# Patient Record
Sex: MALE | Race: WHITE | NOT HISPANIC OR LATINO | Employment: OTHER | URBAN - METROPOLITAN AREA
[De-identification: names, ages, dates, MRNs, and addresses within clinical notes are randomized per-mention and may not be internally consistent; named-entity substitution may affect disease eponyms.]

---

## 2017-02-22 ENCOUNTER — TRANSCRIBE ORDERS (OUTPATIENT)
Dept: LAB | Facility: HOSPITAL | Age: 67
End: 2017-02-22

## 2017-02-22 ENCOUNTER — LAB (OUTPATIENT)
Dept: LAB | Facility: HOSPITAL | Age: 67
End: 2017-02-22

## 2017-02-22 DIAGNOSIS — Z96.651 PRESENCE OF RIGHT ARTIFICIAL KNEE JOINT: Primary | ICD-10-CM

## 2017-02-22 DIAGNOSIS — Z96.651 PRESENCE OF RIGHT ARTIFICIAL KNEE JOINT: ICD-10-CM

## 2017-02-22 LAB
BASOPHILS # BLD AUTO: 0.07 10*3/MM3 (ref 0–0.2)
BASOPHILS NFR BLD AUTO: 1 % (ref 0–1)
CRP SERPL-MCNC: 3.4 MG/DL (ref 0–10)
DEPRECATED RDW RBC AUTO: 50.7 FL (ref 37–54)
EOSINOPHIL # BLD AUTO: 0.42 10*3/MM3 (ref 0.1–0.3)
EOSINOPHIL NFR BLD AUTO: 5.9 % (ref 0–3)
ERYTHROCYTE [DISTWIDTH] IN BLOOD BY AUTOMATED COUNT: 14.9 % (ref 11.3–14.5)
ERYTHROCYTE [SEDIMENTATION RATE] IN BLOOD: 8 MM/HR (ref 0–20)
HCT VFR BLD AUTO: 39.9 % (ref 38.9–50.9)
HGB BLD-MCNC: 13 G/DL (ref 13.1–17.5)
IMM GRANULOCYTES # BLD: 0.01 10*3/MM3 (ref 0–0.03)
IMM GRANULOCYTES NFR BLD: 0.1 % (ref 0–0.6)
LYMPHOCYTES # BLD AUTO: 1.57 10*3/MM3 (ref 0.6–4.8)
LYMPHOCYTES NFR BLD AUTO: 22.1 % (ref 24–44)
MCH RBC QN AUTO: 30 PG (ref 27–31)
MCHC RBC AUTO-ENTMCNC: 32.6 G/DL (ref 32–36)
MCV RBC AUTO: 92.1 FL (ref 80–99)
MONOCYTES # BLD AUTO: 0.85 10*3/MM3 (ref 0–1)
MONOCYTES NFR BLD AUTO: 11.9 % (ref 0–12)
NEUTROPHILS # BLD AUTO: 4.2 10*3/MM3 (ref 1.5–8.3)
NEUTROPHILS NFR BLD AUTO: 59 % (ref 41–71)
PLATELET # BLD AUTO: 251 10*3/MM3 (ref 150–450)
PMV BLD AUTO: 10.7 FL (ref 6–12)
RBC # BLD AUTO: 4.33 10*6/MM3 (ref 4.2–5.76)
WBC NRBC COR # BLD: 7.12 10*3/MM3 (ref 3.5–10.8)

## 2017-02-22 PROCEDURE — 36415 COLL VENOUS BLD VENIPUNCTURE: CPT

## 2017-02-22 PROCEDURE — 86140 C-REACTIVE PROTEIN: CPT | Performed by: ORTHOPAEDIC SURGERY

## 2017-02-22 PROCEDURE — 85652 RBC SED RATE AUTOMATED: CPT | Performed by: ORTHOPAEDIC SURGERY

## 2017-02-22 PROCEDURE — 85025 COMPLETE CBC W/AUTO DIFF WBC: CPT | Performed by: ORTHOPAEDIC SURGERY

## 2017-03-01 ENCOUNTER — TRANSCRIBE ORDERS (OUTPATIENT)
Dept: ADMINISTRATIVE | Facility: HOSPITAL | Age: 67
End: 2017-03-01

## 2017-03-01 DIAGNOSIS — M25.561 RIGHT KNEE PAIN, UNSPECIFIED CHRONICITY: Primary | ICD-10-CM

## 2017-03-15 ENCOUNTER — HOSPITAL ENCOUNTER (OUTPATIENT)
Dept: NUCLEAR MEDICINE | Facility: HOSPITAL | Age: 67
Discharge: HOME | End: 2017-03-15
Attending: ORTHOPAEDIC SURGERY

## 2017-03-15 ENCOUNTER — APPOINTMENT (OUTPATIENT)
Dept: NUCLEAR MEDICINE | Facility: HOSPITAL | Age: 67
End: 2017-03-15
Attending: ORTHOPAEDIC SURGERY

## 2017-03-15 DIAGNOSIS — M25.561 RIGHT KNEE PAIN, UNSPECIFIED CHRONICITY: ICD-10-CM

## 2017-03-15 PROCEDURE — 0 TECHNETIUM MEDRONATE KIT: Performed by: ORTHOPAEDIC SURGERY

## 2017-03-15 PROCEDURE — 78306 BONE IMAGING WHOLE BODY: CPT

## 2017-03-15 PROCEDURE — A9503 TC99M MEDRONATE: HCPCS | Performed by: ORTHOPAEDIC SURGERY

## 2017-03-15 RX ORDER — TC 99M MEDRONATE 20 MG/10ML
23.8 INJECTION, POWDER, LYOPHILIZED, FOR SOLUTION INTRAVENOUS
Status: COMPLETED | OUTPATIENT
Start: 2017-03-15 | End: 2017-03-15

## 2017-03-15 RX ADMIN — Medication 23.8 MILLICURIE: at 09:36

## 2017-09-20 ENCOUNTER — OFFICE VISIT (OUTPATIENT)
Dept: ORTHOPEDIC SURGERY | Facility: CLINIC | Age: 67
End: 2017-09-20

## 2017-09-20 VITALS
SYSTOLIC BLOOD PRESSURE: 164 MMHG | HEIGHT: 72 IN | WEIGHT: 231 LBS | DIASTOLIC BLOOD PRESSURE: 92 MMHG | HEART RATE: 69 BPM | BODY MASS INDEX: 31.29 KG/M2

## 2017-09-20 DIAGNOSIS — Z09 POSTOPERATIVE EXAMINATION: Primary | ICD-10-CM

## 2017-09-20 DIAGNOSIS — Z96.651 STATUS POST TOTAL RIGHT KNEE REPLACEMENT: ICD-10-CM

## 2017-09-20 PROCEDURE — 99213 OFFICE O/P EST LOW 20 MIN: CPT | Performed by: ORTHOPAEDIC SURGERY

## 2017-09-20 RX ORDER — NAPROXEN 500 MG/1
500 TABLET ORAL EVERY MORNING
COMMUNITY
Start: 2017-08-12 | End: 2019-05-18 | Stop reason: HOSPADM

## 2017-09-20 NOTE — PROGRESS NOTES
[facility] [place] [facility] Note    Subjective     Chief Complaint   Patient presents with   • Right Knee - Follow-up     6 month          HPI    Larry D Klinefelter is a 67 y.o. male. Patient follows up today for his right total knee arthroplasty.  Surgery was February 2016.  Pain is only mild on occasion.  He has no swelling, popping or grinding.  Symptoms are improved compared to his preoperative symptoms.  He is pleased with the results.  He is here today for routine follow-up.  There was some concern about his femoral component on his previous x-rays and most specifically with possible signs of loosening.      There is no problem list on file for this patient.    Past Medical History:   Diagnosis Date   • Alzheimer's disease    • Anemia    • Chest pain    • Emphysema of lung    • Hepatitis    • Medial meniscus tear    • Obesity    • Obstructive sleep apnea on CPAP    • Primary osteoarthritis of left knee     and right knee      Past Surgical History:   Procedure Laterality Date   • CARPAL TUNNEL RELEASE     • FRACTURE SURGERY     • KNEE ARTHROSCOPY Right    • REPLACEMENT TOTAL KNEE Right    • TONSILLECTOMY        Family History   Problem Relation Age of Onset   • Rheum arthritis Mother    • Stroke Father    • Heart attack Father    • Hypertension Father    • Clotting disorder Father    • Rheum arthritis Father    • Hypertension Other      Social History     Social History   • Marital status:      Spouse name: N/A   • Number of children: N/A   • Years of education: N/A     Occupational History   • Not on file.     Social History Main Topics   • Smoking status: Never Smoker   • Smokeless tobacco: Never Used   • Alcohol use No   • Drug use: No   • Sexual activity: Defer     Other Topics Concern   • Not on file     Social History Narrative      Current Outpatient Prescriptions on File Prior to Visit   Medication Sig Dispense Refill   • aspirin 81 MG tablet Take  by mouth Take As Directed.     •  Calcium Carb-Cholecalciferol (CALCIUM PLUS VITAMIN D3 PO) Take  by mouth Take As Directed.     • CHONDROITIN SULFATE PO Take  by mouth Take As Directed. Chondroitin Sulphate 600MG     • Coenzyme Q10 (CO Q-10) 200 MG capsule Take 400 mg by mouth Take As Directed.     • diclofenac (VOLTAREN) 1 % gel gel Place  on the skin Take As Directed.     • ezetimibe (ZETIA) 10 MG tablet Take  by mouth Take As Directed.     • Glucosamine Sulfate 750 MG capsule Take  by mouth Take As Directed.     • lidocaine-Maalox-diphenhydramine (MIRACLE MOUTHWASH) 900 mL suspension Take  by mouth Take As Directed.     • Multiple Vitamins-Minerals (CENTRUM SILVER PO) Take  by mouth Take As Directed.     • Multiple Vitamins-Minerals (PRESERVISION AREDS PO) Take  by mouth Daily.     • Multiple Vitamins-Minerals (VITEYES COMPLETE PO) Take  by mouth Daily.     • Polyethylene Glycol 3350 (MIRALAX PO) Take  by mouth Take As Directed.     • Trolamine Salicylate (ASPERCREME) 10 % lotion Apply  topically Take As Directed.     • docusate sodium (COLACE) 250 MG capsule Take  by mouth Take As Directed.     • naproxen (NAPROSYN) 375 MG tablet Take  by mouth As Needed.     • Tolnaftate (ANTIFUNGAL EX) Apply  topically Take As Directed.       No current facility-administered medications on file prior to visit.       Allergies   Allergen Reactions   • Advil [Ibuprofen]         Review of Systems   Constitutional: Negative for activity change, appetite change, chills, diaphoresis, fatigue, fever and unexpected weight change.   HENT: Negative for congestion, dental problem, drooling, ear discharge, ear pain, facial swelling, hearing loss, mouth sores, nosebleeds, postnasal drip, rhinorrhea, sinus pressure, sneezing, sore throat, tinnitus, trouble swallowing and voice change.    Eyes: Negative for photophobia, pain, discharge, redness, itching and visual disturbance.   Respiratory: Negative for apnea, cough, choking, chest tightness, shortness of breath, wheezing  "and stridor.    Cardiovascular: Negative for chest pain, palpitations and leg swelling.   Gastrointestinal: Negative for abdominal distention, abdominal pain, anal bleeding, blood in stool, constipation, diarrhea, nausea, rectal pain and vomiting.   Endocrine: Negative for cold intolerance, heat intolerance, polydipsia, polyphagia and polyuria.   Genitourinary: Negative for decreased urine volume, difficulty urinating, dysuria, enuresis, flank pain, frequency, genital sores, hematuria and urgency.   Musculoskeletal: Positive for arthralgias. Negative for back pain, gait problem, joint swelling, myalgias, neck pain and neck stiffness.   Skin: Negative for color change, pallor, rash and wound.   Allergic/Immunologic: Negative for environmental allergies, food allergies and immunocompromised state.   Neurological: Negative for dizziness, tremors, seizures, syncope, facial asymmetry, speech difficulty, weakness, light-headedness, numbness and headaches.   Hematological: Negative for adenopathy. Does not bruise/bleed easily.   Psychiatric/Behavioral: Negative for agitation, behavioral problems, confusion, decreased concentration, dysphoric mood, hallucinations, self-injury, sleep disturbance and suicidal ideas. The patient is not nervous/anxious and is not hyperactive.         Objective      Physical Exam  /92  Pulse 69  Ht 72\" (182.9 cm)  Wt 231 lb (105 kg)  BMI 31.33 kg/m2    Body mass index is 31.33 kg/(m^2).    General:   Mental Status:  Alert   Appearance: Cooperative, in no acute distress   Build and Nutrition: Overweight    Orientation: Alert and oriented to person, place and time   Posture: Normal   Gait: Normal    Integument:   Right knee: Wound is well-healed with no signs of infection    Lower Extremities:   Right Knee:    Tenderness:  No medial or lateral joint line tenderness    Effusion:  1+    Swelling: None    Crepitus:  None    Range of motion:  Extension: 0°       Flexion: 120°  Instability: "  No varus laxity, no valgus laxity, negative anterior drawer  Deformities:  None  '    Imaging/Studies  Imaging Results (last 24 hours)     Procedure Component Value Units Date/Time    XR Knee 3+ View With [place] Right [[id number]] Resulted:  09/20/17 1156     Updated:  09/20/17 1157    Narrative:       Right Knee Radiographs  Indication: status-post right total knee arthroplasty  Views: AP, lateral, and sunrise views of the right knee    Comparison: no change compared to prior study    Findings:    The components are well aligned, and the findings on the femur are   stable, with the exception of some osteolysis noted posteriorly by the   condyles.            Assessment and Plan     Alec was seen today for follow-up.    Diagnoses and all orders for this visit:    Postoperative examination    Status post total right knee replacement  -     XR Knee 3+ View With [place] Right      I reviewed my findings with patient today.  We will continue to keep an eye on the femoral component, and I will see him back in a year with repeat x-rays.  I will see him back sooner for any problems.  I see no signs of infection clinically, and the patient has had improved symptoms compared to his preoperative symptoms.  He is pleased with his knee.    Return in about 1 year (around 9/20/2018) for Recheck with X-Rays.      Medical Decision Making    Data/Risk: radiology tests and independent visualization of imaging, lab tests, or EMG/NCV      Willian Jc MD  09/20/17  12:07 PM

## 2017-11-03 ENCOUNTER — TELEPHONE (OUTPATIENT)
Dept: ORTHOPEDIC SURGERY | Facility: CLINIC | Age: 67
End: 2017-11-03

## 2017-11-03 NOTE — TELEPHONE ENCOUNTER
Patient would like to know if he needs to take an antibiotic prior to his toe nail removal procedure on 11/14/17. He had right knee total replacement surgery by Dr. Jc about 1 1/2 years ago.

## 2017-11-03 NOTE — TELEPHONE ENCOUNTER
I called patient and told him he does not need an antibiotic for that procedure.  He understood.  Nikki

## 2018-09-19 ENCOUNTER — OFFICE VISIT (OUTPATIENT)
Dept: ORTHOPEDIC SURGERY | Facility: CLINIC | Age: 68
End: 2018-09-19

## 2018-09-19 VITALS — OXYGEN SATURATION: 98 % | BODY MASS INDEX: 32.43 KG/M2 | HEIGHT: 72 IN | HEART RATE: 70 BPM | WEIGHT: 239.42 LBS

## 2018-09-19 DIAGNOSIS — Z96.651 HISTORY OF TOTAL RIGHT KNEE REPLACEMENT: Primary | ICD-10-CM

## 2018-09-19 DIAGNOSIS — Z09 POSTOPERATIVE EXAMINATION: ICD-10-CM

## 2018-09-19 PROCEDURE — 99213 OFFICE O/P EST LOW 20 MIN: CPT | Performed by: ORTHOPAEDIC SURGERY

## 2018-09-19 RX ORDER — NITROGLYCERIN 0.4 MG/1
0.4 TABLET SUBLINGUAL AS NEEDED
COMMUNITY

## 2018-09-19 RX ORDER — ATORVASTATIN CALCIUM 20 MG/1
20 TABLET, FILM COATED ORAL DAILY
COMMUNITY
Start: 2018-06-26 | End: 2020-07-08 | Stop reason: SDUPTHER

## 2018-09-19 RX ORDER — LEVOTHYROXINE SODIUM 25 MCG
25 TABLET ORAL DAILY
COMMUNITY
Start: 2018-07-20

## 2018-09-19 RX ORDER — LISINOPRIL 5 MG/1
5 TABLET ORAL NIGHTLY
COMMUNITY
Start: 2018-07-25 | End: 2020-11-01

## 2018-09-19 RX ORDER — METOPROLOL SUCCINATE 25 MG
25 TABLET, EXTENDED RELEASE 24 HR ORAL DAILY
Status: ON HOLD | COMMUNITY
Start: 2018-06-26 | End: 2020-11-02

## 2018-09-19 NOTE — PROGRESS NOTES
[facility] – [place] [facility] Note    Subjective     Chief Complaint   Patient presents with   • Right Knee - Follow-up     1 year f/u  Status post total right knee replacement 2/16        HPI    Larry D Klinefelter is a 68 y.o. male.  He follows up today for his right knee.  He is not having any pain.  Fully ambulatory without external aids.  Perforating follow-up.  We were monitoring the radiographic features of his knee.      There is no problem list on file for this patient.    Past Medical History:   Diagnosis Date   • Alzheimer's disease    • Anemia    • Chest pain    • Emphysema of lung (CMS/HCC)    • Hepatitis    • Medial meniscus tear    • Obesity    • Obstructive sleep apnea on CPAP    • Primary osteoarthritis of left knee     and right knee      Past Surgical History:   Procedure Laterality Date   • CARPAL TUNNEL RELEASE     • FRACTURE SURGERY     • KNEE ARTHROSCOPY Right    • REPLACEMENT TOTAL KNEE Right    • TONSILLECTOMY        Family History   Problem Relation Age of Onset   • Rheum arthritis Mother    • Stroke Father    • Heart attack Father    • Hypertension Father    • Clotting disorder Father    • Rheum arthritis Father    • Hypertension Other      Social History     Social History   • Marital status:      Spouse name: N/A   • Number of children: N/A   • Years of education: N/A     Occupational History   • Not on file.     Social History Main Topics   • Smoking status: Never Smoker   • Smokeless tobacco: Never Used   • Alcohol use No   • Drug use: No   • Sexual activity: Defer     Other Topics Concern   • Not on file     Social History Narrative   • No narrative on file      Current Outpatient Prescriptions on File Prior to Visit   Medication Sig Dispense Refill   • aspirin 81 MG tablet Take  by mouth Take As Directed.     • Calcium Carb-Cholecalciferol (CALCIUM PLUS VITAMIN D3 PO) Take  by mouth Take As Directed.     • CHONDROITIN SULFATE PO Take  by mouth Take As Directed.  Chondroitin Sulphate 600MG     • Coenzyme Q10 (CO Q-10) 200 MG capsule Take 400 mg by mouth Take As Directed.     • diclofenac (VOLTAREN) 1 % gel gel Place  on the skin Take As Directed.     • docusate sodium (COLACE) 250 MG capsule Take  by mouth Take As Directed.     • ezetimibe (ZETIA) 10 MG tablet Take  by mouth Take As Directed.     • Glucosamine Sulfate 750 MG capsule Take  by mouth Take As Directed.     • Multiple Vitamins-Minerals (CENTRUM SILVER PO) Take  by mouth Take As Directed.     • Multiple Vitamins-Minerals (PRESERVISION AREDS PO) Take  by mouth Daily.     • naproxen (NAPROSYN) 500 MG tablet      • Polyethylene Glycol 3350 (MIRALAX PO) Take  by mouth Take As Directed.     • [DISCONTINUED] lidocaine-Maalox-diphenhydramine (MIRACLE MOUTHWASH) 900 mL suspension Take  by mouth Take As Directed.     • [DISCONTINUED] Multiple Vitamins-Minerals (VITEYES COMPLETE PO) Take  by mouth Daily.     • [DISCONTINUED] naproxen (NAPROSYN) 375 MG tablet Take  by mouth As Needed.     • [DISCONTINUED] Tolnaftate (ANTIFUNGAL EX) Apply  topically Take As Directed.     • [DISCONTINUED] Trolamine Salicylate (ASPERCREME) 10 % lotion Apply  topically Take As Directed.       No current facility-administered medications on file prior to visit.       Allergies   Allergen Reactions   • Advil [Ibuprofen]         Review of Systems   Constitutional: Negative for activity change, appetite change, chills, diaphoresis, fatigue, fever and unexpected weight change.   HENT: Negative for congestion, dental problem, drooling, ear discharge, ear pain, facial swelling, hearing loss, mouth sores, nosebleeds, postnasal drip, rhinorrhea, sinus pressure, sneezing, sore throat, tinnitus, trouble swallowing and voice change.    Eyes: Negative for photophobia, pain, discharge, redness, itching and visual disturbance.   Respiratory: Positive for apnea. Negative for cough, choking, chest tightness, shortness of breath, wheezing and stridor.   "  Cardiovascular: Negative for chest pain, palpitations and leg swelling.   Gastrointestinal: Negative for abdominal distention, abdominal pain, anal bleeding, blood in stool, constipation, diarrhea, nausea, rectal pain and vomiting.   Endocrine: Negative for cold intolerance, heat intolerance, polydipsia, polyphagia and polyuria.   Genitourinary: Negative for decreased urine volume, difficulty urinating, dysuria, enuresis, flank pain, frequency, genital sores, hematuria and urgency.   Musculoskeletal: Positive for arthralgias. Negative for back pain, gait problem, joint swelling, myalgias, neck pain and neck stiffness.   Skin: Negative for color change, pallor, rash and wound.   Allergic/Immunologic: Negative for environmental allergies, food allergies and immunocompromised state.   Neurological: Negative for dizziness, tremors, seizures, syncope, facial asymmetry, speech difficulty, weakness, light-headedness, numbness and headaches.   Hematological: Negative for adenopathy. Does not bruise/bleed easily.   Psychiatric/Behavioral: Negative for agitation, behavioral problems, confusion, decreased concentration, dysphoric mood, hallucinations, self-injury, sleep disturbance and suicidal ideas. The patient is not nervous/anxious and is not hyperactive.         Objective      Physical Exam  Pulse 70   Ht 182.9 cm (72.01\")   Wt 109 kg (239 lb 6.7 oz)   SpO2 98%   BMI 32.46 kg/m²     Body mass index is 32.46 kg/m².    General:   Mental Status:  Alert   Appearance: Cooperative, in no acute distress   Build and Nutrition: Overweight male   Orientation: Alert and oriented to person, place and time   Gait: Hunched over posture, with accommodating gait    Integument:   Right knee: Wound is well-healed with no signs of infection    Lower Extremities:   Right Knee:    Tenderness:  None    Effusion:  1+    Swelling: None    Crepitus:  None    Range of motion:  Extension: 0°       Flexion: 125°  Instability:  No varus laxity, " no valgus laxity, negative anterior drawer  Deformities:  None    Imaging/Studies      Imaging Results (last 24 hours)     Procedure Component Value Units Date/Time    XR Knee 3+ View With [place] Right [[id number]] Resulted:  09/19/18 1149     Updated:  09/19/18 1150    Narrative:       Right Knee Radiographs  Indication: status-post right total knee arthroplasty  Views: AP, lateral, and sunrise views of the right knee    Comparison: no change compared to prior study, 9/20/2017    Findings:   The radiographic loosened lines are stable compared to previous films, on   both the femoral and tibial aspects, with no acute bony features.    Implants are well aligned.          Assessment and Plan     Alec was seen today for follow-up.    Diagnoses and all orders for this visit:    History of total right knee replacement  -     XR Knee 3+ View With [place] Right    Postoperative examination        I reviewed my findings with patient today.  Implants are stable radiographically, and is not having any knee pain.  I will see him back in a year with repeat x-rays, but sooner for any problems.    Return in about 1 year (around 9/19/2019) for Recheck with X-Rays.      Medical Decision Making  Data/Risk: radiology tests and independent visualization of imaging, lab tests, or EMG/NCV      Willian Jc MD  09/19/18  11:58 AM

## 2019-02-06 ENCOUNTER — OFFICE VISIT (OUTPATIENT)
Dept: ORTHOPEDIC SURGERY | Facility: CLINIC | Age: 69
End: 2019-02-06

## 2019-02-06 VITALS — OXYGEN SATURATION: 97 % | HEIGHT: 72 IN | HEART RATE: 64 BPM | WEIGHT: 235.23 LBS | BODY MASS INDEX: 31.86 KG/M2

## 2019-02-06 DIAGNOSIS — M17.12 PRIMARY OSTEOARTHRITIS OF LEFT KNEE: Primary | ICD-10-CM

## 2019-02-06 PROCEDURE — 99213 OFFICE O/P EST LOW 20 MIN: CPT | Performed by: ORTHOPAEDIC SURGERY

## 2019-02-06 NOTE — PROGRESS NOTES
[facility] Note    Subjective     Chief Complaint   Patient presents with   • Left Knee - Pain        HPI    Larry D Klinefelter is a 68 y.o. male.  He presents today for evaluation of left knee pain.  He has had pain for several years, worsening over time.  Worse with climbing stairs and with work activities.  The pain is 5 out of 10, dull, no improvement with bracing.  Of note, he did well with a left total knee replacement.  No history of personal blood clots.  History of injections in the past.      There is no problem list on file for this patient.    Past Medical History:   Diagnosis Date   • Alzheimer's disease    • Anemia    • Chest pain    • Emphysema of lung (CMS/HCC)    • Hepatitis    • Medial meniscus tear    • Obesity    • Obstructive sleep apnea on CPAP    • Primary osteoarthritis of left knee     and right knee      Past Surgical History:   Procedure Laterality Date   • CARPAL TUNNEL RELEASE     • FRACTURE SURGERY     • KNEE ARTHROSCOPY Right    • REPLACEMENT TOTAL KNEE Right    • TONSILLECTOMY        Family History   Problem Relation Age of Onset   • Rheum arthritis Mother    • Stroke Father    • Heart attack Father    • Hypertension Father    • Clotting disorder Father    • Rheum arthritis Father    • Hypertension Other      Social History     Socioeconomic History   • Marital status:      Spouse name: Not on file   • Number of children: Not on file   • Years of education: Not on file   • Highest education level: Not on file   Social Needs   • Financial resource strain: Not on file   • Food insecurity - worry: Not on file   • Food insecurity - inability: Not on file   • Transportation needs - medical: Not on file   • Transportation needs - non-medical: Not on file   Occupational History   • Not on file   Tobacco Use   • Smoking status: Never Smoker   • Smokeless tobacco: Never Used   Substance and Sexual Activity   • Alcohol use: No   • Drug use: No   • Sexual  activity: Defer   Other Topics Concern   • Not on file   Social History Narrative   • Not on file      Current Outpatient Medications on File Prior to Visit   Medication Sig Dispense Refill   • aspirin 81 MG tablet Take  by mouth Take As Directed.     • atorvastatin (LIPITOR) 10 MG tablet      • Calcium Carb-Cholecalciferol (CALCIUM PLUS VITAMIN D3 PO) Take  by mouth Take As Directed.     • CHONDROITIN SULFATE PO Take  by mouth Take As Directed. Chondroitin Sulphate 600MG     • Coenzyme Q10 (CO Q-10) 200 MG capsule Take 400 mg by mouth Take As Directed.     • diclofenac (VOLTAREN) 1 % gel gel Place  on the skin Take As Directed.     • docusate sodium (COLACE) 250 MG capsule Take  by mouth Take As Directed.     • ezetimibe (ZETIA) 10 MG tablet Take  by mouth Take As Directed.     • Glucosamine Sulfate 750 MG capsule Take  by mouth Take As Directed.     • lisinopril (PRINIVIL,ZESTRIL) 5 MG tablet      • metoprolol tartrate (LOPRESSOR) 25 MG tablet Take 25 mg by mouth 2 (Two) Times a Day.     • Multiple Vitamins-Minerals (CENTRUM SILVER PO) Take  by mouth Take As Directed.     • Multiple Vitamins-Minerals (PRESERVISION AREDS PO) Take  by mouth Daily.     • naproxen (NAPROSYN) 500 MG tablet      • nitroglycerin (NITROSTAT) 0.3 MG SL tablet Place 0.3 mg under the tongue As Needed for Chest Pain. Take no more than 3 doses in 15 minutes.     • Polyethylene Glycol 3350 (MIRALAX PO) Take  by mouth Take As Directed.     • SYNTHROID 25 MCG tablet      • TOPROL XL 25 MG 24 hr tablet        No current facility-administered medications on file prior to visit.       Allergies   Allergen Reactions   • Advil [Ibuprofen]         Review of Systems   Constitutional: Negative for activity change, appetite change, chills, diaphoresis, fatigue, fever and unexpected weight change.   HENT: Negative for congestion, dental problem, drooling, ear discharge, ear pain, facial swelling, hearing loss, mouth sores, nosebleeds, postnasal drip,  "rhinorrhea, sinus pressure, sneezing, sore throat, tinnitus, trouble swallowing and voice change.    Eyes: Negative for photophobia, pain, discharge, redness, itching and visual disturbance.   Respiratory: Negative for apnea, cough, choking, chest tightness, shortness of breath, wheezing and stridor.    Cardiovascular: Negative for chest pain, palpitations and leg swelling.   Gastrointestinal: Negative for abdominal distention, abdominal pain, anal bleeding, blood in stool, constipation, diarrhea, nausea, rectal pain and vomiting.   Endocrine: Negative for cold intolerance, heat intolerance, polydipsia, polyphagia and polyuria.   Genitourinary: Negative for decreased urine volume, difficulty urinating, dysuria, enuresis, flank pain, frequency, genital sores, hematuria and urgency.   Musculoskeletal: Negative for arthralgias, back pain, gait problem, joint swelling, myalgias, neck pain and neck stiffness.        Left knee pain      Skin: Negative for color change, pallor, rash and wound.   Allergic/Immunologic: Negative for environmental allergies, food allergies and immunocompromised state.   Neurological: Negative for dizziness, tremors, seizures, syncope, facial asymmetry, speech difficulty, weakness, light-headedness, numbness and headaches.   Hematological: Negative for adenopathy. Does not bruise/bleed easily.   Psychiatric/Behavioral: Negative for agitation, behavioral problems, confusion, decreased concentration, dysphoric mood, hallucinations, self-injury, sleep disturbance and suicidal ideas. The patient is not nervous/anxious and is not hyperactive.         Objective      Physical Exam  Pulse 64   Ht 182.9 cm (72.01\")   Wt 107 kg (235 lb 3.7 oz)   SpO2 97%   BMI 31.90 kg/m²     Body mass index is 31.9 kg/m².    General:   Mental Status:  Alert   Appearance: Cooperative, in no acute distress   Build and Nutrition: Well-nourished and well developed male   Orientation: Alert and oriented to person, place " and time   Posture: Leaning forward   Gait: Limping on the left    Integument:   Left knee: No skin lesions, no rash, no ecchymosis    Lower Extremities:   Left Knee:    Tenderness:  Medial joint line tenderness    Effusion:  None    Swelling:  None    Crepitus:  Positive    Atrophy:  None    Range of motion:  Extension: 5°       Flexion: 120°  Instability:  No varus laxity, no valgus laxity, negative anterior drawer  Deformities:  Varus        Imaging/Studies  Imaging Results (last 24 hours)     Procedure Component Value Units Date/Time    XR Knee 4+ View Left [[id number]] Resulted:  02/06/19 1013     Updated:  02/06/19 1013    Narrative:       Left Knee Radiographs  Indication: left knee pain  Views: Standing AP's and skiers of both knees, with lateral and sunrise   views of the left knee    Comparison: no prior studies available    Findings:   Advanced arthritic changes, with bone-on-bone contact in medial   compartment, with medial osteophytes, severe varus alignment, lateral   osteophytes, and advanced patellofemoral degeneration.            Assessment and Plan     Alec was seen today for pain.    Diagnoses and all orders for this visit:    Primary osteoarthritis of left knee  -     XR Knee 4+ View Left        I reviewed my findings with patient today.  He has advanced left knee arthritis, and is a candidate for knee replacement surgery.  He wishes to discuss this with his wife.  He will contact me if he would like to proceed in the future.  He may also decide to do an injection instead, and we'll contact me if you would like to do that.  I did discuss that he could not have surgery for 3 months after an injection.  Please see my counseling note for details.      Surgical Counseling     I have informed the patient of the diagnosis and the prognosis.  Exhaustive conservative treatment modalities have not resulted in long term pain relief.  The symptoms have progressed to the point of daily pain and inability to  perform activities of daily living without significant pain. The patient has reached the point of desiring to proceed with total knee arthroplasty after discussing the risks, benefits and alternatives to the procedure.  The surgical procedure itself was discussed in detail. Risks of the procedure were discussed, which included but are not limited to, bleeding, infection, damage to blood vessels and nerves, incomplete pain relief, loosening of the prosthesis, deep infection, need for further surgery, loss of limb, deep venous thrombosis, pulmonary embolus, death, heart attack, stroke, kidney failure, liver failure, and anesthetic complications.  In addition, the potential for deep infection developing in the future was discussed, which could require further surgery.  The knee would have to be re-opened, debrided, and potentially remove the prosthesis, which may or may not be replaced in the future.  Also, the possibility for loosening of the prosthesis has been mentioned.  If the prosthesis loosened, a revision arthroplasty could be performed, with results that are not as predictable compared to the original procedure.  The typical rehabilitative course has also been discussed, and full recovery may take up to a year to see the maximum benefit.  The importance of patient cooperation in the rehabilitative efforts has also been discussed.  No guarantees whatsoever were given.  The patient understands the potential risks versus the benefits and desires to proceed with total knee arthroplasty at a mutually convenient time.      Return for When he opts for surgical intervention or injection as an alternative.      Medical Decision Making  Management Options : major surgery with risk factors  Data/Risk: radiology tests and independent visualization of imaging, lab tests, or EMG/NCV      Willian Jc MD  02/06/19  10:26 AM

## 2019-02-11 ENCOUNTER — PREP FOR SURGERY (OUTPATIENT)
Dept: OTHER | Facility: HOSPITAL | Age: 69
End: 2019-02-11

## 2019-02-11 DIAGNOSIS — M17.12 PRIMARY OSTEOARTHRITIS OF LEFT KNEE: Primary | ICD-10-CM

## 2019-02-11 RX ORDER — ACETAMINOPHEN 500 MG
1000 TABLET ORAL ONCE
Status: CANCELLED | OUTPATIENT
Start: 2019-02-11 | End: 2019-02-11

## 2019-02-11 RX ORDER — CHLORHEXIDINE GLUCONATE 4 G/100ML
SOLUTION TOPICAL DAILY
Qty: 236 ML | Refills: 0 | Status: ON HOLD | OUTPATIENT
Start: 2019-02-11 | End: 2019-05-16

## 2019-02-11 RX ORDER — CEFAZOLIN SODIUM 2 G/100ML
2 INJECTION, SOLUTION INTRAVENOUS ONCE
Status: CANCELLED | OUTPATIENT
Start: 2019-02-11 | End: 2019-02-11

## 2019-02-11 RX ORDER — PREGABALIN 150 MG/1
150 CAPSULE ORAL ONCE
Status: CANCELLED | OUTPATIENT
Start: 2019-02-11 | End: 2019-02-11

## 2019-03-05 ENCOUNTER — APPOINTMENT (OUTPATIENT)
Dept: PREADMISSION TESTING | Facility: HOSPITAL | Age: 69
End: 2019-03-05

## 2019-03-05 VITALS — HEIGHT: 70 IN | WEIGHT: 245.15 LBS | BODY MASS INDEX: 35.1 KG/M2

## 2019-03-05 DIAGNOSIS — M17.12 PRIMARY OSTEOARTHRITIS OF LEFT KNEE: ICD-10-CM

## 2019-03-05 LAB
ANION GAP SERPL CALCULATED.3IONS-SCNC: 7 MMOL/L (ref 3–11)
APTT PPP: 28.4 SECONDS (ref 24–37)
BASOPHILS # BLD AUTO: 0.05 10*3/MM3 (ref 0–0.2)
BASOPHILS NFR BLD AUTO: 0.7 % (ref 0–1)
BILIRUB UR QL STRIP: NEGATIVE
BUN BLD-MCNC: 19 MG/DL (ref 9–23)
BUN/CREAT SERPL: 21.8 (ref 7–25)
CALCIUM SPEC-SCNC: 9.2 MG/DL (ref 8.7–10.4)
CHLORIDE SERPL-SCNC: 107 MMOL/L (ref 99–109)
CLARITY UR: CLEAR
CO2 SERPL-SCNC: 26 MMOL/L (ref 20–31)
COLOR UR: YELLOW
CREAT BLD-MCNC: 0.87 MG/DL (ref 0.6–1.3)
CRP SERPL-MCNC: 0.2 MG/DL (ref 0–1)
DEPRECATED RDW RBC AUTO: 51.4 FL (ref 37–54)
EOSINOPHIL # BLD AUTO: 0.35 10*3/MM3 (ref 0–0.3)
EOSINOPHIL NFR BLD AUTO: 4.8 % (ref 0–3)
ERYTHROCYTE [DISTWIDTH] IN BLOOD BY AUTOMATED COUNT: 14.5 % (ref 11.3–14.5)
ERYTHROCYTE [SEDIMENTATION RATE] IN BLOOD: 5 MM/HR (ref 0–20)
GFR SERPL CREATININE-BSD FRML MDRD: 87 ML/MIN/1.73
GLUCOSE BLD-MCNC: 110 MG/DL (ref 70–100)
GLUCOSE UR STRIP-MCNC: NEGATIVE MG/DL
HBA1C MFR BLD: 5.4 % (ref 4.8–5.6)
HCT VFR BLD AUTO: 41.9 % (ref 38.9–50.9)
HGB BLD-MCNC: 13.4 G/DL (ref 13.1–17.5)
HGB UR QL STRIP.AUTO: NEGATIVE
IMM GRANULOCYTES # BLD AUTO: 0.01 10*3/MM3 (ref 0–0.05)
IMM GRANULOCYTES NFR BLD AUTO: 0.1 % (ref 0–0.6)
INR PPP: 1.12 (ref 0.85–1.16)
KETONES UR QL STRIP: NEGATIVE
LEUKOCYTE ESTERASE UR QL STRIP.AUTO: NEGATIVE
LYMPHOCYTES # BLD AUTO: 1.38 10*3/MM3 (ref 0.6–4.8)
LYMPHOCYTES NFR BLD AUTO: 18.8 % (ref 24–44)
MCH RBC QN AUTO: 30.9 PG (ref 27–31)
MCHC RBC AUTO-ENTMCNC: 32 G/DL (ref 32–36)
MCV RBC AUTO: 96.8 FL (ref 80–99)
MONOCYTES # BLD AUTO: 0.73 10*3/MM3 (ref 0–1)
MONOCYTES NFR BLD AUTO: 9.9 % (ref 0–12)
NEUTROPHILS # BLD AUTO: 4.83 10*3/MM3 (ref 1.5–8.3)
NEUTROPHILS NFR BLD AUTO: 65.8 % (ref 41–71)
NITRITE UR QL STRIP: NEGATIVE
PH UR STRIP.AUTO: 6.5 [PH] (ref 5–8)
PLATELET # BLD AUTO: 194 10*3/MM3 (ref 150–450)
PMV BLD AUTO: 10.3 FL (ref 6–12)
POTASSIUM BLD-SCNC: 4.2 MMOL/L (ref 3.5–5.5)
PROT UR QL STRIP: NEGATIVE
PROTHROMBIN TIME: 13.9 SECONDS (ref 11.2–14.3)
RBC # BLD AUTO: 4.33 10*6/MM3 (ref 4.2–5.76)
SODIUM BLD-SCNC: 140 MMOL/L (ref 132–146)
SP GR UR STRIP: 1.01 (ref 1–1.03)
UROBILINOGEN UR QL STRIP: NORMAL
WBC NRBC COR # BLD: 7.34 10*3/MM3 (ref 3.5–10.8)

## 2019-03-05 PROCEDURE — 93010 ELECTROCARDIOGRAM REPORT: CPT | Performed by: INTERNAL MEDICINE

## 2019-03-05 PROCEDURE — 36415 COLL VENOUS BLD VENIPUNCTURE: CPT

## 2019-03-05 PROCEDURE — 80048 BASIC METABOLIC PNL TOTAL CA: CPT | Performed by: ORTHOPAEDIC SURGERY

## 2019-03-05 PROCEDURE — 83036 HEMOGLOBIN GLYCOSYLATED A1C: CPT | Performed by: ANESTHESIOLOGY

## 2019-03-05 PROCEDURE — 86140 C-REACTIVE PROTEIN: CPT | Performed by: ORTHOPAEDIC SURGERY

## 2019-03-05 PROCEDURE — 85025 COMPLETE CBC W/AUTO DIFF WBC: CPT | Performed by: ORTHOPAEDIC SURGERY

## 2019-03-05 PROCEDURE — 85730 THROMBOPLASTIN TIME PARTIAL: CPT | Performed by: ORTHOPAEDIC SURGERY

## 2019-03-05 PROCEDURE — 93005 ELECTROCARDIOGRAM TRACING: CPT

## 2019-03-05 PROCEDURE — 81003 URINALYSIS AUTO W/O SCOPE: CPT | Performed by: ORTHOPAEDIC SURGERY

## 2019-03-05 PROCEDURE — 85652 RBC SED RATE AUTOMATED: CPT | Performed by: ORTHOPAEDIC SURGERY

## 2019-03-05 PROCEDURE — 85610 PROTHROMBIN TIME: CPT | Performed by: ORTHOPAEDIC SURGERY

## 2019-03-05 RX ORDER — FERROUS SULFATE TAB EC 324 MG (65 MG FE EQUIVALENT) 324 (65 FE) MG
324 TABLET DELAYED RESPONSE ORAL NIGHTLY
COMMUNITY

## 2019-03-05 ASSESSMENT — KOOS JR
KOOS JR SCORE: 57.14
KOOS JR SCORE: 12

## 2019-03-05 NOTE — DISCHARGE INSTRUCTIONS
The following information and instructions were given:    Do not eat, drink, smoke or chew gum after midnight the night before surgery. This also includes no mints.  Take all routine, prescribed medications including heart and blood pressure medicines with a sip of water unless otherwise instructed by your physician.   Do NOT take diabetic medication unless instructed by your physician.    If you were instructed to drink 20 ounces (or until full) of Gatorade or G2 (if diabetic) the morning of surgery, the Gatorade or G2 must be completed 1 hour before arrival time on the day of surgery .  No RED Gatorade or G2.      DO NOT shave for two days before your procedure.  Do not wear makeup.      DO NOT wear fingernail polish (gel/regular) and/or acrylic/artificial nails on the day of surgery.   If you have had a recent manicure and would rather not remove polish or artificial nails, then the minimum requirement is that the polish/artificial nails must be removed from the middle finger on each hand.      If you are having surgery/procedure on an upper extremity, then fingernail polish/artificial fingernails must be removed for surgery.  NO EXCEPTIONS.      If you are having surgery/procedure on a lower extremity, then toenail polish on both extremities must be removed for surgery.  NO EXCEPTIONS.    Remove all jewelry (advise to go to jeweler if unable to remove).  Jewelry, especially rings, can no longer be taped for surgery.    Leave anything you consider valuable at home.    Leave your suitcase in the car until after your surgery.    Bring the following with you the day of your procedure (when applicable)       -picture ID and insurance cards       -Co-pay/deductible required by insurance       -Medications in the original bottles (not a list) including all over-the-counter medications if not brought to PAT       -Copy of advance directive, living will or power of  documents if not brought to PAT       -CPAP or  BIPAP mask and tubing (do not bring machine)       -Skin prep instruction(s) sheet       -PAT Pass    Education booklet, brochure, handout or binder related to procedure given to patient.    When applicable, an ERAS booklet was given to patient.    Pain Control After Surgery handout given to patient.    Respirex use (handout given to patient) and pneumonia prevention education provided.    Signs and Symptoms of infection discussed.    DVT Prevention education given.  Stressing the importance of ambulation.    Please apply Chlorhexadine wipes to surgical area (if instructed) the night before procedure and the AM of procedure and document date/time of applications on skin prep instruction sheet.

## 2019-03-05 NOTE — PAT
Cardiac clearance requested from Dr. Kraft's office.     Verified with patient that they received a prescription for Bactroban and Chlorhexidine shower from the office.  Reinforced with them to fill the prescription if they haven't already.  Verbal and written instructions given regarding proper use of the Bactroban and Chlorhexidine to patient and/or famlily during PAT visit. Patient/family also instructed to complete checklist and return it to Pre-op on the day of surgery.  Patient and/or family verbalized understanding.    Patient to apply Chlorhexadine wipes  to surgical area (as instructed) the night before procedure and the AM of procedure. Wipes provided.    Patient instructed to drink 20 ounces (or until full) of Gatorade or 20 ounces of G2 (if diabetic) and complete 1 hour before arrival time for procedure (NO RED Gatorade or G2)    Patient verbalized understanding.    Per Anesthesia Request, patient instructed not to take their ACE/ARB medications on the AM of surgery.    Patient attended joint replacement class today during PAT visit.

## 2019-05-01 ENCOUNTER — HOSPITAL ENCOUNTER (OUTPATIENT)
Dept: GENERAL RADIOLOGY | Facility: HOSPITAL | Age: 69
Discharge: HOME | End: 2019-05-01
Admitting: ORTHOPAEDIC SURGERY

## 2019-05-01 ENCOUNTER — APPOINTMENT (OUTPATIENT)
Dept: PREADMISSION TESTING | Facility: HOSPITAL | Age: 69
End: 2019-05-01

## 2019-05-01 VITALS — BODY MASS INDEX: 35.66 KG/M2 | HEIGHT: 70 IN | WEIGHT: 249.12 LBS

## 2019-05-01 LAB
ANION GAP SERPL CALCULATED.3IONS-SCNC: 11 MMOL/L
APTT PPP: 29.6 SECONDS (ref 24–37)
BACTERIA UR QL AUTO: NORMAL /HPF
BASOPHILS # BLD AUTO: 0.05 10*3/MM3 (ref 0–0.2)
BASOPHILS NFR BLD AUTO: 0.7 % (ref 0–1.5)
BILIRUB UR QL STRIP: NEGATIVE
BUN BLD-MCNC: 21 MG/DL (ref 8–23)
BUN/CREAT SERPL: 24.4 (ref 7–25)
CALCIUM SPEC-SCNC: 9.2 MG/DL (ref 8.6–10.5)
CHLORIDE SERPL-SCNC: 103 MMOL/L (ref 98–107)
CLARITY UR: CLEAR
CO2 SERPL-SCNC: 28 MMOL/L (ref 22–29)
COLOR UR: YELLOW
CREAT BLD-MCNC: 0.86 MG/DL (ref 0.76–1.27)
CRP SERPL-MCNC: 1.36 MG/DL (ref 0–0.5)
DEPRECATED RDW RBC AUTO: 47.7 FL (ref 37–54)
EOSINOPHIL # BLD AUTO: 0.43 10*3/MM3 (ref 0–0.4)
EOSINOPHIL NFR BLD AUTO: 5.6 % (ref 0.3–6.2)
ERYTHROCYTE [DISTWIDTH] IN BLOOD BY AUTOMATED COUNT: 13.4 % (ref 12.3–15.4)
ERYTHROCYTE [SEDIMENTATION RATE] IN BLOOD: 10 MM/HR (ref 0–20)
GFR SERPL CREATININE-BSD FRML MDRD: 88 ML/MIN/1.73
GLUCOSE BLD-MCNC: 114 MG/DL (ref 65–99)
GLUCOSE UR STRIP-MCNC: NEGATIVE MG/DL
HBA1C MFR BLD: 5 % (ref 4.8–5.6)
HCT VFR BLD AUTO: 39.6 % (ref 37.5–51)
HGB BLD-MCNC: 12.9 G/DL (ref 13–17.7)
HGB UR QL STRIP.AUTO: NEGATIVE
HYALINE CASTS UR QL AUTO: NORMAL /LPF
IMM GRANULOCYTES # BLD AUTO: 0.02 10*3/MM3 (ref 0–0.05)
IMM GRANULOCYTES NFR BLD AUTO: 0.3 % (ref 0–0.5)
INR PPP: 1.1 (ref 0.85–1.16)
KETONES UR QL STRIP: NEGATIVE
LEUKOCYTE ESTERASE UR QL STRIP.AUTO: NEGATIVE
LYMPHOCYTES # BLD AUTO: 1.49 10*3/MM3 (ref 0.7–3.1)
LYMPHOCYTES NFR BLD AUTO: 19.5 % (ref 19.6–45.3)
MCH RBC QN AUTO: 32 PG (ref 26.6–33)
MCHC RBC AUTO-ENTMCNC: 32.6 G/DL (ref 31.5–35.7)
MCV RBC AUTO: 98.3 FL (ref 79–97)
MONOCYTES # BLD AUTO: 0.72 10*3/MM3 (ref 0.1–0.9)
MONOCYTES NFR BLD AUTO: 9.4 % (ref 5–12)
NEUTROPHILS # BLD AUTO: 4.97 10*3/MM3 (ref 1.7–7)
NEUTROPHILS NFR BLD AUTO: 64.8 % (ref 42.7–76)
NITRITE UR QL STRIP: NEGATIVE
PH UR STRIP.AUTO: 6.5 [PH] (ref 5–8)
PLATELET # BLD AUTO: 234 10*3/MM3 (ref 140–450)
PMV BLD AUTO: 10.2 FL (ref 6–12)
POTASSIUM BLD-SCNC: 4.6 MMOL/L (ref 3.5–5.2)
PROT UR QL STRIP: NEGATIVE
PROTHROMBIN TIME: 13.7 SECONDS (ref 11.2–14.3)
RBC # BLD AUTO: 4.03 10*6/MM3 (ref 4.14–5.8)
RBC # UR: NORMAL /HPF
REF LAB TEST METHOD: NORMAL
SODIUM BLD-SCNC: 142 MMOL/L (ref 136–145)
SP GR UR STRIP: 1.03 (ref 1–1.03)
SQUAMOUS #/AREA URNS HPF: NORMAL /HPF
UROBILINOGEN UR QL STRIP: NORMAL
WBC NRBC COR # BLD: 7.66 10*3/MM3 (ref 3.4–10.8)
WBC UR QL AUTO: NORMAL /HPF

## 2019-05-01 PROCEDURE — 85730 THROMBOPLASTIN TIME PARTIAL: CPT | Performed by: ORTHOPAEDIC SURGERY

## 2019-05-01 PROCEDURE — 81001 URINALYSIS AUTO W/SCOPE: CPT | Performed by: ORTHOPAEDIC SURGERY

## 2019-05-01 PROCEDURE — 86140 C-REACTIVE PROTEIN: CPT | Performed by: ORTHOPAEDIC SURGERY

## 2019-05-01 PROCEDURE — 85025 COMPLETE CBC W/AUTO DIFF WBC: CPT | Performed by: ORTHOPAEDIC SURGERY

## 2019-05-01 PROCEDURE — 36415 COLL VENOUS BLD VENIPUNCTURE: CPT

## 2019-05-01 PROCEDURE — 83036 HEMOGLOBIN GLYCOSYLATED A1C: CPT | Performed by: ORTHOPAEDIC SURGERY

## 2019-05-01 PROCEDURE — 80048 BASIC METABOLIC PNL TOTAL CA: CPT | Performed by: ORTHOPAEDIC SURGERY

## 2019-05-01 PROCEDURE — 85652 RBC SED RATE AUTOMATED: CPT | Performed by: ORTHOPAEDIC SURGERY

## 2019-05-01 PROCEDURE — 71046 X-RAY EXAM CHEST 2 VIEWS: CPT

## 2019-05-01 PROCEDURE — 85610 PROTHROMBIN TIME: CPT | Performed by: ORTHOPAEDIC SURGERY

## 2019-05-01 ASSESSMENT — KOOS JR
KOOS JR SCORE: 61.583
KOOS JR SCORE: 10

## 2019-05-01 NOTE — PAT
Verified with patient that they received a prescription for Bactroban and Chlorhexidine shower from the office.  Reinforced with them to fill the prescription if they haven't already.  Verbal and written instructions given regarding proper use of the Bactroban and Chlorhexidine to patient and/or famlily during PAT visit. Patient/family also instructed to complete checklist and return it to Pre-op on the day of surgery.  Patient and/or family verbalized understanding.    Patient to apply Chlorhexadine wipes  to surgical area (as instructed) the night before procedure and the AM of procedure. Wipes provided.    Passed memory screen 5/5    Pt and wife plan to attend joint class before surgery date, instructed that class is on tuedays or thursdays at 1pm    Per deangelo in dr parkinson office, pt is cleared for surgery from a cardiac standpoint and ok to stop aspirin as directed by dr londono and to resume with his recs after surgery

## 2019-05-09 ENCOUNTER — TELEPHONE (OUTPATIENT)
Dept: ORTHOPEDIC SURGERY | Facility: CLINIC | Age: 69
End: 2019-05-09

## 2019-05-09 NOTE — TELEPHONE ENCOUNTER
----- Message from Willian Jc MD sent at 5/9/2019  9:37 AM EDT -----  Yes, that is fine.    ----- Message -----  From: Sabrina Self  Sent: 5/9/2019   9:33 AM  To: Willian Jc MD    IS IT OK FOR PATIENT TO GET LUCENTIS INJECTION IN HIS EYES THE DAY BEFORE SX. THIS IS FOR MACULAR DEGENERATION.    TXS

## 2019-05-13 ENCOUNTER — DOCUMENTATION (OUTPATIENT)
Dept: SOCIAL WORK | Facility: HOSPITAL | Age: 69
End: 2019-05-13

## 2019-05-13 NOTE — PROGRESS NOTES
"9/13- I spoke with Mr Klinefelter by phone to discuss d/c plan for upcoming surgery.   His plan is to go to [facility]. He went there after his right TKR in 2016. We reviewed Medicare guidelines that classify TKR's as overnight outpatient/observation. He verbalized understanding. With his permission I made a pre op referral to [facility]. I spoke with Aleksandr who accepted referral. Explained that [facility] may not have a bed available, his second choice would be [facility], but he is open to other SNS's if needed. Also explained that his insurance may not approve if he is too \"high level\". He has attended pre op joint class. He already has rolling walker and installed high toilet, no other questions verbalized.  S.Kellerman RN, case mgt  "

## 2019-05-15 ENCOUNTER — ANESTHESIA EVENT (OUTPATIENT)
Dept: PERIOP | Facility: HOSPITAL | Age: 69
End: 2019-05-15

## 2019-05-15 RX ORDER — FAMOTIDINE 10 MG/ML
20 INJECTION, SOLUTION INTRAVENOUS ONCE
Status: CANCELLED | OUTPATIENT
Start: 2019-05-15 | End: 2019-05-15

## 2019-05-16 ENCOUNTER — APPOINTMENT (OUTPATIENT)
Dept: GENERAL RADIOLOGY | Facility: HOSPITAL | Age: 69
End: 2019-05-16

## 2019-05-16 ENCOUNTER — ANESTHESIA (OUTPATIENT)
Dept: PERIOP | Facility: HOSPITAL | Age: 69
End: 2019-05-16

## 2019-05-16 ENCOUNTER — HOSPITAL ENCOUNTER (OUTPATIENT)
Facility: HOSPITAL | Age: 69
Discharge: HOME | End: 2019-05-18
Attending: ORTHOPAEDIC SURGERY | Admitting: ORTHOPAEDIC SURGERY

## 2019-05-16 DIAGNOSIS — Z78.9 IMPAIRED MOBILITY AND ADLS: ICD-10-CM

## 2019-05-16 DIAGNOSIS — Z74.09 IMPAIRED FUNCTIONAL MOBILITY, BALANCE, GAIT, AND ENDURANCE: Primary | ICD-10-CM

## 2019-05-16 DIAGNOSIS — Z74.09 IMPAIRED MOBILITY AND ADLS: ICD-10-CM

## 2019-05-16 DIAGNOSIS — M17.12 PRIMARY OSTEOARTHRITIS OF LEFT KNEE: ICD-10-CM

## 2019-05-16 PROBLEM — I10 HTN (HYPERTENSION): Status: ACTIVE | Noted: 2019-05-16

## 2019-05-16 PROBLEM — Z99.89 OSA ON CPAP: Status: ACTIVE | Noted: 2019-05-16

## 2019-05-16 PROBLEM — E03.9 HYPOTHYROID: Status: ACTIVE | Noted: 2019-05-16

## 2019-05-16 PROBLEM — E78.5 HLD (HYPERLIPIDEMIA): Status: ACTIVE | Noted: 2019-05-16

## 2019-05-16 PROBLEM — G47.33 OSA ON CPAP: Status: ACTIVE | Noted: 2019-05-16

## 2019-05-16 PROBLEM — I25.10 CAD (CORONARY ARTERY DISEASE): Status: ACTIVE | Noted: 2019-05-16

## 2019-05-16 PROBLEM — Z96.652 STATUS POST TOTAL LEFT KNEE REPLACEMENT: Status: ACTIVE | Noted: 2019-05-16

## 2019-05-16 LAB — POTASSIUM BLD-SCNC: 4.2 MMOL/L (ref 3.5–5.2)

## 2019-05-16 PROCEDURE — A9270 NON-COVERED ITEM OR SERVICE: HCPCS | Performed by: ORTHOPAEDIC SURGERY

## 2019-05-16 PROCEDURE — 97161 PT EVAL LOW COMPLEX 20 MIN: CPT

## 2019-05-16 PROCEDURE — 25010000002 FENTANYL CITRATE (PF) 100 MCG/2ML SOLUTION: Performed by: NURSE ANESTHETIST, CERTIFIED REGISTERED

## 2019-05-16 PROCEDURE — 63710000001 FAMOTIDINE 20 MG TABLET: Performed by: ANESTHESIOLOGY

## 2019-05-16 PROCEDURE — 63710000001 MUPIROCIN 2 % OINTMENT: Performed by: ORTHOPAEDIC SURGERY

## 2019-05-16 PROCEDURE — A9270 NON-COVERED ITEM OR SERVICE: HCPCS | Performed by: NURSE PRACTITIONER

## 2019-05-16 PROCEDURE — 63710000001 ACETAMINOPHEN 500 MG TABLET: Performed by: ORTHOPAEDIC SURGERY

## 2019-05-16 PROCEDURE — 27447 TOTAL KNEE ARTHROPLASTY: CPT | Performed by: ORTHOPAEDIC SURGERY

## 2019-05-16 PROCEDURE — 63710000001 PREGABALIN 75 MG CAPSULE: Performed by: ORTHOPAEDIC SURGERY

## 2019-05-16 PROCEDURE — 25010000002 ROPIVACINE HCL-NACL: Performed by: NURSE ANESTHETIST, CERTIFIED REGISTERED

## 2019-05-16 PROCEDURE — 25010000002 DEXAMETHASONE PER 1 MG: Performed by: NURSE ANESTHETIST, CERTIFIED REGISTERED

## 2019-05-16 PROCEDURE — 94799 UNLISTED PULMONARY SVC/PX: CPT

## 2019-05-16 PROCEDURE — 84132 ASSAY OF SERUM POTASSIUM: CPT | Performed by: ANESTHESIOLOGY

## 2019-05-16 PROCEDURE — 25010000003 CEFAZOLIN IN DEXTROSE 2-4 GM/100ML-% SOLUTION: Performed by: ORTHOPAEDIC SURGERY

## 2019-05-16 PROCEDURE — C1776 JOINT DEVICE (IMPLANTABLE): HCPCS | Performed by: ORTHOPAEDIC SURGERY

## 2019-05-16 PROCEDURE — C1713 ANCHOR/SCREW BN/BN,TIS/BN: HCPCS | Performed by: ORTHOPAEDIC SURGERY

## 2019-05-16 PROCEDURE — 63710000001 BISACODYL 5 MG TABLET DELAYED-RELEASE: Performed by: ORTHOPAEDIC SURGERY

## 2019-05-16 PROCEDURE — 25010000002 ONDANSETRON PER 1 MG: Performed by: NURSE ANESTHETIST, CERTIFIED REGISTERED

## 2019-05-16 PROCEDURE — 63710000001 ATORVASTATIN 20 MG TABLET: Performed by: NURSE PRACTITIONER

## 2019-05-16 PROCEDURE — A9270 NON-COVERED ITEM OR SERVICE: HCPCS | Performed by: ANESTHESIOLOGY

## 2019-05-16 PROCEDURE — 73560 X-RAY EXAM OF KNEE 1 OR 2: CPT

## 2019-05-16 PROCEDURE — 97116 GAIT TRAINING THERAPY: CPT

## 2019-05-16 PROCEDURE — 25010000002 PROPOFOL 1000 MG/ML EMULSION: Performed by: NURSE ANESTHETIST, CERTIFIED REGISTERED

## 2019-05-16 PROCEDURE — 63710000001 LISINOPRIL 5 MG TABLET: Performed by: NURSE PRACTITIONER

## 2019-05-16 PROCEDURE — 63710000001 HYDROCODONE-ACETAMINOPHEN 5-325 MG TABLET: Performed by: ORTHOPAEDIC SURGERY

## 2019-05-16 PROCEDURE — 25010000002 ROPIVACAINE PER 1 MG: Performed by: ORTHOPAEDIC SURGERY

## 2019-05-16 PROCEDURE — 25010000002 ONDANSETRON PER 1 MG: Performed by: ORTHOPAEDIC SURGERY

## 2019-05-16 DEVICE — ATTUNE KNEE SYSTEM TIBIAL BASE ROTATING PLATFORM SIZE 7 CEMENTED
Type: IMPLANTABLE DEVICE | Site: KNEE | Status: FUNCTIONAL
Brand: ATTUNE

## 2019-05-16 DEVICE — TOTL KN ATTUNE DEPUY 9527038: Type: IMPLANTABLE DEVICE | Site: KNEE | Status: FUNCTIONAL

## 2019-05-16 DEVICE — ATTUNE KNEE SYSTEM FEMORAL POSTERIOR STABILIZED SIZE 8 LEFT CEMENTED
Type: IMPLANTABLE DEVICE | Site: KNEE | Status: FUNCTIONAL
Brand: ATTUNE

## 2019-05-16 DEVICE — CMT BONE SIMPLEX/P FULL DOSE 10/PK: Type: IMPLANTABLE DEVICE | Site: KNEE | Status: FUNCTIONAL

## 2019-05-16 DEVICE — ATTUNE PATELLA MEDIALIZED ANATOMIC 41MM CEMENTED AOX
Type: IMPLANTABLE DEVICE | Site: PATELLA | Status: FUNCTIONAL
Brand: ATTUNE

## 2019-05-16 DEVICE — ATTUNE KNEE SYSTEM TIBIAL INSERT ROTATING PLATFORM POSTERIOR STABILIZED 8 5MM AOX
Type: IMPLANTABLE DEVICE | Site: KNEE | Status: FUNCTIONAL
Brand: ATTUNE

## 2019-05-16 RX ORDER — ACETAMINOPHEN 500 MG
1000 TABLET ORAL ONCE
Status: COMPLETED | OUTPATIENT
Start: 2019-05-16 | End: 2019-05-16

## 2019-05-16 RX ORDER — ACETAMINOPHEN 160 MG/5ML
650 SOLUTION ORAL EVERY 4 HOURS PRN
Status: DISCONTINUED | OUTPATIENT
Start: 2019-05-16 | End: 2019-05-18 | Stop reason: HOSPADM

## 2019-05-16 RX ORDER — ONDANSETRON 4 MG/1
4 TABLET, FILM COATED ORAL EVERY 6 HOURS PRN
Status: DISCONTINUED | OUTPATIENT
Start: 2019-05-16 | End: 2019-05-18 | Stop reason: HOSPADM

## 2019-05-16 RX ORDER — NALOXONE HCL 0.4 MG/ML
0.1 VIAL (ML) INJECTION
Status: DISCONTINUED | OUTPATIENT
Start: 2019-05-16 | End: 2019-05-18 | Stop reason: HOSPADM

## 2019-05-16 RX ORDER — SODIUM CHLORIDE 0.9 % (FLUSH) 0.9 %
3-10 SYRINGE (ML) INJECTION AS NEEDED
Status: DISCONTINUED | OUTPATIENT
Start: 2019-05-16 | End: 2019-05-16 | Stop reason: HOSPADM

## 2019-05-16 RX ORDER — METOPROLOL SUCCINATE 25 MG/1
25 TABLET, EXTENDED RELEASE ORAL DAILY
Status: DISCONTINUED | OUTPATIENT
Start: 2019-05-17 | End: 2019-05-18 | Stop reason: HOSPADM

## 2019-05-16 RX ORDER — CEFAZOLIN SODIUM 2 G/100ML
2 INJECTION, SOLUTION INTRAVENOUS EVERY 8 HOURS
Status: COMPLETED | OUTPATIENT
Start: 2019-05-16 | End: 2019-05-17

## 2019-05-16 RX ORDER — SODIUM CHLORIDE 0.9 % (FLUSH) 0.9 %
3 SYRINGE (ML) INJECTION EVERY 12 HOURS SCHEDULED
Status: DISCONTINUED | OUTPATIENT
Start: 2019-05-16 | End: 2019-05-16 | Stop reason: HOSPADM

## 2019-05-16 RX ORDER — ACETAMINOPHEN 325 MG/1
650 TABLET ORAL EVERY 4 HOURS PRN
Status: DISCONTINUED | OUTPATIENT
Start: 2019-05-16 | End: 2019-05-18 | Stop reason: HOSPADM

## 2019-05-16 RX ORDER — ACETAMINOPHEN 325 MG/1
650 TABLET ORAL ONCE AS NEEDED
Status: DISCONTINUED | OUTPATIENT
Start: 2019-05-16 | End: 2019-05-18 | Stop reason: HOSPADM

## 2019-05-16 RX ORDER — ACETAMINOPHEN 650 MG/1
650 SUPPOSITORY RECTAL ONCE AS NEEDED
Status: DISCONTINUED | OUTPATIENT
Start: 2019-05-16 | End: 2019-05-18 | Stop reason: HOSPADM

## 2019-05-16 RX ORDER — PREGABALIN 150 MG/1
150 CAPSULE ORAL ONCE
Status: COMPLETED | OUTPATIENT
Start: 2019-05-16 | End: 2019-05-16

## 2019-05-16 RX ORDER — ONDANSETRON 2 MG/ML
4 INJECTION INTRAMUSCULAR; INTRAVENOUS ONCE AS NEEDED
Status: DISCONTINUED | OUTPATIENT
Start: 2019-05-16 | End: 2019-05-18 | Stop reason: HOSPADM

## 2019-05-16 RX ORDER — ONDANSETRON 2 MG/ML
4 INJECTION INTRAMUSCULAR; INTRAVENOUS EVERY 6 HOURS PRN
Status: DISCONTINUED | OUTPATIENT
Start: 2019-05-16 | End: 2019-05-18 | Stop reason: HOSPADM

## 2019-05-16 RX ORDER — OXYCODONE AND ACETAMINOPHEN 7.5; 325 MG/1; MG/1
1 TABLET ORAL ONCE AS NEEDED
Status: DISCONTINUED | OUTPATIENT
Start: 2019-05-16 | End: 2019-05-18 | Stop reason: HOSPADM

## 2019-05-16 RX ORDER — MORPHINE SULFATE 4 MG/ML
4 INJECTION, SOLUTION INTRAMUSCULAR; INTRAVENOUS
Status: DISCONTINUED | OUTPATIENT
Start: 2019-05-16 | End: 2019-05-18 | Stop reason: HOSPADM

## 2019-05-16 RX ORDER — LIDOCAINE HYDROCHLORIDE 10 MG/ML
0.5 INJECTION, SOLUTION EPIDURAL; INFILTRATION; INTRACAUDAL; PERINEURAL ONCE AS NEEDED
Status: COMPLETED | OUTPATIENT
Start: 2019-05-16 | End: 2019-05-16

## 2019-05-16 RX ORDER — LABETALOL HYDROCHLORIDE 5 MG/ML
10 INJECTION, SOLUTION INTRAVENOUS EVERY 4 HOURS PRN
Status: DISCONTINUED | OUTPATIENT
Start: 2019-05-16 | End: 2019-05-18 | Stop reason: HOSPADM

## 2019-05-16 RX ORDER — SODIUM CHLORIDE, SODIUM LACTATE, POTASSIUM CHLORIDE, CALCIUM CHLORIDE 600; 310; 30; 20 MG/100ML; MG/100ML; MG/100ML; MG/100ML
9 INJECTION, SOLUTION INTRAVENOUS CONTINUOUS
Status: DISCONTINUED | OUTPATIENT
Start: 2019-05-16 | End: 2019-05-16

## 2019-05-16 RX ORDER — MAGNESIUM HYDROXIDE 1200 MG/15ML
LIQUID ORAL AS NEEDED
Status: DISCONTINUED | OUTPATIENT
Start: 2019-05-16 | End: 2019-05-16 | Stop reason: HOSPADM

## 2019-05-16 RX ORDER — OXYCODONE AND ACETAMINOPHEN 10; 325 MG/1; MG/1
1 TABLET ORAL EVERY 4 HOURS PRN
Status: DISCONTINUED | OUTPATIENT
Start: 2019-05-16 | End: 2019-05-18 | Stop reason: HOSPADM

## 2019-05-16 RX ORDER — PROMETHAZINE HYDROCHLORIDE 25 MG/1
25 SUPPOSITORY RECTAL ONCE AS NEEDED
Status: DISCONTINUED | OUTPATIENT
Start: 2019-05-16 | End: 2019-05-18 | Stop reason: HOSPADM

## 2019-05-16 RX ORDER — ONDANSETRON 2 MG/ML
INJECTION INTRAMUSCULAR; INTRAVENOUS AS NEEDED
Status: DISCONTINUED | OUTPATIENT
Start: 2019-05-16 | End: 2019-05-16 | Stop reason: SURG

## 2019-05-16 RX ORDER — BISACODYL 10 MG
10 SUPPOSITORY, RECTAL RECTAL DAILY PRN
Status: DISCONTINUED | OUTPATIENT
Start: 2019-05-16 | End: 2019-05-18 | Stop reason: HOSPADM

## 2019-05-16 RX ORDER — FENTANYL CITRATE 50 UG/ML
INJECTION, SOLUTION INTRAMUSCULAR; INTRAVENOUS AS NEEDED
Status: DISCONTINUED | OUTPATIENT
Start: 2019-05-16 | End: 2019-05-16 | Stop reason: SURG

## 2019-05-16 RX ORDER — ATORVASTATIN CALCIUM 20 MG/1
20 TABLET, FILM COATED ORAL DAILY
Status: DISCONTINUED | OUTPATIENT
Start: 2019-05-16 | End: 2019-05-18 | Stop reason: HOSPADM

## 2019-05-16 RX ORDER — TRANEXAMIC ACID 100 MG/ML
INJECTION, SOLUTION INTRAVENOUS AS NEEDED
Status: DISCONTINUED | OUTPATIENT
Start: 2019-05-16 | End: 2019-05-16 | Stop reason: SURG

## 2019-05-16 RX ORDER — FAMOTIDINE 20 MG/1
20 TABLET, FILM COATED ORAL ONCE
Status: COMPLETED | OUTPATIENT
Start: 2019-05-16 | End: 2019-05-16

## 2019-05-16 RX ORDER — HYDROCODONE BITARTRATE AND ACETAMINOPHEN 5; 325 MG/1; MG/1
1 TABLET ORAL EVERY 4 HOURS PRN
Status: DISCONTINUED | OUTPATIENT
Start: 2019-05-16 | End: 2019-05-18 | Stop reason: HOSPADM

## 2019-05-16 RX ORDER — NALOXONE HCL 0.4 MG/ML
0.4 VIAL (ML) INJECTION
Status: DISCONTINUED | OUTPATIENT
Start: 2019-05-16 | End: 2019-05-18 | Stop reason: HOSPADM

## 2019-05-16 RX ORDER — PROMETHAZINE HYDROCHLORIDE 25 MG/ML
6.25 INJECTION, SOLUTION INTRAMUSCULAR; INTRAVENOUS ONCE AS NEEDED
Status: DISCONTINUED | OUTPATIENT
Start: 2019-05-16 | End: 2019-05-18 | Stop reason: HOSPADM

## 2019-05-16 RX ORDER — PROMETHAZINE HYDROCHLORIDE 25 MG/1
25 TABLET ORAL ONCE AS NEEDED
Status: DISCONTINUED | OUTPATIENT
Start: 2019-05-16 | End: 2019-05-18 | Stop reason: HOSPADM

## 2019-05-16 RX ORDER — ASPIRIN 325 MG
325 TABLET, DELAYED RELEASE (ENTERIC COATED) ORAL DAILY
Status: DISCONTINUED | OUTPATIENT
Start: 2019-05-17 | End: 2019-05-18 | Stop reason: HOSPADM

## 2019-05-16 RX ORDER — ROPIVACAINE HYDROCHLORIDE 5 MG/ML
INJECTION, SOLUTION EPIDURAL; INFILTRATION; PERINEURAL AS NEEDED
Status: DISCONTINUED | OUTPATIENT
Start: 2019-05-16 | End: 2019-05-16 | Stop reason: HOSPADM

## 2019-05-16 RX ORDER — MEPERIDINE HYDROCHLORIDE 50 MG/ML
12.5 INJECTION INTRAMUSCULAR; INTRAVENOUS; SUBCUTANEOUS
Status: ACTIVE | OUTPATIENT
Start: 2019-05-16 | End: 2019-05-17

## 2019-05-16 RX ORDER — BUPIVACAINE HYDROCHLORIDE 2.5 MG/ML
INJECTION, SOLUTION EPIDURAL; INFILTRATION; INTRACAUDAL
Status: DISCONTINUED | OUTPATIENT
Start: 2019-05-16 | End: 2019-05-16 | Stop reason: SURG

## 2019-05-16 RX ORDER — BUPIVACAINE HYDROCHLORIDE 5 MG/ML
INJECTION, SOLUTION PERINEURAL
Status: COMPLETED | OUTPATIENT
Start: 2019-05-16 | End: 2019-05-16

## 2019-05-16 RX ORDER — IPRATROPIUM BROMIDE AND ALBUTEROL SULFATE 2.5; .5 MG/3ML; MG/3ML
3 SOLUTION RESPIRATORY (INHALATION) ONCE AS NEEDED
Status: DISCONTINUED | OUTPATIENT
Start: 2019-05-16 | End: 2019-05-18 | Stop reason: HOSPADM

## 2019-05-16 RX ORDER — SODIUM CHLORIDE 0.9 % (FLUSH) 0.9 %
1-10 SYRINGE (ML) INJECTION AS NEEDED
Status: DISCONTINUED | OUTPATIENT
Start: 2019-05-16 | End: 2019-05-18 | Stop reason: HOSPADM

## 2019-05-16 RX ORDER — DEXAMETHASONE SODIUM PHOSPHATE 4 MG/ML
INJECTION, SOLUTION INTRA-ARTICULAR; INTRALESIONAL; INTRAMUSCULAR; INTRAVENOUS; SOFT TISSUE AS NEEDED
Status: DISCONTINUED | OUTPATIENT
Start: 2019-05-16 | End: 2019-05-16 | Stop reason: SURG

## 2019-05-16 RX ORDER — HYDROMORPHONE HYDROCHLORIDE 1 MG/ML
0.5 INJECTION, SOLUTION INTRAMUSCULAR; INTRAVENOUS; SUBCUTANEOUS
Status: DISCONTINUED | OUTPATIENT
Start: 2019-05-16 | End: 2019-05-18 | Stop reason: HOSPADM

## 2019-05-16 RX ORDER — SODIUM CHLORIDE 0.9 % (FLUSH) 0.9 %
3 SYRINGE (ML) INJECTION EVERY 12 HOURS SCHEDULED
Status: DISCONTINUED | OUTPATIENT
Start: 2019-05-16 | End: 2019-05-18 | Stop reason: HOSPADM

## 2019-05-16 RX ORDER — POLYETHYLENE GLYCOL 3350 17 G/17G
17 POWDER, FOR SOLUTION ORAL DAILY
Status: DISCONTINUED | OUTPATIENT
Start: 2019-05-16 | End: 2019-05-16

## 2019-05-16 RX ORDER — CEFAZOLIN SODIUM 2 G/100ML
2 INJECTION, SOLUTION INTRAVENOUS ONCE
Status: COMPLETED | OUTPATIENT
Start: 2019-05-16 | End: 2019-05-16

## 2019-05-16 RX ORDER — FENTANYL CITRATE 50 UG/ML
50 INJECTION, SOLUTION INTRAMUSCULAR; INTRAVENOUS
Status: DISCONTINUED | OUTPATIENT
Start: 2019-05-16 | End: 2019-05-18 | Stop reason: HOSPADM

## 2019-05-16 RX ORDER — DOCUSATE SODIUM 100 MG/1
100 CAPSULE, LIQUID FILLED ORAL 2 TIMES DAILY PRN
Status: DISCONTINUED | OUTPATIENT
Start: 2019-05-16 | End: 2019-05-18 | Stop reason: HOSPADM

## 2019-05-16 RX ORDER — MELOXICAM 7.5 MG/1
15 TABLET ORAL DAILY
Status: DISCONTINUED | OUTPATIENT
Start: 2019-05-17 | End: 2019-05-18 | Stop reason: HOSPADM

## 2019-05-16 RX ORDER — LISINOPRIL 5 MG/1
5 TABLET ORAL NIGHTLY
Status: DISCONTINUED | OUTPATIENT
Start: 2019-05-16 | End: 2019-05-18 | Stop reason: HOSPADM

## 2019-05-16 RX ORDER — SODIUM CHLORIDE 9 MG/ML
120 INJECTION, SOLUTION INTRAVENOUS CONTINUOUS
Status: DISCONTINUED | OUTPATIENT
Start: 2019-05-16 | End: 2019-05-18 | Stop reason: HOSPADM

## 2019-05-16 RX ORDER — BISACODYL 5 MG/1
10 TABLET, DELAYED RELEASE ORAL DAILY PRN
Status: DISCONTINUED | OUTPATIENT
Start: 2019-05-16 | End: 2019-05-18 | Stop reason: HOSPADM

## 2019-05-16 RX ORDER — LEVOTHYROXINE SODIUM 0.03 MG/1
25 TABLET ORAL
Status: DISCONTINUED | OUTPATIENT
Start: 2019-05-17 | End: 2019-05-18 | Stop reason: HOSPADM

## 2019-05-16 RX ADMIN — SODIUM CHLORIDE, POTASSIUM CHLORIDE, SODIUM LACTATE AND CALCIUM CHLORIDE 9 ML/HR: 600; 310; 30; 20 INJECTION, SOLUTION INTRAVENOUS at 08:02

## 2019-05-16 RX ADMIN — ROPIVACAINE HYDROCHLORIDE 10 ML/HR: 5 INJECTION, SOLUTION EPIDURAL; INFILTRATION; PERINEURAL at 12:24

## 2019-05-16 RX ADMIN — HYDROCODONE BITARTRATE AND ACETAMINOPHEN 1 TABLET: 5; 325 TABLET ORAL at 17:51

## 2019-05-16 RX ADMIN — PROPOFOL 50 MCG/KG/MIN: 10 INJECTION, EMULSION INTRAVENOUS at 10:37

## 2019-05-16 RX ADMIN — TRANEXAMIC ACID 1000 MG: 100 INJECTION, SOLUTION INTRAVENOUS at 12:01

## 2019-05-16 RX ADMIN — MUPIROCIN 1 APPLICATION: 20 OINTMENT TOPICAL at 08:02

## 2019-05-16 RX ADMIN — BISACODYL 10 MG: 5 TABLET, COATED ORAL at 20:25

## 2019-05-16 RX ADMIN — ACETAMINOPHEN 1000 MG: 500 TABLET ORAL at 08:01

## 2019-05-16 RX ADMIN — CEFAZOLIN SODIUM 2 G: 2 INJECTION, SOLUTION INTRAVENOUS at 10:19

## 2019-05-16 RX ADMIN — LIDOCAINE HYDROCHLORIDE 0.5 ML: 10 INJECTION, SOLUTION EPIDURAL; INFILTRATION; INTRACAUDAL; PERINEURAL at 08:02

## 2019-05-16 RX ADMIN — HYDROCODONE BITARTRATE AND ACETAMINOPHEN 1 TABLET: 5; 325 TABLET ORAL at 21:56

## 2019-05-16 RX ADMIN — ATORVASTATIN CALCIUM 20 MG: 20 TABLET, FILM COATED ORAL at 20:19

## 2019-05-16 RX ADMIN — FENTANYL CITRATE 50 MCG: 50 INJECTION, SOLUTION INTRAMUSCULAR; INTRAVENOUS at 10:26

## 2019-05-16 RX ADMIN — ONDANSETRON 4 MG: 2 INJECTION INTRAMUSCULAR; INTRAVENOUS at 15:19

## 2019-05-16 RX ADMIN — SODIUM CHLORIDE, PRESERVATIVE FREE 3 ML: 5 INJECTION INTRAVENOUS at 20:18

## 2019-05-16 RX ADMIN — PREGABALIN 150 MG: 75 CAPSULE ORAL at 08:10

## 2019-05-16 RX ADMIN — SODIUM CHLORIDE, POTASSIUM CHLORIDE, SODIUM LACTATE AND CALCIUM CHLORIDE: 600; 310; 30; 20 INJECTION, SOLUTION INTRAVENOUS at 11:55

## 2019-05-16 RX ADMIN — FAMOTIDINE 20 MG: 20 TABLET ORAL at 08:01

## 2019-05-16 RX ADMIN — BUPIVACAINE HYDROCHLORIDE 2 ML: 5 INJECTION, SOLUTION PERINEURAL at 10:36

## 2019-05-16 RX ADMIN — BUPIVACAINE HYDROCHLORIDE 20 ML: 2.5 INJECTION, SOLUTION EPIDURAL; INFILTRATION; INTRACAUDAL; PERINEURAL at 12:55

## 2019-05-16 RX ADMIN — SODIUM CHLORIDE 120 ML/HR: 9 INJECTION, SOLUTION INTRAVENOUS at 16:23

## 2019-05-16 RX ADMIN — TRANEXAMIC ACID 1000 MG: 100 INJECTION, SOLUTION INTRAVENOUS at 10:36

## 2019-05-16 RX ADMIN — ONDANSETRON 4 MG: 2 INJECTION INTRAMUSCULAR; INTRAVENOUS at 12:11

## 2019-05-16 RX ADMIN — CEFAZOLIN SODIUM 2 G: 2 INJECTION, SOLUTION INTRAVENOUS at 17:11

## 2019-05-16 RX ADMIN — DEXAMETHASONE SODIUM PHOSPHATE 8 MG: 4 INJECTION, SOLUTION INTRAMUSCULAR; INTRAVENOUS at 10:58

## 2019-05-16 RX ADMIN — FENTANYL CITRATE 50 MCG: 50 INJECTION, SOLUTION INTRAMUSCULAR; INTRAVENOUS at 10:19

## 2019-05-16 RX ADMIN — LISINOPRIL 5 MG: 5 TABLET ORAL at 20:19

## 2019-05-16 NOTE — ANESTHESIA POSTPROCEDURE EVALUATION
Patient: Larry Dean Klinefelter    Procedure Summary     Date:  05/16/19 Room / Location:   [facility]    Anesthesia Start:  1019 Anesthesia Stop:  1234    Procedure:  TOTAL KNEE ARTHROPLASTY LEFT (Left Knee) Diagnosis:       Primary osteoarthritis of left knee      (Primary osteoarthritis of left knee [M17.12])    Surgeon:  Willian Jc MD Provider:  Armen Le MD    Anesthesia Type:  MAC, spinal ASA Status:  3          Anesthesia Type: MAC, spinal  Last vitals  BP   146/71 (05/16/19 1250)   Temp   97.9 °F (36.6 °C) (05/16/19 1250)   Pulse   60 (05/16/19 1250)   Resp   16 (05/16/19 1250)     SpO2   96 % (05/16/19 1250)     Post Anesthesia Care and Evaluation    Patient location during evaluation: PACU  Patient participation: complete - patient participated  Level of consciousness: awake and alert  Pain score: 0  Pain management: adequate  Airway patency: patent  Anesthetic complications: No anesthetic complications  PONV Status: none  Cardiovascular status: hemodynamically stable and acceptable  Respiratory status: nonlabored ventilation, acceptable and nasal cannula  Hydration status: acceptable

## 2019-05-16 NOTE — H&P
Pre-Op H&P  Larry Dean Klinefelter  [id number]  1950    Chief complaint: Left knee pain    HPI:    2/6/19 office visit:  Larry D Klinefelter is a 68 y.o. male.  He presents today for evaluation of left knee pain.  He has had pain for several years, worsening over time.  Worse with climbing stairs and with work activities.  The pain is 5 out of 10, dull, no improvement with bracing.  Of note, he did well with a left total knee replacement.  No history of personal blood clots.  History of injections in the past.    5/16/19:  Here today for left total knee arthroplasty    Review of Systems:  General ROS: negative for chills, fever or skin lesions;  No changes since last office visit  Cardiovascular ROS: no chest pain or +baseline dyspnea on exertion.  +Cardiac clearance.  No change since seen in Feb 2019.  [facility] 2018 d/t SOA with nonobstructive CAD and in an area not amendable to PCI per pt report  Respiratory ROS: no cough, shortness of breath, or wheezing    Allergies: No Known Allergies    Home Meds:    No current facility-administered medications on file prior to encounter.      Current Outpatient Medications on File Prior to Encounter   Medication Sig Dispense Refill   • aspirin 81 MG tablet Take 81 mg by mouth Daily.     • atorvastatin (LIPITOR) 20 MG tablet Take 20 mg by mouth Daily.     • Calcium Carb-Cholecalciferol (CALCIUM PLUS VITAMIN D3 PO) Take 1 tablet by mouth 2 (Two) Times a Day.     • chlorhexidine (HIBICLENS) 4 % external liquid Shower with solution as directed for 5 days prior to surgery 236 mL 0   • Coenzyme Q10 (CO Q-10) 100 MG capsule Take 100 mg by mouth 2 (Two) Times a Day.     • docusate sodium (COLACE) 250 MG capsule Take 250 mg by mouth Every Night.     • ezetimibe (ZETIA) 10 MG tablet Take 10 mg by mouth Daily.     • lisinopril (PRINIVIL,ZESTRIL) 5 MG tablet Take 5 mg by mouth Every Night.     • mupirocin (BACTROBAN) 2 % ointment Apply to the inside of each nostril as directed by  "provider 2 (Two) Times a Day for 5 days prior to surgery. 22 g 0   • SYNTHROID 25 MCG tablet Take 25 mcg by mouth Daily.     • TOPROL XL 25 MG 24 hr tablet Take 25 mg by mouth Daily.     • diclofenac (VOLTAREN) 1 % gel gel Place 4 g on the skin as directed by provider 4 (Four) Times a Day As Needed (feet).     • Glucosamine Sulfate 750 MG capsule Take 1 tablet by mouth 2 (Two) Times a Day.     • naproxen (NAPROSYN) 500 MG tablet Take 500 mg by mouth Every Morning.     • nitroglycerin (NITROSTAT) 0.4 MG SL tablet Place 0.4 mg under the tongue As Needed for Chest Pain. Take no more than 3 doses in 15 minutes.          PMH:   Past Medical History:   Diagnosis Date   • Anemia    • Bowel obstruction (CMS/HCC)    • Chest pain    • Constipation    • Coronary artery disease    • Disease of thyroid gland    • Hepatitis    • Hyperlipidemia    • Hypertension    • Macular degeneration    • Medial meniscus tear    • Obesity    • Obstructive sleep apnea on CPAP     setting - 13.   • Primary osteoarthritis of left knee     and right knee   • Wears glasses      PSH:    Past Surgical History:   Procedure Laterality Date   • CARDIAC CATHETERIZATION  01/2019   • COLONOSCOPY  2013   • FRACTURE SURGERY      left heel    • INGUINAL HERNIA REPAIR      x2    • KNEE ARTHROSCOPY Right    • REPLACEMENT TOTAL KNEE Right    • TONSILLECTOMY       Social History:   Tobacco:   Social History     Tobacco Use   Smoking Status Never Smoker   Smokeless Tobacco Never Used      Alcohol:     Social History     Substance and Sexual Activity   Alcohol Use No       Vitals:           /85 (BP Location: Right arm, Patient Position: Lying)   Pulse 64   Resp 16   Ht 177.8 cm (70\")   Wt 113 kg (249 lb 1.9 oz)   SpO2 97%   BMI 35.74 kg/m²     Physical Exam:  General Appearance:    Alert, cooperative, no distress, appears stated age   Head:    Normocephalic, without obvious abnormality, atraumatic   Lungs:     Clear to auscultation bilaterally, " respirations unlabored    Heart:   Regular rate and rhythm, S1 and S2 normal, no murmur, rub    or gallop    Abdomen:    Soft, non-tender.  +bowel sounds   Breast Exam:    deferred   Genitalia:    deferred   Extremities:   Extremities normal, atraumatic, no cyanosis or edema   Skin:   Skin color, texture, turgor normal, no rashes or lesions   Neurologic:   Grossly intact   Results Review  LABS:  Lab Results   Component Value Date    WBC 7.66 05/01/2019    HGB 12.9 (L) 05/01/2019    HCT 39.6 05/01/2019    MCV 98.3 (H) 05/01/2019     05/01/2019    NEUTROABS 4.97 05/01/2019    GLUCOSE 114 (H) 05/01/2019    BUN 21 05/01/2019    CREATININE 0.86 05/01/2019    EGFRIFNONA 88 05/01/2019     05/01/2019    K 4.2 05/16/2019     05/01/2019    CO2 28.0 05/01/2019    CALCIUM 9.2 05/01/2019       RADIOLOGY:  Imaging Results (last 72 hours)     ** No results found for the last 72 hours. **          I reviewed the patient's new clinical results.    Cancer Staging (if applicable)  Cancer Patient: __ yes _x_no __unknown; If yes, clinical stage T:__ N:__M:__, stage group or __N/A    Impression/Plan:      He has advanced left knee arthritis, and is a candidate for knee replacement surgery.   Please see my counseling note for details.      Surgical Counseling      I have informed the patient of the diagnosis and the prognosis.  Exhaustive conservative treatment modalities have not resulted in long term pain relief.  The symptoms have progressed to the point of daily pain and inability to perform activities of daily living without significant pain. The patient has reached the point of desiring to proceed with total knee arthroplasty after discussing the risks, benefits and alternatives to the procedure.  The surgical procedure itself was discussed in detail. Risks of the procedure were discussed, which included but are not limited to, bleeding, infection, damage to blood vessels and nerves, incomplete pain relief,  loosening of the prosthesis, deep infection, need for further surgery, loss of limb, deep venous thrombosis, pulmonary embolus, death, heart attack, stroke, kidney failure, liver failure, and anesthetic complications.  In addition, the potential for deep infection developing in the future was discussed, which could require further surgery.  The knee would have to be re-opened, debrided, and potentially remove the prosthesis, which may or may not be replaced in the future.  Also, the possibility for loosening of the prosthesis has been mentioned.  If the prosthesis loosened, a revision arthroplasty could be performed, with results that are not as predictable compared to the original procedure.  The typical rehabilitative course has also been discussed, and full recovery may take up to a year to see the maximum benefit.  The importance of patient cooperation in the rehabilitative efforts has also been discussed.  No guarantees whatsoever were given.  The patient understands the potential risks versus the benefits and desires to proceed with total knee arthroplasty    Sun Kwon, JASSI   5/16/2019   8:50 AM     Agree with above.  Plan for left total knee arthroplasty.

## 2019-05-16 NOTE — ANESTHESIA PROCEDURE NOTES
Peripheral Block      Patient location during procedure: post-op  Start time: 5/16/2019 12:35 PM  Stop time: 5/16/2019 12:55 PM  Reason for block: at surgeon's request and post-op pain management  Performed by  CRNA: Villa Kramer CRNA  Assisted by: Rowan Li CRNA  Preanesthetic Checklist  Completed: patient identified, site marked, surgical consent, pre-op evaluation, timeout performed, IV checked, risks and benefits discussed and monitors and equipment checked  Prep:  Sterile barriers:cap, gloves, mask and sterile barriers  Prep: ChloraPrep  Patient monitoring: blood pressure monitoring, continuous pulse oximetry and EKG  Procedure    Guidance:ultrasound guided  Images:still images obtained    Laterality:left  Block Type:adductor canal block  Injection Technique:catheter  Needle Type:Tuohy and echogenic  Needle Gauge:18 G    Catheter Size:20 G (20g)  Cath Depth at skin: 13 cm  Medications Used: bupivacaine PF (MARCAINE) 0.25 % injection, 20 mL  Med admintered at 5/16/2019 12:55 PM  Medications  Preservative Free Saline:10ml    Post Assessment  Injection Assessment: negative aspiration for heme, incremental injection and no paresthesia on injection  Patient Tolerance:comfortable throughout block  Complications:no  Additional Notes  Procedure:             The pt was placed in the Supine position.  The Insertion site was  prepped and Draped in sterile fashion.  The pt was anesthetized with  IV Sedation( see meds).  Skin and cutaneous tissue was infiltrated and anesthetized with 1% Lidocaine 3 mls via a 25g needle.  A BBraun 4 inch 18g echogenic needle was then  inserted approximately midline, mid-thigh and advanced In-plane with Ultrasound guidance.  Normal Saline PSF was utilized for hydrodissection of tissue.  The Vastus medialis and Sartorius muscle where visualized and the needle tip was placed in the adductor canal,  lateral to the femoral artery.  LA injection spread was visualized, injection was  incremental 1-5ml, injection pressure was normal or little, no intraneural injection, no vascular injection.  LA dose was injected thru the needle(see dose above).  A BBraun 20g wire stylet catheter was placed via the needle with ultrasound visualization and confirmation with NS fluid bolus. The labeled Catheter was then secured to skin at insertion site with skin afix and steristrips to curled catheter and CHG transparent dressing.  Thank you.

## 2019-05-16 NOTE — H&P
Patient Name: Larry Dean Klinefelter  MRN: [id number]  : 1950  DOS: 2019    Attending: Willian Jc MD    Primary Care Provider: Kin Nunez MD      Chief complaint:  Left knee pain    Subjective   Patient is a 68 y.o. male presented for left total knee arthroplasty by Dr. Jc.    He underwent surgery under spinal anethesia. He tolerated surgery well and is admitted for further medical management. His knee has been painful for many years. Conservative treatments failed to provide long term pain relief. He denies use of assistive device for ambulation or recent falls.     When seen postop he is doing well. He denies pain, but is still under effects of spinal block. He denies nausea, shortness of breath or chest pain. No hx of DVT or PE.    He has history of HTN, CAD and HLD. He is followed by Dr. Newman, cardiology, and received preop cardiac clearance.     ( Above is noted/ agree. Seen in his room afterwards. Doing well. No f/c/n/vom/sob. Good pain control. Ambulated with PT 15 ft with CGAx2 limited by pain and fatigue. )wy    Allergies:  No Known Allergies    Meds:  Medications Prior to Admission   Medication Sig Dispense Refill Last Dose   • aspirin 81 MG tablet Take 81 mg by mouth Daily.   2019 at Unknown time   • atorvastatin (LIPITOR) 20 MG tablet Take 20 mg by mouth Daily.   5/15/2019 at 1800   • Calcium Carb-Cholecalciferol (CALCIUM PLUS VITAMIN D3 PO) Take 1 tablet by mouth 2 (Two) Times a Day.   5/15/2019 at 1800   • Coenzyme Q10 (CO Q-10) 100 MG capsule Take 100 mg by mouth 2 (Two) Times a Day.   5/15/2019 at 1800   • docusate sodium (COLACE) 250 MG capsule Take 250 mg by mouth Every Night.   5/15/2019 at 1800   • ezetimibe (ZETIA) 10 MG tablet Take 10 mg by mouth Daily.   5/15/2019 at 1800   • ferrous sulfate 324 (65 Fe) MG tablet delayed-release EC tablet Take 324 mg by mouth Every Night.   5/15/2019 at 1800   • lisinopril (PRINIVIL,ZESTRIL) 5 MG tablet Take 5 mg by mouth  Every Night.   5/15/2019 at 1800   • Polyethylene Glycol 3350 (MIRALAX PO) Take 1 dose by mouth Daily.   5/15/2019 at 0700   • SYNTHROID 25 MCG tablet Take 25 mcg by mouth Daily.   5/15/2019 at 0700   • TOPROL XL 25 MG 24 hr tablet Take 25 mg by mouth Daily.   5/16/2019 at 0430   • diclofenac (VOLTAREN) 1 % gel gel Place 4 g on the skin as directed by provider 4 (Four) Times a Day As Needed (feet).   5/9/2019   • Glucosamine Sulfate 750 MG capsule Take 1 tablet by mouth 2 (Two) Times a Day.   5/12/2019   • Multiple Vitamins-Minerals (VITEYES AREDS FORMULA PO) Take 1 capsule by mouth 2 (Two) Times a Day.      • naproxen (NAPROSYN) 500 MG tablet Take 500 mg by mouth Every Morning.   5/12/2019   • nitroglycerin (NITROSTAT) 0.4 MG SL tablet Place 0.4 mg under the tongue As Needed for Chest Pain. Take no more than 3 doses in 15 minutes.    Taking       History:   Past Medical History:   Diagnosis Date   • Anemia    • Bowel obstruction (CMS/HCC)    • Chest pain    • Constipation    • Coronary artery disease    • Disease of thyroid gland    • Hepatitis    • Hyperlipidemia    • Hypertension    • Macular degeneration    • Medial meniscus tear    • Obesity    • Obstructive sleep apnea on CPAP     setting - 13.   • Primary osteoarthritis of left knee     and right knee   • Wears glasses      Past Surgical History:   Procedure Laterality Date   • CARDIAC CATHETERIZATION  01/2019   • COLONOSCOPY  2013   • FRACTURE SURGERY      left heel    • INGUINAL HERNIA REPAIR      x2    • KNEE ARTHROSCOPY Right    • REPLACEMENT TOTAL KNEE Right    • TONSILLECTOMY       Family History   Problem Relation Age of Onset   • Rheum arthritis Mother    • Stroke Father    • Heart attack Father    • Hypertension Father    • Clotting disorder Father    • Rheum arthritis Father    • Hypertension Other      Social History     Tobacco Use   • Smoking status: Never Smoker   • Smokeless tobacco: Never Used   Substance Use Topics   • Alcohol use: No   •  "Drug use: No   He is  and has 3 children. He is a teacher.     Review of Systems  All systems were reviewed and negative except for:  Respiratory: positive for  shortness of air  Gastrointestinal: positive for  constipation    Vital Signs  Visit Vitals  /80 (BP Location: Right arm, Patient Position: Lying)   Pulse 58   Temp 97.4 °F (36.3 °C) (Oral)   Resp 16   Ht 177.8 cm (70\")   Wt 113 kg (249 lb 1.9 oz)   SpO2 97%   BMI 35.74 kg/m²       Physical Exam:    General Appearance:    Alert, cooperative, in no acute distress   Head:    Normocephalic, without obvious abnormality, atraumatic   Eyes:            Lids and lashes normal, conjunctivae and sclerae normal, no   icterus, no pallor, corneas clear   Ears:    Ears appear intact with no abnormalities noted   Neck:   No adenopathy, supple, trachea midline, no thyromegaly   Lungs:     Clear to auscultation,respirations regular, even and                   unlabored    Heart:    Regular rhythm and normal rate, normal S1 and S2, no            murmur, no gallop   Abdomen:     Normal bowel sounds, no masses, no organomegaly, soft        non-tender, non-distended, no guarding, no rebound                 tenderness   Genitalia:    Deferred   Extremities:   Left knee ACE wrap CDI. Nerve block present   Pulses:   Pulses palpable and equal bilaterally   Skin:   No bleeding, bruising or rash   Neurologic:   Cranial nerves 2 - 12 grossly intact, limited sensation and movement BLE due to lingering effects of spinal block( SA effects subsiding)wy      I reviewed the patient's new clinical results.     Results for KLINEFELTER, LARRY DEAN (MRN [id number]) as of 5/16/2019 17:40   Ref. Range 5/1/2019 11:05   Glucose Latest Ref Range: 65 - 99 mg/dL 114 (H)   Sodium Latest Ref Range: 136 - 145 mmol/L 142   Potassium Latest Ref Range: 3.5 - 5.2 mmol/L 4.6   CO2 Latest Ref Range: 22.0 - 29.0 mmol/L 28.0   Chloride Latest Ref Range: 98 - 107 mmol/L 103   Anion Gap Latest Units: " mmol/L 11.0   Creatinine Latest Ref Range: 0.76 - 1.27 mg/dL 0.86   BUN Latest Ref Range: 8 - 23 mg/dL 21   BUN/Creatinine Ratio Latest Ref Range: 7.0 - 25.0  24.4   Calcium Latest Ref Range: 8.6 - 10.5 mg/dL 9.2   eGFR Non  Am Latest Ref Range: >60 mL/min/1.73 88   Hemoglobin A1C Latest Ref Range: 4.80 - 5.60 % 5.00   C-Reactive Protein Latest Ref Range: 0.00 - 0.50 mg/dL 1.36 (H)   Protime Latest Ref Range: 11.2 - 14.3 Seconds 13.7   INR Latest Ref Range: 0.85 - 1.16  1.10   PTT Latest Ref Range: 24.0 - 37.0 seconds 29.6   WBC Latest Ref Range: 3.40 - 10.80 10*3/mm3 7.66   RBC Latest Ref Range: 4.14 - 5.80 10*6/mm3 4.03 (L)   Hemoglobin Latest Ref Range: 13.0 - 17.7 g/dL 12.9 (L)   Hematocrit Latest Ref Range: 37.5 - 51.0 % 39.6   RDW Latest Ref Range: 12.3 - 15.4 % 13.4   MCV Latest Ref Range: 79.0 - 97.0 fL 98.3 (H)   MCH Latest Ref Range: 26.6 - 33.0 pg 32.0   MCHC Latest Ref Range: 31.5 - 35.7 g/dL 32.6   MPV Latest Ref Range: 6.0 - 12.0 fL 10.2   Platelets Latest Ref Range: 140 - 450 10*3/mm3 234     Assessment and Plan:     Status post total left knee replacement    Primary osteoarthritis of left knee    TRACEY on CPAP    HTN (hypertension)    HLD (hyperlipidemia)    Hypothyroid    CAD (coronary artery disease)      Plan  1. PT/OT- early ambulation post op  2. Pain control-prns, AC nerve block  3. IS-encourage  4. DVT proph- mechs/ASA  5. Bowel regimen  6. Resume home medications as appropriate  7. Monitor post-op labs  8. CT planning for rehab, requesting [facility]    HTN, HLD, CAD  - Continue home statin, lisinopril and toprol   - Monitor BP   - Holding parameters for BP meds  - Labetalol PRN for SBP>170    Hypothyroid  - Continue home synthroid    TRACEY  - CPAP at night      JASSI Kline  05/16/19  5:41 PM   I have personally performed the evaluation on this patient. My history is consistent  with HPI obtained. My exam findings are listed above. I have personally reviewed and discussed the above  formulated treatment plan with pt and [email or web address]

## 2019-05-16 NOTE — PLAN OF CARE
Problem: Patient Care Overview  Goal: Plan of Care Review  Outcome: Ongoing (interventions implemented as appropriate)   05/16/19 7088   Coping/Psychosocial   Plan of Care Reviewed With patient   Plan of Care Review   Progress improving   OTHER   Outcome Summary Pt had LTK done today by Dr. Jc. Ace wrap in place, CDI. Ropivicaine infusing, ice packs on knee. Pt still numb/tingling in BETI feet- hasnt worked with PT/OT yet but stood at edge of bed with walker while we changed his sheets due to small incontinece episode (spinal anesthesia wearing off). Pt VSS, A&Ox4. Pt wants to go to rehab at d/c. Hx of chronic consitpation, last Bm today.

## 2019-05-16 NOTE — ANESTHESIA PROCEDURE NOTES
Spinal Block    Pre-sedation assessment completed: 5/16/2019 10:19 AM    Patient reassessed immediately prior to procedure    Patient location during procedure: OR  Start Time: 5/16/2019 10:25 AM  Stop Time: 5/16/2019 10:36 AM  Indication:at surgeon's request  Performed By  CRNA: Villa Kramer CRNA  Preanesthetic Checklist  Completed: patient identified, site marked, surgical consent, pre-op evaluation, timeout performed, IV checked, risks and benefits discussed and monitors and equipment checked  Spinal Block Prep:  Patient Position:sitting  Sterile Tech:cap, gloves, sterile barriers and mask  Prep:alcohol swabs and Chloraprep  Patient Monitoring:blood pressure monitoring, continuous pulse oximetry and EKG  Spinal Block Procedure  Approach:midline  Guidance:landmark technique and palpation technique  Needle Gauge:22 G  Placement of Spinal needle event:cerebrospinal fluid aspirated  Paresthesia: no  Fluid Appearance:clear  Medications: bupivacaine (MARCAINE) 0.5 % injection, 2 mL  Med Administered at 5/16/2019 10:36 AM   Post Assessment  Patient Tolerance:patient tolerated the procedure well with no apparent complications  Complications no  Additional Notes  Procedure:  Pt assisted to sitting position, with legs in position of comfort over side of bed.  Pt. instructed in optimal spine presentation, the spine was prepped/ Draped and the skin at insertion site was anesthetized with 1% Lidocaine 2 ml.  The spinal needle was then advanced until CSF flow was obtained and LA was injected:

## 2019-05-16 NOTE — ANESTHESIA PREPROCEDURE EVALUATION
Anesthesia Evaluation     Patient summary reviewed and Nursing notes reviewed   no history of anesthetic complications:  NPO Solid Status: > 8 hours  NPO Liquid Status: > 8 hours           Airway   Mallampati: II  TM distance: >3 FB  Neck ROM: full  No difficulty expected  Dental - normal exam     Pulmonary - normal exam    breath sounds clear to auscultation  (+) sleep apnea on CPAP,   Cardiovascular - normal exam    ECG reviewed  Rhythm: regular  Rate: normal    (+) hypertension, CAD (non-obstructive),       Neuro/Psych- negative ROS  GI/Hepatic/Renal/Endo    (+) obesity,       Musculoskeletal     Abdominal    Substance History      OB/GYN          Other   (+) arthritis                     Anesthesia Plan    ASA 3     MAC and spinal   (Left adductor canal block/catheter with arrow pump for post-operative analgesia per request of Dr. Jc)  intravenous induction   Anesthetic plan, all risks, benefits, and alternatives have been provided, discussed and informed consent has been obtained with: patient.    Plan discussed with CRNA.

## 2019-05-16 NOTE — THERAPY EVALUATION
Acute Care - Physical Therapy Initial Evaluation   [place]     Patient Name: Larry Dean Klinefelter  : 1950  MRN: [id number]  Today's Date: 2019   Onset of Illness/Injury or Date of Surgery: 19  Date of Referral to PT: 19  Referring Physician: MD Hosea      Admit Date: 2019    Visit Dx:     ICD-10-CM ICD-9-CM   1. Impaired functional mobility, balance, gait, and endurance Z74.09 V49.89   2. Primary osteoarthritis of left knee M17.12 715.16     Patient Active Problem List   Diagnosis   • Primary osteoarthritis of left knee   • TRACEY on CPAP   • HTN (hypertension)   • HLD (hyperlipidemia)   • Hypothyroid   • CAD (coronary artery disease)   • Status post total left knee replacement     Past Medical History:   Diagnosis Date   • Anemia    • Bowel obstruction (CMS/HCC)    • Chest pain    • Constipation    • Coronary artery disease    • Disease of thyroid gland    • Hepatitis    • Hyperlipidemia    • Hypertension    • Macular degeneration    • Medial meniscus tear    • Obesity    • Obstructive sleep apnea on CPAP     setting - 13.   • Primary osteoarthritis of left knee     and right knee   • Wears glasses      Past Surgical History:   Procedure Laterality Date   • CARDIAC CATHETERIZATION  2019   • COLONOSCOPY  2013   • FRACTURE SURGERY      left heel    • INGUINAL HERNIA REPAIR      x2    • KNEE ARTHROSCOPY Right    • REPLACEMENT TOTAL KNEE Right    • TONSILLECTOMY          PT ASSESSMENT (last 12 hours)      Physical Therapy Evaluation     Row Name 19 1712          PT Evaluation Time/Intention    Subjective Information  complains of;pain  -MJ     Document Type  evaluation  -MJ     Mode of Treatment  physical therapy  -MJ     Patient Effort  adequate  -MJ     Symptoms Noted During/After Treatment  increased pain  -MJ     Comment  RN notified  -     Row Name 19 2587          General Information    Patient Profile Reviewed?  yes  -MJ     Onset of Illness/Injury or Date of  Surgery  05/16/19  -     Referring Physician  MD Hosea  -     Patient Observations  alert;cooperative;agree to therapy  -     General Observations of Patient  Pt supine in bed, L adductor nerve catheter, IV, ace bandage and ice to L knee. RN and tech present  -MJ     Prior Level of Function  min assist:;all household mobility;community mobility;gait;transfer;bed mobility;cooking;cleaning;driving;shopping Pain increased with activity, used rollator for mobility  -MJ     Equipment Currently Used at Home  walker, rolling;rollator;cane, straight;shower chair;raised toilet;grab bar  -MJ     Pertinent History of Current Functional Problem  Pt presents for surgical management of L knee pain and dysfunction that failed to improve with conservative management  -MJ     Existing Precautions/Restrictions  fall;other (see comments) L adductor nerve catheter  -     Risks Reviewed  patient:;LOB;increased discomfort  -MJ     Benefits Reviewed  patient:;improve function;increase independence;increase strength;increase balance;decrease pain  -     Barriers to Rehab  previous functional deficit  -     Row Name 05/16/19 1712          Relationship/Environment    Lives With  spouse  -     Family Caregiver if Needed  spouse  -MJ     Row Name 05/16/19 1712          Resource/Environmental Concerns    Current Living Arrangements  home/apartment/condo  -     Row Name 05/16/19 1712          Home Main Entrance    Number of Stairs, Main Entrance  one  -     Row Name 05/16/19 1712          Cognitive Assessment/Intervention- PT/OT    Orientation Status (Cognition)  oriented x 4  -MJ     Follows Commands (Cognition)  follows one step commands;over 90% accuracy;verbal cues/prompting required  -     Safety Deficit (Cognitive)  mild deficit  -     Row Name 05/16/19 1712          Safety Issues, Functional Mobility    Impairments Affecting Function (Mobility)  pain;range of motion (ROM);strength  -     Row Name 05/16/19 1712           Mobility Assessment/Treatment    Extremity Weight-bearing Status  left lower extremity  -     Left Lower Extremity (Weight-bearing Status)  weight-bearing as tolerated (WBAT)  -     Row Name 05/16/19 1712          Bed Mobility Assessment/Treatment    Bed Mobility Assessment/Treatment  supine-sit  -     Supine-Sit [place] (Bed Mobility)  supervision;verbal cues  -     Bed Mobility, Safety Issues  decreased use of legs for bridging/pushing  -     Assistive Device (Bed Mobility)  bed rails;head of bed elevated  -     Comment (Bed Mobility)  Verbal cues to move LEs off EOB and to use UEs to push trunk to sitting  -     Row Name 05/16/19 1712          Transfer Assessment/Treatment    Transfer Assessment/Treatment  sit-stand transfer;stand-sit transfer  -     Comment (Transfers)  Verbal cues to push up from bed with UEs to stand and to reach back for chair to lower into sitting. Cues to step L LE out prior to t/f.   -     Sit-Stand [place] (Transfers)  contact guard;2 person assist;verbal cues  -     Stand-Sit [place] (Transfers)  contact guard;2 person assist;verbal cues  -     Row Name 05/16/19 1712          Sit-Stand Transfer    Assistive Device (Sit-Stand Transfers)  walker, front-wheeled  -MJ     Row Name 05/16/19 1712          Stand-Sit Transfer    Assistive Device (Stand-Sit Transfers)  walker, front-wheeled  -MJ     [facility] Name 05/16/19 1712          Gait/Stairs Assessment/Training    43698 - Gait Training Minutes   7  -MJ     [place] Level (Gait)  contact guard;2 person assist;verbal cues  -     Assistive Device (Gait)  walker, front-wheeled  -     Distance in Feet (Gait)  15  -MJ     Pattern (Gait)  step-to;step-through  -MJ     Deviations/Abnormal Patterns (Gait)  left sided deviations;antalgic;bilateral deviations;mala decreased;stride length decreased  -     Bilateral Gait Deviations  forward flexed posture;heel strike decreased;weight shift ability  decreased  -MJ     Comment (Gait/Stairs)  Pt initially demo step to gait pattern at slow pace, moments of progression to step through. Cues for upright posture, to stay in middle of RW and for equal step lengths. Pt with 1 instance of knee buckling initially, then improved. Gait limited by pain and chair brought up behind him  -MJ     Row Name 05/16/19 1712          General ROM    LT Lower Ext  Lt Knee Extension/Flexion  -     Row Name 05/16/19 1712          Left Lower Ext    Lt Knee Extension/Flexion AROM  L knee AROM impaired 25%  -     Row Name 05/16/19 1712          MMT (Manual Muscle Testing)    General MMT Comments  L LE 4-/5; R LE 4+/5  -     Row Name 05/16/19 1712          Motor Assessment/Intervention    Additional Documentation  Therapeutic Exercise (Group);Therapeutic Exercise Interventions (Group)  -     Row Name 05/16/19 1712          Therapeutic Exercise    87584 - PT Therapeutic Exercise Minutes  1  -MJ     Row Name 05/16/19 1712          Therapeutic Exercise    Lower Extremity (Therapeutic Exercise)  gluteal sets;quad sets, left  -MJ     Lower Extremity Range of Motion (Therapeutic Exercise)  ankle dorsiflexion/plantar flexion, left  -MJ     Exercise Type (Therapeutic Exercise)  AROM (active range of motion);isometric contraction, static  -MJ     Position (Therapeutic Exercise)  seated  -MJ     Sets/Reps (Therapeutic Exercise)  10x each  -MJ     Comment (Therapeutic Exercise)  Cues for technique  -     Row Name 05/16/19 1712          Pain Assessment    Additional Documentation  Pain Scale: Numbers Pre/Post-Treatment (Group)  -     Row Name 05/16/19 1712          Pain Scale: Numbers Pre/Post-Treatment    Pain Scale: Numbers, Pretreatment  1/10  -MJ     Pain Scale: Numbers, Post-Treatment  5/10  -MJ     Pain Location - Side  Left  -MJ     Pain Location - Orientation  generalized  -MJ     Pain Location  knee  -MJ     Pain Intervention(s)  Repositioned;Ambulation/increased activity;Cold pack   -     Row Name             Wound 05/16/19 1139 Left knee incision    Wound - Properties Group Date first assessed: 05/16/19  -DS Time first assessed: 1139  -DS Side: Left  -DS Location: knee  -DS Type: incision  -DS    Row Name 05/16/19 1712          Plan of Care Review    Plan of Care Reviewed With  patient  -     Row Name 05/16/19 1712          Physical Therapy Clinical Impression    Date of Referral to PT  05/16/19  -     PT Diagnosis (PT Clinical Impression)  s/p elective L TKA  -     Patient/Family Goals Statement (PT Clinical Impression)  to go to rehab  -     Criteria for Skilled Interventions Met (PT Clinical Impression)  yes;treatment indicated  -     Care Plan Review (PT)  evaluation/treatment results reviewed;care plan/treatment goals reviewed;risks/benefits reviewed;patient/other agree to care plan  -     Row Name 05/16/19 1712          Physical Therapy Goals    Bed Mobility Goal Selection (PT)  bed mobility, PT goal 1  -MJ     Transfer Goal Selection (PT)  transfer, PT goal 1  -MJ     Gait Training Goal Selection (PT)  gait training, PT goal 1  -MJ     ROM Goal Selection (PT)  ROM, PT goal 1  -MJ     Additional Documentation  ROM Goal Selection (PT) (Row)  -     Row Name 05/16/19 1712          Bed Mobility Goal 1 (PT)    Activity/Assistive Device (Bed Mobility Goal 1, PT)  sit to supine/supine to sit  -MJ     [place] Level/Cues Needed (Bed Mobility Goal 1, PT)  conditional independence  -MJ     Time Frame (Bed Mobility Goal 1, PT)  3 days;long term goal (LTG)  -MJ     Progress/Outcomes (Bed Mobility Goal 1, PT)  goal ongoing  -     Row Name 05/16/19 1712          Transfer Goal 1 (PT)    Activity/Assistive Device (Transfer Goal 1, PT)  sit-to-stand/stand-to-sit;walker, rolling  -     [place] Level/Cues Needed (Transfer Goal 1, PT)  conditional independence  -MJ     Time Frame (Transfer Goal 1, PT)  3 days;long term goal (LTG)  -MJ     Progress/Outcome (Transfer Goal 1, PT)   goal ongoing  -     Row Name 05/16/19 1712          Gait Training Goal 1 (PT)    Activity/Assistive Device (Gait Training Goal 1, PT)  gait (walking locomotion);walker, rolling  -     [place] Level (Gait Training Goal 1, PT)  conditional independence  -     Distance (Gait Goal 1, PT)  150 feet  -     Time Frame (Gait Training Goal 1, PT)  3 days;long term goal (LTG)  -     Progress/Outcome (Gait Training Goal 1, PT)  goal ongoing  -     Row Name 05/16/19 1712          ROM Goal 1 (PT)    ROM Goal 1 (PT)  L knee 0-90 degrees  -     Time Frame (ROM Goal 1, PT)  3 days;long term goal (LTG)  -     Progress/Outcome (ROM Goal 1, PT)  goal ongoing  -     Row Name 05/16/19 1712          Positioning and Restraints    Pre-Treatment Position  in bed  -     Post Treatment Position  chair  -     In Chair  notified nsg;reclined;call light within reach;encouraged to call for assist;exit alarm on;on mechanical lift sling  -     Row Name 05/16/19 1712          Living Environment    Home Accessibility  stairs to enter home walk-in shower  -       User Key  (r) = Recorded By, (t) = Taken By, (c) = Cosigned By    Initials Name Provider Type     Kee Natarajan, PT Physical Therapist    Jimena Crenshaw, RN Registered Nurse        Physical Therapy Education     Title: PT OT SLP Therapies (In Progress)     Topic: Physical Therapy (In Progress)     Point: Mobility training (Done)     Learning Progress Summary           Patient Acceptance, E,D, VU by  at 5/16/2019  5:12 PM    Comment:  Edu re: effects of spinal, correct transfer technique, gait mechanics, progression of POC                   Point: Body mechanics (Done)     Learning Progress Summary           Patient Acceptance, E,D, VU by  at 5/16/2019  5:12 PM    Comment:  Edu re: effects of spinal, correct transfer technique, gait mechanics, progression of POC                   Point: Precautions (Done)     Learning Progress Summary           Patient  Acceptance, E,D, VU by DARA at 5/16/2019  5:12 PM    Comment:  Edu re: effects of spinal, correct transfer technique, gait mechanics, progression of POC                               User Key     Initials Effective Dates Name Provider Type Discipline    DARA 04/03/18 -  Kee Natarajan, PT Physical Therapist PT              PT Recommendation and Plan  Anticipated Discharge Disposition (PT): inpatient rehabilitation facility  Planned Therapy Interventions (PT Eval): balance training, bed mobility training, gait training, home exercise program, patient/family education, ROM (range of motion), stair training, strengthening, transfer training  Therapy Frequency (PT Clinical Impression): 2 times/day  Outcome Summary/Treatment Plan (PT)  Anticipated Equipment Needs at Discharge (PT): (none)  Anticipated Discharge Disposition (PT): inpatient rehabilitation facility  Plan of Care Reviewed With: patient  Outcome Summary: Pt ambulated 15 feet with CGAx2 and RW, limited by pain and fatigue. Will obtain ROM measure POD #1. PT will follow to increase strength and progress functional mobility. Plan is rehab at discharge, PADD score=7  Outcome Measures     Row Name 05/16/19 1712             How much help from another person do you currently need...    Turning from your back to your side while in flat bed without using bedrails?  3  -MJ      Moving from lying on back to sitting on the side of a flat bed without bedrails?  3  -MJ      Moving to and from a bed to a chair (including a wheelchair)?  3  -MJ      Standing up from a chair using your arms (e.g., wheelchair, bedside chair)?  3  -MJ      Climbing 3-5 steps with a railing?  1  -MJ      To walk in hospital room?  3  -MJ      AM-PAC 6 Clicks Score  16  -         Functional Assessment    Outcome Measure Options  AM-PAC 6 Clicks Basic Mobility (PT)  -        User Key  (r) = Recorded By, (t) = Taken By, (c) = Cosigned By    Initials Name Provider Type    Kee Jorgensen, KELIN Physical  Therapist         Time Calculation:   PT Charges     Row Name 05/16/19 1712             Time Calculation    Start Time  1712  -MJ      PT Received On  05/16/19  -MJ      PT Goal Re-Cert Due Date  05/26/19  -MJ         Time Calculation- PT    Total Timed Code Minutes- PT  8 minute(s)  -MJ         Timed Charges    17174 - PT Therapeutic Exercise Minutes  1  -MJ      69615 - Gait Training Minutes   7  -MJ        User Key  (r) = Recorded By, (t) = Taken By, (c) = Cosigned By    Initials Name Provider Type    Kee Jorgensen, PT Physical Therapist        Therapy Charges for Today     Code Description Service Date Service Provider Modifiers Qty    79400042846 HC PT EVAL LOW COMPLEXITY 2 5/16/2019 Kee Natarajan, PT GP 1    27908404395 HC GAIT TRAINING EA 15 MIN 5/16/2019 Kee Natarajan, PT GP 1    53152629942 HC PT THER SUPP EA 15 MIN 5/16/2019 Kee Natarajan, PT GP 2          PT G-Codes  Outcome Measure Options: AM-PAC 6 Clicks Basic Mobility (PT)  AM-PAC 6 Clicks Score: 16      Kee Natarajan PT  5/16/2019

## 2019-05-16 NOTE — PLAN OF CARE
Problem: Patient Care Overview  Goal: Plan of Care Review  Outcome: Ongoing (interventions implemented as appropriate)   05/16/19 1712   Coping/Psychosocial   Plan of Care Reviewed With patient   OTHER   Outcome Summary Pt ambulated 15 feet with CGAx2 and RW, limited by pain and fatigue. Will obtain ROM measure POD #1. PT will follow to increase strength and progress functional mobility. Plan is rehab at discharge, PADD score=7       Problem: Knee Arthroplasty (Total, Partial) (Adult)  Goal: Signs and Symptoms of Listed Potential Problems Will be Absent, Minimized or Managed (Knee Arthroplasty)  Outcome: Ongoing (interventions implemented as appropriate)   05/16/19 1712   Goal/Outcome Evaluation   Problems Assessed (Knee Arthroplasty) functional deficit;pain;range of motion decreased   Problems Present (Knee Arthroplasty) functional deficit;pain;range of motion decreased

## 2019-05-16 NOTE — OP NOTE
DATE OF PROCEDURE: 05/16/19    PREOPERATIVE DIAGNOSIS: left knee arthritis      POSTOPERATIVE DIAGNOSIS:  left knee arthritis    PROCEDURES PERFORMED:   left total knee arthroplasty with DePuy Attune components (#8 posterior stabilized femur, #7 rotating platform tibia, 5 mm rotating platform polyethylene, with 41 three peg anatomic patella).     SURGEON: Willian Jc MD    ASSISTANT: Viviane Denney PA-C     SPECIMENS: None      ANESTHESIA:  Spinal    STAFF:  Circulator: Yennifer Waldron RN; Qi Ramos RN; Jimena Metz RN  Scrub Person: Favio Carpio  Vendor Representative: Jamie Pulido  Nursing Assistant: Briana Waldron  Assistant: Viviane Denney PA-C    TOURNIQUET TIME: 19 minutes    ESTIMATED BLOOD LOSS: 200ml     COMPLICATIONS: None    PREOPERATIVE ANTIBIOTICS: Ancef 2 g    INDICATIONS: The patient is a 68 y.o. male with debilitating left knee pain secondary to osteoarthritis that failed to improve in spite of conservative treatment .  Options have been discussed at length with the patient and the patient has had an extended course of conservative treatment without long-term benefit. The patient has reached the point where the patient desires total knee arthroplasty surgery and understands the risks, benefits, and alternatives. Consent was obtained. Please see my office notes for details with regard to preoperative counseling and operative rationale.     DESCRIPTION OF PROCEDURE: The patient was positively identified in the preoperative holding area and brought to the operating suite and placed in a supine position. After adequate spinal anesthetic had been achieved, the left lower extremity was prepped and draped in the usual sterile fashion.  After application of a tourniquet to the left upper thigh, which was used during the procedure for a total 17 minutes during the cementation process only. Landmarks of the knee were identified and timeout procedure was performed to confirm the  operative site, as well as other parameters. Following the sterile prep and drape, a skin incision was made just off the medial aspect of midline for a medial parapatellar approach. Following a sharp skin incision, dissection was carried down to the level of the extensor mechanism and a medial parapatellar arthrotomy was made and the patella was tucked into the lateral gutter. Description of arthritis: Bone-on-bone contact medial compartment, with tricompartmental degeneration, varus alignment.  The knee was adequately exposed and a distal femoral cut was made with an intramedullary guide. This was subsequently sized for a #8 implant and anterior, posterior chamfer cuts made. The menisci removed both medially and laterally.  The ACL was transected and the tibia was subluxed anteriorly. Proximal tibia cut was made with the external alignment guide. The cut was noted to be perpendicular to the tibial axis, with symmetric flexion and extension gaps. Therefore, final femoral preparations were made with the box cutting guide followed by final tibial preparations to accommodate a #7 tibia. With a trial 5 insert in place, full flexion and extension was noted with no instability. The patella was then prepared for a 41 three peg anatomic patella which had excellent tracking. All trial components were removed and final components were cemented in place with namely a #7 rotating platform tibia, #8 posterior stabilized femur and a 41 three peg anatomic patella with a trial 6 insert for cement compression. All the excess cement was removed from the bone implant interface and allowed to harden. Tourniquet was deflated. Hemostasis was obtained with electrocautery. There was no brisk bleeding noted in the popliteal fossa in particular. Therefore, the knee was copiously irrigated as it was between major steps, and the final 5 insert was placed as this was deemed appropriate for the patient's anatomy with full flexion and extension  and no instability and attention was then directed towards closure. The medial parapatellar arthrotomy was closed with #1 Vicryl in an interrupted figure-of-eight fashion in 4 strategic locations followed by oversewing this from proximal to distal with a #1 StrataFix symmetric, which nicely sealed the joint, followed by closure of the deep fascial layer with #1 Vicryl in a buried interrupted fashion, followed by closure of the subcutaneous layer with 2-0 Vicryl and the skin with 3-0 Stratafix in a running subcuticular fashion. Prineo wound closure dressing was applied followed by a sterile dressing with 4 x 4's, abdominal pad, soft roll and Ace wrap. The patient tolerated the procedure well and was brought to the recovery room in good condition.     PLAN:  1.  The patient will begin early range of motion and weight-bearing per the post total knee arthroplasty protocol.   2.  I anticipate brief hospitalization for initial rehabilitation and pain control followed by continued rehabilitation in either home health or supervised setting.   3.  Postoperative medical management with Dr. [place].  4.  Aspirin will be utilized for DVT prophylaxis unless there is a contraindication.       Willian Jc MD  05/16/19  12:11 PM

## 2019-05-17 PROBLEM — G89.18 ACUTE POSTOPERATIVE PAIN: Status: ACTIVE | Noted: 2019-05-17

## 2019-05-17 PROBLEM — N99.89 POSTOPERATIVE URINARY RETENTION: Status: ACTIVE | Noted: 2019-05-17

## 2019-05-17 PROBLEM — D62 ACUTE BLOOD LOSS ANEMIA: Status: ACTIVE | Noted: 2019-05-17

## 2019-05-17 PROBLEM — D72.829 LEUKOCYTOSIS: Status: ACTIVE | Noted: 2019-05-17

## 2019-05-17 PROBLEM — R33.8 POSTOPERATIVE URINARY RETENTION: Status: ACTIVE | Noted: 2019-05-17

## 2019-05-17 LAB
ANION GAP SERPL CALCULATED.3IONS-SCNC: 15 MMOL/L
BUN BLD-MCNC: 17 MG/DL (ref 8–23)
BUN/CREAT SERPL: 18.9 (ref 7–25)
CALCIUM SPEC-SCNC: 8.2 MG/DL (ref 8.6–10.5)
CHLORIDE SERPL-SCNC: 102 MMOL/L (ref 98–107)
CO2 SERPL-SCNC: 22 MMOL/L (ref 22–29)
CREAT BLD-MCNC: 0.9 MG/DL (ref 0.76–1.27)
DEPRECATED RDW RBC AUTO: 47.4 FL (ref 37–54)
ERYTHROCYTE [DISTWIDTH] IN BLOOD BY AUTOMATED COUNT: 13.3 % (ref 12.3–15.4)
GFR SERPL CREATININE-BSD FRML MDRD: 83 ML/MIN/1.73
GLUCOSE BLD-MCNC: 126 MG/DL (ref 65–99)
HCT VFR BLD AUTO: 35.8 % (ref 37.5–51)
HGB BLD-MCNC: 11.9 G/DL (ref 13–17.7)
MCH RBC QN AUTO: 32.6 PG (ref 26.6–33)
MCHC RBC AUTO-ENTMCNC: 33.2 G/DL (ref 31.5–35.7)
MCV RBC AUTO: 98.1 FL (ref 79–97)
PLATELET # BLD AUTO: 226 10*3/MM3 (ref 140–450)
PMV BLD AUTO: 10.1 FL (ref 6–12)
POTASSIUM BLD-SCNC: 4.1 MMOL/L (ref 3.5–5.2)
RBC # BLD AUTO: 3.65 10*6/MM3 (ref 4.14–5.8)
SODIUM BLD-SCNC: 139 MMOL/L (ref 136–145)
WBC NRBC COR # BLD: 13.71 10*3/MM3 (ref 3.4–10.8)

## 2019-05-17 PROCEDURE — 63710000001 LEVOTHYROXINE 25 MCG TABLET: Performed by: NURSE PRACTITIONER

## 2019-05-17 PROCEDURE — 97535 SELF CARE MNGMENT TRAINING: CPT

## 2019-05-17 PROCEDURE — 63710000001 LISINOPRIL 5 MG TABLET: Performed by: NURSE PRACTITIONER

## 2019-05-17 PROCEDURE — A9270 NON-COVERED ITEM OR SERVICE: HCPCS | Performed by: ORTHOPAEDIC SURGERY

## 2019-05-17 PROCEDURE — 97110 THERAPEUTIC EXERCISES: CPT

## 2019-05-17 PROCEDURE — 63710000001 ASPIRIN 325 MG TABLET DELAYED-RELEASE: Performed by: ORTHOPAEDIC SURGERY

## 2019-05-17 PROCEDURE — 63710000001 POLYETHYLENE GLYCOL PACK: Performed by: INTERNAL MEDICINE

## 2019-05-17 PROCEDURE — 97116 GAIT TRAINING THERAPY: CPT

## 2019-05-17 PROCEDURE — 63710000001 TAMSULOSIN 0.4 MG CAPSULE: Performed by: NURSE PRACTITIONER

## 2019-05-17 PROCEDURE — A9270 NON-COVERED ITEM OR SERVICE: HCPCS | Performed by: NURSE PRACTITIONER

## 2019-05-17 PROCEDURE — 97165 OT EVAL LOW COMPLEX 30 MIN: CPT

## 2019-05-17 PROCEDURE — 63710000001 DOCUSATE SODIUM 100 MG CAPSULE: Performed by: ORTHOPAEDIC SURGERY

## 2019-05-17 PROCEDURE — 63710000001 METOPROLOL SUCCINATE XL 25 MG TABLET SUSTAINED-RELEASE 24 HOUR: Performed by: NURSE PRACTITIONER

## 2019-05-17 PROCEDURE — 97530 THERAPEUTIC ACTIVITIES: CPT

## 2019-05-17 PROCEDURE — 63710000001 HYDROCODONE-ACETAMINOPHEN 5-325 MG TABLET: Performed by: ORTHOPAEDIC SURGERY

## 2019-05-17 PROCEDURE — 25010000003 CEFAZOLIN IN DEXTROSE 2-4 GM/100ML-% SOLUTION: Performed by: ORTHOPAEDIC SURGERY

## 2019-05-17 PROCEDURE — 85027 COMPLETE CBC AUTOMATED: CPT | Performed by: ORTHOPAEDIC SURGERY

## 2019-05-17 PROCEDURE — 63710000001 ATORVASTATIN 20 MG TABLET: Performed by: NURSE PRACTITIONER

## 2019-05-17 PROCEDURE — 99024 POSTOP FOLLOW-UP VISIT: CPT | Performed by: ORTHOPAEDIC SURGERY

## 2019-05-17 PROCEDURE — A9270 NON-COVERED ITEM OR SERVICE: HCPCS | Performed by: NURSE ANESTHETIST, CERTIFIED REGISTERED

## 2019-05-17 PROCEDURE — 94799 UNLISTED PULMONARY SVC/PX: CPT

## 2019-05-17 PROCEDURE — 63710000001 OXYCODONE-ACETAMINOPHEN 10-325 MG TABLET: Performed by: NURSE ANESTHETIST, CERTIFIED REGISTERED

## 2019-05-17 PROCEDURE — A9270 NON-COVERED ITEM OR SERVICE: HCPCS | Performed by: INTERNAL MEDICINE

## 2019-05-17 PROCEDURE — 80048 BASIC METABOLIC PNL TOTAL CA: CPT | Performed by: NURSE PRACTITIONER

## 2019-05-17 PROCEDURE — 63710000001 MELOXICAM 7.5 MG TABLET: Performed by: ORTHOPAEDIC SURGERY

## 2019-05-17 RX ORDER — TAMSULOSIN HYDROCHLORIDE 0.4 MG/1
0.4 CAPSULE ORAL DAILY
Status: DISCONTINUED | OUTPATIENT
Start: 2019-05-17 | End: 2019-05-18 | Stop reason: HOSPADM

## 2019-05-17 RX ADMIN — ASPIRIN 325 MG: 325 TABLET, COATED ORAL at 08:11

## 2019-05-17 RX ADMIN — TAMSULOSIN HYDROCHLORIDE 0.4 MG: 0.4 CAPSULE ORAL at 10:05

## 2019-05-17 RX ADMIN — OXYCODONE HYDROCHLORIDE AND ACETAMINOPHEN 1 TABLET: 10; 325 TABLET ORAL at 04:12

## 2019-05-17 RX ADMIN — DOCUSATE SODIUM 100 MG: 100 CAPSULE, LIQUID FILLED ORAL at 08:10

## 2019-05-17 RX ADMIN — HYDROCODONE BITARTRATE AND ACETAMINOPHEN 1 TABLET: 5; 325 TABLET ORAL at 19:04

## 2019-05-17 RX ADMIN — CEFAZOLIN SODIUM 2 G: 2 INJECTION, SOLUTION INTRAVENOUS at 02:25

## 2019-05-17 RX ADMIN — LISINOPRIL 5 MG: 5 TABLET ORAL at 22:58

## 2019-05-17 RX ADMIN — HYDROCODONE BITARTRATE AND ACETAMINOPHEN 1 TABLET: 5; 325 TABLET ORAL at 08:11

## 2019-05-17 RX ADMIN — MELOXICAM 15 MG: 7.5 TABLET ORAL at 08:10

## 2019-05-17 RX ADMIN — POLYETHYLENE GLYCOL 3350 17 G: 17 POWDER, FOR SOLUTION ORAL at 08:10

## 2019-05-17 RX ADMIN — METOPROLOL SUCCINATE 25 MG: 25 TABLET, EXTENDED RELEASE ORAL at 08:11

## 2019-05-17 RX ADMIN — LEVOTHYROXINE SODIUM 25 MCG: 25 TABLET ORAL at 04:12

## 2019-05-17 RX ADMIN — ATORVASTATIN CALCIUM 20 MG: 20 TABLET, FILM COATED ORAL at 08:10

## 2019-05-17 NOTE — PROGRESS NOTES
Discharge Planning Assessment  [facility]     Patient Name: Larry Dean Klinefelter  MRN: [id number]  Today's Date: 5/17/2019    Admit Date: 5/16/2019    Discharge Needs Assessment     Row Name 05/17/19 1432       Living Environment    Lives With  spouse    Current Living Arrangements  home/apartment/condo    Primary Care Provided by  self    Provides Primary Care For  no one    Family Caregiver if Needed  spouse    Able to Return to Prior Arrangements  yes       Transition Planning    Patient/Family Anticipates Transition to  home;inpatient rehabilitation facility    Transportation Anticipated  family or friend will provide       Discharge Needs Assessment    Equipment Currently Used at Home  bipap/cpap;rollator;walker, standard;commode;shower chair        Discharge Plan     Row Name 05/17/19 1433       Plan    Plan  [facility]    Patient/Family in Agreement with Plan  yes    Plan Comments  Spoke with patient and wife at bedside. They live in a one story in [facility]. PTA patient was independent with ADL's and mobility. Per his request a referral has been made to [facility]/ [facility]. Ins precert was initiated and approved. They are able to accept him tomorrow. Final note to follow w/ number to call report. CM following.     Final Discharge Disposition Code  03 - skilled nursing facility (SNF)        Destination      No service coordination in this encounter.      Durable Medical Equipment      No service coordination in this encounter.      Dialysis/Infusion      No service coordination in this encounter.      Home Medical Care      No service coordination in this encounter.      Therapy      No service coordination in this encounter.      Community Resources      No service coordination in this encounter.        Expected Discharge Date and Time     Expected Discharge Date Expected Discharge Time    May 20, 2019         Demographic Summary     Row Name 05/17/19 1423       General Information    Arrived From  home    Reason for  Consult  discharge planning        Functional Status     Row Name 05/17/19 1432       Functional Status    Usual Activity Tolerance  good    Current Activity Tolerance  moderate        Psychosocial    No documentation.       Abuse/Neglect    No documentation.       Legal    No documentation.       Substance Abuse    No documentation.       Patient Forms    No documentation.           Brooklyn Soto RN

## 2019-05-17 NOTE — PLAN OF CARE
Problem: Patient Care Overview  Goal: Plan of Care Review  Outcome: Ongoing (interventions implemented as appropriate)   05/17/19 5494   Coping/Psychosocial   Plan of Care Reviewed With patient;spouse   OTHER   Outcome Summary Pt increased ambulation distance to 180 feet with CGA and RW, required multiple standing rest breaks throughout due to fatigue and pain during ambulation. Pt continued to require CGA for transfers; Jose Martin when returning to supine position in bed. Will continue to progress with mobility as able.

## 2019-05-17 NOTE — PLAN OF CARE
Problem: Patient Care Overview  Goal: Plan of Care Review  Outcome: Ongoing (interventions implemented as appropriate)   05/17/19 1034   Coping/Psychosocial   Plan of Care Reviewed With patient   OTHER   Outcome Summary Pt increased ambulation distance to 150 feet with CGA and RW, cues for sequencing throughout; gait distance limited by fatigue. L knee AROM measured 12-78 degrees. Initiated HEP, reviewed knee precautions. Pt awaiting rehab placement. PADD score: 9

## 2019-05-17 NOTE — PROGRESS NOTES
Case Management Discharge Note    Final Note: Per Aleksandr mccarthy/ [facility], they are able to accept patient to the Skilled Rehab Unit tomorrow if medically ready. Nurse to call report to [phone], dc summary to be faxed to [phone]. Family to transport. CM following.     Destination - Selection Complete      Service Provider Request Status Selected Services Address Phone Number Fax Number    [facility] Selected Skilled Nursing [address] [place] [id number] [phone]           Final Discharge Disposition Code: 03 - skilled nursing facility (SNF)

## 2019-05-17 NOTE — PROGRESS NOTES
"IM progress note      Larry Dean Klinefelter  [id number]  1950     LOS: 0 days     Attending: Willian Jc MD    Primary Care Provider: Kin Nunez MD      Chief Complaint/Reason for visit:  Left knee pain    Subjective   Doing well. Good pain control. Denies f/c/n/v/sob/cp.  ( noted/ agree. Fair progress.)wy    Objective     Vital Signs  Visit Vitals  /55   Pulse 78   Temp 97.8 °F (36.6 °C) (Temporal)   Resp 20   Ht 177.8 cm (70\")   Wt 113 kg (249 lb 1.9 oz)   SpO2 94%   BMI 35.74 kg/m²     Temp (24hrs), Av.9 °F (36.6 °C), Min:97.4 °F (36.3 °C), Max:98.7 °F (37.1 °C)      Nutrition: PO    Respiratory: RA    Physical Therapy: Pt increased ambulation distance to 150 feet with CGA and RW, cues for sequencing throughout; gait distance limited by fatigue. L knee AROM measured 12-78 degrees. Initiated HEP, reviewed knee precautions. Pt awaiting rehab placement. PADD score: 9      Physical Exam:     General Appearance:    Alert, cooperative, in no acute distress   Head:    Normocephalic, without obvious abnormality, atraumatic    Lungs:     Normal effort, symmetric chest rise, no crepitus, clear to      auscultation bilaterally             Heart:    Regular rhythm and normal rate, normal S1 and S2   Abdomen:     Normal bowel sounds, no masses, no organomegaly, soft        non-tender, non-distended, no guarding, no rebound                tenderness   Extremities:   No clubbing, cyanosis or edema.  No deformities. Left knee ACE wrap CDI. Nerve block present   Pulses:   Pulses palpable and equal bilaterally   Skin:   No bleeding, bruising or rash   Neurologic:   Moves all extremities with no obvious focal motor deficit.  Cranial nerves 2 - 12 grossly intact. Flexion and dorsiflexion intact bilateral feet.       Results Review:     I reviewed the patient's new clinical results.   Results from last 7 days   Lab Units 19  0351   WBC 10*3/mm3 13.71*   HEMOGLOBIN g/dL 11.9*   HEMATOCRIT % 35.8* "   PLATELETS 10*3/mm3 226     Results from last 7 days   Lab Units 05/17/19  0351 05/16/19  0805   SODIUM mmol/L 139  --    POTASSIUM mmol/L 4.1 4.2   CHLORIDE mmol/L 102  --    CO2 mmol/L 22.0  --    BUN mg/dL 17  --    CREATININE mg/dL 0.90  --    CALCIUM mg/dL 8.2*  --    GLUCOSE mg/dL 126*  --      I reviewed the patient's new imaging including images and reports.    All medications reviewed.     aspirin 325 mg Oral Daily   atorvastatin 20 mg Oral Daily   levothyroxine 25 mcg Oral Q AM   lisinopril 5 mg Oral Nightly   meloxicam 15 mg Oral Daily   metoprolol succinate XL 25 mg Oral Daily   polyethylene glycol 17 g Oral Daily   sodium chloride 3 mL Intravenous Q12H   tamsulosin 0.4 mg Oral Daily       Assessment/Plan        Status post total left knee replacement    Primary osteoarthritis of left knee    TRACEY on CPAP    HTN (hypertension)    HLD (hyperlipidemia)    Hypothyroid    CAD (coronary artery disease)    Postoperative urinary retention    Leukocytosis, likely reactive    Acute blood loss anemia, mild, asymptomatic    Acute postoperative pain      Plan  1. PT/OT- WBAT LLE  2. Pain control-prns, AC nerve block   3. IS-encouraged  4. DVT proph- mechs/ASA  5. Bowel regimen  6. Monitor post-op labs  7. DC planning for [facility]. CM following    HTN, HLD, CAD  - Continue home statin, lisinopril and toprol   - Monitor BP   - Holding parameters for BP meds  - Labetalol PRN for SBP>170     Hypothyroid  - Continue home synthroid     TRACEY  - CPAP at night      JASSI Kline  05/17/19  2:07 PM     I have personally performed the evaluation on this patient. My history is consistent  with HPI obtained. My exam findings are listed above. I have personally reviewed and discussed the above formulated treatment plan with pt and AH. [email or web address].

## 2019-05-17 NOTE — THERAPY EVALUATION
Acute Care - Occupational Therapy Initial Evaluation   [place]     Patient Name: Larry Dean Klinefelter  : 1950  MRN: [id number]  Today's Date: 2019  Onset of Illness/Injury or Date of Surgery: 19  Date of Referral to OT: 19  Referring Physician: MD Hosea     Admit Date: 2019       ICD-10-CM ICD-9-CM   1. Impaired functional mobility, balance, gait, and endurance Z74.09 V49.89   2. Primary osteoarthritis of left knee M17.12 715.16   3. Impaired mobility and ADLs Z74.09 799.89     Patient Active Problem List   Diagnosis   • Primary osteoarthritis of left knee   • TRACEY on CPAP   • HTN (hypertension)   • HLD (hyperlipidemia)   • Hypothyroid   • CAD (coronary artery disease)   • Status post total left knee replacement     Past Medical History:   Diagnosis Date   • Anemia    • Bowel obstruction (CMS/HCC)    • Chest pain    • Constipation    • Coronary artery disease    • Disease of thyroid gland    • Hepatitis    • Hyperlipidemia    • Hypertension    • Macular degeneration    • Medial meniscus tear    • Obesity    • Obstructive sleep apnea on CPAP     setting - 13.   • Primary osteoarthritis of left knee     and right knee   • Wears glasses      Past Surgical History:   Procedure Laterality Date   • CARDIAC CATHETERIZATION  2019   • COLONOSCOPY     • FRACTURE SURGERY      left heel    • INGUINAL HERNIA REPAIR      x2    • KNEE ARTHROSCOPY Right    • REPLACEMENT TOTAL KNEE Right    • TONSILLECTOMY     • TOTAL KNEE ARTHROPLASTY Left 2019    Procedure: TOTAL KNEE ARTHROPLASTY LEFT;  Surgeon: Willian Jc MD;  Location: [facility];  Service: Orthopedics          OT ASSESSMENT FLOWSHEET (last 12 hours)      Occupational Therapy Evaluation     Row Name 19 0912                   OT Evaluation Time/Intention    Subjective Information  complains of;weakness;pain  -HK        Document Type  evaluation  -HK        Mode of Treatment  individual therapy;occupational therapy  -HK         Patient Effort  good  -HK        Symptoms Noted During/After Treatment  increased pain;fatigue  -HK        Comment  increased pain with movement   -HK           General Information    Patient Profile Reviewed?  yes  -HK        Onset of Illness/Injury or Date of Surgery  05/16/19  -HK        Referring Physician  MD Hosea   -HK        Patient Observations  alert;cooperative;agree to therapy  -HK        Patient/Family Observations  No visitors at bedside.   -HK        General Observations of Patient  Pt received upright in chair with IV heplocked and L adductor canal nerve catheter intact.   -HK        Prior Level of Function  min assist:;all household mobility;community mobility;gait;transfer;ADL's;bed mobility;cooking;cleaning;driving;shopping  -HK        Equipment Currently Used at Home  walker, standard;rollator;cane, straight;shower chair;raised toilet;grab bar  -HK        Pertinent History of Current Functional Problem  Pt is a 68 YOM who presents to [facility] for surgical management of intractable L knee pain and dysfunction that failed to improve with conservative management. Pt is POD #1 s/p L TKA.   -HK        Existing Precautions/Restrictions  fall;other (see comments) knee precautions; L adductor canal nerve catheter  -HK        Equipment Issued to Patient  leg ;sock aid  -HK        Risks Reviewed  patient:;nausea/vomiting;LOB;dizziness;increased discomfort;change in vital signs;increased drainage;lines disloged  -HK        Benefits Reviewed  patient:;improve function;increase independence;increase strength;increase balance;decrease pain;improve skin integrity;increase knowledge;decrease risk of DVT  -HK        Barriers to Rehab  previous functional deficit  -HK           Relationship/Environment    Primary Source of Support/Comfort  spouse  -HK        Lives With  spouse  -HK           Resource/Environmental Concerns    Current Living Arrangements  home/apartment/condo  -HK           Home Main Entrance     Number of Stairs, Main Entrance  one  -HK           Cognitive Assessment/Interventions    Additional Documentation  Cognitive Assessment/Intervention (Group)  -HK           Cognitive Assessment/Intervention- PT/OT    Affect/Mental Status (Cognitive)  WFL  -HK        Orientation Status (Cognition)  oriented x 4  -HK        Follows Commands (Cognition)  follows one step commands;over 90% accuracy  -HK        Cognitive Function (Cognitive)  safety deficit  -HK        Safety Deficit (Cognitive)  mild deficit;safety precautions follow-through/compliance;safety precautions awareness  -HK        Personal Safety Interventions  fall prevention program maintained;gait belt;nonskid shoes/slippers when out of bed  -HK           Safety Issues, Functional Mobility    Safety Issues Affecting Function (Mobility)  safety precautions follow-through/compliance;safety precaution awareness  -HK        Impairments Affecting Function (Mobility)  balance;pain;strength;range of motion (ROM)  -HK           Mobility Assessment/Treatment    Extremity Weight-bearing Status  left lower extremity  -HK        Left Lower Extremity (Weight-bearing Status)  weight-bearing as tolerated (WBAT)  -HK           Bed Mobility Assessment/Treatment    Comment (Bed Mobility)  Pt received and left UIC. OT issued leg  and educated pt on use for completion of bed mobility and car transfers.   -HK           Functional Mobility    Functional Mobility- Ind. Level  contact guard assist;verbal cues required  -HK        Functional Mobility- Device  rolling walker  -HK        Functional Mobility-Distance (Feet)  30  -HK        Functional Mobility- Safety Issues  step length decreased;weight-shifting ability decreased  -HK        Functional Mobility- Comment  Pt ambulated from chair to bathroom and back to chair.   -HK           Transfer Assessment/Treatment    Transfer Assessment/Treatment  sit-stand transfer;stand-sit transfer;toilet transfer  -         Comment (Transfers)  Verbal cues for safe hand placement, sequencing and to slide L LE out prior to surgery.   -HK           Sit-Stand Transfer    Sit-Stand [place] (Transfers)  contact guard;verbal cues  -HK        Assistive Device (Sit-Stand Transfers)  walker, front-wheeled  -HK           Stand-Sit Transfer    Stand-Sit [place] (Transfers)  contact guard;verbal cues  -HK        Assistive Device (Stand-Sit Transfers)  walker, front-wheeled  -HK           Toilet Transfer    Type (Toilet Transfer)  sit-stand;stand-sit  -HK        [place] Level (Toilet Transfer)  contact guard;verbal cues  -HK        Assistive Device (Toilet Transfer)  walker, front-wheeled  -HK           ADL Assessment/Intervention    BADL Assessment/Intervention  bathing;lower body dressing;upper body dressing;toileting  -HK           Bathing Assessment/Intervention    Comment (Bathing)  Pt educated on use of LH sponge for completion of LBB.   -HK           Upper Body Dressing Assessment/Training    Upper Body Dressing [place] Level  don;pull-over garment;set up  -HK        Upper Body Dressing Position  unsupported sitting  -HK           Lower Body Dressing Assessment/Training    Lower Body Dressing [place] Level  doff;don;socks;minimum assist (75% patient effort);verbal cues  -HK        Assistive Devices (Lower Body Dressing)  sock-aid  -HK        Lower Body Dressing Position  unsupported sitting  -HK        Comment (Lower Body Dressing)  Pt able to don underwear and pants independently with out use of AE. Pt requried use of sock aid to don socks. OT issued sock aid and educated pt on use. Pt educated on use of reacher for item retrieval and use of shoe horn if needed.  -HK           Toileting Assessment/Training    [place] Level (Toileting)  toileting skills;minimum assist (75% patient effort);verbal cues  -HK        Toileting Position  unsupported sitting  -HK        Comment (Toileting)  Jose Martin for clothing management  and cues to attend to nerve catheter during clothing management.   -HK           BADL Safety/Performance    Impairments, BADL Safety/Performance  balance;pain;strength;range of motion  -HK           General ROM    RT Upper Ext  Rt Shoulder Flexion  -HK        LT Upper Ext  Lt Shoulder Flexion  -HK           Right Upper Ext    Rt Shoulder Flexion AROM  WFL for eval   -HK           Left Upper Ext    Lt Shoulder Flexion AROM  WFL for eval   -HK           MMT (Manual Muscle Testing)    Rt Upper Ext  Rt Shoulder WFL  -HK        Lt Upper Ext  Lt Shoulder WFL  -HK        General MMT Comments  WFL for eval   -HK           Motor Assessment/Interventions    Additional Documentation  Balance (Group)  -HK           Balance    Balance  static sitting balance;static standing balance;dynamic sitting balance;dynamic standing balance  -HK           Static Sitting Balance    Level of [place] (Unsupported Sitting, Static Balance)  independent  -HK        Sitting Position (Unsupported Sitting, Static Balance)  sitting in chair  -HK        Time Able to Maintain Position (Unsupported Sitting, Static Balance)  2 to 3 minutes  -HK           Dynamic Sitting Balance    Level of [place], Reaches Outside Midline (Sitting, Dynamic Balance)  independent  -HK        Sitting Position, Reaches Outside Midline (Sitting, Dynamic Balance)  sitting in chair  -HK        Comment, Reaches Outside Midline (Sitting, Dynamic Balance)  completing LBD   -HK           Static Standing Balance    Level of [place] (Supported Standing, Static Balance)  contact guard assist  -HK        Time Able to Maintain Position (Supported Standing, Static Balance)  1 to 2 minutes  -HK        Assistive Device Utilized (Supported Standing, Static Balance)  walker, rolling  -HK           Dynamic Standing Balance    Level of [place], Reaches Outside Midline (Standing, Dynamic Balance)  contact guard assist  -HK        Time Able to Maintain Position, Reaches  Outside Midline (Standing, Dynamic Balance)  30 to 45 seconds  -HK        Assistive Device Utilized (Supported Standing, Dynamic Balance)  walker, rolling  -HK        Comment, Reaches Outside Midline (Standing, Dynamic Balance)  completing clothing management after toileting.   -HK           Sensory Assessment/Intervention    Sensory General Assessment  no sensation deficits identified  -HK           Positioning and Restraints    Pre-Treatment Position  sitting in chair/recliner  -HK        Post Treatment Position  chair  -HK        In Chair  notified nsg;reclined;call light within reach;encouraged to call for assist;exit alarm on  -HK           Pain Assessment    Additional Documentation  Pain Scale: Numbers Pre/Post-Treatment (Group)  -HK           Pain Scale: Numbers Pre/Post-Treatment    Pain Scale: Numbers, Pretreatment  0/10 - no pain  -HK        Pain Scale: Numbers, Post-Treatment  0/10 - no pain  -HK        Pre/Post Treatment Pain Comment  Pt reports pain is worse when up but 0 when sitting.   -HK           Wound 05/16/19 1139 Left knee incision    Wound - Properties Group Date first assessed: 05/16/19  -DS Time first assessed: 1139  -DS Side: Left  -DS Location: knee  -DS Type: incision  -DS       Coping    Observed Emotional State  accepting;calm;cooperative  -HK           Plan of Care Review    Plan of Care Reviewed With  patient  -HK           Clinical Impression (OT)    Date of Referral to OT  05/16/19  -HK        OT Diagnosis  Decreased independence with ADLS and Mobility   -HK        Patient/Family Goals Statement (OT Eval)  Pt would like to improve and return home.   -HK        Criteria for Skilled Therapeutic Interventions Met (OT Eval)  yes;treatment indicated  -HK        Rehab Potential (OT Eval)  good, to achieve stated therapy goals  -HK        Therapy Frequency (OT Eval)  daily  -HK        Care Plan Review (OT)  evaluation/treatment results reviewed;care plan/treatment goals  reviewed;risks/benefits reviewed;patient/other agree to care plan  -HK        Anticipated Equipment Needs at Discharge (OT)  front wheeled walker  -HK        Anticipated Discharge Disposition (OT)  inpatient rehabilitation facility  -HK           Vital Signs    Pre Systolic BP Rehab  -- RN cleared for tx   -HK        Pre Patient Position  Sitting  -HK        Intra Patient Position  Standing  -HK        Post Patient Position  Sitting  -HK           OT Goals    Bed Mobility Goal Selection (OT)  bed mobility, OT goal 1  -HK        Transfer Goal Selection (OT)  transfer, OT goal 1  -HK        Dressing Goal Selection (OT)  dressing, OT goal 1  -HK        Toileting Goal Selection (OT)  toileting, OT goal 1  -HK           Bed Mobility Goal 1 (OT)    Activity/Assistive Device (Bed Mobility Goal 1, OT)  sit to supine/supine to sit;scooting  -HK        [place] Level/Cues Needed (Bed Mobility Goal 1, OT)  minimum assist (75% or more patient effort);verbal cues required  -HK        Time Frame (Bed Mobility Goal 1, OT)  5 days  -HK        Progress/Outcomes (Bed Mobility Goal 1, OT)  goal ongoing  -HK           Transfer Goal 1 (OT)    Activity/Assistive Device (Transfer Goal 1, OT)  sit-to-stand/stand-to-sit;toilet  -HK        [place] Level/Cues Needed (Transfer Goal 1, OT)  conditional independence  -HK        Time Frame (Transfer Goal 1, OT)  5 days  -HK        Progress/Outcome (Transfer Goal 1, OT)  goal ongoing  -HK           Dressing Goal 1 (OT)    Activity/Assistive Device (Dressing Goal 1, OT)  lower body dressing;sock-aid  -HK        [place]/Cues Needed (Dressing Goal 1, OT)  standby assist  -HK        Time Frame (Dressing Goal 1, OT)  5 days  -HK        Progress/Outcome (Dressing Goal 1, OT)  goal ongoing  -HK           Toileting Goal 1 (OT)    Activity/Device (Toileting Goal 1, OT)  toileting skills, all  -HK        [place] Level/Cues Needed (Toileting Goal 1, OT)  conditional independence  -HK         Time Frame (Toileting Goal 1, OT)  5 days  -        Progress/Outcome (Toileting Goal 1, OT)  goal ongoing  -           Living Environment    Home Accessibility  stairs to enter home  -          User Key  (r) = Recorded By, (t) = Taken By, (c) = Cosigned By    Initials Name Effective Dates     Holly Cobb, OT 03/07/18 -     Jimena Crenshaw, RN -         Occupational Therapy Education     Title: PT OT SLP Therapies (In Progress)     Topic: Occupational Therapy (In Progress)     Point: ADL training (Done)     Description: Instruct learner(s) on proper safety adaptation and remediation techniques during self care or transfers.   Instruct in proper use of assistive devices.    Learning Progress Summary           Patient Acceptance, E, VU by  at 5/17/2019  9:12 AM    Comment:  Pt educated on ADL retraining with use of sock aid for LBD, safety precautions, and appropriate body mechanics to maintain knee precautions.                   Point: Precautions (Done)     Description: Instruct learner(s) on prescribed precautions during self-care and functional transfers.    Learning Progress Summary           Patient Acceptance, E, VU by  at 5/17/2019  9:12 AM    Comment:  Pt educated on ADL retraining with use of sock aid for LBD, safety precautions, and appropriate body mechanics to maintain knee precautions.                   Point: Body mechanics (Done)     Description: Instruct learner(s) on proper positioning and spine alignment during self-care, functional mobility activities and/or exercises.    Learning Progress Summary           Patient Acceptance, E, VU by  at 5/17/2019  9:12 AM    Comment:  Pt educated on ADL retraining with use of sock aid for LBD, safety precautions, and appropriate body mechanics to maintain knee precautions.                               User Key     Initials Effective Dates Name Provider Type Discipline     03/07/18 -  Holly Cobb, OT Occupational Therapist OT                   OT Recommendation and Plan  Outcome Summary/Treatment Plan (OT)  Anticipated Equipment Needs at Discharge (OT): front wheeled walker  Anticipated Discharge Disposition (OT): inpatient rehabilitation facility  Therapy Frequency (OT Eval): daily  Plan of Care Review  Plan of Care Reviewed With: patient  Plan of Care Reviewed With: patient  Outcome Summary: OT eval complete. Pt compeltes sit to stand with CGA and ambulates with CGA and RW. Pt donned pants and underwear independently and required Jose Martin and use of sock aid to don socks. Pt completed toileting with Jose Martin for clothing management. Recommend d/c to IPR prior to returning home due to patients wife being unable to assist him. Pt will need RW.     Outcome Measures     Row Name 05/16/19 1712             How much help from another person do you currently need...    Turning from your back to your side while in flat bed without using bedrails?  3  -MJ      Moving from lying on back to sitting on the side of a flat bed without bedrails?  3  -MJ      Moving to and from a bed to a chair (including a wheelchair)?  3  -MJ      Standing up from a chair using your arms (e.g., wheelchair, bedside chair)?  3  -MJ      Climbing 3-5 steps with a railing?  1  -MJ      To walk in hospital room?  3  -MJ      AM-PAC 6 Clicks Score  16  -MJ         Functional Assessment    Outcome Measure Options  AM-PAC 6 Clicks Basic Mobility (PT)  -        User Key  (r) = Recorded By, (t) = Taken By, (c) = Cosigned By    Initials Name Provider Type     Kee Natarajan, PT Physical Therapist          Time Calculation:   Time Calculation- OT     Row Name 05/17/19 0912             Time Calculation- OT    OT Start Time  0912  -      OT Received On  05/17/19  -      OT Goal Re-Cert Due Date  05/27/19  -         Timed Charges    84915 - OT Self Care/Mgmt Minutes  24  -HK        User Key  (r) = Recorded By, (t) = Taken By, (c) = Cosigned By    Initials Name Provider Type     Holly Cobb  [place], OT Occupational Therapist        Therapy Charges for Today     Code Description Service Date Service Provider Modifiers Qty    44820292753 HC OT SELF CARE/MGMT/TRAIN EA 15 MIN 5/17/2019 Holly Cobb, OT GO 2    90943144350 HC OT EVAL LOW COMPLEXITY 2 5/17/2019 Holly Cobb, OT GO 1               Holly Cobb OT  5/17/2019

## 2019-05-17 NOTE — PLAN OF CARE
Problem: Patient Care Overview  Goal: Plan of Care Review  Outcome: Ongoing (interventions implemented as appropriate)   05/17/19 7500   Coping/Psychosocial   Plan of Care Reviewed With patient   Plan of Care Review   Progress no change   OTHER   Outcome Summary Pt LTK compressogrip in place, CDI. Ropivicaine infusing at 10ml/hr, ice packs on knee. Pt VSS, A&Ox4. Pt to go to [facility] rehab tomorrow. Hx of chronic consitpation, see MAR for meds given. Voids frequently, refusing I/o cath- Flomax started today. Able to ambulate up with 1 assist.

## 2019-05-17 NOTE — THERAPY TREATMENT NOTE
Acute Care - Physical Therapy Treatment Note  [facility]     Patient Name: Larry Dean Klinefelter  : 1950  MRN: [id number]  Today's Date: 2019  Onset of Illness/Injury or Date of Surgery: 19  Date of Referral to PT: 19  Referring Physician: MD Hosea     Admit Date: 2019    Visit Dx:    ICD-10-CM ICD-9-CM   1. Impaired functional mobility, balance, gait, and endurance Z74.09 V49.89   2. Primary osteoarthritis of left knee M17.12 715.16   3. Impaired mobility and ADLs Z74.09 799.89     Patient Active Problem List   Diagnosis   • Primary osteoarthritis of left knee   • TRACEY on CPAP   • HTN (hypertension)   • HLD (hyperlipidemia)   • Hypothyroid   • CAD (coronary artery disease)   • Status post total left knee replacement       Therapy Treatment    Rehabilitation Treatment Summary     Row Name 19 1034             Treatment Time/Intention    Discipline  physical therapist  -LM      Document Type  therapy note (daily note)  -LM      Subjective Information  no complaints  -LM      Mode of Treatment  physical therapy;individual therapy  -LM      Patient/Family Observations  Pt received sitting UIC, L adductor canal nerve catheter intact.  -LM      Care Plan Review  care plan/treatment goals reviewed;patient/other agree to care plan  -LM      Therapy Frequency (PT Clinical Impression)  2 times/day  -LM      Patient Effort  good  -LM      Existing Precautions/Restrictions  fall;other (see comments) L adductor canal nerve catheter  -LM      Recorded by [LM] Yolanda Trinidad, PT 19 1319      Row Name 19 1034             Vital Signs    Pre Patient Position  Sitting  -LM      Intra Patient Position  Standing  -LM      Post Patient Position  Sitting  -LM      Recorded by [LM] Yolanda Trinidad, PT 19 1319      Row Name 19 1034             Cognitive Assessment/Intervention- PT/OT    Affect/Mental Status (Cognitive)  WFL  -LM      Orientation Status (Cognition)  oriented x  4  -LM      Follows Commands (Cognition)  WFL  -LM      Safety Deficit (Cognitive)  mild deficit;safety precautions awareness;safety precautions follow-through/compliance  -LM      Recorded by [LM] Yolanda Trinidad, PT 05/17/19 1319      Row Name 05/17/19 1034             Safety Issues, Functional Mobility    Safety Issues Affecting Function (Mobility)  safety precaution awareness;safety precautions follow-through/compliance  -LM      Impairments Affecting Function (Mobility)  balance;pain;range of motion (ROM);strength  -LM      Recorded by [LM] Yolanda Trinidad, PT 05/17/19 1319      Row Name 05/17/19 1034             Mobility Assessment/Intervention    Extremity Weight-bearing Status  left lower extremity  -LM      Left Lower Extremity (Weight-bearing Status)  weight-bearing as tolerated (WBAT)  -LM      Recorded by [LM] Yolanda Trinidad, PT 05/17/19 1319      Row Name 05/17/19 1034             Bed Mobility Assessment/Treatment    Comment (Bed Mobility)  Not tested - pt received and left UIC  -LM      Recorded by [LM] Yolanda Trinidad, PT 05/17/19 1319      Row Name 05/17/19 1034             Transfer Assessment/Treatment    Transfer Assessment/Treatment  sit-stand transfer;stand-sit transfer  -LM      Comment (Transfers)  Verbal cues for correct hand placement and to step out left LE prior to t/f for comfort.  -LM      Recorded by [LM] Yolanda Trinidad, PT 05/17/19 1319      Row Name 05/17/19 1034             Sit-Stand Transfer    Sit-Stand [place] (Transfers)  contact guard;verbal cues  -LM      Assistive Device (Sit-Stand Transfers)  walker, front-wheeled  -LM      Recorded by [LM] Yolanda Trinidad, PT 05/17/19 1319      Row Name 05/17/19 1034             Stand-Sit Transfer    Stand-Sit [place] (Transfers)  contact guard;verbal cues  -LM      Assistive Device (Stand-Sit Transfers)  walker, front-wheeled  -LM      Recorded by [LM] Yolanda Trinidad, PT 05/17/19 1319      Row Name 05/17/19 1034              Gait/Stairs Assessment/Training    23242 - Gait Training Minutes   10  -LM      [place] Level (Gait)  contact guard;verbal cues  -LM      Assistive Device (Gait)  walker, front-wheeled  -LM      Distance in Feet (Gait)  150  -LM      Pattern (Gait)  step-to;step-through  -LM      Deviations/Abnormal Patterns (Gait)  left sided deviations;antalgic;bilateral deviations;mala decreased;stride length decreased  -LM      Bilateral Gait Deviations  forward flexed posture;heel strike decreased  -LM      Left Sided Gait Deviations  weight shift ability decreased  -LM      Comment (Gait/Stairs)  Pt initially demonstrated step-to pattern but progressed to step-through pattern at slow pace with cues and practice. Verbal cues to increase left LE weight bearing, upright posture, remain within middle of RW, forward gaze, and remain within middle of RW at all times. Cues to not twist on left LE during turns. Gait limited by fatigue.  -LM      Recorded by [LM] Yolanda Trinidad, PT 05/17/19 1319      Row Name 05/17/19 1034             Left Lower Ext    Lt Knee Extension/Flexion AROM  L knee AROM measured 12-78 degrees; pt lacking 12 degrees from full extension, AAROM flexion measured in sitting  -LM      Recorded by [LM] Yolanda Trinidad, PT 05/17/19 1319      Row Name 05/17/19 1034             Motor Skills Assessment/Interventions    Additional Documentation  Therapeutic Exercise (Group);Therapeutic Exercise Interventions (Group)  -LM      Recorded by [LM] Yolanda Trinidad, PT 05/17/19 1319      Row Name 05/17/19 1034             Therapeutic Exercise    76132 - PT Therapeutic Exercise Minutes  10  -LM      97021 - PT Therapeutic Activity Minutes  3  -LM      Recorded by [LM] Yolanda Trinidad, PT 05/17/19 1319      Row Name 05/17/19 1034             Therapeutic Exercise    Lower Extremity (Therapeutic Exercise)  gluteal sets;heel slides, left;LAQ (long arc quad), left;quad sets, left;SAQ (short arc quad), left;SLR  (straight leg raise), left  -LM      Lower Extremity Range of Motion (Therapeutic Exercise)  ankle dorsiflexion/plantar flexion, bilateral  -LM      Exercise Type (Therapeutic Exercise)  AROM (active range of motion)  -LM      Position (Therapeutic Exercise)  seated  -LM      Sets/Reps (Therapeutic Exercise)  x15 reps each  -LM      Comment (Therapeutic Exercise)  Cues for technique, able to perform without physical assist.  -LM      Recorded by [LM] Yolanda Trinidad, PT 05/17/19 1319      Row Name 05/17/19 1034             Positioning and Restraints    Pre-Treatment Position  sitting in chair/recliner  -LM      Post Treatment Position  chair  -LM      In Chair  notified nsg;reclined;sitting;call light within reach;encouraged to call for assist;exit alarm on;legs elevated  -LM      Recorded by [LM] Yolanda Trinidad, PT 05/17/19 1319      Row Name 05/17/19 1034             Pain Assessment    Additional Documentation  Pain Scale: Numbers Pre/Post-Treatment (Group)  -LM      Recorded by [LM] Yolanda rTinidad, PT 05/17/19 1319      Row Name 05/17/19 1034             Pain Scale: Numbers Pre/Post-Treatment    Pain Scale: Numbers, Pretreatment  0/10 - no pain  -LM      Pain Scale: Numbers, Post-Treatment  0/10 - no pain  -LM      Recorded by [LM] Yolanda Trinidad, PT 05/17/19 1319      Row Name                Wound 05/16/19 1139 Left knee incision    Wound - Properties Group Date first assessed: 05/16/19 [DS] Time first assessed: 1139 [DS] Side: Left [DS] Location: knee [DS] Type: incision [DS] Recorded by:  [DS] Jimena Metz RN 05/16/19 1139    Row Name 05/17/19 1034             Plan of Care Review    Plan of Care Reviewed With  patient  -LM      Recorded by [LM] Yolanda Trinidad, PT 05/17/19 1319      Row Name 05/17/19 103             Outcome Summary/Treatment Plan (PT)    Daily Summary of Progress (PT)  progress toward functional goals is good  -LM      Recorded by [LM] Yolanda Trinidad, PT 05/17/19 0620         User Key  (r) = Recorded By, (t) = Taken By, (c) = Cosigned By    Initials Name Effective Dates Discipline    LM Yolanda Trinidad, PT 04/03/18 -  PT    Jimena Crenshaw, RN - Nurse          Wound 05/16/19 1139 Left knee incision (Active)   Dressing Appearance dry;intact;no drainage 5/17/2019  8:10 AM   Closure APPLE 5/17/2019  8:10 AM   Base dressing in place, unable to visualize 5/17/2019  8:10 AM   Drainage Amount none 5/17/2019  8:10 AM   Care, Wound dressing removed 5/17/2019 11:00 AM   Dressing Care, Wound elastic bandage 5/17/2019 11:00 AM       Rehab Goal Summary     Row Name 05/17/19 0912             Occupational Therapy Goals    Bed Mobility Goal Selection (OT)  bed mobility, OT goal 1  -HK      Transfer Goal Selection (OT)  transfer, OT goal 1  -HK      Dressing Goal Selection (OT)  dressing, OT goal 1  -HK      Toileting Goal Selection (OT)  toileting, OT goal 1  -HK         Bed Mobility Goal 1 (OT)    Activity/Assistive Device (Bed Mobility Goal 1, OT)  sit to supine/supine to sit;scooting  -HK      [place] Level/Cues Needed (Bed Mobility Goal 1, OT)  minimum assist (75% or more patient effort);verbal cues required  -HK      Time Frame (Bed Mobility Goal 1, OT)  5 days  -HK      Progress/Outcomes (Bed Mobility Goal 1, OT)  goal ongoing  -HK         Transfer Goal 1 (OT)    Activity/Assistive Device (Transfer Goal 1, OT)  sit-to-stand/stand-to-sit;toilet  -HK      [place] Level/Cues Needed (Transfer Goal 1, OT)  conditional independence  -HK      Time Frame (Transfer Goal 1, OT)  5 days  -HK      Progress/Outcome (Transfer Goal 1, OT)  goal ongoing  -HK         Dressing Goal 1 (OT)    Activity/Assistive Device (Dressing Goal 1, OT)  lower body dressing;sock-aid  -HK      [place]/Cues Needed (Dressing Goal 1, OT)  standby assist  -HK      Time Frame (Dressing Goal 1, OT)  5 days  -HK      Progress/Outcome (Dressing Goal 1, OT)  goal ongoing  -HK         Toileting Goal 1 (OT)     Activity/Device (Toileting Goal 1, OT)  toileting skills, all  -HK      [place] Level/Cues Needed (Toileting Goal 1, OT)  conditional independence  -HK      Time Frame (Toileting Goal 1, OT)  5 days  -HK      Progress/Outcome (Toileting Goal 1, OT)  goal ongoing  -HK        User Key  (r) = Recorded By, (t) = Taken By, (c) = Cosigned By    Initials Name Provider Type Discipline    HK Holly Cobb, OT Occupational Therapist OT          Physical Therapy Education     Title: PT OT SLP Therapies (In Progress)     Topic: Physical Therapy (Done)     Point: Mobility training (Done)     Learning Progress Summary           Patient Acceptance, E,D, VU,NR by SPENCER at 5/17/2019 10:34 AM    Comment:  Reviewed knee precautions, benefits of activity, HEP, correct gait mechanics.    Acceptance, E,D, VU by DARA at 5/16/2019  5:12 PM    Comment:  Edu re: effects of spinal, correct transfer technique, gait mechanics, progression of POC                   Point: Home exercise program (Done)     Learning Progress Summary           Patient Acceptance, E,D, VU,NR by SPENCER at 5/17/2019 10:34 AM    Comment:  Reviewed knee precautions, benefits of activity, HEP, correct gait mechanics.                   Point: Body mechanics (Done)     Learning Progress Summary           Patient Acceptance, E,D, VU,NR by SPENCER at 5/17/2019 10:34 AM    Comment:  Reviewed knee precautions, benefits of activity, HEP, correct gait mechanics.    Acceptance, E,D, VU by DARA at 5/16/2019  5:12 PM    Comment:  Edu re: effects of spinal, correct transfer technique, gait mechanics, progression of POC                   Point: Precautions (Done)     Learning Progress Summary           Patient Acceptance, E,D, VU,NR by SPENCER at 5/17/2019 10:34 AM    Comment:  Reviewed knee precautions, benefits of activity, HEP, correct gait mechanics.    Acceptance, E,D, VU by DARA at 5/16/2019  5:12 PM    Comment:  Edu re: effects of spinal, correct transfer technique, gait mechanics, progression of  POC                               User Key     Initials Effective Dates Name Provider Type Discipline     04/03/18 -  Kee Natarajan, PT Physical Therapist PT     04/03/18 -  Yolanda Trinidad PT Physical Therapist PT                PT Recommendation and Plan  Therapy Frequency (PT Clinical Impression): 2 times/day  Outcome Summary/Treatment Plan (PT)  Daily Summary of Progress (PT): progress toward functional goals is good  Plan of Care Reviewed With: patient  Outcome Summary: Pt increased ambulation distance to 150 feet with CGA and RW, cues for sequencing throughout; gait distance limited by fatigue. L knee AROM measured 12-78 degrees. Initiated HEP, reviewed knee precautions. Pt awaiting rehab placement. PADD score: 9  Outcome Measures     Row Name 05/17/19 1034 05/16/19 1712          How much help from another person do you currently need...    Turning from your back to your side while in flat bed without using bedrails?  3  -LM  3  -MJ     Moving from lying on back to sitting on the side of a flat bed without bedrails?  3  -LM  3  -MJ     Moving to and from a bed to a chair (including a wheelchair)?  3  -LM  3  -MJ     Standing up from a chair using your arms (e.g., wheelchair, bedside chair)?  3  -LM  3  -MJ     Climbing 3-5 steps with a railing?  1  -LM  1  -MJ     To walk in hospital room?  3  -LM  3  -MJ     AM-PAC 6 Clicks Score  16  -LM  16  -MJ        Functional Assessment    Outcome Measure Options  AM-PAC 6 Clicks Basic Mobility (PT)  -  AM-PAC 6 Clicks Basic Mobility (PT)  -       User Key  (r) = Recorded By, (t) = Taken By, (c) = Cosigned By    Initials Name Provider Type    Kee Jorgensen, PT Physical Therapist     Yolanda Trinidad, KELIN Physical Therapist         Time Calculation:   PT Charges     Row Name 05/17/19 1034             Time Calculation    Start Time  1321  -LM      PT Received On  05/17/19  -      PT Goal Re-Cert Due Date  05/26/19  -         Time Calculation- PT    Total  Timed Code Minutes- PT  23 minute(s)  -LM         Timed Charges    92910 - PT Therapeutic Exercise Minutes  10  -LM      94043 - Gait Training Minutes   10  -LM      71357 - PT Therapeutic Activity Minutes  3  -LM        User Key  (r) = Recorded By, (t) = Taken By, (c) = Cosigned By    Initials Name Provider Type     Yolanda Trinidad, PT Physical Therapist        Therapy Charges for Today     Code Description Service Date Service Provider Modifiers Qty    30460325437 HC PT THER PROC EA 15 MIN 5/17/2019 Yolanda Trinidad, PT GP 1    21880856885 HC GAIT TRAINING EA 15 MIN 5/17/2019 Yolanda Trinidad, PT GP 1          PT G-Codes  Outcome Measure Options: AM-PAC 6 Clicks Basic Mobility (PT)  AM-PAC 6 Clicks Score: 16    Yolanda Trinidad PT  5/17/2019

## 2019-05-17 NOTE — THERAPY TREATMENT NOTE
Acute Care - Physical Therapy Treatment Note  [facility]     Patient Name: Larry Dean Klinefelter  : 1950  MRN: [id number]  Today's Date: 2019  Onset of Illness/Injury or Date of Surgery: 19  Date of Referral to PT: 19  Referring Physician: MD Hosea     Admit Date: 2019    Visit Dx:    ICD-10-CM ICD-9-CM   1. Impaired functional mobility, balance, gait, and endurance Z74.09 V49.89   2. Primary osteoarthritis of left knee M17.12 715.16   3. Impaired mobility and ADLs Z74.09 799.89     Patient Active Problem List   Diagnosis   • Primary osteoarthritis of left knee   • TRACEY on CPAP   • HTN (hypertension)   • HLD (hyperlipidemia)   • Hypothyroid   • CAD (coronary artery disease)   • Status post total left knee replacement   • Postoperative urinary retention   • Leukocytosis, likely reactive   • Acute blood loss anemia, mild, asymptomatic   • Acute postoperative pain       Therapy Treatment    Rehabilitation Treatment Summary     Row Name 19 1419 19 1034          Treatment Time/Intention    Discipline  physical therapist  -LM  physical therapist  -LM     Document Type  therapy note (daily note)  -LM  therapy note (daily note)  -LM     Subjective Information  no complaints  -LM  no complaints  -LM     Mode of Treatment  physical therapy  -LM  physical therapy;individual therapy  -LM     Patient/Family Observations  Pt received semi-tomlin in bed, left adductor canal nerve catheter in place, spouse present.  -LM  Pt received sitting UIC, L adductor canal nerve catheter intact.  -LM     Care Plan Review  care plan/treatment goals reviewed;patient/other agree to care plan  -LM  care plan/treatment goals reviewed;patient/other agree to care plan  -LM     Therapy Frequency (PT Clinical Impression)  2 times/day  -LM  2 times/day  -LM     Patient Effort  good  -LM  good  -LM     Existing Precautions/Restrictions  fall;other (see comments) L adductor canal nerve catheter  -LM  fall;other  (see comments) L adductor canal nerve catheter  -LM     Recorded by [LM] Yolanda Trinidad, PT 05/17/19 1528 [LM] Yolanda Trinidad, PT 05/17/19 1319     Row Name 05/17/19 1419 05/17/19 1034          Vital Signs    Pre Patient Position  Supine  -LM  Sitting  -LM     Intra Patient Position  Standing  -LM  Standing  -LM     Post Patient Position  Supine  -LM  Sitting  -LM     Recorded by [LM] Yolanda Trinidad, PT 05/17/19 1528 [LM] Yolanda Trinidad, PT 05/17/19 1319     Row Name 05/17/19 1419 05/17/19 1034          Cognitive Assessment/Intervention- PT/OT    Affect/Mental Status (Cognitive)  WNL  -LM  WFL  -LM     Orientation Status (Cognition)  oriented x 4  -LM  oriented x 4  -LM     Follows Commands (Cognition)  WFL  -LM  WFL  -LM     Safety Deficit (Cognitive)  mild deficit;safety precautions awareness;safety precautions follow-through/compliance  -LM  mild deficit;safety precautions awareness;safety precautions follow-through/compliance  -LM     Recorded by [LM] Yolanda Trinidad, PT 05/17/19 1528 [LM] Yolanda Trinidad, PT 05/17/19 1319     Row Name 05/17/19 1419 05/17/19 1034          Safety Issues, Functional Mobility    Safety Issues Affecting Function (Mobility)  safety precaution awareness;safety precautions follow-through/compliance  -LM  safety precaution awareness;safety precautions follow-through/compliance  -LM     Impairments Affecting Function (Mobility)  balance;pain;strength;endurance/activity tolerance  -LM  balance;pain;range of motion (ROM);strength  -LM     Recorded by [LM] Yolanda Trinidad, PT 05/17/19 1528 [LM] Yolanda Trinidad, PT 05/17/19 1319     Row Name 05/17/19 1419 05/17/19 1034          Mobility Assessment/Intervention    Extremity Weight-bearing Status  left lower extremity  -LM  left lower extremity  -LM     Left Lower Extremity (Weight-bearing Status)  weight-bearing as tolerated (WBAT)  -LM  weight-bearing as tolerated (WBAT)  -LM     Recorded by [LM] Yolanda Trinidad, PT 05/17/19  1528 [LM] Yolanda Trinidad, PT 05/17/19 1319     Row Name 05/17/19 1419 05/17/19 1034          Bed Mobility Assessment/Treatment    Bed Mobility Assessment/Treatment  supine-sit;sit-supine  -LM  --     Supine-Sit [place] (Bed Mobility)  supervision;verbal cues  -LM  --     Sit-Supine [place] (Bed Mobility)  minimum assist (75% patient effort);verbal cues  -LM  --     Bed Mobility, Safety Issues  decreased use of legs for bridging/pushing  -LM  --     Assistive Device (Bed Mobility)  bed rails;leg   -LM  --     Comment (Bed Mobility)  Verbal cues for sequencing, assist at left LE to bring onto EOB when returning to supine.  -LM  Not tested - pt received and left UIC  -LM     Recorded by [LM] Yolanda Trinidad, PT 05/17/19 1528 [LM] Yolanda Trinidad, PT 05/17/19 1319     Row Name 05/17/19 1419 05/17/19 1034          Transfer Assessment/Treatment    Transfer Assessment/Treatment  sit-stand transfer;stand-sit transfer  -LM  sit-stand transfer;stand-sit transfer  -LM     Comment (Transfers)  Verbal cues for correct hand placement.  -LM  Verbal cues for correct hand placement and to step out left LE prior to t/f for comfort.  -LM     Recorded by [LM] Yolanda Trinidad, PT 05/17/19 1528 [LM] Yolanda Trinidad, PT 05/17/19 1319     Row Name 05/17/19 1419 05/17/19 1034          Sit-Stand Transfer    Sit-Stand [place] (Transfers)  contact guard;verbal cues  -LM  contact guard;verbal cues  -LM     Assistive Device (Sit-Stand Transfers)  walker, front-wheeled  -LM  walker, front-wheeled  -LM     Recorded by [LM] Yolanda Trinidad, PT 05/17/19 1528 [LM] Yolanda Trinidad, PT 05/17/19 1319     Row Name 05/17/19 1419 05/17/19 1034          Stand-Sit Transfer    Stand-Sit [place] (Transfers)  contact guard;verbal cues  -LM  contact guard;verbal cues  -LM     Assistive Device (Stand-Sit Transfers)  walker, front-wheeled  -LM  walker, front-wheeled  -LM     Recorded by [LM] Yolanda Trinidad, PT 05/17/19 1528 [LM]  Yolanda Trinidad, PT 05/17/19 1319     Row Name 05/17/19 1419 05/17/19 1034          Gait/Stairs Assessment/Training    03624 - Gait Training Minutes   9  -LM  10  -LM     [place] Level (Gait)  contact guard;verbal cues  -LM  contact guard;verbal cues  -LM     Assistive Device (Gait)  walker, front-wheeled  -LM  walker, front-wheeled  -LM     Distance in Feet (Gait)  180  -LM  150  -LM     Pattern (Gait)  step-through;step-to  -LM  step-to;step-through  -LM     Deviations/Abnormal Patterns (Gait)  left sided deviations;antalgic;bilateral deviations;mala decreased;stride length decreased  -LM  left sided deviations;antalgic;bilateral deviations;mala decreased;stride length decreased  -LM     Bilateral Gait Deviations  forward flexed posture;heel strike decreased  -LM  forward flexed posture;heel strike decreased  -LM     Left Sided Gait Deviations  weight shift ability decreased  -LM  weight shift ability decreased  -LM     Comment (Gait/Stairs)  Pt initially demonstrated step-to pattern but progressed to step-through pattern with cues and practice. Cues to increase left LE weight bearing, decrease bilateral UE weight bearing, upright posture, forward gaze and increase step length. Pt took multiple standing rest breaks due to fatigue and pain.   -LM  Pt initially demonstrated step-to pattern but progressed to step-through pattern at slow pace with cues and practice. Verbal cues to increase left LE weight bearing, upright posture, remain within middle of RW, forward gaze, and remain within middle of RW at all times. Cues to not twist on left LE during turns. Gait limited by fatigue.  -LM     Recorded by [LM] Yolanda Trinidad, PT 05/17/19 1528 [LM] Yolanda Trinidad, PT 05/17/19 1319     Row Name 05/17/19 1034             Left Lower Ext    Lt Knee Extension/Flexion AROM  L knee AROM measured 12-78 degrees; pt lacking 12 degrees from full extension, AAROM flexion measured in sitting  -LM      Recorded by [LM]  Yolanda Trinidad, PT 05/17/19 1319      Row Name 05/17/19 1034             Motor Skills Assessment/Interventions    Additional Documentation  Therapeutic Exercise (Group);Therapeutic Exercise Interventions (Group)  -LM      Recorded by [LM] Yolanda Trinidad, PT 05/17/19 1319      Row Name 05/17/19 1419 05/17/19 1034          Therapeutic Exercise    57069 - PT Therapeutic Exercise Minutes  10  -LM  10  -LM     41573 - PT Therapeutic Activity Minutes  10  -LM  3  -LM     Recorded by [LM] Yolanda Trinidad, PT 05/17/19 1528 [LM] Yolanda Trinidad, PT 05/17/19 1319     Row Name 05/17/19 1419 05/17/19 1034          Therapeutic Exercise    Lower Extremity (Therapeutic Exercise)  gluteal sets;heel slides, left;LAQ (long arc quad), left;quad sets, left;SAQ (short arc quad), left;SLR (straight leg raise), left  -LM  gluteal sets;heel slides, left;LAQ (long arc quad), left;quad sets, left;SAQ (short arc quad), left;SLR (straight leg raise), left  -LM     Lower Extremity Range of Motion (Therapeutic Exercise)  ankle dorsiflexion/plantar flexion, bilateral  -LM  ankle dorsiflexion/plantar flexion, bilateral  -LM     Exercise Type (Therapeutic Exercise)  AROM (active range of motion)  -LM  AROM (active range of motion)  -LM     Position (Therapeutic Exercise)  supine  -LM  seated  -LM     Sets/Reps (Therapeutic Exercise)  x15 reps each  -LM  x15 reps each  -LM     Comment (Therapeutic Exercise)  Cues for technique,  no physical assist required  -LM  Cues for technique, able to perform without physical assist.  -LM     Recorded by [LM] Yolanda Trinidad, PT 05/17/19 1528 [LM] Yolanda Trinidad, PT 05/17/19 1319     Row Name 05/17/19 1419 05/17/19 1034          Positioning and Restraints    Pre-Treatment Position  in bed  -LM  sitting in chair/recliner  -LM     Post Treatment Position  bed  -LM  chair  -LM     In Bed  notified nsg;supine;call light within reach;encouraged to call for assist;with family/caregiver  -LM  --     In Chair   --  notified nsg;reclined;sitting;call light within reach;encouraged to call for assist;exit alarm on;legs elevated  -LM     Recorded by [LM] Yolanda Trinidad, PT 05/17/19 1528 [LM] Yolanda Trinidad, PT 05/17/19 1319     Row Name 05/17/19 1034             Pain Assessment    Additional Documentation  Pain Scale: Numbers Pre/Post-Treatment (Group)  -LM      Recorded by [LM] Yolanda Trinidad, PT 05/17/19 1319      Row Name 05/17/19 1419 05/17/19 1034          Pain Scale: Numbers Pre/Post-Treatment    Pain Scale: Numbers, Pretreatment  0/10 - no pain  -LM  0/10 - no pain  -LM     Pain Scale: Numbers, Post-Treatment  0/10 - no pain  -LM  0/10 - no pain  -LM     Recorded by [LM] Yolanda Trinidad, PT 05/17/19 1528 [LM] Yolanda Trinidad, PT 05/17/19 1319     Row Name                Wound 05/16/19 1139 Left knee incision    Wound - Properties Group Date first assessed: 05/16/19 [DS] Time first assessed: 1139 [DS] Side: Left [DS] Location: knee [DS] Type: incision [DS] Recorded by:  [DS] Jimena Metz RN 05/16/19 1139    Row Name 05/17/19 1419 05/17/19 1034          Plan of Care Review    Plan of Care Reviewed With  patient;spouse  -LM  patient  -LM     Recorded by [LM] Yolanda Trinidad, PT 05/17/19 1528 [LM] Yolanda Trinidad, PT 05/17/19 1319     Row Name 05/17/19 1419 05/17/19 1034          Outcome Summary/Treatment Plan (PT)    Daily Summary of Progress (PT)  progress toward functional goals is good  -LM  progress toward functional goals is good  -LM     Recorded by [LM] Yolanda Trinidad, PT 05/17/19 1528 [LM] Yolanda Trinidad, PT 05/17/19 1319       User Key  (r) = Recorded By, (t) = Taken By, (c) = Cosigned By    Initials Name Effective Dates Discipline    LM Yolanda Trinidad, PT 04/03/18 -  PT    DS Metz, Jimena, RN - Nurse          Wound 05/16/19 1139 Left knee incision (Active)   Dressing Appearance dry;intact;no drainage 5/17/2019  8:10 AM   Closure APPLE 5/17/2019  8:10 AM   Base dressing in place, unable to  visualize 5/17/2019  8:10 AM   Drainage Amount none 5/17/2019  8:10 AM   Care, Wound dressing removed 5/17/2019 11:00 AM   Dressing Care, Wound elastic bandage 5/17/2019 11:00 AM       Rehab Goal Summary     Row Name 05/17/19 0912             Occupational Therapy Goals    Bed Mobility Goal Selection (OT)  bed mobility, OT goal 1  -HK      Transfer Goal Selection (OT)  transfer, OT goal 1  -HK      Dressing Goal Selection (OT)  dressing, OT goal 1  -HK      Toileting Goal Selection (OT)  toileting, OT goal 1  -HK         Bed Mobility Goal 1 (OT)    Activity/Assistive Device (Bed Mobility Goal 1, OT)  sit to supine/supine to sit;scooting  -HK      [place] Level/Cues Needed (Bed Mobility Goal 1, OT)  minimum assist (75% or more patient effort);verbal cues required  -HK      Time Frame (Bed Mobility Goal 1, OT)  5 days  -HK      Progress/Outcomes (Bed Mobility Goal 1, OT)  goal ongoing  -HK         Transfer Goal 1 (OT)    Activity/Assistive Device (Transfer Goal 1, OT)  sit-to-stand/stand-to-sit;toilet  -HK      [place] Level/Cues Needed (Transfer Goal 1, OT)  conditional independence  -HK      Time Frame (Transfer Goal 1, OT)  5 days  -HK      Progress/Outcome (Transfer Goal 1, OT)  goal ongoing  -HK         Dressing Goal 1 (OT)    Activity/Assistive Device (Dressing Goal 1, OT)  lower body dressing;sock-aid  -HK      [place]/Cues Needed (Dressing Goal 1, OT)  standby assist  -HK      Time Frame (Dressing Goal 1, OT)  5 days  -HK      Progress/Outcome (Dressing Goal 1, OT)  goal ongoing  -HK         Toileting Goal 1 (OT)    Activity/Device (Toileting Goal 1, OT)  toileting skills, all  -HK      [place] Level/Cues Needed (Toileting Goal 1, OT)  conditional independence  -HK      Time Frame (Toileting Goal 1, OT)  5 days  -HK      Progress/Outcome (Toileting Goal 1, OT)  goal ongoing  -HK        User Key  (r) = Recorded By, (t) = Taken By, (c) = Cosigned By    Initials Name Provider Type Discipline     Holly Pool, OT Occupational Therapist OT          Physical Therapy Education     Title: PT OT SLP Therapies (In Progress)     Topic: Physical Therapy (Done)     Point: Mobility training (Done)     Learning Progress Summary           Patient Acceptance, E,D, VU,NR by LM at 5/17/2019  2:19 PM    Comment:  Reviewed benefits of activity, HEP, knee precautions, correct gait mechanics, safety with mobility and transfers.    Acceptance, E,D, VU,NR by LM at 5/17/2019 10:34 AM    Comment:  Reviewed knee precautions, benefits of activity, HEP, correct gait mechanics.    Acceptance, E,D, VU by MJ at 5/16/2019  5:12 PM    Comment:  Edu re: effects of spinal, correct transfer technique, gait mechanics, progression of POC   Significant Other Acceptance, E,D, VU,NR by LM at 5/17/2019  2:19 PM    Comment:  Reviewed benefits of activity, HEP, knee precautions, correct gait mechanics, safety with mobility and transfers.                   Point: Home exercise program (Done)     Learning Progress Summary           Patient Acceptance, E,D, VU,NR by LM at 5/17/2019  2:19 PM    Comment:  Reviewed benefits of activity, HEP, knee precautions, correct gait mechanics, safety with mobility and transfers.    Acceptance, E,D, VU,NR by LM at 5/17/2019 10:34 AM    Comment:  Reviewed knee precautions, benefits of activity, HEP, correct gait mechanics.   Significant Other Acceptance, E,D, VU,NR by LM at 5/17/2019  2:19 PM    Comment:  Reviewed benefits of activity, HEP, knee precautions, correct gait mechanics, safety with mobility and transfers.                   Point: Body mechanics (Done)     Learning Progress Summary           Patient Acceptance, E,D, VU,NR by LM at 5/17/2019  2:19 PM    Comment:  Reviewed benefits of activity, HEP, knee precautions, correct gait mechanics, safety with mobility and transfers.    Acceptance, E,D, VU,NR by LM at 5/17/2019 10:34 AM    Comment:  Reviewed knee precautions, benefits of activity, HEP, correct  gait mechanics.    Acceptance, E,D, VU by DARA at 5/16/2019  5:12 PM    Comment:  Edu re: effects of spinal, correct transfer technique, gait mechanics, progression of POC   Significant Other Acceptance, E,D, VU,NR by SPENCER at 5/17/2019  2:19 PM    Comment:  Reviewed benefits of activity, HEP, knee precautions, correct gait mechanics, safety with mobility and transfers.                   Point: Precautions (Done)     Learning Progress Summary           Patient Acceptance, E,D, VU,NR by SPENCER at 5/17/2019  2:19 PM    Comment:  Reviewed benefits of activity, HEP, knee precautions, correct gait mechanics, safety with mobility and transfers.    Acceptance, E,D, VU,NR by SPENCER at 5/17/2019 10:34 AM    Comment:  Reviewed knee precautions, benefits of activity, HEP, correct gait mechanics.    Acceptance, E,D, VU by DARA at 5/16/2019  5:12 PM    Comment:  Edu re: effects of spinal, correct transfer technique, gait mechanics, progression of POC   Significant Other Acceptance, E,D, VU,NR by SPENCER at 5/17/2019  2:19 PM    Comment:  Reviewed benefits of activity, HEP, knee precautions, correct gait mechanics, safety with mobility and transfers.                               User Key     Initials Effective Dates Name Provider Type Discipline     04/03/18 -  Kee Natarajan, PT Physical Therapist PT     04/03/18 -  Yolanda Trinidad PT Physical Therapist PT                PT Recommendation and Plan  Therapy Frequency (PT Clinical Impression): 2 times/day  Outcome Summary/Treatment Plan (PT)  Daily Summary of Progress (PT): progress toward functional goals is good  Plan of Care Reviewed With: patient, spouse  Outcome Summary: Pt increased ambulation distance to 180 feet with CGA and RW, required multiple standing rest breaks throughout due to fatigue and pain during ambulation. Pt continued to require CGA for transfers; Jose Martin when returning to supine position in bed. Will continue to progress with mobility as able.   Outcome Measures     Row Name  05/17/19 1419 05/17/19 1034 05/16/19 1712       How much help from another person do you currently need...    Turning from your back to your side while in flat bed without using bedrails?  3  -LM  3  -LM  3  -MJ    Moving from lying on back to sitting on the side of a flat bed without bedrails?  3  -LM  3  -LM  3  -MJ    Moving to and from a bed to a chair (including a wheelchair)?  3  -LM  3  -LM  3  -MJ    Standing up from a chair using your arms (e.g., wheelchair, bedside chair)?  3  -LM  3  -LM  3  -MJ    Climbing 3-5 steps with a railing?  1  -LM  1  -LM  1  -MJ    To walk in hospital room?  3  -LM  3  -LM  3  -MJ    AM-PAC 6 Clicks Score  16  -LM  16  -LM  16  -MJ       Functional Assessment    Outcome Measure Options  AM-PAC 6 Clicks Basic Mobility (PT)  -LM  AM-PAC 6 Clicks Basic Mobility (PT)  -LM  AM-PAC 6 Clicks Basic Mobility (PT)  -MJ      User Key  (r) = Recorded By, (t) = Taken By, (c) = Cosigned By    Initials Name Provider Type    Kee Jorgensen, PT Physical Therapist    Yolanda Arias, PT Physical Therapist         Time Calculation:   PT Charges     Row Name 05/17/19 1419 05/17/19 1034          Time Calculation    Start Time  1419  -LM  1321  -LM     PT Received On  05/17/19  -LM  05/17/19  -LM     PT Goal Re-Cert Due Date  05/26/19  -LM  05/26/19  -LM        Time Calculation- PT    Total Timed Code Minutes- PT  29 minute(s)  -LM  23 minute(s)  -LM        Timed Charges    21422 - PT Therapeutic Exercise Minutes  10  -LM  10  -LM     36237 - Gait Training Minutes   9  -LM  10  -LM     26758 - PT Therapeutic Activity Minutes  10  -LM  3  -LM       User Key  (r) = Recorded By, (t) = Taken By, (c) = Cosigned By    Initials Name Provider Type    Yolanda Arias, PT Physical Therapist        Therapy Charges for Today     Code Description Service Date Service Provider Modifiers Qty    78909168010 HC PT THER PROC EA 15 MIN 5/17/2019 Yolanda Trinidad, PT GP 1    20260816992 HC GAIT TRAINING EA 15  MIN 5/17/2019 Yolanda Trinidad, PT GP 1    17571233542 HC PT THER PROC EA 15 MIN 5/17/2019 Yolanda Trinidad, PT GP 1    86708694054 HC PT THERAPEUTIC ACT EA 15 MIN 5/17/2019 Yolanda Trinidad, PT GP 1          PT G-Codes  Outcome Measure Options: AM-PAC 6 Clicks Basic Mobility (PT)  AM-PAC 6 Clicks Score: 16    Yolanda Trinidad PT  5/17/2019

## 2019-05-17 NOTE — PLAN OF CARE
Problem: Patient Care Overview  Goal: Plan of Care Review  Outcome: Ongoing (interventions implemented as appropriate)   05/17/19 0912   Coping/Psychosocial   Plan of Care Reviewed With patient   Plan of Care Review   Progress improving   OTHER   Outcome Summary OT eval complete. Pt compeltes sit to stand with CGA and ambulates with CGA and RW. Pt donned pants and underwear independently and required Jose Martin and use of sock aid to don socks. Pt completed toileting with Jose Martin for clothing management. Recommend d/c to [facility] prior to returning home due to patients wife being unable to assist him. Pt will need RW.

## 2019-05-17 NOTE — PROGRESS NOTES
"      [facility] [place] [facility] Progress Note    Subjective      LOS: 0 days   Patient Care Team:  Kin Nunez MD as PCP - General  Kin Nunez MD as PCP - Family Medicine  NewmanRosario toro MD as Consulting Physician (Cardiology)    Chief complaint: left knee pain       Interval History:   Patient sitting in a chair comfortably.  Pain is controlled.    Objective      Vital Signs  Temp (24hrs), Av.9 °F (36.6 °C), Min:97.4 °F (36.3 °C), Max:98.7 °F (37.1 °C)      /55   Pulse 78   Temp 97.8 °F (36.6 °C) (Temporal)   Resp 20   Ht 177.8 cm (70\")   Wt 113 kg (249 lb 1.9 oz)   SpO2 94%   BMI 35.74 kg/m²     Examination:   Examination of the left knee: The wound is clean, dry, and intact.  Ankle dorsiflexion, ankle plantar flexion, and EHL are intact.  Sensation intact in the foot to light touch.  2+ dorsalis pedis pulse.  Straight leg raise is intact.      Labs:  Results from last 7 days   Lab Units 19  0351   WBC 10*3/mm3 13.71*   HEMOGLOBIN g/dL 11.9*   HEMATOCRIT % 35.8*   MCV fL 98.1*   PLATELETS 10*3/mm3 226       Radiology:  Imaging Results (last 24 hours)     Procedure Component Value Units Date/Time    XR Knee 1 or 2 View Left [[id number]] Collected:  19 1331     Updated:  19 1352    Narrative:       EXAMINATION: XR KNEE 1 OR 2 VW LEFT- 2019      INDICATION: KNEE ARTHROPLASTY      COMPARISON: NONE     FINDINGS: Views of the left knee demonstrate no fracture or dislocation.  There is no loosening.           Impression:       Expected postoperative findings.     D:  2019  E:  2019     This report was finalized on 2019 1:49 PM by Dr. César Rm MD.             PT:  Note from yesterday:  Pt ambulated 15 feet with CGAx2 and RW, limited by pain and fatigue. Will obtain ROM measure POD #1. PT will follow to increase strength and progress functional mobility. Plan is rehab at discharge, PADD score=7     Results Review:     I reviewed the patient's new " clinical results.    Assessment and Plan     Assessment:   Status post left total knee arthroplasty-doing well      Status post total left knee replacement    Primary osteoarthritis of left knee    TRACEY on CPAP    HTN (hypertension)    HLD (hyperlipidemia)    Hypothyroid    CAD (coronary artery disease)      Plan for disposition: Patient requesting rehab since facility is a possibility for discharge.  Okay to transfer when bed is available from my perspective.  I will see him back in 3 weeks as planned, but sooner for any problems.      6/10/2019 2:50 PM Willian Jc MD [facility]    Appt Notes: 3 WEEK POSTOP         Willian Jc MD  05/17/19  11:13 AM

## 2019-05-17 NOTE — PROGRESS NOTES
[facility]    Acute pain service Inpatient Progress Note    Patient Name: Larry Dean Klinefelter  :  1950  MRN:  [id number]        Acute Pain  Service Inpatient Progress Note:    Analgesia:Good  Pain Score:0/10  LOC: alert and awake  Resp Status: room air  Cardiac: VS stable  Side Effects:None  Catheter Site:clean, dressing intact and dry  Cath type: peripheral nerve cath with ON Q  Infusion rate: 10ml/hr  Catheter Plan:Catheter to remain Insitu and Continue catheter infusion rate unchanged

## 2019-05-17 NOTE — NURSING NOTE
Acute Pain Service:  Peripheral nerve catheter and disposable infusion device teaching completed with patient and spouse. Demonstration, handout and bracelet provided with [facility] on call central phone number.  Instructed to call with any questions or concerns.  Patient verbalized understanding.  Service will continue to follow until catheter DC'd.  Please contact patient at [phone] or [phone] if needed.

## 2019-05-18 VITALS
TEMPERATURE: 99.3 F | HEIGHT: 70 IN | SYSTOLIC BLOOD PRESSURE: 125 MMHG | RESPIRATION RATE: 16 BRPM | DIASTOLIC BLOOD PRESSURE: 58 MMHG | WEIGHT: 249.12 LBS | HEART RATE: 86 BPM | BODY MASS INDEX: 35.66 KG/M2 | OXYGEN SATURATION: 96 %

## 2019-05-18 LAB
DEPRECATED RDW RBC AUTO: 49.6 FL (ref 37–54)
ERYTHROCYTE [DISTWIDTH] IN BLOOD BY AUTOMATED COUNT: 13.7 % (ref 12.3–15.4)
HCT VFR BLD AUTO: 33.2 % (ref 37.5–51)
HGB BLD-MCNC: 10.9 G/DL (ref 13–17.7)
MCH RBC QN AUTO: 32.5 PG (ref 26.6–33)
MCHC RBC AUTO-ENTMCNC: 32.8 G/DL (ref 31.5–35.7)
MCV RBC AUTO: 99.1 FL (ref 79–97)
PLATELET # BLD AUTO: 204 10*3/MM3 (ref 140–450)
PMV BLD AUTO: 10.1 FL (ref 6–12)
RBC # BLD AUTO: 3.35 10*6/MM3 (ref 4.14–5.8)
WBC NRBC COR # BLD: 10.2 10*3/MM3 (ref 3.4–10.8)

## 2019-05-18 PROCEDURE — 85027 COMPLETE CBC AUTOMATED: CPT | Performed by: ORTHOPAEDIC SURGERY

## 2019-05-18 PROCEDURE — 63710000001 METOPROLOL SUCCINATE XL 25 MG TABLET SUSTAINED-RELEASE 24 HOUR: Performed by: NURSE PRACTITIONER

## 2019-05-18 PROCEDURE — A9270 NON-COVERED ITEM OR SERVICE: HCPCS | Performed by: NURSE PRACTITIONER

## 2019-05-18 PROCEDURE — 94660 CPAP INITIATION&MGMT: CPT

## 2019-05-18 PROCEDURE — A9270 NON-COVERED ITEM OR SERVICE: HCPCS | Performed by: ORTHOPAEDIC SURGERY

## 2019-05-18 PROCEDURE — 63710000001 POLYETHYLENE GLYCOL PACK: Performed by: INTERNAL MEDICINE

## 2019-05-18 PROCEDURE — 63710000001 MELOXICAM 7.5 MG TABLET: Performed by: ORTHOPAEDIC SURGERY

## 2019-05-18 PROCEDURE — 63710000001 TAMSULOSIN 0.4 MG CAPSULE: Performed by: NURSE PRACTITIONER

## 2019-05-18 PROCEDURE — 97116 GAIT TRAINING THERAPY: CPT

## 2019-05-18 PROCEDURE — 63710000001 ASPIRIN 325 MG TABLET DELAYED-RELEASE: Performed by: ORTHOPAEDIC SURGERY

## 2019-05-18 PROCEDURE — 63710000001 BISACODYL 5 MG TABLET DELAYED-RELEASE: Performed by: ORTHOPAEDIC SURGERY

## 2019-05-18 PROCEDURE — 63710000001 ATORVASTATIN 20 MG TABLET: Performed by: NURSE PRACTITIONER

## 2019-05-18 PROCEDURE — A9270 NON-COVERED ITEM OR SERVICE: HCPCS | Performed by: INTERNAL MEDICINE

## 2019-05-18 PROCEDURE — 63710000001 LEVOTHYROXINE 25 MCG TABLET: Performed by: NURSE PRACTITIONER

## 2019-05-18 PROCEDURE — 94799 UNLISTED PULMONARY SVC/PX: CPT

## 2019-05-18 RX ORDER — HYDROCODONE BITARTRATE AND ACETAMINOPHEN 5; 325 MG/1; MG/1
1 TABLET ORAL EVERY 4 HOURS PRN
Start: 2019-05-18 | End: 2019-05-26

## 2019-05-18 RX ORDER — TAMSULOSIN HYDROCHLORIDE 0.4 MG/1
0.4 CAPSULE ORAL DAILY
Qty: 7 CAPSULE | Refills: 0
Start: 2019-05-19 | End: 2020-07-08

## 2019-05-18 RX ADMIN — ATORVASTATIN CALCIUM 20 MG: 20 TABLET, FILM COATED ORAL at 08:41

## 2019-05-18 RX ADMIN — POLYETHYLENE GLYCOL 3350 17 G: 17 POWDER, FOR SOLUTION ORAL at 08:41

## 2019-05-18 RX ADMIN — METOPROLOL SUCCINATE 25 MG: 25 TABLET, EXTENDED RELEASE ORAL at 08:41

## 2019-05-18 RX ADMIN — BISACODYL 10 MG: 5 TABLET, COATED ORAL at 04:44

## 2019-05-18 RX ADMIN — TAMSULOSIN HYDROCHLORIDE 0.4 MG: 0.4 CAPSULE ORAL at 08:41

## 2019-05-18 RX ADMIN — LEVOTHYROXINE SODIUM 25 MCG: 25 TABLET ORAL at 04:44

## 2019-05-18 RX ADMIN — ASPIRIN 325 MG: 325 TABLET, COATED ORAL at 08:41

## 2019-05-18 RX ADMIN — MELOXICAM 15 MG: 7.5 TABLET ORAL at 08:41

## 2019-05-18 NOTE — DISCHARGE SUMMARY
Patient Name: Larry Dean Klinefelter  MRN: [id number]  : 1950  DOS: 2019    Attending: Willian Jc MD    Primary Care Provider: Kin Nunez MD    Date of Admission:.2019  7:20 AM    Date of Discharge:  2019    Discharge Diagnosis:     Status post total left knee replacement    Primary osteoarthritis of left knee    TRACEY on CPAP    HTN (hypertension)    HLD (hyperlipidemia)    Hypothyroid    CAD (coronary artery disease)    Postoperative urinary retention, better.    Leukocytosis, likely reactive, resolved.    Acute blood loss anemia, mild, asymptomatic    Acute postoperative pain      Hospital Course  Patient is a 68 y.o. male presented for scheduled surgery by Dr. Jc.  He underwent left total knee arthroplasty under spinal anesthesia, tolerated procedure well, was admitted for further management.  He had an adductor canal nerve block catheter placed.    Patient was provided pain medications as needed for pain control, along with adductor canal nerve block infusion of Ropivacaine.    Adjustments were made to pain medications to optimize postop pain management. Risks and benefits of opiate medications discussed with patient.    He was seen by PT and OT and has progressed well over his stay.    He used an IS for atelectasis prophylaxis and ASA along with mechanicals for DVT prophylaxis.    He was continued on CPAP for his obstructive sleep apnea.    Home medications were resumed as appropriate, and labs were monitored and remained fairly stable.     With the progress he has made, patient is ready for DC to [facility] today.      He will have an Arrow pump ( instructed on it during this admit).    Discussed with patient regarding plan and he shows understanding and agreement.    Patient will have HHPT following discharge.    Procedures Performed  Procedure(s):  TOTAL KNEE ARTHROPLASTY LEFT       Pertinent Test Results:    I reviewed the patient's new clinical results.   Results from last 7  "days   Lab Units 19  0849 19  0351   WBC 10*3/mm3 10.20 13.71*   HEMOGLOBIN g/dL 10.9* 11.9*   HEMATOCRIT % 33.2* 35.8*   PLATELETS 10*3/mm3 204 226     Results from last 7 days   Lab Units 19  0351 19  0805   SODIUM mmol/L 139  --    POTASSIUM mmol/L 4.1 4.2   CHLORIDE mmol/L 102  --    CO2 mmol/L 22.0  --    BUN mg/dL 17  --    CREATININE mg/dL 0.90  --    CALCIUM mg/dL 8.2*  --    GLUCOSE mg/dL 126*  --      I reviewed the patient's new imaging including images and reports.          Physical therapyPt ambulated 180 feet with CGA and RW, required multiple standing rest breaks due to pain and fatigue. L knee AROM measured 12-80 degrees. Pt anticipating d/c to rehab today.     Discharge Assessment:    Vital Signs  Visit Vitals  /58 (BP Location: Right arm, Patient Position: Lying)   Pulse 86   Temp 99.3 °F (37.4 °C) (Oral)   Resp 16   Ht 177.8 cm (70\")   Wt 113 kg (249 lb 1.9 oz)   SpO2 96%   BMI 35.74 kg/m²     Temp (24hrs), Av.3 °F (36.8 °C), Min:97.4 °F (36.3 °C), Max:99.3 °F (37.4 °C)      General Appearance:    Alert, cooperative, in no acute distress   Lungs:     Clear to auscultation,respirations regular, even and                   unlabored    Heart:    Regular rhythm and normal rate, normal S1 and S2    Abdomen:     Normal bowel sounds, no masses, no organomegaly, soft        non-tender, non-distended, no guarding, no rebound                 tenderness   Extremities:   Left knee with prineo dressing, CDI. Mild swelling.   Pulses:   Pulses palpable and equal bilaterally   Skin:   No bleeding, bruising or rash   Neurologic:   Cranial nerves 2 - 12 grossly intact, sensation intact, Flexion and dorsiflexion intact bilateral feet.         Discharge Disposition: [facility].    Discharge Medications     Discharge Medications      New Medications      Instructions Start Date   aspirin 325 MG EC tablet  Replaces:  aspirin 81 MG tablet   325 mg, Oral, Daily, For 30 days then 81 mg /day   " Start Date:  5/19/2019     HYDROcodone-acetaminophen 5-325 MG per tablet  Commonly known as:  NORCO   1 tablet, Oral, Every 4 Hours PRN      Ropivacine HCl-NaCl  Commonly known as:  NAROPIN   10 mL/hr, Peripheral Nerve, Continuous      tamsulosin 0.4 MG capsule 24 hr capsule  Commonly known as:  FLOMAX   0.4 mg, Oral, Daily   Start Date:  5/19/2019        Continue These Medications      Instructions Start Date   atorvastatin 20 MG tablet  Commonly known as:  LIPITOR   20 mg, Oral, Daily      CALCIUM PLUS VITAMIN D3 PO   1 tablet, Oral, 2 Times Daily      Co Q-10 100 MG capsule   100 mg, Oral, 2 Times Daily      docusate sodium 250 MG capsule  Commonly known as:  COLACE   250 mg, Oral, Nightly      ferrous sulfate 324 (65 Fe) MG tablet delayed-release EC tablet   324 mg, Oral, Nightly      lisinopril 5 MG tablet  Commonly known as:  PRINIVIL,ZESTRIL   5 mg, Oral, Nightly      MIRALAX PO   1 dose, Oral, Daily      nitroglycerin 0.4 MG SL tablet  Commonly known as:  NITROSTAT   0.4 mg, Sublingual, As Needed, Take no more than 3 doses in 15 minutes.      SYNTHROID 25 MCG tablet  Generic drug:  levothyroxine   25 mcg, Oral, Daily      TOPROL XL 25 MG 24 hr tablet  Generic drug:  metoprolol succinate XL   25 mg, Oral, Daily      VITEYES AREDS FORMULA PO   1 capsule, Oral, 2 Times Daily      ZETIA 10 MG tablet  Generic drug:  ezetimibe   10 mg, Oral, Daily         Stop These Medications    aspirin 81 MG tablet  Replaced by:  aspirin 325 MG EC tablet     diclofenac 1 % gel gel  Commonly known as:  VOLTAREN     Glucosamine Sulfate 750 MG capsule     naproxen 500 MG tablet  Commonly known as:  NAPROSYN            Discharge Diet: Regular.    Activity at Discharge:  Weight bearing as tolerated     Follow-up Appointments   per his orders.    Dianna Montana MD  05/18/19  12:38 PM

## 2019-05-18 NOTE — PLAN OF CARE
Problem: Patient Care Overview  Goal: Plan of Care Review  Outcome: Ongoing (interventions implemented as appropriate)   05/18/19 1016   Coping/Psychosocial   Plan of Care Reviewed With patient   OTHER   Outcome Summary Pt ambulated 180 feet with CGA and RW, required multiple standing rest breaks due to pain and fatigue. L knee AROM measured 12-80 degrees. Pt anticipating d/c to rehab today.

## 2019-05-18 NOTE — THERAPY TREATMENT NOTE
Acute Care - Physical Therapy Treatment Note  [facility]     Patient Name: Larry Dean Klinefelter  : 1950  MRN: [id number]  Today's Date: 2019  Onset of Illness/Injury or Date of Surgery: 19  Date of Referral to PT: 19  Referring Physician: MD Hosea     Admit Date: 2019    Visit Dx:    ICD-10-CM ICD-9-CM   1. Impaired functional mobility, balance, gait, and endurance Z74.09 V49.89   2. Primary osteoarthritis of left knee M17.12 715.16   3. Impaired mobility and ADLs Z74.09 799.89     Patient Active Problem List   Diagnosis   • Primary osteoarthritis of left knee   • TRACEY on CPAP   • HTN (hypertension)   • HLD (hyperlipidemia)   • Hypothyroid   • CAD (coronary artery disease)   • Status post total left knee replacement   • Postoperative urinary retention   • Leukocytosis, likely reactive   • Acute blood loss anemia, mild, asymptomatic   • Acute postoperative pain       Therapy Treatment    Rehabilitation Treatment Summary     Row Name 19 1016             Treatment Time/Intention    Discipline  physical therapist  -LM      Document Type  therapy note (daily note)  -LM      Subjective Information  no complaints  -LM      Mode of Treatment  physical therapy;individual therapy  -LM      Patient/Family Observations  Pt received sitting UIC, exit alarm intact, L adductor canal nerve catheter.  -LM      Care Plan Review  care plan/treatment goals reviewed;patient/other agree to care plan  -LM      Therapy Frequency (PT Clinical Impression)  2 times/day  -LM      Patient Effort  good  -LM      Existing Precautions/Restrictions  fall;other (see comments) L adductor canal nerve catheter  -LM      Recorded by [LM] Yolanda Trinidad, PT 19 1140      Row Name 19 1016             Cognitive Assessment/Intervention- PT/OT    Affect/Mental Status (Cognitive)  WNL  -LM      Orientation Status (Cognition)  oriented x 4  -LM      Follows Commands (Cognition)  WNL  -LM      Safety Deficit  (Cognitive)  mild deficit;safety precautions awareness;safety precautions follow-through/compliance  -LM      Recorded by [LM] Yolanda Trinidad, PT 05/18/19 1140      Row Name 05/18/19 1016             Safety Issues, Functional Mobility    Safety Issues Affecting Function (Mobility)  safety precaution awareness;safety precautions follow-through/compliance  -LM      Impairments Affecting Function (Mobility)  balance;pain;endurance/activity tolerance  -LM      Recorded by [LM] Yolanda Trinidad, PT 05/18/19 1140      Row Name 05/18/19 1016             Mobility Assessment/Intervention    Extremity Weight-bearing Status  left lower extremity  -LM      Left Lower Extremity (Weight-bearing Status)  weight-bearing as tolerated (WBAT)  -LM      Recorded by [LM] Yolanda Trinidad, PT 05/18/19 1140      Row Name 05/18/19 1016             Bed Mobility Assessment/Treatment    Comment (Bed Mobility)  Not tested - pt received and left UIC  -LM      Recorded by [LM] Yolanda Trinidad, PT 05/18/19 1140      Row Name 05/18/19 1016             Transfer Assessment/Treatment    Transfer Assessment/Treatment  sit-stand transfer;stand-sit transfer;toilet transfer  -LM      Comment (Transfers)  Verbal cues for correct hand placement. Assisted patient into bathroom.  -LM      Recorded by [LM] Yolanda Trinidad, PT 05/18/19 1140      Row Name 05/18/19 1016             Sit-Stand Transfer    Sit-Stand [place] (Transfers)  contact guard;verbal cues  -LM      Assistive Device (Sit-Stand Transfers)  walker, front-wheeled  -LM      Recorded by [LM] Yolanda Trinidad, PT 05/18/19 1140      Row Name 05/18/19 1016             Stand-Sit Transfer    Stand-Sit [place] (Transfers)  contact guard;verbal cues  -LM      Assistive Device (Stand-Sit Transfers)  walker, front-wheeled  -LM      Recorded by [LM] Yolanda Trinidad, PT 05/18/19 1140      Row Name 05/18/19 1016             Toilet Transfer    Type (Toilet Transfer)  stand-sit  -LM       "[place] Level (Toilet Transfer)  contact guard;verbal cues  -LM      Assistive Device (Toilet Transfer)  walker, front-wheeled  -LM      Recorded by [LM] Yolanda Trinidad, PT 05/18/19 1140      Row Name 05/18/19 1016             Gait/Stairs Assessment/Training    70201 - Gait Training Minutes   10  -LM      [place] Level (Gait)  contact guard;verbal cues  -LM      Assistive Device (Gait)  walker, front-wheeled  -LM      Distance in Feet (Gait)  180  -LM      Pattern (Gait)  step-through  -LM      Deviations/Abnormal Patterns (Gait)  left sided deviations;antalgic;bilateral deviations;mala decreased;stride length decreased  -LM      Bilateral Gait Deviations  forward flexed posture;heel strike decreased  -LM      Left Sided Gait Deviations  weight shift ability decreased  -LM      Comment (Gait/Stairs)  Pt initially demonstrated step-to pattern but progresed to step-through pattern with cues and practice. Verbal cues for upright posture with minimal correction, remaining within middle of RW during turns, and increasing step length. Gait limited by fatigue, required multiple standing rest breaks throughout ambulation distance. Pt attempted to \"walk as fast as possible\" during ambulation, cues for safety and to slow pace for balance.  -LM      Recorded by [LM] Yolanda Trinidad, PT 05/18/19 1140      Row Name 05/18/19 1016             Left Lower Ext    Lt Knee Extension/Flexion AROM  L knee AROM measured 12-80 degrees; pt lacking 12 degrees from full extension; AAROM flexion measured in sitting.  -LM      Recorded by [LM] Yolanda Trinidad, PT 05/18/19 1140      Row Name 05/18/19 1016             Therapeutic Exercise    50552 - PT Therapeutic Exercise Minutes  6  -LM      68767 - PT Therapeutic Activity Minutes  3  -LM      Recorded by [LM] Yolanda Trinidad, PT 05/18/19 1140      Row Name 05/18/19 1016             Therapeutic Exercise    Lower Extremity (Therapeutic Exercise)  gluteal sets;heel slides, " left;LAQ (long arc quad), left;quad sets, left;SAQ (short arc quad), left;SLR (straight leg raise), left  -LM      Lower Extremity Range of Motion (Therapeutic Exercise)  ankle dorsiflexion/plantar flexion, bilateral  -LM      Exercise Type (Therapeutic Exercise)  AROM (active range of motion)  -LM      Position (Therapeutic Exercise)  seated  -LM      Sets/Reps (Therapeutic Exercise)  x15 reps each  -LM      Comment (Therapeutic Exercise)  Cues for technique, no physical assist required. Reviewed HEP and frequency.  -LM      Recorded by [LM] Yolanda Trinidad, PT 05/18/19 1140      Row Name 05/18/19 1016             Positioning and Restraints    Pre-Treatment Position  sitting in chair/recliner  -LM      Post Treatment Position  bathroom  -LM      Bathroom  notified nsg;sitting;call light within reach;encouraged to call for assist  -LM      Recorded by [LM] Yolanda Trinidad, PT 05/18/19 1140      Row Name 05/18/19 1016             Pain Scale: Numbers Pre/Post-Treatment    Pain Scale: Numbers, Pretreatment  0/10 - no pain  -LM      Pain Scale: Numbers, Post-Treatment  0/10 - no pain  -LM      Recorded by [LM] Yolanda Trinidad, PT 05/18/19 1140      Row Name                Wound 05/16/19 1139 Left knee incision    Wound - Properties Group Date first assessed: 05/16/19 [DS] Time first assessed: 1139 [DS] Side: Left [DS] Location: knee [DS] Type: incision [DS] Recorded by:  [DS] Jimena Metz RN 05/16/19 1139    Row Name 05/18/19 1016             Plan of Care Review    Plan of Care Reviewed With  patient  -LM      Recorded by [LM] Yolanda Trinidad, PT 05/18/19 1140      Row Name 05/18/19 1016             Outcome Summary/Treatment Plan (PT)    Daily Summary of Progress (PT)  progress toward functional goals is good  -LM      Recorded by [LM] Yolanda Trinidad, PT 05/18/19 1140        User Key  (r) = Recorded By, (t) = Taken By, (c) = Cosigned By    Initials Name Effective Dates Discipline    LM Yolanda Trinidad, PT  04/03/18 -  PT    Jimena Crenshaw, RN - Nurse          Wound 05/16/19 1139 Left knee incision (Active)   Dressing Appearance no drainage;intact;dry 5/18/2019 10:28 AM   Drainage Amount none 5/17/2019 10:58 PM           Physical Therapy Education     Title: PT OT SLP Therapies (In Progress)     Topic: Physical Therapy (Done)     Point: Mobility training (Done)     Learning Progress Summary           Patient Acceptance, E,D, VU,NR by LM at 5/18/2019 10:16 AM    Comment:  Reviewed benefits of activity, knee precautions, correct gait mechanics, HEP, safety with mobility.    Acceptance, E,D, VU,NR by LM at 5/17/2019  2:19 PM    Comment:  Reviewed benefits of activity, HEP, knee precautions, correct gait mechanics, safety with mobility and transfers.    Acceptance, E,D, VU,NR by LM at 5/17/2019 10:34 AM    Comment:  Reviewed knee precautions, benefits of activity, HEP, correct gait mechanics.    Acceptance, E,D, VU by  at 5/16/2019  5:12 PM    Comment:  Edu re: effects of spinal, correct transfer technique, gait mechanics, progression of POC   Significant Other Acceptance, E,D, VU,NR by LM at 5/17/2019  2:19 PM    Comment:  Reviewed benefits of activity, HEP, knee precautions, correct gait mechanics, safety with mobility and transfers.                   Point: Home exercise program (Done)     Learning Progress Summary           Patient Acceptance, E,D, VU,NR by LM at 5/18/2019 10:16 AM    Comment:  Reviewed benefits of activity, knee precautions, correct gait mechanics, HEP, safety with mobility.    Acceptance, E,D, VU,NR by LM at 5/17/2019  2:19 PM    Comment:  Reviewed benefits of activity, HEP, knee precautions, correct gait mechanics, safety with mobility and transfers.    Acceptance, E,D, VU,NR by LM at 5/17/2019 10:34 AM    Comment:  Reviewed knee precautions, benefits of activity, HEP, correct gait mechanics.   Significant Other Acceptance, E,D, VU,NR by LM at 5/17/2019  2:19 PM    Comment:  Reviewed benefits  of activity, HEP, knee precautions, correct gait mechanics, safety with mobility and transfers.                   Point: Body mechanics (Done)     Learning Progress Summary           Patient Acceptance, E,D, VU,NR by LM at 5/18/2019 10:16 AM    Comment:  Reviewed benefits of activity, knee precautions, correct gait mechanics, HEP, safety with mobility.    Acceptance, E,D, VU,NR by LM at 5/17/2019  2:19 PM    Comment:  Reviewed benefits of activity, HEP, knee precautions, correct gait mechanics, safety with mobility and transfers.    Acceptance, E,D, VU,NR by SPENCER at 5/17/2019 10:34 AM    Comment:  Reviewed knee precautions, benefits of activity, HEP, correct gait mechanics.    Acceptance, E,D, VU by DARA at 5/16/2019  5:12 PM    Comment:  Edu re: effects of spinal, correct transfer technique, gait mechanics, progression of POC   Significant Other Acceptance, E,D, VU,NR by SPENCER at 5/17/2019  2:19 PM    Comment:  Reviewed benefits of activity, HEP, knee precautions, correct gait mechanics, safety with mobility and transfers.                   Point: Precautions (Done)     Learning Progress Summary           Patient Acceptance, E,D, VU,NR by SPENCER at 5/18/2019 10:16 AM    Comment:  Reviewed benefits of activity, knee precautions, correct gait mechanics, HEP, safety with mobility.    Acceptance, E,D, VU,NR by SPENCER at 5/17/2019  2:19 PM    Comment:  Reviewed benefits of activity, HEP, knee precautions, correct gait mechanics, safety with mobility and transfers.    Acceptance, E,D, VU,NR by SPENCER at 5/17/2019 10:34 AM    Comment:  Reviewed knee precautions, benefits of activity, HEP, correct gait mechanics.    Acceptance, E,D, VU by DARA at 5/16/2019  5:12 PM    Comment:  Edu re: effects of spinal, correct transfer technique, gait mechanics, progression of POC   Significant Other Acceptance, E,D, VU,NR by SPENCER at 5/17/2019  2:19 PM    Comment:  Reviewed benefits of activity, HEP, knee precautions, correct gait mechanics, safety with  mobility and transfers.                               User Key     Initials Effective Dates Name Provider Type Discipline    MJ 04/03/18 -  Kee Natarajan, PT Physical Therapist PT    LM 04/03/18 -  Yolanda Trinidad PT Physical Therapist PT                PT Recommendation and Plan  Therapy Frequency (PT Clinical Impression): 2 times/day  Outcome Summary/Treatment Plan (PT)  Daily Summary of Progress (PT): progress toward functional goals is good  Plan of Care Reviewed With: patient  Outcome Summary: Pt ambulated 180 feet with CGA and RW, required multiple standing rest breaks due to pain and fatigue. L knee AROM measured 12-80 degrees. Pt anticipating d/c to rehab today.   Outcome Measures     Row Name 05/18/19 1016 05/17/19 1419 05/17/19 1034       How much help from another person do you currently need...    Turning from your back to your side while in flat bed without using bedrails?  3  -LM  3  -LM  3  -LM    Moving from lying on back to sitting on the side of a flat bed without bedrails?  3  -LM  3  -LM  3  -LM    Moving to and from a bed to a chair (including a wheelchair)?  3  -LM  3  -LM  3  -LM    Standing up from a chair using your arms (e.g., wheelchair, bedside chair)?  3  -LM  3  -LM  3  -LM    Climbing 3-5 steps with a railing?  1  -LM  1  -LM  1  -LM    To walk in hospital room?  3  -LM  3  -LM  3  -LM    AM-PAC 6 Clicks Score  16  -LM  16  -LM  16  -LM       Functional Assessment    Outcome Measure Options  AM-PAC 6 Clicks Basic Mobility (PT)  -LM  AM-PAC 6 Clicks Basic Mobility (PT)  -LM  AM-PAC 6 Clicks Basic Mobility (PT)  -LM    Row Name 05/16/19 1712             How much help from another person do you currently need...    Turning from your back to your side while in flat bed without using bedrails?  3  -MJ      Moving from lying on back to sitting on the side of a flat bed without bedrails?  3  -MJ      Moving to and from a bed to a chair (including a wheelchair)?  3  -MJ      Standing up from  a chair using your arms (e.g., wheelchair, bedside chair)?  3  -MJ      Climbing 3-5 steps with a railing?  1  -MJ      To walk in hospital room?  3  -MJ      AM-PAC 6 Clicks Score  16  -MJ         Functional Assessment    Outcome Measure Options  AM-PAC 6 Clicks Basic Mobility (PT)  -MJ        User Key  (r) = Recorded By, (t) = Taken By, (c) = Cosigned By    Initials Name Provider Type    Kee Jorgensen, PT Physical Therapist    Yolanda Arias PT Physical Therapist         Time Calculation:   PT Charges     Row Name 05/18/19 1016             Time Calculation    Start Time  1016  -LM      PT Received On  05/18/19  -LM      PT Goal Re-Cert Due Date  05/26/19  -LM         Time Calculation- PT    Total Timed Code Minutes- PT  19 minute(s)  -LM         Timed Charges    98274 - PT Therapeutic Exercise Minutes  6  -LM      65092 - Gait Training Minutes   10  -LM      79446 - PT Therapeutic Activity Minutes  3  -LM        User Key  (r) = Recorded By, (t) = Taken By, (c) = Cosigned By    Initials Name Provider Type    Yolanda Arias PT Physical Therapist        Therapy Charges for Today     Code Description Service Date Service Provider Modifiers Qty    03033112301 HC PT THER PROC EA 15 MIN 5/17/2019 Yolanda Trinidad, PT GP 1    56685089113 HC GAIT TRAINING EA 15 MIN 5/17/2019 Yolanda Trinidad, PT GP 1    16120298044 HC PT THER PROC EA 15 MIN 5/17/2019 Yolanda Trinidad, PT GP 1    01921401435 HC PT THERAPEUTIC ACT EA 15 MIN 5/17/2019 Yolanda Trinidad, PT GP 1    45499151191 HC GAIT TRAINING EA 15 MIN 5/18/2019 Yolanda Trinidad, PT GP 1          PT G-Codes  Outcome Measure Options: AM-PAC 6 Clicks Basic Mobility (PT)  AM-PAC 6 Clicks Score: 16    Yolanda Trinidad PT  5/18/2019

## 2019-05-18 NOTE — PROGRESS NOTES
Continued Stay Note   Trisha     Patient Name: Larry Dean Klinefelter  MRN: [id number]  Today's Date: 5/18/2019    Admit Date: 5/16/2019    Discharge Plan     Row Name 05/18/19 1312       Plan    Plan  [facility]    Patient/Family in Agreement with Plan  yes    Plan Comments  DC summary faxed. CM following.         Discharge Codes    No documentation.       Expected Discharge Date and Time     Expected Discharge Date Expected Discharge Time    May 18, 2019             Brooklyn Soto RN

## 2019-05-18 NOTE — NURSING NOTE
Report called to [facility], spoke with Carol Chin RN. All paperwork printed and ready for transfer, wife to provide. No IV access, all personal belongings packed by family/ wife. Arrow pump continues to infuse as ordered without complaints voiced, blue wrist band identified that education was provided to patient on care and removed of arrow once emptied.Has not been medicated with prn meds, no s/s of pain/discomfort and no requests made by patient.

## 2019-05-18 NOTE — DISCHARGE PLACEMENT REQUEST
"Klinefelter, Larry Dean (68 y.o. Male)     Date of Birth Social Security Number Address Home Phone MRN    1950  [address] [place] [address] [place] [address] [phone] [id number]    Christian Marital Status          None        Admission Date Admission Type Admitting Provider Attending Provider Department, Room/Bed    19 Elective Willian Jc MD Kirk, Michael E, MD [address], S378/1    Discharge Date Discharge Disposition Discharge Destination         Home or Self Care              Attending Provider:  Willian Jc MD    Allergies:  No Known Allergies    Isolation:  None   Infection:  None   Code Status:  CPR    Ht:  177.8 cm (70\")   Wt:  113 kg (249 lb 1.9 oz)    Admission Cmt:  None   Principal Problem:  Status post total left knee replacement [Z96.652]                 Active Insurance as of 2019     Primary Coverage     Payor Plan Insurance Group Employer/Plan Group    [facility] MEDICARE REPLACEMENT [facility] [id number]     Payor Plan Address Payor Plan Phone Number Payor Plan Fax Number Effective Dates    [address] [address]   2017 - None Entered    [facility] [id number]       Subscriber Name Subscriber Birth Date Member ID       KLINEFELTER,LARRY DEAN 1950 [id number]                 Emergency Contacts      (Rel.) Home Phone Work Phone Mobile Phone    Klinefelter,Orlena (Spouse) [phone] -- [phone]                 Discharge Summary      Dianna Montana MD at 2019 12:31 PM          Patient Name: Larry Dean Klinefelter  MRN: [id number]  : 1950  DOS: 2019    Attending: Willian Jc MD    Primary Care Provider: Kin Nunez MD    Date of Admission:.2019  7:20 AM    Date of Discharge:  2019    Discharge Diagnosis:     Status post total left knee replacement    Primary osteoarthritis of left knee    TRACEY on CPAP    HTN (hypertension)    HLD (hyperlipidemia)    Hypothyroid    CAD (coronary artery " disease)    Postoperative urinary retention, better.    Leukocytosis, likely reactive, resolved.    Acute blood loss anemia, mild, asymptomatic    Acute postoperative pain      Hospital Course  Patient is a 68 y.o. male presented for scheduled surgery by Dr. Jc.  He underwent left total knee arthroplasty under spinal anesthesia, tolerated procedure well, was admitted for further management.  He had an adductor canal nerve block catheter placed.    Patient was provided pain medications as needed for pain control, along with adductor canal nerve block infusion of Ropivacaine.    Adjustments were made to pain medications to optimize postop pain management. Risks and benefits of opiate medications discussed with patient.    He was seen by PT and OT and has progressed well over his stay.    He used an IS for atelectasis prophylaxis and ASA along with mechanicals for DVT prophylaxis.    He was continued on CPAP for his obstructive sleep apnea.    Home medications were resumed as appropriate, and labs were monitored and remained fairly stable.     With the progress he has made, patient is ready for DC to [facility] today.      He will have an Arrow pump ( instructed on it during this admit).    Discussed with patient regarding plan and he shows understanding and agreement.    Patient will have HHPT following discharge.    Procedures Performed  Procedure(s):  TOTAL KNEE ARTHROPLASTY LEFT       Pertinent Test Results:    I reviewed the patient's new clinical results.   Results from last 7 days   Lab Units 05/18/19  0849 05/17/19  0351   WBC 10*3/mm3 10.20 13.71*   HEMOGLOBIN g/dL 10.9* 11.9*   HEMATOCRIT % 33.2* 35.8*   PLATELETS 10*3/mm3 204 226     Results from last 7 days   Lab Units 05/17/19  0351 05/16/19  0805   SODIUM mmol/L 139  --    POTASSIUM mmol/L 4.1 4.2   CHLORIDE mmol/L 102  --    CO2 mmol/L 22.0  --    BUN mg/dL 17  --    CREATININE mg/dL 0.90  --    CALCIUM mg/dL 8.2*  --    GLUCOSE mg/dL 126*  --      I  "reviewed the patient's new imaging including images and reports.          Physical therapyPt ambulated 180 feet with CGA and RW, required multiple standing rest breaks due to pain and fatigue. L knee AROM measured 12-80 degrees. Pt anticipating d/c to rehab today.     Discharge Assessment:    Vital Signs  Visit Vitals  /58 (BP Location: Right arm, Patient Position: Lying)   Pulse 86   Temp 99.3 °F (37.4 °C) (Oral)   Resp 16   Ht 177.8 cm (70\")   Wt 113 kg (249 lb 1.9 oz)   SpO2 96%   BMI 35.74 kg/m²     Temp (24hrs), Av.3 °F (36.8 °C), Min:97.4 °F (36.3 °C), Max:99.3 °F (37.4 °C)      General Appearance:    Alert, cooperative, in no acute distress   Lungs:     Clear to auscultation,respirations regular, even and                   unlabored    Heart:    Regular rhythm and normal rate, normal S1 and S2    Abdomen:     Normal bowel sounds, no masses, no organomegaly, soft        non-tender, non-distended, no guarding, no rebound                 tenderness   Extremities:   Left knee with prineo dressing, CDI. Mild swelling.   Pulses:   Pulses palpable and equal bilaterally   Skin:   No bleeding, bruising or rash   Neurologic:   Cranial nerves 2 - 12 grossly intact, sensation intact, Flexion and dorsiflexion intact bilateral feet.         Discharge Disposition: [facility].    Discharge Medications     Discharge Medications      New Medications      Instructions Start Date   aspirin 325 MG EC tablet  Replaces:  aspirin 81 MG tablet   325 mg, Oral, Daily, For 30 days then 81 mg /day   Start Date:  2019     HYDROcodone-acetaminophen 5-325 MG per tablet  Commonly known as:  NORCO   1 tablet, Oral, Every 4 Hours PRN      Ropivacine HCl-NaCl  Commonly known as:  NAROPIN   10 mL/hr, Peripheral Nerve, Continuous      tamsulosin 0.4 MG capsule 24 hr capsule  Commonly known as:  FLOMAX   0.4 mg, Oral, Daily   Start Date:  2019        Continue These Medications      Instructions Start Date   atorvastatin 20 MG " tablet  Commonly known as:  LIPITOR   20 mg, Oral, Daily      CALCIUM PLUS VITAMIN D3 PO   1 tablet, Oral, 2 Times Daily      Co Q-10 100 MG capsule   100 mg, Oral, 2 Times Daily      docusate sodium 250 MG capsule  Commonly known as:  COLACE   250 mg, Oral, Nightly      ferrous sulfate 324 (65 Fe) MG tablet delayed-release EC tablet   324 mg, Oral, Nightly      lisinopril 5 MG tablet  Commonly known as:  PRINIVIL,ZESTRIL   5 mg, Oral, Nightly      MIRALAX PO   1 dose, Oral, Daily      nitroglycerin 0.4 MG SL tablet  Commonly known as:  NITROSTAT   0.4 mg, Sublingual, As Needed, Take no more than 3 doses in 15 minutes.      SYNTHROID 25 MCG tablet  Generic drug:  levothyroxine   25 mcg, Oral, Daily      TOPROL XL 25 MG 24 hr tablet  Generic drug:  metoprolol succinate XL   25 mg, Oral, Daily      VITEYES AREDS FORMULA PO   1 capsule, Oral, 2 Times Daily      ZETIA 10 MG tablet  Generic drug:  ezetimibe   10 mg, Oral, Daily         Stop These Medications    aspirin 81 MG tablet  Replaced by:  aspirin 325 MG EC tablet     diclofenac 1 % gel gel  Commonly known as:  VOLTAREN     Glucosamine Sulfate 750 MG capsule     naproxen 500 MG tablet  Commonly known as:  NAPROSYN            Discharge Diet: Regular.    Activity at Discharge:  Weight bearing as tolerated     Follow-up Appointments   per his orders.    Dianna Montana MD  05/18/19  12:38 PM            Electronically signed by Dianna Montana MD at 5/18/2019 12:40 PM

## 2019-05-18 NOTE — PROGRESS NOTES
"Alec Tolliverdevyn  [id number]  1950    /58 (BP Location: Right arm, Patient Position: Lying)   Pulse 86   Temp 99.3 °F (37.4 °C) (Oral)   Resp 16   Ht 177.8 cm (70\")   Wt 113 kg (249 lb 1.9 oz)   SpO2 96%   BMI 35.74 kg/m²     Lab Results (last 24 hours)     Procedure Component Value Units Date/Time    CBC (No Diff) [[id number]]  (Abnormal) Collected:  05/18/19 0849    Specimen:  Blood Updated:  05/18/19 0933     WBC 10.20 10*3/mm3      RBC 3.35 10*6/mm3      Hemoglobin 10.9 g/dL      Hematocrit 33.2 %      MCV 99.1 fL      MCH 32.5 pg      MCHC 32.8 g/dL      RDW 13.7 %      RDW-SD 49.6 fl      MPV 10.1 fL      Platelets 204 10*3/mm3           Patient Care Team:  Kin Nunez MD as PCP - General  Kin Nunez MD as PCP - Family Medicine  Rosario Newman MD as Consulting Physician (Cardiology)    SUBJECTIVE  Pain well controlled.  Good start in physical therapy.    PHYSICAL EXAM  Incision clean and dry  Minimal thigh and leg swelling  Neurovascularly intact to knee and toes       Status post total left knee replacement    Primary osteoarthritis of left knee    TRACEY on CPAP    HTN (hypertension)    HLD (hyperlipidemia)    Hypothyroid    CAD (coronary artery disease)    Postoperative urinary retention    Leukocytosis, likely reactive    Acute blood loss anemia, mild, asymptomatic    Acute postoperative pain      PLAN / DISPOSITION:  Hemoglobin stable no transfusion anticipated  Continue regional block  Discharge [facility] planned for this afternoon    Vamsi Bird MD  05/18/19  1:39 PM  "

## 2019-05-19 NOTE — PROGRESS NOTES
MARIELENA Rico    Nerve Cath Post Op Call    Patient Name: Larry Dean Klinefelter  :  1950  MRN:  [id number]  Date of Discharge: 2019    Nerve Cath Post Op Call:    Catheter Plan:Patient called/No answer/Message left to call [facility] pain service for any questions or complaints

## 2019-05-20 NOTE — PROGRESS NOTES
MARIELENA Rico    Nerve Cath Post Op Call    Patient Name: Larry Dean Klinefelter  :  1950  MRN:  [id number]  Date of Discharge: 2019    Nerve Cath Post Op Call:    Catheter Plan:Patient called/No answer/Message left to call [facility] pain service for any questions or complaints and The patient was instructed to call ON CALL Anesthesia provider for any questions or problems

## 2019-05-20 NOTE — THERAPY DISCHARGE NOTE
Acute Care - Occupational Therapy Discharge Summary  [facility]     Patient Name: Larry Dean Klinefelter  : 1950  MRN: [id number]    Today's Date: 2019  Onset of Illness/Injury or Date of Surgery: 19    Date of Referral to OT: 19  Referring Physician: MD Hosea       Admit Date: 2019        OT Recommendation and Plan    Visit Dx:    ICD-10-CM ICD-9-CM   1. Impaired functional mobility, balance, gait, and endurance Z74.09 V49.89   2. Primary osteoarthritis of left knee M17.12 715.16   3. Impaired mobility and ADLs Z74.09 799.89               Rehab Goal Summary     Row Name 19 1357             Bed Mobility Goal 1 (OT)    Progress/Outcomes (Bed Mobility Goal 1, OT)  goal met;discharged from facility  -AGUSTIN         Transfer Goal 1 (OT)    Progress/Outcome (Transfer Goal 1, OT)  goal not met;discharged from facility  -AGUSTIN         Dressing Goal 1 (OT)    Progress/Outcome (Dressing Goal 1, OT)  goal not met;discharged from facility  -AGUSTIN         Toileting Goal 1 (OT)    Progress/Outcome (Toileting Goal 1, OT)  goal not met;discharged from facility  -AGUSTIN        User Key  (r) = Recorded By, (t) = Taken By, (c) = Cosigned By    Initials Name Provider Type Discipline    Caryn Sanders, OT Occupational Therapist OT          Outcome Measures     Row Name 19 1016 19 1419          How much help from another person do you currently need...    Turning from your back to your side while in flat bed without using bedrails?  3  -LM  3  -LM     Moving from lying on back to sitting on the side of a flat bed without bedrails?  3  -LM  3  -LM     Moving to and from a bed to a chair (including a wheelchair)?  3  -LM  3  -LM     Standing up from a chair using your arms (e.g., wheelchair, bedside chair)?  3  -LM  3  -LM     Climbing 3-5 steps with a railing?  1  -LM  1  -LM     To walk in hospital room?  3  -LM  3  -LM     AM-PAC 6 Clicks Score  16  -LM  16  -LM        Functional Assessment     Outcome Measure Options  AM-PAC 6 Clicks Basic Mobility (PT)  -LM  AM-PAC 6 Clicks Basic Mobility (PT)  -LM       User Key  (r) = Recorded By, (t) = Taken By, (c) = Cosigned By    Initials Name Provider Type    Yolanda Arias, PT Physical Therapist          Therapy Suggested Charges     Code   Minutes Charges    88979 (CPT®) Hc Ot Neuromusc Re Education Ea 15 Min      13453 (CPT®) Hc Ot Ther Proc Ea 15 Min      09531 (CPT®) Hc Ot Therapeutic Act Ea 15 Min      56080 (CPT®) Hc Ot Manual Therapy Ea 15 Min      50147 (CPT®) Hc Ot Iontophoresis Ea 15 Min      32775 (CPT®) Hc Ot Elec Stim Ea-Per 15 Min      80829 (CPT®) Hc Ot Ultrasound Ea 15 Min      68393 (CPT®) Hc Ot Self Care/Mgmt/Train Ea 15 Min 24 2    Total  24 2              OT Discharge Summary  Reason for Discharge: Discharge from facility  Outcomes Achieved: Other(Goals not met due to patient karin seen for evaluation prior to discharge.)  Discharge Destination: Inpatient rehabilitation facility      Caryn Charlton OT  5/20/2019

## 2019-05-21 NOTE — PROGRESS NOTES
MARIELENA Rico    Nerve Cath Post Op Call    Patient Name: Larry Dean Klinefelter  :  1950  MRN:  [id number]  Date of Discharge: 2019    Nerve Cath Post Op Call:    Analgesia:Good  Pain Score:4/10  Side Effects:None  Catheter Site:clean  Volume: 0ml/hr, catheter discontinued; infusion complete.  Catheter Plan:Patient/Family member report nerve catheter previously discontinued, tip intact and The patient was instructed to call ON CALL Anesthesia provider for any questions or problems

## 2019-05-28 ENCOUNTER — TELEPHONE (OUTPATIENT)
Dept: ORTHOPEDIC SURGERY | Facility: CLINIC | Age: 69
End: 2019-05-28

## 2019-05-28 DIAGNOSIS — Z96.652 STATUS POST TOTAL LEFT KNEE REPLACEMENT: Primary | ICD-10-CM

## 2019-05-28 NOTE — TELEPHONE ENCOUNTER
Patient is calling to see if he can get an order for physical therapy put in. He would like to go to [facility] physical therapy.

## 2019-06-10 ENCOUNTER — OFFICE VISIT (OUTPATIENT)
Dept: ORTHOPEDIC SURGERY | Facility: CLINIC | Age: 69
End: 2019-06-10

## 2019-06-10 DIAGNOSIS — Z47.89 ORTHOPEDIC AFTERCARE: ICD-10-CM

## 2019-06-10 DIAGNOSIS — Z96.652 STATUS POST TOTAL LEFT KNEE REPLACEMENT: Primary | ICD-10-CM

## 2019-06-10 PROCEDURE — 99024 POSTOP FOLLOW-UP VISIT: CPT | Performed by: ORTHOPAEDIC SURGERY

## 2019-06-10 NOTE — PROGRESS NOTES
[facility] Orthopaedic Surgery Clinic Note    Subjective     Chief Complaint   Patient presents with   • Post-op     3 weeks s/p (L) TKA 05/16/2019        HPI Larry Dean Klinefelter is a 69 y.o. male.  He follows up today for his left total knee arthroplasty.  He is doing well today, and is pleased with results so far.  He is making progress with physical therapy.      Patient Active Problem List   Diagnosis   • Primary osteoarthritis of left knee   • TRACEY on CPAP   • HTN (hypertension)   • HLD (hyperlipidemia)   • Hypothyroid   • CAD (coronary artery disease)   • Status post total left knee replacement   • Postoperative urinary retention   • Leukocytosis, likely reactive   • Acute blood loss anemia, mild, asymptomatic   • Acute postoperative pain     Past Medical History:   Diagnosis Date   • Anemia    • Bowel obstruction (CMS/HCC)    • Chest pain    • Constipation    • Coronary artery disease    • Disease of thyroid gland    • Hepatitis    • Hyperlipidemia    • Hypertension    • Macular degeneration    • Medial meniscus tear    • Obesity    • Obstructive sleep apnea on CPAP     setting - 13.   • Primary osteoarthritis of left knee     and right knee   • Wears glasses       Past Surgical History:   Procedure Laterality Date   • CARDIAC CATHETERIZATION  01/2019   • COLONOSCOPY  2013   • FRACTURE SURGERY      left heel    • INGUINAL HERNIA REPAIR      x2    • KNEE ARTHROSCOPY Right    • REPLACEMENT TOTAL KNEE Right    • TONSILLECTOMY     • TOTAL KNEE ARTHROPLASTY Left 5/16/2019    Procedure: TOTAL KNEE ARTHROPLASTY LEFT;  Surgeon: Willian Jc MD;  [facility];  Service: Orthopedics      Family History   Problem Relation Age of Onset   • Rheum arthritis Mother    • Stroke Father    • Heart attack Father    • Hypertension Father    • Clotting disorder Father    • Rheum arthritis Father    • Hypertension Other      Social History     Socioeconomic History   • Marital status:      Spouse name: Not on  file   • Number of children: Not on file   • Years of education: Not on file   • Highest education level: Not on file   Tobacco Use   • Smoking status: Never Smoker   • Smokeless tobacco: Never Used   Substance and Sexual Activity   • Alcohol use: No   • Drug use: No   • Sexual activity: Defer      Current Outpatient Medications on File Prior to Visit   Medication Sig Dispense Refill   • aspirin  MG EC tablet Take 1 tablet by mouth Daily. For 30 days then 81 mg /day 30 tablet 0   • atorvastatin (LIPITOR) 20 MG tablet Take 20 mg by mouth Daily.     • Calcium Carb-Cholecalciferol (CALCIUM PLUS VITAMIN D3 PO) Take 1 tablet by mouth 2 (Two) Times a Day.     • Coenzyme Q10 (CO Q-10) 100 MG capsule Take 100 mg by mouth 2 (Two) Times a Day.     • docusate sodium (COLACE) 250 MG capsule Take 250 mg by mouth Every Night.     • ezetimibe (ZETIA) 10 MG tablet Take 10 mg by mouth Daily.     • ferrous sulfate 324 (65 Fe) MG tablet delayed-release EC tablet Take 324 mg by mouth Every Night.     • lisinopril (PRINIVIL,ZESTRIL) 5 MG tablet Take 5 mg by mouth Every Night.     • Multiple Vitamins-Minerals (VITEYES AREDS FORMULA PO) Take 1 capsule by mouth 2 (Two) Times a Day.     • nitroglycerin (NITROSTAT) 0.4 MG SL tablet Place 0.4 mg under the tongue As Needed for Chest Pain. Take no more than 3 doses in 15 minutes.      • Polyethylene Glycol 3350 (MIRALAX PO) Take 1 dose by mouth Daily.     • Ropivacine HCl-NaCl (NAROPIN) 20 mg/hr by Peripheral Nerve route Continuous.     • SYNTHROID 25 MCG tablet Take 25 mcg by mouth Daily.     • tamsulosin (FLOMAX) 0.4 MG capsule 24 hr capsule Take 1 capsule by mouth Daily. 7 capsule 0   • TOPROL XL 25 MG 24 hr tablet Take 25 mg by mouth Daily.       No current facility-administered medications on file prior to visit.       No Known Allergies     Review of Systems   Constitutional: Negative for activity change, appetite change, chills, diaphoresis, fatigue, fever and unexpected weight  change.   HENT: Negative for congestion, dental problem, drooling, ear discharge, ear pain, facial swelling, hearing loss, mouth sores, nosebleeds, postnasal drip, rhinorrhea, sinus pressure, sneezing, sore throat, tinnitus, trouble swallowing and voice change.    Eyes: Negative for photophobia, pain, discharge, redness, itching and visual disturbance.   Respiratory: Negative for apnea, cough, choking, chest tightness, shortness of breath, wheezing and stridor.    Cardiovascular: Negative for chest pain, palpitations and leg swelling.   Gastrointestinal: Negative for abdominal distention, abdominal pain, anal bleeding, blood in stool, constipation, diarrhea, nausea, rectal pain and vomiting.   Endocrine: Negative for cold intolerance, heat intolerance, polydipsia, polyphagia and polyuria.   Genitourinary: Negative for decreased urine volume, difficulty urinating, dysuria, enuresis, flank pain, frequency, genital sores, hematuria and urgency.   Musculoskeletal: Positive for arthralgias. Negative for back pain, gait problem, joint swelling, myalgias, neck pain and neck stiffness.   Skin: Negative for color change, pallor, rash and wound.   Allergic/Immunologic: Negative for environmental allergies, food allergies and immunocompromised state.   Neurological: Negative for dizziness, tremors, seizures, syncope, facial asymmetry, speech difficulty, weakness, light-headedness, numbness and headaches.   Hematological: Negative for adenopathy. Does not bruise/bleed easily.   Psychiatric/Behavioral: Negative for agitation, behavioral problems, confusion, decreased concentration, dysphoric mood, hallucinations, self-injury, sleep disturbance and suicidal ideas. The patient is not nervous/anxious and is not hyperactive.         Objective      Physical Exam  There were no vitals taken for this visit.    There is no height or weight on file to calculate BMI.    General:   Mental Status:  Alert   Appearance: Cooperative, in no  acute distress   Build and Nutrition: Overweight male   Orientation: Alert and oriented to person, place and time   Posture: Normal   Gait: Normal    Integument:   Left knee: Wound is well-healed with no signs of infection    Lower Extremities:   Left Knee:    Tenderness:  None    Effusion:  None    Swelling: None    Crepitus:  None    Range of motion:  Extension: 0°       Flexion: 115°  Instability:  No varus laxity, no valgus laxity, negative anterior drawer  Deformities:  None      Imaging/Studies      Imaging Results (last 24 hours)     Procedure Component Value Units Date/Time    XR Knee 3+ View With [place] Left [[id number]] Resulted:  06/10/19 1502     Updated:  06/10/19 1502    Narrative:       Left Knee Radiographs  Indication: status-post left total knee arthroplasty  Views: AP, lateral, and sunrise views of the left knee    Comparison: no change compared to prior study, 5/16/2019    Findings:   The components are well aligned, with no signs of loosening or failure.          Assessment and Plan     Alec was seen today for post-op.    Diagnoses and all orders for this visit:    Status post total left knee replacement  -     XR Knee 3+ View With [place] Left    Orthopedic aftercare        1. Status post total left knee replacement    2. Orthopedic aftercare        I reviewed my findings with patient today.  His left total knee arthroplasty is functioning well, and he is pleased with results.  I will see him back in 6 to 8 weeks, but sooner for any problems.    Return in about 6 weeks (around 7/22/2019).      Medical Decision Making  Management Options : prescription/IM medicine  Data/Risk: radiology tests and independent visualization of imaging, lab tests, or EMG/NCV      Willian Jc MD  06/10/19  3:11 PM

## 2019-08-05 ENCOUNTER — OFFICE VISIT (OUTPATIENT)
Dept: ORTHOPEDIC SURGERY | Facility: CLINIC | Age: 69
End: 2019-08-05

## 2019-08-05 DIAGNOSIS — Z47.89 ORTHOPEDIC AFTERCARE: ICD-10-CM

## 2019-08-05 DIAGNOSIS — Z96.652 STATUS POST TOTAL LEFT KNEE REPLACEMENT: Primary | ICD-10-CM

## 2019-08-05 PROCEDURE — 99024 POSTOP FOLLOW-UP VISIT: CPT | Performed by: ORTHOPAEDIC SURGERY

## 2019-08-05 RX ORDER — ASPIRIN 81 MG/1
81 TABLET ORAL DAILY
COMMUNITY

## 2019-08-05 NOTE — PROGRESS NOTES
[facility] [place] Orthopaedic Surgery Clinic Note    Subjective     Chief Complaint   Patient presents with   • Post-op     11 weeks s/p (L) TKA 05/16/2019        HPI Larry Dean Klinefelter is a 69 y.o. male.  He follows up today for his left total knee arthroplasty.  He is doing well today, with 80 to 90% improvement compared to his preoperative symptoms.  Ambulatory without external aids.      Patient Active Problem List   Diagnosis   • Primary osteoarthritis of left knee   • TRACEY on CPAP   • HTN (hypertension)   • HLD (hyperlipidemia)   • Hypothyroid   • CAD (coronary artery disease)   • Status post total left knee replacement   • Postoperative urinary retention   • Leukocytosis, likely reactive   • Acute blood loss anemia, mild, asymptomatic   • Acute postoperative pain     Past Medical History:   Diagnosis Date   • Anemia    • Bowel obstruction (CMS/HCC)    • Chest pain    • Constipation    • Coronary artery disease    • Disease of thyroid gland    • Hepatitis    • Hyperlipidemia    • Hypertension    • Macular degeneration    • Medial meniscus tear    • Obesity    • Obstructive sleep apnea on CPAP     setting - 13.   • Primary osteoarthritis of left knee     and right knee   • Wears glasses       Past Surgical History:   Procedure Laterality Date   • CARDIAC CATHETERIZATION  01/2019   • COLONOSCOPY  2013   • FRACTURE SURGERY      left heel    • INGUINAL HERNIA REPAIR      x2    • KNEE ARTHROSCOPY Right    • REPLACEMENT TOTAL KNEE Right    • TONSILLECTOMY     • TOTAL KNEE ARTHROPLASTY Left 5/16/2019    Procedure: TOTAL KNEE ARTHROPLASTY LEFT;  Surgeon: Willian Jc MD;  Location: [facility];  Service: Orthopedics      Family History   Problem Relation Age of Onset   • Rheum arthritis Mother    • Stroke Father    • Heart attack Father    • Hypertension Father    • Clotting disorder Father    • Rheum arthritis Father    • Hypertension Other      Social History     Socioeconomic History   • Marital status:       Spouse name: Not on file   • Number of children: Not on file   • Years of education: Not on file   • Highest education level: Not on file   Tobacco Use   • Smoking status: Never Smoker   • Smokeless tobacco: Never Used   Substance and Sexual Activity   • Alcohol use: No   • Drug use: No   • Sexual activity: Defer      Current Outpatient Medications on File Prior to Visit   Medication Sig Dispense Refill   • aspirin 81 MG EC tablet Take 81 mg by mouth Daily.     • atorvastatin (LIPITOR) 20 MG tablet Take 20 mg by mouth Daily.     • Calcium Carb-Cholecalciferol (CALCIUM PLUS VITAMIN D3 PO) Take 1 tablet by mouth 2 (Two) Times a Day.     • Coenzyme Q10 (CO Q-10) 100 MG capsule Take 100 mg by mouth 2 (Two) Times a Day.     • docusate sodium (COLACE) 250 MG capsule Take 250 mg by mouth Every Night.     • ezetimibe (ZETIA) 10 MG tablet Take 10 mg by mouth Daily.     • ferrous sulfate 324 (65 Fe) MG tablet delayed-release EC tablet Take 324 mg by mouth Every Night.     • lisinopril (PRINIVIL,ZESTRIL) 5 MG tablet Take 5 mg by mouth Every Night.     • Multiple Vitamins-Minerals (VITEYES AREDS FORMULA PO) Take 1 capsule by mouth 2 (Two) Times a Day.     • nitroglycerin (NITROSTAT) 0.4 MG SL tablet Place 0.4 mg under the tongue As Needed for Chest Pain. Take no more than 3 doses in 15 minutes.      • Polyethylene Glycol 3350 (MIRALAX PO) Take 1 dose by mouth Daily.     • Ropivacine HCl-NaCl (NAROPIN) 20 mg/hr by Peripheral Nerve route Continuous.     • SYNTHROID 25 MCG tablet Take 25 mcg by mouth Daily.     • tamsulosin (FLOMAX) 0.4 MG capsule 24 hr capsule Take 1 capsule by mouth Daily. 7 capsule 0   • TOPROL XL 25 MG 24 hr tablet Take 25 mg by mouth Daily.     • [DISCONTINUED] aspirin  MG EC tablet Take 1 tablet by mouth Daily. For 30 days then 81 mg /day 30 tablet 0     No current facility-administered medications on file prior to visit.       No Known Allergies     Review of Systems     Objective      Physical  Exam  There were no vitals taken for this visit.    There is no height or weight on file to calculate BMI.    General:   Mental Status:  Alert   Appearance: Cooperative, in no acute distress   Build and Nutrition: Overweight male   Orientation: Alert and oriented to person, place and time   Posture: Normal    Integument:   Left knee: Wound is well-healed with no signs of infection    Lower Extremities:   Left Knee:    Tenderness:  None    Effusion:  1+    Swelling: None    Crepitus:  None    Range of motion:  Extension: 5°       Flexion: 115°  Instability:  No varus laxity, no valgus laxity, negative anterior drawer  Deformities:  None      Assessment and Plan     Alec was seen today for post-op.    Diagnoses and all orders for this visit:    Status post total left knee replacement    Orthopedic aftercare        1. Status post total left knee replacement    2. Orthopedic aftercare        Reviewed my findings with patient today.  His left total knee arthroplasty appears to be functioning well, and I will see him back for 6-month checkup which would be in about 3 months.  I will see him back sooner for any problems.    Return in about 3 months (around 11/5/2019) for Recheck with X-Rays.      Willian Jc MD  08/05/19  11:34 AM

## 2019-11-06 ENCOUNTER — OFFICE VISIT (OUTPATIENT)
Dept: ORTHOPEDIC SURGERY | Facility: CLINIC | Age: 69
End: 2019-11-06

## 2019-11-06 VITALS — HEART RATE: 93 BPM | OXYGEN SATURATION: 98 % | BODY MASS INDEX: 35.51 KG/M2 | HEIGHT: 70 IN | WEIGHT: 248.02 LBS

## 2019-11-06 DIAGNOSIS — Z96.652 STATUS POST TOTAL LEFT KNEE REPLACEMENT: Primary | ICD-10-CM

## 2019-11-06 DIAGNOSIS — Z09 POSTOPERATIVE EXAMINATION: ICD-10-CM

## 2019-11-06 PROCEDURE — 99213 OFFICE O/P EST LOW 20 MIN: CPT | Performed by: ORTHOPAEDIC SURGERY

## 2019-11-06 RX ORDER — NAPROXEN 500 MG/1
TABLET ORAL
COMMUNITY
Start: 2019-08-15 | End: 2020-11-01

## 2019-11-06 NOTE — PROGRESS NOTES
[facility] [place] Orthopaedic Surgery Clinic Note    Subjective     Chief Complaint   Patient presents with   • Follow-up     3 month f/u; 6 months s/p Left total knee arthroplasty 5/16/19        ALLY    Larry Dean Klinefelter is a 69 y.o. male.  He follows up today for his left total knee arthroplasty.  He is doing well today, with 90% improvement compared to his preoperative symptoms.  He is pleased with results.  Ambulating without external aids.      Patient Active Problem List   Diagnosis   • Primary osteoarthritis of left knee   • TRACEY on CPAP   • HTN (hypertension)   • HLD (hyperlipidemia)   • Hypothyroid   • CAD (coronary artery disease)   • Status post total left knee replacement   • Postoperative urinary retention   • Leukocytosis, likely reactive   • Acute blood loss anemia, mild, asymptomatic   • Acute postoperative pain     Past Medical History:   Diagnosis Date   • Anemia    • Bowel obstruction (CMS/HCC)    • Chest pain    • Constipation    • Coronary artery disease    • Disease of thyroid gland    • Hepatitis    • Hyperlipidemia    • Hypertension    • Macular degeneration    • Medial meniscus tear    • Obesity    • Obstructive sleep apnea on CPAP     setting - 13.   • Primary osteoarthritis of left knee     and right knee   • Wears glasses       Past Surgical History:   Procedure Laterality Date   • CARDIAC CATHETERIZATION  01/2019   • COLONOSCOPY  2013   • FRACTURE SURGERY      left heel    • INGUINAL HERNIA REPAIR      x2    • KNEE ARTHROSCOPY Right    • REPLACEMENT TOTAL KNEE Right    • TONSILLECTOMY     • TOTAL KNEE ARTHROPLASTY Left 5/16/2019    Procedure: TOTAL KNEE ARTHROPLASTY LEFT;  Surgeon: Willian Jc MD;  Location: [facility];  Service: Orthopedics      Family History   Problem Relation Age of Onset   • Rheum arthritis Mother    • Stroke Father    • Heart attack Father    • Hypertension Father    • Clotting disorder Father    • Rheum arthritis Father    • Hypertension Other      Social History      Socioeconomic History   • Marital status:      Spouse name: Not on file   • Number of children: Not on file   • Years of education: Not on file   • Highest education level: Not on file   Tobacco Use   • Smoking status: Never Smoker   • Smokeless tobacco: Never Used   Substance and Sexual Activity   • Alcohol use: No   • Drug use: No   • Sexual activity: Defer      Current Outpatient Medications on File Prior to Visit   Medication Sig Dispense Refill   • aspirin 81 MG EC tablet Take 81 mg by mouth Daily.     • atorvastatin (LIPITOR) 20 MG tablet Take 20 mg by mouth Daily.     • Calcium Carb-Cholecalciferol (CALCIUM PLUS VITAMIN D3 PO) Take 1 tablet by mouth 2 (Two) Times a Day.     • Coenzyme Q10 (CO Q-10) 100 MG capsule Take 100 mg by mouth 2 (Two) Times a Day.     • docusate sodium (COLACE) 250 MG capsule Take 250 mg by mouth Every Night.     • ezetimibe (ZETIA) 10 MG tablet Take 10 mg by mouth Daily.     • ferrous sulfate 324 (65 Fe) MG tablet delayed-release EC tablet Take 324 mg by mouth Every Night.     • lisinopril (PRINIVIL,ZESTRIL) 5 MG tablet Take 5 mg by mouth Every Night.     • Multiple Vitamins-Minerals (VITEYES AREDS FORMULA PO) Take 1 capsule by mouth 2 (Two) Times a Day.     • naproxen (NAPROSYN) 500 MG tablet      • nitroglycerin (NITROSTAT) 0.4 MG SL tablet Place 0.4 mg under the tongue As Needed for Chest Pain. Take no more than 3 doses in 15 minutes.      • Polyethylene Glycol 3350 (MIRALAX PO) Take 1 dose by mouth Daily.     • Ropivacine HCl-NaCl (NAROPIN) 20 mg/hr by Peripheral Nerve route Continuous.     • SYNTHROID 25 MCG tablet Take 25 mcg by mouth Daily.     • tamsulosin (FLOMAX) 0.4 MG capsule 24 hr capsule Take 1 capsule by mouth Daily. 7 capsule 0   • TOPROL XL 25 MG 24 hr tablet Take 25 mg by mouth Daily.       No current facility-administered medications on file prior to visit.       No Known Allergies     Review of Systems   Constitutional: Negative for activity change,  "appetite change, chills, diaphoresis, fatigue, fever and unexpected weight change.   HENT: Positive for congestion, postnasal drip and sore throat. Negative for dental problem, drooling, ear discharge, ear pain, facial swelling, hearing loss, mouth sores, nosebleeds, rhinorrhea, sinus pressure, sneezing, tinnitus, trouble swallowing and voice change.    Eyes: Negative for photophobia, pain, discharge, redness, itching and visual disturbance.   Respiratory: Negative for apnea, cough, choking, chest tightness, shortness of breath, wheezing and stridor.    Cardiovascular: Negative for chest pain, palpitations and leg swelling.   Gastrointestinal: Negative for abdominal distention, abdominal pain, anal bleeding, blood in stool, constipation, diarrhea, nausea, rectal pain and vomiting.   Endocrine: Negative for cold intolerance, heat intolerance, polydipsia, polyphagia and polyuria.   Genitourinary: Negative for decreased urine volume, difficulty urinating, dysuria, enuresis, flank pain, frequency, genital sores, hematuria and urgency.   Musculoskeletal: Positive for joint swelling. Negative for arthralgias, back pain, gait problem, myalgias, neck pain and neck stiffness.   Skin: Negative for color change, pallor, rash and wound.   Allergic/Immunologic: Negative for environmental allergies, food allergies and immunocompromised state.   Neurological: Positive for numbness. Negative for dizziness, tremors, seizures, syncope, facial asymmetry, speech difficulty, weakness, light-headedness and headaches.   Hematological: Negative for adenopathy. Does not bruise/bleed easily.   Psychiatric/Behavioral: Negative for agitation, behavioral problems, confusion, decreased concentration, dysphoric mood, hallucinations, self-injury, sleep disturbance and suicidal ideas. The patient is not nervous/anxious and is not hyperactive.         Objective      Physical Exam  Pulse 93   Ht 177.8 cm (70\")   Wt 113 kg (248 lb 0.3 oz)   SpO2 " 98%   BMI 35.59 kg/m²     Body mass index is 35.59 kg/m².    General:   Mental Status:  Alert   Appearance: Cooperative, in no acute distress   Build and Nutrition: Overweight male   Orientation: Alert and oriented to person, place and time   Posture: Normal   Gait: Normal    Integument:   Left knee: Wound is well-healed with no signs of infection    Lower Extremities:   Left Knee:    Tenderness:  None    Effusion:  None    Swelling: None    Crepitus:  None    Range of motion:  Extension: 5°       Flexion: 115°  Instability:  No varus laxity, no valgus laxity, negative anterior drawer  Deformities:  None      Imaging/Studies  Imaging Results (Last 24 Hours)     Procedure Component Value Units Date/Time    XR Knee 3+ View With [place] Left [[id number]] Resulted:  11/06/19 0914     Updated:  11/06/19 0915    Narrative:       Left Knee Radiographs  Indication: status-post left total knee arthroplasty  Views: AP, lateral, and sunrise views of the left knee    Comparison: no change compared to prior study, 6/10/2019    Findings:   The components are well aligned, with no signs of loosening or failure.            Assessment and Plan     Alec was seen today for follow-up.    Diagnoses and all orders for this visit:    Status post total left knee replacement  -     XR Knee 3+ View With [place] Left    Postoperative examination        1. Status post total left knee replacement    2. Postoperative examination        I reviewed my findings with the patient today.  His left total knee arthroplasty appears to be functioning well, and I will see him back in 6 months, which will be a one-year checkup.  I will see him back sooner for any problems.    Return in about 6 months (around 5/6/2020) for Recheck with X-Rays.      Medical Decision Making  Data/Risk: radiology tests and independent visualization of imaging, lab tests, or EMG/NCV      Willian Jc MD  11/06/19  9:30 AM

## 2020-07-08 ENCOUNTER — OFFICE VISIT (OUTPATIENT)
Dept: ORTHOPEDIC SURGERY | Facility: CLINIC | Age: 70
End: 2020-07-08

## 2020-07-08 VITALS — BODY MASS INDEX: 33.76 KG/M2 | WEIGHT: 235.8 LBS | HEIGHT: 70 IN | OXYGEN SATURATION: 98 % | HEART RATE: 69 BPM

## 2020-07-08 DIAGNOSIS — Z09 POSTOPERATIVE EXAMINATION: ICD-10-CM

## 2020-07-08 DIAGNOSIS — Z96.652 STATUS POST TOTAL LEFT KNEE REPLACEMENT: Primary | ICD-10-CM

## 2020-07-08 PROCEDURE — 99213 OFFICE O/P EST LOW 20 MIN: CPT | Performed by: ORTHOPAEDIC SURGERY

## 2020-07-08 RX ORDER — ATORVASTATIN CALCIUM 40 MG/1
40 TABLET, FILM COATED ORAL NIGHTLY
COMMUNITY
Start: 2020-06-02

## 2020-07-08 ASSESSMENT — KOOS JR
KOOS JR SCORE: 2
KOOS JR SCORE: 84.6

## 2020-07-08 NOTE — PROGRESS NOTES
[facility] [place] Orthopaedic Surgery Clinic Note    Subjective     Chief Complaint   Patient presents with   • Follow-up     8 months follow up; 14 months s/p Left total knee arthroplasty 5/16/19        HPI    It has been 8  month(s) since Mr. Klinefelter's last visit. He returns to clinic today for follow-up of left knee arthroplasty. He rates his pain a 1/10 on the pain scale. Previous/current treatments: NSAIDS and physical therapy. Current symptoms: popping. The pain is worse with walking, standing; resting improve the pain. Overall, he is doing better.  90% improvement compared to his preoperative symptoms.  He is pleased with results.  Fully ambulatory without external aids.    I have reviewed the following portions of the patient's history:History of Present Illness     Patient Active Problem List   Diagnosis   • Primary osteoarthritis of left knee   • TRACEY on CPAP   • HTN (hypertension)   • HLD (hyperlipidemia)   • Hypothyroid   • CAD (coronary artery disease)   • Status post total left knee replacement   • Postoperative urinary retention   • Leukocytosis, likely reactive   • Acute blood loss anemia, mild, asymptomatic   • Acute postoperative pain     Past Medical History:   Diagnosis Date   • Anemia    • Bowel obstruction (CMS/HCC)    • Chest pain    • Constipation    • Coronary artery disease    • Disease of thyroid gland    • Hepatitis    • Hyperlipidemia    • Hypertension    • Macular degeneration    • Medial meniscus tear    • Obesity    • Obstructive sleep apnea on CPAP     setting - 13.   • Primary osteoarthritis of left knee     and right knee   • Wears glasses       Past Surgical History:   Procedure Laterality Date   • CARDIAC CATHETERIZATION  01/2019   • COLONOSCOPY  2013   • FRACTURE SURGERY      left heel    • INGUINAL HERNIA REPAIR      x2    • KNEE ARTHROSCOPY Right    • REPLACEMENT TOTAL KNEE Right    • TONSILLECTOMY     • TOTAL KNEE ARTHROPLASTY Left 5/16/2019    Procedure: TOTAL KNEE ARTHROPLASTY  LEFT;  Surgeon: Willian Jc MD;  Location: [facility];  Service: Orthopedics      Family History   Problem Relation Age of Onset   • Rheum arthritis Mother    • Stroke Father    • Heart attack Father    • Hypertension Father    • Clotting disorder Father    • Rheum arthritis Father    • Hypertension Other      Social History     Socioeconomic History   • Marital status:      Spouse name: Not on file   • Number of children: Not on file   • Years of education: Not on file   • Highest education level: Not on file   Tobacco Use   • Smoking status: Never Smoker   • Smokeless tobacco: Never Used   Substance and Sexual Activity   • Alcohol use: No   • Drug use: No   • Sexual activity: Defer      Current Outpatient Medications on File Prior to Visit   Medication Sig Dispense Refill   • aspirin 81 MG EC tablet Take 81 mg by mouth Daily.     • atorvastatin (LIPITOR) 40 MG tablet      • Calcium Carb-Cholecalciferol (CALCIUM PLUS VITAMIN D3 PO) Take 1 tablet by mouth 2 (Two) Times a Day.     • Coenzyme Q10 (CO Q-10) 100 MG capsule Take 100 mg by mouth 2 (Two) Times a Day.     • docusate sodium (COLACE) 250 MG capsule Take 250 mg by mouth Every Night.     • ezetimibe (ZETIA) 10 MG tablet Take 10 mg by mouth Daily.     • ferrous sulfate 324 (65 Fe) MG tablet delayed-release EC tablet Take 324 mg by mouth Every Night.     • lisinopril (PRINIVIL,ZESTRIL) 5 MG tablet Take 5 mg by mouth Every Night.     • Multiple Vitamins-Minerals (VITEYES AREDS FORMULA PO) Take 1 capsule by mouth 2 (Two) Times a Day.     • naproxen (NAPROSYN) 500 MG tablet      • nitroglycerin (NITROSTAT) 0.4 MG SL tablet Place 0.4 mg under the tongue As Needed for Chest Pain. Take no more than 3 doses in 15 minutes.      • Polyethylene Glycol 3350 (MIRALAX PO) Take 1 dose by mouth Daily.     • Ropivacine HCl-NaCl (NAROPIN) 20 mg/hr by Peripheral Nerve route Continuous.     • SYNTHROID 25 MCG tablet Take 25 mcg by mouth Daily.     • TOPROL XL 25 MG 24 hr  "tablet Take 25 mg by mouth Daily.     • [DISCONTINUED] atorvastatin (LIPITOR) 20 MG tablet Take 20 mg by mouth Daily.     • [DISCONTINUED] tamsulosin (FLOMAX) 0.4 MG capsule 24 hr capsule Take 1 capsule by mouth Daily. 7 capsule 0     No current facility-administered medications on file prior to visit.       No Known Allergies     Review of Systems   Constitutional: Negative.    HENT: Negative.    Eyes: Negative.    Respiratory: Negative.    Cardiovascular: Negative.    Gastrointestinal: Negative.    Endocrine: Negative.    Genitourinary: Negative.    Musculoskeletal: Positive for arthralgias.   Skin: Negative.    Allergic/Immunologic: Negative.    Neurological: Negative.    Hematological: Negative.    Psychiatric/Behavioral: Negative.         Objective      Physical Exam  Pulse 69   Ht 177.8 cm (70\")   Wt 107 kg (235 lb 12.8 oz)   SpO2 98%   BMI 33.83 kg/m²     Body mass index is 33.83 kg/m².    General:   Mental Status:  Alert   Appearance: Cooperative, in no acute distress   Build and Nutrition: Overweight male   Orientation: Alert and oriented to person, place and time   Posture: Normal   Gait: Normal    Integument:   Left knee: Wound is well-healed with no signs of infection    Lower Extremities:   Left Knee:    Tenderness:  None    Effusion:  None    Swelling: None    Crepitus:  None    Range of motion:  Extension: 0°       Flexion: 130°  Instability:  No varus laxity, no valgus laxity, negative anterior drawer  Deformities:  None      Imaging/Studies  Imaging Results (Last 24 Hours)     Procedure Component Value Units Date/Time    XR Knee 3+ View With [place] Left [[id number]] Resulted:  07/08/20 1625     Updated:  07/08/20 1626    Narrative:       Left Knee Radiographs  Indication: status-post left total knee arthroplasty  Views: AP, lateral, and sunrise views of the left knee    Comparison: 11/6/2019    Findings:   [place] under the medial aspect of the plate, with no significant changes   compared to " the previous films, with good alignment, and no unusual   features.            Assessment and Plan     Alec was seen today for follow-up.    Diagnoses and all orders for this visit:    Status post total left knee replacement  -     XR Knee 3+ View With [place] Left    Postoperative examination        1. Status post total left knee replacement    2. Postoperative examination        I reviewed my findings with patient today.  His left total knee arthroplasty is functioning well, and he is pleased with results.  I will see him back in 4 years with an x-ray, which will be a 5-year checkup.  We will check both in his knees at that time.  I will see him back sooner for any problems.    Return in about 4 years (around 7/8/2024) for Recheck with X-Rays.    Medical Decision Making  Data/Risk: radiology tests and independent visualization of imaging, lab tests, or EMG/NCV      Willian Jc MD  07/08/20  16:30    Dragon disclaimer:  Much of this encounter note is an electronic transcription/translation of spoken language to printed text. The electronic translation of spoken language may permit erroneous, or at times, nonsensical words or phrases to be inadvertently transcribed; Although I have reviewed the note for such errors, some may still exist.

## 2020-11-01 ENCOUNTER — APPOINTMENT (OUTPATIENT)
Dept: CT IMAGING | Facility: HOSPITAL | Age: 70
End: 2020-11-01

## 2020-11-01 ENCOUNTER — APPOINTMENT (OUTPATIENT)
Dept: GENERAL RADIOLOGY | Facility: HOSPITAL | Age: 70
End: 2020-11-01

## 2020-11-01 ENCOUNTER — HOSPITAL ENCOUNTER (INPATIENT)
Facility: HOSPITAL | Age: 70
LOS: 4 days | Discharge: HOME | End: 2020-11-05
Attending: EMERGENCY MEDICINE | Admitting: FAMILY MEDICINE

## 2020-11-01 DIAGNOSIS — J18.9 PNEUMONIA OF RIGHT LOWER LOBE DUE TO INFECTIOUS ORGANISM: Primary | ICD-10-CM

## 2020-11-01 DIAGNOSIS — J96.01 ACUTE RESPIRATORY FAILURE WITH HYPOXIA (HCC): ICD-10-CM

## 2020-11-01 DIAGNOSIS — R53.1 GENERALIZED WEAKNESS: ICD-10-CM

## 2020-11-01 DIAGNOSIS — R06.02 SHORTNESS OF BREATH: ICD-10-CM

## 2020-11-01 DIAGNOSIS — R50.9 ACUTE FEBRILE ILLNESS: ICD-10-CM

## 2020-11-01 DIAGNOSIS — A41.9 SEPSIS DUE TO PNEUMONIA (HCC): ICD-10-CM

## 2020-11-01 DIAGNOSIS — J18.9 SEPSIS DUE TO PNEUMONIA (HCC): ICD-10-CM

## 2020-11-01 LAB
ABO GROUP BLD: NORMAL
ALBUMIN SERPL-MCNC: 4.1 G/DL (ref 3.5–5.2)
ALBUMIN/GLOB SERPL: 1.2 G/DL
ALP SERPL-CCNC: 54 U/L (ref 39–117)
ALT SERPL W P-5'-P-CCNC: 15 U/L (ref 1–41)
ANION GAP SERPL CALCULATED.3IONS-SCNC: 13 MMOL/L (ref 5–15)
AST SERPL-CCNC: 18 U/L (ref 1–40)
B PARAPERT DNA SPEC QL NAA+PROBE: NOT DETECTED
B PERT DNA SPEC QL NAA+PROBE: NOT DETECTED
BACTERIA UR QL AUTO: ABNORMAL /HPF
BASOPHILS # BLD AUTO: 0.06 10*3/MM3 (ref 0–0.2)
BASOPHILS NFR BLD AUTO: 0.4 % (ref 0–1.5)
BILIRUB SERPL-MCNC: 0.6 MG/DL (ref 0–1.2)
BILIRUB UR QL STRIP: NEGATIVE
BLD GP AB SCN SERPL QL: NEGATIVE
BUN SERPL-MCNC: 15 MG/DL (ref 8–23)
BUN/CREAT SERPL: 15.8 (ref 7–25)
C PNEUM DNA NPH QL NAA+NON-PROBE: NOT DETECTED
CALCIUM SPEC-SCNC: 8.8 MG/DL (ref 8.6–10.5)
CHLORIDE SERPL-SCNC: 104 MMOL/L (ref 98–107)
CLARITY UR: CLEAR
CO2 SERPL-SCNC: 22 MMOL/L (ref 22–29)
COLOR UR: YELLOW
CREAT SERPL-MCNC: 0.95 MG/DL (ref 0.76–1.27)
D-LACTATE SERPL-SCNC: 1.3 MMOL/L (ref 0.5–2)
DEPRECATED RDW RBC AUTO: 46.3 FL (ref 37–54)
EOSINOPHIL # BLD AUTO: 0.08 10*3/MM3 (ref 0–0.4)
EOSINOPHIL NFR BLD AUTO: 0.5 % (ref 0.3–6.2)
ERYTHROCYTE [DISTWIDTH] IN BLOOD BY AUTOMATED COUNT: 12.9 % (ref 12.3–15.4)
FLUAV H1 2009 PAND RNA NPH QL NAA+PROBE: NOT DETECTED
FLUAV H1 HA GENE NPH QL NAA+PROBE: NOT DETECTED
FLUAV H3 RNA NPH QL NAA+PROBE: NOT DETECTED
FLUAV SUBTYP SPEC NAA+PROBE: NOT DETECTED
FLUBV RNA ISLT QL NAA+PROBE: NOT DETECTED
GFR SERPL CREATININE-BSD FRML MDRD: 78 ML/MIN/1.73
GLOBULIN UR ELPH-MCNC: 3.3 GM/DL
GLUCOSE SERPL-MCNC: 146 MG/DL (ref 65–99)
GLUCOSE UR STRIP-MCNC: NEGATIVE MG/DL
HADV DNA SPEC NAA+PROBE: NOT DETECTED
HCOV 229E RNA SPEC QL NAA+PROBE: NOT DETECTED
HCOV HKU1 RNA SPEC QL NAA+PROBE: NOT DETECTED
HCOV NL63 RNA SPEC QL NAA+PROBE: NOT DETECTED
HCOV OC43 RNA SPEC QL NAA+PROBE: NOT DETECTED
HCT VFR BLD AUTO: 37.5 % (ref 37.5–51)
HGB BLD-MCNC: 12.4 G/DL (ref 13–17.7)
HGB UR QL STRIP.AUTO: ABNORMAL
HMPV RNA NPH QL NAA+NON-PROBE: NOT DETECTED
HPIV1 RNA SPEC QL NAA+PROBE: NOT DETECTED
HPIV2 RNA SPEC QL NAA+PROBE: NOT DETECTED
HPIV3 RNA NPH QL NAA+PROBE: NOT DETECTED
HPIV4 P GENE NPH QL NAA+PROBE: NOT DETECTED
HYALINE CASTS UR QL AUTO: ABNORMAL /LPF
IMM GRANULOCYTES # BLD AUTO: 0.08 10*3/MM3 (ref 0–0.05)
IMM GRANULOCYTES NFR BLD AUTO: 0.5 % (ref 0–0.5)
KETONES UR QL STRIP: ABNORMAL
LEUKOCYTE ESTERASE UR QL STRIP.AUTO: NEGATIVE
LIPASE SERPL-CCNC: 13 U/L (ref 13–60)
LYMPHOCYTES # BLD AUTO: 0.58 10*3/MM3 (ref 0.7–3.1)
LYMPHOCYTES NFR BLD AUTO: 3.6 % (ref 19.6–45.3)
M PNEUMO IGG SER IA-ACNC: NOT DETECTED
MCH RBC QN AUTO: 32 PG (ref 26.6–33)
MCHC RBC AUTO-ENTMCNC: 33.1 G/DL (ref 31.5–35.7)
MCV RBC AUTO: 96.6 FL (ref 79–97)
MONOCYTES # BLD AUTO: 1.07 10*3/MM3 (ref 0.1–0.9)
MONOCYTES NFR BLD AUTO: 6.6 % (ref 5–12)
MRSA DNA SPEC QL NAA+PROBE: NEGATIVE
NEUTROPHILS NFR BLD AUTO: 14.32 10*3/MM3 (ref 1.7–7)
NEUTROPHILS NFR BLD AUTO: 88.4 % (ref 42.7–76)
NITRITE UR QL STRIP: NEGATIVE
NRBC BLD AUTO-RTO: 0 /100 WBC (ref 0–0.2)
PH UR STRIP.AUTO: <=5 [PH] (ref 5–8)
PLATELET # BLD AUTO: 201 10*3/MM3 (ref 140–450)
PMV BLD AUTO: 9.8 FL (ref 6–12)
POTASSIUM SERPL-SCNC: 3.8 MMOL/L (ref 3.5–5.2)
PROCALCITONIN SERPL-MCNC: 0.32 NG/ML (ref 0–0.25)
PROT SERPL-MCNC: 7.4 G/DL (ref 6–8.5)
PROT UR QL STRIP: ABNORMAL
RBC # BLD AUTO: 3.88 10*6/MM3 (ref 4.14–5.8)
RBC # UR: ABNORMAL /HPF
REF LAB TEST METHOD: ABNORMAL
RH BLD: POSITIVE
RHINOVIRUS RNA SPEC NAA+PROBE: NOT DETECTED
RSV RNA NPH QL NAA+NON-PROBE: NOT DETECTED
SARS-COV-2 RNA NPH QL NAA+NON-PROBE: NOT DETECTED
SODIUM SERPL-SCNC: 139 MMOL/L (ref 136–145)
SP GR UR STRIP: 1.02 (ref 1–1.03)
SQUAMOUS #/AREA URNS HPF: ABNORMAL /HPF
T&S EXPIRATION DATE: NORMAL
UROBILINOGEN UR QL STRIP: ABNORMAL
WBC # BLD AUTO: 16.19 10*3/MM3 (ref 3.4–10.8)
WBC UR QL AUTO: ABNORMAL /HPF

## 2020-11-01 PROCEDURE — 0202U NFCT DS 22 TRGT SARS-COV-2: CPT | Performed by: PHYSICIAN ASSISTANT

## 2020-11-01 PROCEDURE — 87040 BLOOD CULTURE FOR BACTERIA: CPT | Performed by: PHYSICIAN ASSISTANT

## 2020-11-01 PROCEDURE — 99222 1ST HOSP IP/OBS MODERATE 55: CPT | Performed by: FAMILY MEDICINE

## 2020-11-01 PROCEDURE — 80053 COMPREHEN METABOLIC PANEL: CPT | Performed by: PHYSICIAN ASSISTANT

## 2020-11-01 PROCEDURE — 86850 RBC ANTIBODY SCREEN: CPT | Performed by: NURSE PRACTITIONER

## 2020-11-01 PROCEDURE — 83605 ASSAY OF LACTIC ACID: CPT | Performed by: PHYSICIAN ASSISTANT

## 2020-11-01 PROCEDURE — 36415 COLL VENOUS BLD VENIPUNCTURE: CPT

## 2020-11-01 PROCEDURE — 81001 URINALYSIS AUTO W/SCOPE: CPT | Performed by: PHYSICIAN ASSISTANT

## 2020-11-01 PROCEDURE — 25010000002 VANCOMYCIN 10 G RECONSTITUTED SOLUTION: Performed by: PHYSICIAN ASSISTANT

## 2020-11-01 PROCEDURE — 99285 EMERGENCY DEPT VISIT HI MDM: CPT

## 2020-11-01 PROCEDURE — 84145 PROCALCITONIN (PCT): CPT | Performed by: PHYSICIAN ASSISTANT

## 2020-11-01 PROCEDURE — 86900 BLOOD TYPING SEROLOGIC ABO: CPT | Performed by: NURSE PRACTITIONER

## 2020-11-01 PROCEDURE — 83690 ASSAY OF LIPASE: CPT | Performed by: PHYSICIAN ASSISTANT

## 2020-11-01 PROCEDURE — 71045 X-RAY EXAM CHEST 1 VIEW: CPT

## 2020-11-01 PROCEDURE — 0 IOPAMIDOL PER 1 ML: Performed by: EMERGENCY MEDICINE

## 2020-11-01 PROCEDURE — 85025 COMPLETE CBC W/AUTO DIFF WBC: CPT | Performed by: PHYSICIAN ASSISTANT

## 2020-11-01 PROCEDURE — 87641 MR-STAPH DNA AMP PROBE: CPT | Performed by: FAMILY MEDICINE

## 2020-11-01 PROCEDURE — 25010000002 CEFTRIAXONE PER 250 MG: Performed by: PHYSICIAN ASSISTANT

## 2020-11-01 PROCEDURE — 86901 BLOOD TYPING SEROLOGIC RH(D): CPT | Performed by: NURSE PRACTITIONER

## 2020-11-01 PROCEDURE — 71275 CT ANGIOGRAPHY CHEST: CPT

## 2020-11-01 RX ORDER — ACETAMINOPHEN 500 MG
1000 TABLET ORAL ONCE
Status: COMPLETED | OUTPATIENT
Start: 2020-11-01 | End: 2020-11-01

## 2020-11-01 RX ORDER — DOCUSATE SODIUM 100 MG/1
100 CAPSULE, LIQUID FILLED ORAL DAILY PRN
Status: DISCONTINUED | OUTPATIENT
Start: 2020-11-01 | End: 2020-11-05 | Stop reason: HOSPADM

## 2020-11-01 RX ORDER — ATORVASTATIN CALCIUM 40 MG/1
40 TABLET, FILM COATED ORAL DAILY
Status: DISCONTINUED | OUTPATIENT
Start: 2020-11-02 | End: 2020-11-05 | Stop reason: HOSPADM

## 2020-11-01 RX ORDER — TAMSULOSIN HYDROCHLORIDE 0.4 MG/1
1 CAPSULE ORAL DAILY
Status: ON HOLD | COMMUNITY
End: 2020-11-02

## 2020-11-01 RX ORDER — LEVOTHYROXINE SODIUM 0.03 MG/1
25 TABLET ORAL
Status: DISCONTINUED | OUTPATIENT
Start: 2020-11-02 | End: 2020-11-05 | Stop reason: HOSPADM

## 2020-11-01 RX ORDER — ALBUTEROL SULFATE 2.5 MG/3ML
2.5 SOLUTION RESPIRATORY (INHALATION) EVERY 4 HOURS PRN
Status: DISCONTINUED | OUTPATIENT
Start: 2020-11-01 | End: 2020-11-05 | Stop reason: HOSPADM

## 2020-11-01 RX ORDER — ACETAMINOPHEN 325 MG/1
650 TABLET ORAL EVERY 4 HOURS PRN
Status: DISCONTINUED | OUTPATIENT
Start: 2020-11-01 | End: 2020-11-05 | Stop reason: HOSPADM

## 2020-11-01 RX ORDER — ACETAMINOPHEN 650 MG/1
650 SUPPOSITORY RECTAL EVERY 4 HOURS PRN
Status: DISCONTINUED | OUTPATIENT
Start: 2020-11-01 | End: 2020-11-05 | Stop reason: HOSPADM

## 2020-11-01 RX ORDER — TAMSULOSIN HYDROCHLORIDE 0.4 MG/1
0.4 CAPSULE ORAL DAILY
Status: DISCONTINUED | OUTPATIENT
Start: 2020-11-02 | End: 2020-11-05 | Stop reason: HOSPADM

## 2020-11-01 RX ORDER — SODIUM CHLORIDE 0.9 % (FLUSH) 0.9 %
10 SYRINGE (ML) INJECTION EVERY 12 HOURS SCHEDULED
Status: DISCONTINUED | OUTPATIENT
Start: 2020-11-01 | End: 2020-11-05 | Stop reason: HOSPADM

## 2020-11-01 RX ORDER — SODIUM CHLORIDE 0.9 % (FLUSH) 0.9 %
10 SYRINGE (ML) INJECTION AS NEEDED
Status: DISCONTINUED | OUTPATIENT
Start: 2020-11-01 | End: 2020-11-05 | Stop reason: HOSPADM

## 2020-11-01 RX ORDER — MULTIPLE VITAMINS W/ MINERALS TAB 9MG-400MCG
1 TAB ORAL DAILY
Status: DISCONTINUED | OUTPATIENT
Start: 2020-11-02 | End: 2020-11-05 | Stop reason: HOSPADM

## 2020-11-01 RX ORDER — ACETAMINOPHEN 160 MG/5ML
650 SOLUTION ORAL EVERY 4 HOURS PRN
Status: DISCONTINUED | OUTPATIENT
Start: 2020-11-01 | End: 2020-11-05 | Stop reason: HOSPADM

## 2020-11-01 RX ORDER — ALBUTEROL SULFATE 90 UG/1
2 AEROSOL, METERED RESPIRATORY (INHALATION) EVERY 4 HOURS PRN
Status: DISCONTINUED | OUTPATIENT
Start: 2020-11-01 | End: 2020-11-01

## 2020-11-01 RX ORDER — FERROUS SULFATE 325(65) MG
325 TABLET ORAL
Status: DISCONTINUED | OUTPATIENT
Start: 2020-11-02 | End: 2020-11-05 | Stop reason: HOSPADM

## 2020-11-01 RX ORDER — SODIUM CHLORIDE 9 MG/ML
100 INJECTION, SOLUTION INTRAVENOUS CONTINUOUS
Status: ACTIVE | OUTPATIENT
Start: 2020-11-01 | End: 2020-11-02

## 2020-11-01 RX ORDER — ASPIRIN 81 MG/1
81 TABLET ORAL DAILY
Status: DISCONTINUED | OUTPATIENT
Start: 2020-11-02 | End: 2020-11-05 | Stop reason: HOSPADM

## 2020-11-01 RX ADMIN — IOPAMIDOL 85 ML: 755 INJECTION, SOLUTION INTRAVENOUS at 16:51

## 2020-11-01 RX ADMIN — SODIUM CHLORIDE 100 ML/HR: 9 INJECTION, SOLUTION INTRAVENOUS at 22:06

## 2020-11-01 RX ADMIN — VANCOMYCIN HYDROCHLORIDE 2000 MG: 10 INJECTION, POWDER, LYOPHILIZED, FOR SOLUTION INTRAVENOUS at 17:35

## 2020-11-01 RX ADMIN — CEFTRIAXONE SODIUM 1 G: 1 INJECTION, POWDER, FOR SOLUTION INTRAMUSCULAR; INTRAVENOUS at 16:41

## 2020-11-01 RX ADMIN — SODIUM CHLORIDE, PRESERVATIVE FREE 10 ML: 5 INJECTION INTRAVENOUS at 22:53

## 2020-11-01 RX ADMIN — SODIUM CHLORIDE 1000 ML: 9 INJECTION, SOLUTION INTRAVENOUS at 14:58

## 2020-11-01 RX ADMIN — ACETAMINOPHEN 1000 MG: 500 TABLET, FILM COATED ORAL at 15:02

## 2020-11-01 RX ADMIN — Medication 1 TABLET: at 22:05

## 2020-11-01 NOTE — ED PROVIDER NOTES
Subjective   Mr. Klinefelter is a 70-year-old male that comes to the emergency room today with fevers chills and increasing weakness.  He states this is been going on for about 3 days.  This morning his wife states that she was unable to get him out of bed even pulling and tugging he was so weak.  He states he is not short of breath states he has had very little cough.  He denies any dysuria frequency urgency or hematuria associated with this.  He has no history of lung disease that he is aware of.  Does not smoke he does not drink he does have a history of obstructive sleep apnea and uses CPAP at night.  He has noticed to be a little bit short of breath.  But states his cough has been minimal.  Wife is concerned due to the increasing weakness states he has been noticing that he has been chilling quite a bit they have pretty much been home quarantine has had no exposure to the Covid virus.  He has had no loss of smell or taste.  He has no other complaints.      History provided by:  Patient   used: No    Fever  Max temp prior to arrival:  100.5  Temp source:  Oral  Severity:  Moderate  Onset quality:  Gradual  Duration:  3 days  Timing:  Constant  Progression:  Worsening  Chronicity:  New  Relieved by:  Nothing  Worsened by:  Standing and exertion  Ineffective treatments:  Acetaminophen  Associated symptoms: chills    Associated symptoms: no chest pain, no confusion, no cough, no diarrhea, no dysuria, no ear pain, no myalgias, no nausea and no rash    Risk factors: no contaminated food, no contaminated water, no hx of cancer, no immunosuppression, no occupational exposure, no recent sickness, no recent travel and no sick contacts        Review of Systems   Constitutional: Positive for chills and fever.   HENT: Negative for ear pain.    Eyes: Negative for pain.   Respiratory: Positive for shortness of breath. Negative for cough, chest tightness and wheezing.    Cardiovascular: Negative for chest  pain.   Gastrointestinal: Negative for abdominal pain, diarrhea and nausea.   Genitourinary: Negative for dysuria.   Musculoskeletal: Negative for back pain, myalgias and neck pain.   Skin: Negative for pallor and rash.   Psychiatric/Behavioral: Negative.  Negative for confusion.   All other systems reviewed and are negative.      Past Medical History:   Diagnosis Date   • Anemia    • Bowel obstruction (CMS/HCC)    • Chest pain    • Constipation    • Coronary artery disease    • Disease of thyroid gland    • Hepatitis    • Hyperlipidemia    • Hypertension    • Macular degeneration    • Medial meniscus tear    • Obesity    • Obstructive sleep apnea on CPAP     setting - 13.   • Primary osteoarthritis of left knee     and right knee   • Wears glasses        Allergies   Allergen Reactions   • Lisinopril Angioedema       Past Surgical History:   Procedure Laterality Date   • CARDIAC CATHETERIZATION  01/2019   • COLONOSCOPY  2013   • FRACTURE SURGERY      left heel    • INGUINAL HERNIA REPAIR      x2    • KNEE ARTHROSCOPY Right    • REPLACEMENT TOTAL KNEE Right    • TONSILLECTOMY     • TOTAL KNEE ARTHROPLASTY Left 5/16/2019    Procedure: TOTAL KNEE ARTHROPLASTY LEFT;  Surgeon: Willian Jc MD;  Location: formerly [facility];  Service: Orthopedics       Family History   Problem Relation Age of Onset   • Rheum arthritis Mother    • Stroke Father    • Heart attack Father    • Hypertension Father    • Clotting disorder Father    • Rheum arthritis Father    • Hypertension Other        Social History     Socioeconomic History   • Marital status:      Spouse name: Not on file   • Number of children: Not on file   • Years of education: Not on file   • Highest education level: Not on file   Tobacco Use   • Smoking status: Never Smoker   • Smokeless tobacco: Never Used   Substance and Sexual Activity   • Alcohol use: No   • Drug use: No   • Sexual activity: Defer           Objective   Physical Exam  Vitals signs and nursing note  reviewed.   Constitutional:       Appearance: He is well-developed.      Comments: His O2 sats were noticed to be 93%.  He does not appear to be toxic but does not appear to feel well.  Had difficulty sitting up in the bed just due to generalized weakness.   HENT:      Head: Normocephalic and atraumatic.      Right Ear: External ear normal.      Left Ear: External ear normal.      Nose: Nose normal.   Eyes:      General: No scleral icterus.     Conjunctiva/sclera: Conjunctivae normal.      Pupils: Pupils are equal, round, and reactive to light.   Neck:      Musculoskeletal: Normal range of motion.      Thyroid: No thyromegaly.   Cardiovascular:      Rate and Rhythm: Normal rate and regular rhythm.      Heart sounds: Normal heart sounds.   Pulmonary:      Effort: Pulmonary effort is normal. No respiratory distress.      Breath sounds: Normal breath sounds. No wheezing or rales.   Chest:      Chest wall: No tenderness.   Abdominal:      General: Bowel sounds are normal. There is no distension.      Palpations: Abdomen is soft.      Tenderness: There is no abdominal tenderness.   Musculoskeletal: Normal range of motion.   Lymphadenopathy:      Cervical: No cervical adenopathy.   Skin:     General: Skin is warm and dry.   Neurological:      Mental Status: He is alert and oriented to person, place, and time.      Cranial Nerves: No cranial nerve deficit.      Coordination: Coordination normal.      Deep Tendon Reflexes: Reflexes are normal and symmetric. Reflexes normal.   Psychiatric:         Behavior: Behavior normal.         Thought Content: Thought content normal.         Judgment: Judgment normal.         Procedures           ED Course                                 I discussed the findings of the pneumonia white count and low oxygen levels with both Mr. Klinefelter and his wife.  IV antibiotics have already been started.  Also discussed Mr. Klinefelter with Dr. Rogers she is agreed to admit the patient.  He still  mildly tachycardic.  Mostly just generalized weakness is the reason for his admission.  Recent Results (from the past 24 hour(s))   Legionella Antigen, Urine - Urine, Urine, Clean Catch    Collection Time: 11/02/20  7:31 AM    Specimen: Urine, Clean Catch   Result Value Ref Range    LEGIONELLA ANTIGEN, URINE Positive (A) Negative   S. Pneumo Ag Urine or CSF - Urine, Urine, Clean Catch    Collection Time: 11/02/20  7:31 AM    Specimen: Urine, Clean Catch   Result Value Ref Range    Strep Pneumo Ag Negative Negative   D-dimer, Quantitative    Collection Time: 11/02/20  8:17 AM    Specimen: Blood   Result Value Ref Range    D-Dimer, Quantitative 1.63 (H) 0.00 - 0.56 MCGFEU/mL   Potassium    Collection Time: 11/02/20 10:42 PM    Specimen: Blood   Result Value Ref Range    Potassium 4.6 3.5 - 5.2 mmol/L   CBC (No Diff)    Collection Time: 11/03/20  6:16 AM    Specimen: Blood   Result Value Ref Range    WBC 15.06 (H) 3.40 - 10.80 10*3/mm3    RBC 3.46 (L) 4.14 - 5.80 10*6/mm3    Hemoglobin 11.3 (L) 13.0 - 17.7 g/dL    Hematocrit 34.8 (L) 37.5 - 51.0 %    .6 (H) 79.0 - 97.0 fL    MCH 32.7 26.6 - 33.0 pg    MCHC 32.5 31.5 - 35.7 g/dL    RDW 13.3 12.3 - 15.4 %    RDW-SD 49.7 37.0 - 54.0 fl    MPV 10.1 6.0 - 12.0 fL    Platelets 196 140 - 450 10*3/mm3   Basic Metabolic Panel    Collection Time: 11/03/20  6:16 AM    Specimen: Blood   Result Value Ref Range    Glucose 151 (H) 65 - 99 mg/dL    BUN 17 8 - 23 mg/dL    Creatinine 1.01 0.76 - 1.27 mg/dL    Sodium 139 136 - 145 mmol/L    Potassium 4.4 3.5 - 5.2 mmol/L    Chloride 105 98 - 107 mmol/L    CO2 24.0 22.0 - 29.0 mmol/L    Calcium 8.5 (L) 8.6 - 10.5 mg/dL    eGFR Non African Amer 73 >60 mL/min/1.73    BUN/Creatinine Ratio 16.8 7.0 - 25.0    Anion Gap 10.0 5.0 - 15.0 mmol/L     Note: In addition to lab results from this visit, the labs listed above may include labs taken at another facility or during a different encounter within the last 24 hours. Please correlate lab  times with ED admission and discharge times for further clarification of the services performed during this visit.    FL Limited Ugi For Mbs Reflux Single-Contrast   Final Result   Fluoroscopy provided for a modified barium swallow. Please   see speech therapy report for full details and recommendations. Limited   upper GI series demonstrated a very small sized Zenker's diverticulum.            This report was finalized on 11/2/2020 4:56 PM by Dr. Edu Coughlin.          FL Video Swallow With Speech Single Contrast   Final Result   Fluoroscopy provided for a modified barium swallow. Please   see speech therapy report for full details and recommendations. Limited   upper GI series demonstrated a very small sized Zenker's diverticulum.            This report was finalized on 11/2/2020 4:56 PM by Dr. Edu Coughlin.          XR Chest 1 View   Final Result   No significant change. Persistent right basilar infiltrate.      Signer Name: Omar Tse MD    Signed: 11/2/2020 5:32 AM    Workstation Name: [facility]     [facility]      CT Angiogram Chest   Final Result   1. No evidence of pulmonary embolus.       2. Extensive right lower lobe pneumonia typical of lobar/bacterial   pneumonia. No evidence of pneumonia elsewhere.       3. Mild, likely reactive mediastinal and right hilar lymphadenopathy.       4. Gallstones.       5. Incidental hepatic and left renal cysts.       DICTATED:   11/01/2020   EDITED/ls :   11/01/2020            This report was finalized on 11/2/2020 8:30 AM by Dr. Landen Gao MD.          XR Chest 1 View   Final Result   Subtle increased opacity in the retrocardiac region of the   medial right lung base. No evidence of active disease elsewhere. Please   see patient's subsequent chest CT scan report.       D:  11/01/2020   E:  11/02/2020            This report was finalized on 11/2/2020 8:41 AM by Dr. Landen Gao MD.            Vitals:    11/02/20 1909 11/03/20 0331 11/03/20  0552 11/03/20 0652   BP: 103/57 147/79  160/82   BP Location: Right arm Left arm  Left arm   Patient Position: Sitting Lying  Lying   Pulse: 94 79  101   Resp: 18 18  20   Temp: 98.5 °F (36.9 °C) 98.4 °F (36.9 °C)  (!) 103.1 °F (39.5 °C)   TempSrc: Oral Oral Oral Oral   SpO2: 92% 97%     Weight:       Height:         Medications   aspirin EC tablet 81 mg (81 mg Oral Given 11/2/20 0838)   atorvastatin (LIPITOR) tablet 40 mg (40 mg Oral Given 11/2/20 0838)   calcium carb-cholecalciferol 600-800 MG-UNIT tablet 1 tablet (1 tablet Oral Given 11/2/20 2013)   docusate sodium (COLACE) capsule 100 mg (has no administration in time range)   ferrous sulfate tablet 325 mg (325 mg Oral Given 11/2/20 0839)   metoprolol tartrate (LOPRESSOR) tablet 25 mg (25 mg Oral Given 11/2/20 0839)   multivitamin with minerals 1 tablet (1 tablet Oral Given 11/2/20 0839)   levothyroxine (SYNTHROID, LEVOTHROID) tablet 25 mcg (25 mcg Oral Given 11/3/20 0551)   tamsulosin (FLOMAX) 24 hr capsule 0.4 mg (0.4 mg Oral Given 11/2/20 0838)   sodium chloride 0.9 % flush 10 mL (10 mL Intravenous Given 11/2/20 2014)   sodium chloride 0.9 % flush 10 mL (has no administration in time range)   enoxaparin (LOVENOX) syringe 40 mg (40 mg Subcutaneous Given 11/2/20 0838)   acetaminophen (TYLENOL) tablet 650 mg (650 mg Oral Given 11/3/20 0657)     Or   acetaminophen (TYLENOL) 160 MG/5ML solution 650 mg ( Oral Not Given:  See Alt 11/3/20 0657)     Or   acetaminophen (TYLENOL) suppository 650 mg ( Rectal Not Given:  See Alt 11/3/20 0657)   albuterol (PROVENTIL) nebulizer solution 0.083% 2.5 mg/3mL (has no administration in time range)   sodium chloride 0.9 % infusion (100 mL/hr Intravenous New Bag 11/2/20 0355)   ketorolac (TORADOL) injection 15 mg (15 mg Intravenous Given 11/2/20 8711)   piperacillin-tazobactam (ZOSYN) 3.375 g in iso-osmotic dextrose 50 ml (premix) (3.375 g Intravenous New Bag 11/3/20 1113)   acetaminophen (TYLENOL) tablet 325 mg (0 mg Oral Hold  11/2/20 0513)   doxycycline (MONODOX) capsule 100 mg (100 mg Oral Given 11/2/20 2013)   potassium chloride (MICRO-K) CR capsule 40 mEq (40 mEq Oral Given 11/2/20 1830)   potassium chloride (KLOR-CON) packet 40 mEq (has no administration in time range)   sodium chloride 0.9 % bolus 1,000 mL (0 mL Intravenous Stopped 11/1/20 1610)   acetaminophen (TYLENOL) tablet 1,000 mg (1,000 mg Oral Given 11/1/20 1502)   cefTRIAXone (ROCEPHIN) 1 g/100 mL 0.9% NS (MBP) (0 g Intravenous Stopped 11/1/20 1736)   vancomycin 2000 mg/500 mL 0.9% NS IVPB ([facility]) (0 mg Intravenous Stopped 11/1/20 2020)   iopamidol (ISOVUE-370) 76 % injection 100 mL (85 mL Intravenous Given 11/1/20 1651)   sodium chloride 0.9 % bolus 500 mL (500 mL Intravenous New Bag 11/2/20 0401)   ipratropium-albuterol (DUO-NEB) nebulizer solution 3 mL (3 mL Nebulization Given 11/2/20 0437)   piperacillin-tazobactam (ZOSYN) 3.375 g in iso-osmotic dextrose 50 ml (premix) (3.375 g Intravenous New Bag 11/2/20 0557)   barium sulfate (VARIBAR THIN LIQUID) oral suspension 100 mL ( Oral Canceled Entry 11/2/20 1347)   barium sulfate (VARIBAR PUDDING) oral paste 20 mL ( Oral Canceled Entry 11/2/20 1347)     ECG/EMG Results (last 24 hours)     ** No results found for the last 24 hours. **        No orders to display                 MDM  Number of Diagnoses or Management Options  Acute febrile illness: new and requires workup  Generalized weakness: new and requires workup  Pneumonia of right lower lobe due to infectious organism: new and requires workup  Shortness of breath: new and requires workup     Amount and/or Complexity of Data Reviewed  Clinical lab tests: reviewed and ordered  Tests in the radiology section of CPT®: reviewed and ordered  Tests in the medicine section of CPT®: ordered and reviewed  Discuss the patient with other providers: yes    Patient Progress  Patient progress: stable      Final diagnoses:   Pneumonia of right lower lobe due to infectious organism   Acute  febrile illness   Shortness of breath   Generalized weakness            Jermain Cordova PA  11/03/20 7714

## 2020-11-02 ENCOUNTER — APPOINTMENT (OUTPATIENT)
Dept: GENERAL RADIOLOGY | Facility: HOSPITAL | Age: 70
End: 2020-11-02

## 2020-11-02 PROBLEM — J96.01 ACUTE RESPIRATORY FAILURE WITH HYPOXIA: Status: ACTIVE | Noted: 2020-11-02

## 2020-11-02 LAB
ALBUMIN SERPL-MCNC: 3.2 G/DL (ref 3.5–5.2)
ALBUMIN/GLOB SERPL: 1.1 G/DL
ALP SERPL-CCNC: 48 U/L (ref 39–117)
ALT SERPL W P-5'-P-CCNC: 15 U/L (ref 1–41)
ANION GAP SERPL CALCULATED.3IONS-SCNC: 10 MMOL/L (ref 5–15)
ARTERIAL PATENCY WRIST A: ABNORMAL
AST SERPL-CCNC: 20 U/L (ref 1–40)
ATMOSPHERIC PRESS: ABNORMAL MM[HG]
BASE EXCESS BLDA CALC-SCNC: 0.9 MMOL/L (ref 0–2)
BASOPHILS # BLD AUTO: 0.04 10*3/MM3 (ref 0–0.2)
BASOPHILS NFR BLD AUTO: 0.3 % (ref 0–1.5)
BDY SITE: ABNORMAL
BILIRUB SERPL-MCNC: 0.4 MG/DL (ref 0–1.2)
BODY TEMPERATURE: 37 C
BUN SERPL-MCNC: 11 MG/DL (ref 8–23)
BUN/CREAT SERPL: 12.1 (ref 7–25)
CALCIUM SPEC-SCNC: 8.2 MG/DL (ref 8.6–10.5)
CHLORIDE SERPL-SCNC: 106 MMOL/L (ref 98–107)
CO2 BLDA-SCNC: 23.9 MMOL/L (ref 22–33)
CO2 SERPL-SCNC: 21 MMOL/L (ref 22–29)
COHGB MFR BLD: 0.8 % (ref 0–2)
CREAT SERPL-MCNC: 0.91 MG/DL (ref 0.76–1.27)
D DIMER PPP FEU-MCNC: 1.63 MCGFEU/ML (ref 0–0.56)
D-LACTATE SERPL-SCNC: 1.5 MMOL/L (ref 0.5–2)
DEPRECATED RDW RBC AUTO: 48.4 FL (ref 37–54)
EOSINOPHIL # BLD AUTO: 0.04 10*3/MM3 (ref 0–0.4)
EOSINOPHIL NFR BLD AUTO: 0.3 % (ref 0.3–6.2)
EPAP: 0
ERYTHROCYTE [DISTWIDTH] IN BLOOD BY AUTOMATED COUNT: 13 % (ref 12.3–15.4)
GFR SERPL CREATININE-BSD FRML MDRD: 82 ML/MIN/1.73
GLOBULIN UR ELPH-MCNC: 2.9 GM/DL
GLUCOSE BLDC GLUCOMTR-MCNC: 162 MG/DL (ref 70–130)
GLUCOSE SERPL-MCNC: 160 MG/DL (ref 65–99)
HCO3 BLDA-SCNC: 23 MMOL/L (ref 20–26)
HCT VFR BLD AUTO: 34.1 % (ref 37.5–51)
HCT VFR BLD CALC: 34.4 %
HGB BLD-MCNC: 10.9 G/DL (ref 13–17.7)
HGB BLDA-MCNC: 11.2 G/DL (ref 13.5–17.5)
IMM GRANULOCYTES # BLD AUTO: 0.1 10*3/MM3 (ref 0–0.05)
IMM GRANULOCYTES NFR BLD AUTO: 0.8 % (ref 0–0.5)
INHALED O2 CONCENTRATION: 60 %
IPAP: 0
L PNEUMO1 AG UR QL IA: POSITIVE
LYMPHOCYTES # BLD AUTO: 0.63 10*3/MM3 (ref 0.7–3.1)
LYMPHOCYTES NFR BLD AUTO: 4.8 % (ref 19.6–45.3)
MCH RBC QN AUTO: 32 PG (ref 26.6–33)
MCHC RBC AUTO-ENTMCNC: 32 G/DL (ref 31.5–35.7)
MCV RBC AUTO: 100 FL (ref 79–97)
METHGB BLD QL: 0.5 % (ref 0–1.5)
MODALITY: ABNORMAL
MONOCYTES # BLD AUTO: 0.55 10*3/MM3 (ref 0.1–0.9)
MONOCYTES NFR BLD AUTO: 4.2 % (ref 5–12)
NEUTROPHILS NFR BLD AUTO: 11.86 10*3/MM3 (ref 1.7–7)
NEUTROPHILS NFR BLD AUTO: 89.6 % (ref 42.7–76)
NOTE: ABNORMAL
NRBC BLD AUTO-RTO: 0 /100 WBC (ref 0–0.2)
NT-PROBNP SERPL-MCNC: 501.8 PG/ML (ref 0–900)
OXYHGB MFR BLDV: 98.8 % (ref 94–99)
PAW @ PEAK INSP FLOW SETTING VENT: 0 CMH2O
PCO2 BLDA: 28.2 MM HG (ref 35–45)
PCO2 TEMP ADJ BLD: 28.2 MM HG (ref 35–48)
PH BLDA: 7.52 PH UNITS (ref 7.35–7.45)
PH, TEMP CORRECTED: 7.52 PH UNITS
PLATELET # BLD AUTO: 182 10*3/MM3 (ref 140–450)
PMV BLD AUTO: 9.8 FL (ref 6–12)
PO2 BLDA: 152 MM HG (ref 83–108)
PO2 TEMP ADJ BLD: 152 MM HG (ref 83–108)
POTASSIUM SERPL-SCNC: 3.4 MMOL/L (ref 3.5–5.2)
PROCALCITONIN SERPL-MCNC: 0.55 NG/ML (ref 0–0.25)
PROT SERPL-MCNC: 6.1 G/DL (ref 6–8.5)
RBC # BLD AUTO: 3.41 10*6/MM3 (ref 4.14–5.8)
S PNEUM AG SPEC QL LA: NEGATIVE
SODIUM SERPL-SCNC: 137 MMOL/L (ref 136–145)
TOTAL RATE: 0 BREATHS/MINUTE
TROPONIN T SERPL-MCNC: <0.01 NG/ML (ref 0–0.03)
WBC # BLD AUTO: 13.22 10*3/MM3 (ref 3.4–10.8)

## 2020-11-02 PROCEDURE — 94799 UNLISTED PULMONARY SVC/PX: CPT

## 2020-11-02 PROCEDURE — 25010000002 ENOXAPARIN PER 10 MG: Performed by: NURSE PRACTITIONER

## 2020-11-02 PROCEDURE — 94660 CPAP INITIATION&MGMT: CPT

## 2020-11-02 PROCEDURE — 25010000002 PIPERACILLIN SOD-TAZOBACTAM PER 1 G: Performed by: NURSE PRACTITIONER

## 2020-11-02 PROCEDURE — 85025 COMPLETE CBC W/AUTO DIFF WBC: CPT | Performed by: FAMILY MEDICINE

## 2020-11-02 PROCEDURE — 74230 X-RAY XM SWLNG FUNCJ C+: CPT

## 2020-11-02 PROCEDURE — 97530 THERAPEUTIC ACTIVITIES: CPT

## 2020-11-02 PROCEDURE — 74240 X-RAY XM UPR GI TRC 1CNTRST: CPT

## 2020-11-02 PROCEDURE — 83880 ASSAY OF NATRIURETIC PEPTIDE: CPT | Performed by: FAMILY MEDICINE

## 2020-11-02 PROCEDURE — 25010000002 KETOROLAC TROMETHAMINE PER 15 MG: Performed by: FAMILY MEDICINE

## 2020-11-02 PROCEDURE — 84132 ASSAY OF SERUM POTASSIUM: CPT | Performed by: INTERNAL MEDICINE

## 2020-11-02 PROCEDURE — 84484 ASSAY OF TROPONIN QUANT: CPT | Performed by: FAMILY MEDICINE

## 2020-11-02 PROCEDURE — 87899 AGENT NOS ASSAY W/OPTIC: CPT | Performed by: NURSE PRACTITIONER

## 2020-11-02 PROCEDURE — 85379 FIBRIN DEGRADATION QUANT: CPT | Performed by: FAMILY MEDICINE

## 2020-11-02 PROCEDURE — 83605 ASSAY OF LACTIC ACID: CPT | Performed by: PHYSICIAN ASSISTANT

## 2020-11-02 PROCEDURE — 84145 PROCALCITONIN (PCT): CPT | Performed by: FAMILY MEDICINE

## 2020-11-02 PROCEDURE — 99233 SBSQ HOSP IP/OBS HIGH 50: CPT | Performed by: INTERNAL MEDICINE

## 2020-11-02 PROCEDURE — 82962 GLUCOSE BLOOD TEST: CPT

## 2020-11-02 PROCEDURE — 92611 MOTION FLUOROSCOPY/SWALLOW: CPT

## 2020-11-02 PROCEDURE — 80053 COMPREHEN METABOLIC PANEL: CPT | Performed by: FAMILY MEDICINE

## 2020-11-02 PROCEDURE — 82805 BLOOD GASES W/O2 SATURATION: CPT

## 2020-11-02 PROCEDURE — 94640 AIRWAY INHALATION TREATMENT: CPT

## 2020-11-02 PROCEDURE — 25010000002 VANCOMYCIN 10 G RECONSTITUTED SOLUTION: Performed by: FAMILY MEDICINE

## 2020-11-02 PROCEDURE — 71045 X-RAY EXAM CHEST 1 VIEW: CPT

## 2020-11-02 PROCEDURE — 92610 EVALUATE SWALLOWING FUNCTION: CPT

## 2020-11-02 PROCEDURE — 97161 PT EVAL LOW COMPLEX 20 MIN: CPT

## 2020-11-02 PROCEDURE — 87040 BLOOD CULTURE FOR BACTERIA: CPT | Performed by: FAMILY MEDICINE

## 2020-11-02 PROCEDURE — 36600 WITHDRAWAL OF ARTERIAL BLOOD: CPT

## 2020-11-02 RX ORDER — POTASSIUM CHLORIDE 1.5 G/1.77G
40 POWDER, FOR SOLUTION ORAL AS NEEDED
Status: DISCONTINUED | OUTPATIENT
Start: 2020-11-02 | End: 2020-11-05 | Stop reason: HOSPADM

## 2020-11-02 RX ORDER — DOXYCYCLINE 100 MG/1
100 CAPSULE ORAL EVERY 12 HOURS SCHEDULED
Status: DISCONTINUED | OUTPATIENT
Start: 2020-11-02 | End: 2020-11-03

## 2020-11-02 RX ORDER — POTASSIUM CHLORIDE 750 MG/1
40 CAPSULE, EXTENDED RELEASE ORAL AS NEEDED
Status: DISCONTINUED | OUTPATIENT
Start: 2020-11-02 | End: 2020-11-05 | Stop reason: HOSPADM

## 2020-11-02 RX ORDER — ACETAMINOPHEN 325 MG/1
325 TABLET ORAL ONCE
Status: DISCONTINUED | OUTPATIENT
Start: 2020-11-02 | End: 2020-11-04

## 2020-11-02 RX ORDER — KETOROLAC TROMETHAMINE 15 MG/ML
15 INJECTION, SOLUTION INTRAMUSCULAR; INTRAVENOUS EVERY 6 HOURS PRN
Status: DISCONTINUED | OUTPATIENT
Start: 2020-11-02 | End: 2020-11-05 | Stop reason: HOSPADM

## 2020-11-02 RX ORDER — IPRATROPIUM BROMIDE AND ALBUTEROL SULFATE 2.5; .5 MG/3ML; MG/3ML
3 SOLUTION RESPIRATORY (INHALATION) ONCE
Status: COMPLETED | OUTPATIENT
Start: 2020-11-02 | End: 2020-11-02

## 2020-11-02 RX ADMIN — ASPIRIN 81 MG: 81 TABLET, COATED ORAL at 08:38

## 2020-11-02 RX ADMIN — ENOXAPARIN SODIUM 40 MG: 40 INJECTION SUBCUTANEOUS at 08:38

## 2020-11-02 RX ADMIN — DOXYCYCLINE 100 MG: 100 CAPSULE ORAL at 20:13

## 2020-11-02 RX ADMIN — Medication 1 TABLET: at 08:39

## 2020-11-02 RX ADMIN — TAZOBACTAM SODIUM AND PIPERACILLIN SODIUM 3.38 G: 375; 3 INJECTION, SOLUTION INTRAVENOUS at 20:13

## 2020-11-02 RX ADMIN — SODIUM CHLORIDE, PRESERVATIVE FREE 10 ML: 5 INJECTION INTRAVENOUS at 20:14

## 2020-11-02 RX ADMIN — LEVOTHYROXINE SODIUM 25 MCG: 25 TABLET ORAL at 05:57

## 2020-11-02 RX ADMIN — Medication 1 TABLET: at 20:13

## 2020-11-02 RX ADMIN — BARIUM SULFATE 20 ML: 400 PASTE ORAL at 13:33

## 2020-11-02 RX ADMIN — VANCOMYCIN HYDROCHLORIDE 1250 MG: 10 INJECTION, POWDER, LYOPHILIZED, FOR SOLUTION INTRAVENOUS at 05:57

## 2020-11-02 RX ADMIN — ACETAMINOPHEN ORAL SOLUTION 650 MG: 650 SOLUTION ORAL at 15:11

## 2020-11-02 RX ADMIN — POTASSIUM CHLORIDE 40 MEQ: 10 CAPSULE, COATED, EXTENDED RELEASE ORAL at 11:46

## 2020-11-02 RX ADMIN — TAMSULOSIN HYDROCHLORIDE 0.4 MG: 0.4 CAPSULE ORAL at 08:38

## 2020-11-02 RX ADMIN — KETOROLAC TROMETHAMINE 15 MG: 15 INJECTION, SOLUTION INTRAMUSCULAR; INTRAVENOUS at 11:54

## 2020-11-02 RX ADMIN — IPRATROPIUM BROMIDE AND ALBUTEROL SULFATE 3 ML: 2.5; .5 SOLUTION RESPIRATORY (INHALATION) at 04:37

## 2020-11-02 RX ADMIN — KETOROLAC TROMETHAMINE 15 MG: 15 INJECTION, SOLUTION INTRAMUSCULAR; INTRAVENOUS at 17:11

## 2020-11-02 RX ADMIN — METOPROLOL TARTRATE 25 MG: 25 TABLET, FILM COATED ORAL at 08:39

## 2020-11-02 RX ADMIN — ACETAMINOPHEN 650 MG: 325 TABLET, FILM COATED ORAL at 03:26

## 2020-11-02 RX ADMIN — KETOROLAC TROMETHAMINE 15 MG: 15 INJECTION, SOLUTION INTRAMUSCULAR; INTRAVENOUS at 04:24

## 2020-11-02 RX ADMIN — POTASSIUM CHLORIDE 40 MEQ: 10 CAPSULE, COATED, EXTENDED RELEASE ORAL at 18:30

## 2020-11-02 RX ADMIN — MULTIPLE VITAMINS W/ MINERALS TAB 1 TABLET: TAB at 08:39

## 2020-11-02 RX ADMIN — BARIUM SULFATE 100 ML: 0.81 POWDER, FOR SUSPENSION ORAL at 13:34

## 2020-11-02 RX ADMIN — DOXYCYCLINE 100 MG: 100 CAPSULE ORAL at 11:46

## 2020-11-02 RX ADMIN — SODIUM CHLORIDE 100 ML/HR: 9 INJECTION, SOLUTION INTRAVENOUS at 03:55

## 2020-11-02 RX ADMIN — ACETAMINOPHEN 650 MG: 325 TABLET, FILM COATED ORAL at 09:11

## 2020-11-02 RX ADMIN — TAZOBACTAM SODIUM AND PIPERACILLIN SODIUM 3.38 G: 375; 3 INJECTION, SOLUTION INTRAVENOUS at 05:57

## 2020-11-02 RX ADMIN — ATORVASTATIN CALCIUM 40 MG: 40 TABLET, FILM COATED ORAL at 08:38

## 2020-11-02 RX ADMIN — SODIUM CHLORIDE 500 ML: 9 INJECTION, SOLUTION INTRAVENOUS at 04:01

## 2020-11-02 RX ADMIN — TAZOBACTAM SODIUM AND PIPERACILLIN SODIUM 3.38 G: 375; 3 INJECTION, SOLUTION INTRAVENOUS at 11:47

## 2020-11-02 RX ADMIN — FERROUS SULFATE TAB 325 MG (65 MG ELEMENTAL FE) 325 MG: 325 (65 FE) TAB at 08:39

## 2020-11-02 NOTE — THERAPY EVALUATION
Acute Care - Speech Language Pathology   Swallow Initial Evaluation  [place]   Clinical Swallow Evaluation     Patient Name: Larry Dean Klinefelter  : 1950  MRN: [id number]  Today's Date: 2020               Admit Date: 2020    Visit Dx:     ICD-10-CM ICD-9-CM   1. Pneumonia of right lower lobe due to infectious organism  J18.9 486   2. Acute febrile illness  R50.9 780.60   3. Shortness of breath  R06.02 786.05   4. Generalized weakness  R53.1 780.79     Patient Active Problem List   Diagnosis   • Primary osteoarthritis of left knee   • TRACEY on CPAP   • HTN (hypertension)   • HLD (hyperlipidemia)   • Hypothyroid   • CAD (coronary artery disease)   • Status post total left knee replacement   • Postoperative urinary retention   • Leukocytosis, likely reactive   • Acute blood loss anemia, mild, asymptomatic   • Acute postoperative pain   • Sepsis due to pneumonia (CMS/HCC)     Past Medical History:   Diagnosis Date   • Anemia    • Bowel obstruction (CMS/HCC)    • Chest pain    • Constipation    • Coronary artery disease    • Disease of thyroid gland    • Hepatitis    • Hyperlipidemia    • Hypertension    • Macular degeneration    • Medial meniscus tear    • Obesity    • Obstructive sleep apnea on CPAP     setting - 13.   • Primary osteoarthritis of left knee     and right knee   • Wears glasses      Past Surgical History:   Procedure Laterality Date   • CARDIAC CATHETERIZATION  2019   • COLONOSCOPY     • FRACTURE SURGERY      left heel    • INGUINAL HERNIA REPAIR      x2    • KNEE ARTHROSCOPY Right    • REPLACEMENT TOTAL KNEE Right    • TONSILLECTOMY     • TOTAL KNEE ARTHROPLASTY Left 2019    Procedure: TOTAL KNEE ARTHROPLASTY LEFT;  Surgeon: Willian Jc MD;  Location: [facility];  Service: Orthopedics        SWALLOW EVALUATION (last 72 hours)      SLP Adult Swallow Evaluation     Row Name 20 1005                   Rehab Evaluation    Document Type  evaluation  -         Subjective Information  no complaints  -MP        Patient Observations  alert;cooperative  -MP        Patient/Family/Caregiver Comments/Observations  No family present  -MP        Patient Effort  good  -MP           General Information    Patient Profile Reviewed  yes  -MP        Pertinent History Of Current Problem  Adm 11/1 w/ sepsis 2' PNA. CTA chest w/ RLL PNA. Hx HTN, HLD, CAD, hypothyroid, TRACEY.  -MP        Current Method of Nutrition  regular textures;thin liquids  -MP        Precautions/Limitations, Vision  WFL with corrective lenses  -MP        Precautions/Limitations, Hearing  WFL;for purposes of eval  -MP        Prior Level of Function-Communication  WFL  -MP        Prior Level of Function-Swallowing  no diet consistency restrictions;other (see comments) pt reported occasional heartburn  -MP        Plans/Goals Discussed with  patient;agreed upon  -MP        Barriers to Rehab  none identified  -MP        Patient's Goals for Discharge  patient did not state  -MP           Pain    Additional Documentation  Pain Scale: FACES Pre/Post-Treatment (Group)  -MP           Pain Scale: FACES Pre/Post-Treatment    Pain: FACES Scale, Pretreatment  0-->no hurt  -MP        Posttreatment Pain Rating  0-->no hurt  -MP           Oral Motor Structure and Function    Dentition Assessment  natural, present and adequate  -MP        Secretion Management  WNL/WFL  -MP        Mucosal Quality  moist, healthy  -MP           Oral Musculature and Cranial Nerve Assessment    Oral Motor General Assessment  WFL  -MP           General Eating/Swallowing Observations    Respiratory Support Currently in Use  nasal cannula  -MP        Eating/Swallowing Skills  self-fed;appropriate self-feeding skills observed  -MP        Positioning During Eating  upright in chair  -MP        Utensils Used  spoon;cup;straw  -MP        Consistencies Trialed  thin liquids;pureed;regular textures  -MP           Clinical Swallow Eval    Pharyngeal Phase  -- r/o  pharyngeal impairment  -MP        Clinical Swallow Evaluation Summary  Clinical swallow evaluation completed. Pt given trials of thin via cup/straw, puree, solid. Oral phase seemingly WNL. One throat clear noted following solid trial. Pt reported occasional heartburn. Given current PNA, will plan for MBS + limited upper GI to further assess. OK to continue current diet until MBS.  -MP           Pharyngeal Phase Concerns    Pharyngeal Phase Concerns  throat clear  -MP        Throat Clear  regular consistencies  -MP           Clinical Impression    SLP Swallowing Diagnosis  R/O pharyngeal dysphagia  -MP        Functional Impact  risk of aspiration/pneumonia  -MP        Rehab Potential/Prognosis, Swallowing  good, to achieve stated therapy goals  -MP        Swallow Criteria for Skilled Therapeutic Interventions Met  demonstrates skilled criteria  -MP           Recommendations    SLP Diet Recommendation  other (see comments) OK to continue current diet until MBS  -MP        Recommended Diagnostics  VFSS (MBS);other (see comments) + limited upper GI  -MP        Recommended Precautions and Strategies  upright posture during/after eating;small bites of food and sips of liquid;general aspiration precautions;reflux precautions  -MP        Oral Care Recommendations  Oral Care BID/PRN  -MP        SLP Rec. for Method of Medication Administration  meds whole;with thin liquids;with pudding or applesauce;as tolerated  -MP        Monitor for Signs of Aspiration  yes;notify SLP if any concerns  -MP        Anticipated Discharge Disposition (SLP)  unknown  -MP          User Key  (r) = Recorded By, (t) = Taken By, (c) = Cosigned By    Initials Name Effective Dates    Kee Mccall MS CCC-SLP 06/19/19 -           EDUCATION  The patient has been educated in the following areas:   Dysphagia (Swallowing Impairment).    SLP Recommendation and Plan  SLP Swallowing Diagnosis: R/O pharyngeal dysphagia  SLP Diet Recommendation: other  (see comments)(OK to continue current diet until MBS)  Recommended Precautions and Strategies: upright posture during/after eating, small bites of food and sips of liquid, general aspiration precautions, reflux precautions  SLP Rec. for Method of Medication Administration: meds whole, with thin liquids, with pudding or applesauce, as tolerated     Monitor for Signs of Aspiration: yes, notify SLP if any concerns  Recommended Diagnostics: VFSS (MBS), other (see comments)(+ limited upper GI)  Swallow Criteria for Skilled Therapeutic Interventions Met: demonstrates skilled criteria  Anticipated Discharge Disposition (SLP): unknown  Rehab Potential/Prognosis, Swallowing: good, to achieve stated therapy goals                               Plan of Care Reviewed With: patient           Time Calculation:   Time Calculation- SLP     Row Name 11/02/20 1108             Time Calculation- SLP    SLP Start Time  1005  -MP      SLP Received On  11/02/20  -MP        User Key  (r) = Recorded By, (t) = Taken By, (c) = Cosigned By    Initials Name Provider Type    Kee Mccall MS CCC-SLP Speech and Language Pathologist          Therapy Charges for Today     Code Description Service Date Service Provider Modifiers Qty    [id number] HC ST EVAL ORAL PHARYNG SWALLOW 3 11/2/2020 Kee Carey MS CCC-SLP GN 1          Patient was not wearing a face mask and did not exhibit coughing during this therapy encounter.  Procedure performed was aerosolizing, involved close contact (within 6 feet for at least 15 minutes or longer), and did not involve contact with infectious secretions or specimens.  Therapist used appropriate personal protective equipment including gloves, standard procedure mask and eye protection.  Appropriate PPE was worn during the entire therapy session.  Hand hygiene was completed before and after therapy session.        MS DIGNA WooSLP  11/2/2020

## 2020-11-02 NOTE — H&P
"    [facility] Medicine Services  HISTORY AND PHYSICAL    Patient Name: Larry Dean Klinefelter  : 1950  MRN: [id number]  Primary Care Physician: iKn Nunez MD  Date of admission: 2020      Subjective   Subjective     Chief Complaint:  Fever, chills     HPI:  Larry Dean Klinefelter is a 70 y.o. male with a past medical history significant for essential hypertension, hyperlipidemia, CAD, hypothyroidism and TRACEY presents to the ED with complaints of fever and chills.  Patient reports on Thursday he took his grandchild to [employer] to swim.  The following day he began having chills along with increased fatigue, weakness, poor appetite and fever.  Patient denies any shortness of air and reports a \"mild cough.\"  Additionally he denies any nausea, vomiting, diarrhea, abdominal pain, dysuria, palpitations or chest pain.  CTA of chest obtained in the ED shows RLL lobar pneumonia.  Patient has maintained his oxygen saturation on RA.  He is currently 99% while at bedside.  Patient will be admitted to [facility] under the care of the Hospitalist for further evaluation and treatment.       Current COVID Risks are:  [x] Fever [x]  Cough [] Shortness of breath [] Fatigue [] Change in taste or smell    [] Exposure to COVID positive patient  [] High risk facility   []  NONE    Review of Systems   Constitutional: Positive for activity change, appetite change, chills, fatigue and fever. Negative for diaphoresis and unexpected weight change.   HENT: Negative.    Eyes: Negative for photophobia and visual disturbance.   Respiratory: Positive for cough. Negative for shortness of breath.    Cardiovascular: Negative for chest pain, palpitations and leg swelling.   Gastrointestinal: Negative for abdominal distention, abdominal pain, blood in stool, constipation, diarrhea, nausea and vomiting.   Genitourinary: Negative for difficulty urinating, dysuria, flank pain and frequency.   Musculoskeletal: Negative.  "   Skin: Negative.    Neurological: Positive for weakness. Negative for dizziness, speech difficulty, light-headedness, numbness and headaches.   Psychiatric/Behavioral: Negative.         All other systems reviewed and are negative.     Personal History     Past Medical History:   Diagnosis Date   • Anemia    • Bowel obstruction (CMS/HCC)    • Chest pain    • Constipation    • Coronary artery disease    • Disease of thyroid gland    • Hepatitis    • Hyperlipidemia    • Hypertension    • Macular degeneration    • Medial meniscus tear    • Obesity    • Obstructive sleep apnea on CPAP     setting - 13.   • Primary osteoarthritis of left knee     and right knee   • Wears glasses        Past Surgical History:   Procedure Laterality Date   • CARDIAC CATHETERIZATION  01/2019   • COLONOSCOPY  2013   • FRACTURE SURGERY      left heel    • INGUINAL HERNIA REPAIR      x2    • KNEE ARTHROSCOPY Right    • REPLACEMENT TOTAL KNEE Right    • TONSILLECTOMY     • TOTAL KNEE ARTHROPLASTY Left 5/16/2019    Procedure: TOTAL KNEE ARTHROPLASTY LEFT;  Surgeon: Willian Jc MD;  Location: [facility];  Service: Orthopedics       Family History: family history includes Clotting disorder in his father; Heart attack in his father; Hypertension in his father and another family member; Rheum arthritis in his father and mother; Stroke in his father. Otherwise pertinent FHx was reviewed and unremarkable.     Social History:  reports that he has never smoked. He has never used smokeless tobacco. He reports that he does not drink alcohol or use drugs.  Social History     Social History Narrative   • Not on file       Medications:  Available home medication information reviewed.  (Not in a hospital admission)      Allergies   Allergen Reactions   • Lisinopril Angioedema       Objective   Objective     Vital Signs:   Temp:  [96.6 °F (35.9 °C)-99.4 °F (37.4 °C)] 98.4 °F (36.9 °C)  Heart Rate:  [] 70  Resp:  [18] 18  BP: (104-147)/(63-79) 129/66         Physical Exam  Vitals signs and nursing note reviewed.   Constitutional:       Appearance: Normal appearance.   HENT:      Head: Normocephalic and atraumatic.      Nose: Nose normal.      Mouth/Throat:      Mouth: Mucous membranes are moist.   Eyes:      General:         Right eye: Discharge present.      Comments: Yellow/green discharge from right eye, swollen    Neck:      Musculoskeletal: Normal range of motion and neck supple.   Cardiovascular:      Rate and Rhythm: Normal rate and regular rhythm.      Pulses: Normal pulses.   Pulmonary:      Effort: Pulmonary effort is normal. No respiratory distress.      Breath sounds: No stridor. No wheezing, rhonchi or rales.      Comments: Decreased breath sounds to lower lobes.   Chest:      Chest wall: No tenderness.   Abdominal:      General: Abdomen is flat. Bowel sounds are normal. There is no distension.      Palpations: Abdomen is soft. There is no mass.      Tenderness: There is no abdominal tenderness. There is no right CVA tenderness, left CVA tenderness, guarding or rebound.      Hernia: No hernia is present.   Musculoskeletal: Normal range of motion.         General: No swelling, tenderness, deformity or signs of injury.      Right lower leg: No edema.      Left lower leg: No edema.   Skin:     General: Skin is warm and dry.   Neurological:      General: No focal deficit present.      Mental Status: He is alert and oriented to person, place, and time. Mental status is at baseline.   Psychiatric:         Mood and Affect: Mood normal.         Behavior: Behavior normal.         Thought Content: Thought content normal.         Judgment: Judgment normal.            Results Reviewed:  I have personally reviewed most recent indicated data and agree with findings including:  [x]  Laboratory  [x]  Radiology  [x]  EKG/Telemetry  []  Pathology  []  Cardiac/Vascular Studies  []  Old records  []  Other:  Most pertinent findings include:      Results from last 7 days    Lab Units 11/01/20  1452   WBC 10*3/mm3 16.19*   HEMOGLOBIN g/dL 12.4*   HEMATOCRIT % 37.5   PLATELETS 10*3/mm3 201     Results from last 7 days   Lab Units 11/01/20  1452   SODIUM mmol/L 139   POTASSIUM mmol/L 3.8   CHLORIDE mmol/L 104   CO2 mmol/L 22.0   BUN mg/dL 15   CREATININE mg/dL 0.95   GLUCOSE mg/dL 146*   CALCIUM mg/dL 8.8   ALT (SGPT) U/L 15   AST (SGOT) U/L 18   LACTATE mmol/L 1.3   PROCALCITONIN ng/mL 0.32*     Estimated Creatinine Clearance: 86.3 mL/min (by C-G formula based on SCr of 0.95 mg/dL).  Brief Urine Lab Results  (Last result in the past 365 days)      Color   Clarity   Blood   Leuk Est   Nitrite   Protein   CREAT   Urine HCG        11/01/20 1559 Yellow Clear Small (1+) Negative Negative 30 mg/dL (1+)             Imaging Results (Last 24 Hours)     Procedure Component Value Units Date/Time    CT Angiogram Chest [[id number]] Collected: 11/01/20 1701     Updated: 11/01/20 1748    Narrative:      EXAMINATION: CT ANGIOGRAM CHEST - 11/01/2020     INDICATION: Shortness of air. Fever.     TECHNIQUE: Spiral acquisition 3 mm post IV contrast images through the  chest with sagittal and coronal 2D reconstructions.     The radiation dose reduction device was turned on for each scan per the  ALARA (As Low as Reasonably Achievable) protocol.     COMPARISON: Portable chest radiograph of today's date.     FINDINGS: There is good contrast opacification of the pulmonary  arteries. No filling defects are seen to suggest pulmonary embolic  disease. There is no evidence of thoracic aortic aneurysm or dissection,  pericardial or pleural effusion, or significant mediastinal adenopathy.  There do appear to be shotty mediastinal lymph nodes and mildly  prominent right hilar node, which may be reactive.     Lung window images show dense area of airspace disease in the posterior  medial right lower lobe, difficult to appreciate even in retrospect on  the patient's current chest radiograph, behind the heart  and  hemidiaphragm. Disease extends over a roughly 9 x 6 x 13 cm area.  Appearance is typical of lobar pneumonia. Lungs elsewhere appear clear  except for a couple of granulomatous calcifications.     Included images of the upper abdomen show expected fatty liver change  for body habitus, and a several small round right and left lobe liver  lesions all of which measure water density, typical of liver cysts.  There are several calcified gallstones in the dependent portion of the  otherwise normal-appearing gallbladder. The spleen is not enlarged. No  significant abnormalities are noted of the included portions of the  pancreatic tail, adrenal glands, or upper renal poles. There appear to  be a couple of small left renal cysts.       Impression:      1. No evidence of pulmonary embolus.     2. Extensive right lower lobe pneumonia typical of lobar/bacterial  pneumonia. No evidence of pneumonia elsewhere.     3. Mild, likely reactive mediastinal and right hilar lymphadenopathy.     4. Gallstones.     5. Incidental hepatic and left renal cysts.     DICTATED:   11/01/2020  EDITED/ls :   11/01/2020           XR Chest 1 View [[id number]] Resulted: 11/01/20 1453     Updated: 11/01/20 1544             Assessment/Plan   Assessment & Plan     Active Hospital Problems    Diagnosis POA   • **Sepsis due to pneumonia (CMS/HCC) [J18.9, A41.9] Yes     Priority: High   • CAD (coronary artery disease) [I25.10] Yes   • HLD (hyperlipidemia) [E78.5] Yes   • HTN (hypertension) [I10] Yes   • Hypothyroid [E03.9] Yes   • TRACEY on CPAP [G47.33, Z99.89] Not Applicable       70 year old male presents to the ED with fever, chills, fatigue, weakness over the past three days.  CTA consistent with Right lower lobe lobar pneumonia.      1) Sepsis due to pneumonia  -procal: 0.32, WBC: 16.19  -blood cultures pending  -CTA as mentioned above   -continue vancomycin and rocephin  -COVID-19 PCR pending  -check urine antigens  -albuterol inhaler  -continuous  pulse ox  -keep o2 sat >90%    2) Essential hypertension  -continue lopressor with holding parameters    3) Hyperlipidemia  -continue statin    4) Hypothyroidism  -continue levothyroxine    5) TRACEY  -CPAP at HS      DVT prophylaxis:  Scds, lovenox       CODE STATUS:  Full code   There are no questions and answers to display.       Electronically signed by JASSI Leon, 11/01/20, 7:39 PM EST.      Attending   Admission Attestation       I have seen and examined the patient, performing an independent face-to-face diagnostic evaluation with plan of care reviewed and developed with the advanced practice clinician (APC).      Brief Summary Statement:   Larry Dean Klinefelter is a 70 y.o. male with PMH significant for HTN, HLD, CAD, hypothyroidism and TRACEY.  The patient reported to the ER today with a 2 day history of malaise, fever, chills and generalized weakness.  He has had a mild cough.  He has had substantial rigors.  In the ER, he was found on CTA to have a RLL lobar PNA.  His respiraratory PCR including COVID is negative.    Remainder of detailed HPI is as noted by APC and has been reviewed and/or edited by me for completeness.    Attending Physical Exam:  Constitutional: Awake, alert, rigors presently  Eyes: PERRLA, sclerae anicteric, no conjunctival injection  HENT: NCAT, mucous membranes moist, right eye with purulent discharge  Neck: Supple, no thyromegaly, no lymphadenopathy, trachea midline  Respiratory: diminished to auscultation bilaterally, nonlabored respirations   Cardiovascular: RRR, no murmurs, rubs, or gallops, palpable pedal pulses bilaterally  Gastrointestinal: Positive bowel sounds, soft, nontender, nondistended  Musculoskeletal: No bilateral ankle edema, no clubbing or cyanosis to extremities  Psychiatric: Appropriate affect, cooperative  Neurologic: Oriented x 3, strength symmetric in all extremities, Cranial Nerves grossly intact to confrontation, speech clear  Skin: No rashes      Brief  Assessment/Plan :  See detailed assessment and plan developed with APC which I have reviewed and/or edited for completeness.        Admission Status: I believe that this patient meets inpatient criteria due to sepsis secondary to CAP.      Carmen Rogers MD  11/01/20

## 2020-11-02 NOTE — DISCHARGE INSTR - APPOINTMENTS
MBS/VFSS   11/2/2020  Reason for Referral  Patient was referred for a MBS to assess the efficiency of his/her swallow function, rule out aspiration and make recommendations regarding safe dietary consistencies, effective compensatory strategies, and safe eating environment.             Recommendations/Treatment  SLP Swallowing Diagnosis: functional oral phase, functional pharyngeal phase  Functional Impact: risk of aspiration/pneumonia  Rehab Potential/Prognosis, Swallowing: good, to achieve stated therapy goals  Swallow Criteria for Skilled Therapeutic Interventions Met: no problems identified which require skilled intervention  Therapy Frequency (Swallow): evaluation only  SLP Diet Recommendation: regular textures, thin liquids  Recommended Diagnostics: VFSS (MBS), other (see comments)(+ limited upper GI)  Recommended Precautions and Strategies: upright posture during/after eating, small bites of food and sips of liquid  SLP Rec. for Method of Medication Administration: meds whole, with thin liquids(crush in pudding/puree PRN if any difficulty)  Monitor for Signs of Aspiration: yes, notify SLP if any concerns  Anticipated Discharge Disposition (SLP): home    Instrumental Set-up  Utensils Used: spoon, cup, straw  Consistencies Trialed: thin liquids, pudding thick, regular textures    Oral Preparation/ Oral Phase  Oral Prep Phase: WFL  Oral Transit Phase: WFL  Oral Residue: WFL             Pharyngeal Phase  Initiation of Pharyngeal Swallow: WFL  Pharyngeal Phase: functional pharyngeal phase of swallowing  VFSS Summary: Grossly functional oropharyngeal swallow. No penetration/aspiration or significant pharyngeal residue w/ any consistency tested. Small diverticulum noted, though did not contribute to significant amount of retention - majority of material cleared w/ consecutive swallows. No significant safety risk. Per Radiology PA, limited upper GI unremarkable. Pt appears safe to continue regular diet, thin liquids.  Standard aspiration precautions.    Cervical Esophageal Phase

## 2020-11-02 NOTE — PLAN OF CARE
Goal Outcome Evaluation:  Plan of Care Reviewed With: patient  Progress: no change  Outcome Summary: Patient spiked a temp of 104.9 rectally. Unable to maintain sats on 6L NC. Patient was also tachycardiac with movement, also expressing some intermittent nausea. RN noticed some new crackles in right lung. Tiera KEENE called and came to see the patient with Dr. Bermeo. Stat labs ordered, nebs,tylenol, 500- liter bolus of NS given and toradol. Patient was placed on cooling blanket with ice packs in arm hope. Temp came down to 103.4 appx one hour later. NS resumed at 100 mls/hr. Maintaining O2 sats appx 93% on 4L NC. Pt was requesting cpap to sleep for a few hours. Nausea had subsided therefore respiratory was called and patient was placed back on cpap. RN will continue to monitor patients O2 status and temp.

## 2020-11-02 NOTE — PLAN OF CARE
Problem: Adult Inpatient Plan of Care  Goal: Plan of Care Review  11/2/2020 1354 by Kee Carey MS CCC-SLP  Outcome: Ongoing, Progressing  Flowsheets (Taken 11/2/2020 1354)  Plan of Care Reviewed With: patient     SLP MBS evaluation completed. Will sign-off. Please see note for further details and recommendations.

## 2020-11-02 NOTE — PROGRESS NOTES
[facility] Medicine Services  PROGRESS NOTE    Patient Name: Larry Dean Klinefelter  : 1950  MRN: [id number]    Date of Admission: 2020  Primary Care Physician: Kin Nunez MD    Subjective   Subjective     CC: f/u PNA    HPI: Febrile to 104.9 overnight. Up in bed with sister in room. Says he feels much better today. Wants rid of cooling blanket. Wants to shower.    Review of Systems  CV- No chest pain, palpitations  GI- No N/V/D, abd pain    Objective   Objective     Vital Signs:   Temp:  [96.6 °F (35.9 °C)-104.9 °F (40.5 °C)] 100.9 °F (38.3 °C)  Heart Rate:  [] 88  Resp:  [18-25] 18  BP: (104-160)/() 152/76        Physical Exam:  Constitutional: No acute distress, awake, alert  HENT: NCAT, mucous membranes moist, O2 via NC  Respiratory: Slightly tachypneic, right lung crackles  Cardiovascular: RRR, no murmurs, rubs, or gallops, palpable pedal pulses bilaterally  Gastrointestinal: Positive bowel sounds, soft, nontender, nondistended, obese  Musculoskeletal: No bilateral ankle edema  Psychiatric: Appropriate affect, cooperative  Neurologic: Oriented x 3, strength symmetric in all extremities, Cranial Nerves grossly intact to confrontation, speech clear  Skin: No rashes    Results Reviewed:  Results from last 7 days   Lab Units 20  0428 20  1452   WBC 10*3/mm3 13.22* 16.19*   HEMOGLOBIN g/dL 10.9* 12.4*   HEMATOCRIT % 34.1* 37.5   PLATELETS 10*3/mm3 182 201   PROCALCITONIN ng/mL 0.55* 0.32*     Results from last 7 days   Lab Units 20  0428 20  1452   SODIUM mmol/L 137 139   POTASSIUM mmol/L 3.4* 3.8   CHLORIDE mmol/L 106 104   CO2 mmol/L 21.0* 22.0   BUN mg/dL 11 15   CREATININE mg/dL 0.91 0.95   GLUCOSE mg/dL 160* 146*   CALCIUM mg/dL 8.2* 8.8   ALT (SGPT) U/L 15 15   AST (SGOT) U/L 20 18   TROPONIN T ng/mL <0.010  --    PROBNP pg/mL 501.8  --      Estimated Creatinine Clearance: 90.9 mL/min (by C-G formula based on SCr of 0.91  mg/dL).    Microbiology Results Abnormal     Procedure Component Value - Date/Time    COVID PRE-OP / PRE-PROCEDURE SCREENING ORDER (NO ISOLATION) - Swab, Nasopharynx [[id number]]  (Normal) Collected: 11/01/20 1817    Lab Status: Final result Specimen: Swab from Nasopharynx Updated: 11/01/20 2015    Narrative:      The following orders were created for panel order COVID PRE-OP / PRE-PROCEDURE SCREENING ORDER (NO ISOLATION) - Swab, Nasopharynx.  Procedure                               Abnormality         Status                     ---------                               -----------         ------                     Respiratory Panel PCR w/...[[id number]]  Normal              Final result                 Please view results for these tests on the individual orders.    Respiratory Panel PCR w/COVID-19(SARS-CoV-2) HUNTER/MIRELLA/LING/PAD/COR/MAD/CHRISTIN In-House, NP Swab in UTM/VTM, 3-4 HR TAT - Swab, Nasopharynx [[id number]]  (Normal) Collected: 11/01/20 1817    Lab Status: Final result Specimen: Swab from Nasopharynx Updated: 11/01/20 2015     ADENOVIRUS, PCR Not Detected     Coronavirus 229E Not Detected     Coronavirus HKU1 Not Detected     Coronavirus NL63 Not Detected     Coronavirus OC43 Not Detected     COVID19 Not Detected     Human Metapneumovirus Not Detected     Human Rhinovirus/Enterovirus Not Detected     Influenza A PCR Not Detected     Influenza A H1 Not Detected     Influenza A H1 2009 PCR Not Detected     Influenza A H3 Not Detected     Influenza B PCR Not Detected     Parainfluenza Virus 1 Not Detected     Parainfluenza Virus 2 Not Detected     Parainfluenza Virus 3 Not Detected     Parainfluenza Virus 4 Not Detected     RSV, PCR Not Detected     Bordetella pertussis pcr Not Detected     Bordetella parapertussis PCR Not Detected     Chlamydophila pneumoniae PCR Not Detected     Mycoplasma pneumo by PCR Not Detected    Narrative:      Fact sheet for providers:  https://docs.[employer]/wp-content/uploads/MWU4974-9662-HN1.1-EUA-Provider-Fact-Sheet-3.pdf    Fact sheet for patients: https://docs.[employer]/wp-content/uploads/ADX4015-3915-OJ7.1-EUA-Patient-Fact-Sheet-1.pdf    MRSA Screen, PCR (Inpatient) - Swab, Nares [[id number]]  (Normal) Collected: 11/01/20 1826    Lab Status: Final result Specimen: Swab from Nares Updated: 11/01/20 1950     MRSA PCR Negative    Narrative:      MRSA Negative        Personally reviewed CTA chest showing dense RLL pneumonia. Agree with interpretation.  Imaging Results (Last 24 Hours)     Procedure Component Value Units Date/Time    XR Chest 1 View [[id number]] Collected: 11/01/20 1600     Updated: 11/02/20 0844    Narrative:      EXAMINATION: XR CHEST 1 VW- 11/01/2020     INDICATION: fever     COMPARISON: 05/01/2019 chest radiograph     FINDINGS: Heart shadow and pulmonary vasculature appear normal in size.  Lungs are moderately well-expanded and appear grossly clear. A slight  hazy opacity of the medial right base, is seen to be much more extensive  on patient's subsequent angiographic chest CT scan. No effusion, edema  or pneumothorax is seen.       Impression:      Subtle increased opacity in the retrocardiac region of the  medial right lung base. No evidence of active disease elsewhere. Please  see patient's subsequent chest CT scan report.     D:  11/01/2020  E:  11/02/2020         This report was finalized on 11/2/2020 8:41 AM by Dr. Landen Gao MD.       CT Angiogram Chest [[id number]] Collected: 11/01/20 1701     Updated: 11/02/20 0833    Narrative:      EXAMINATION: CT ANGIOGRAM CHEST - 11/01/2020     INDICATION: Shortness of air. Fever.     TECHNIQUE: Spiral acquisition 3 mm post IV contrast images through the  chest with sagittal and coronal 2D reconstructions.     The radiation dose reduction device was turned on for each scan per the  ALARA (As Low as Reasonably Achievable) protocol.     COMPARISON: Portable chest radiograph of  today's date.     FINDINGS: There is good contrast opacification of the pulmonary  arteries. No filling defects are seen to suggest pulmonary embolic  disease. There is no evidence of thoracic aortic aneurysm or dissection,  pericardial or pleural effusion, or significant mediastinal adenopathy.  There do appear to be shotty mediastinal lymph nodes and mildly  prominent right hilar node, which may be reactive.     Lung window images show dense area of airspace disease in the posterior  medial right lower lobe, difficult to appreciate even in retrospect on  the patient's current chest radiograph, behind the heart and  hemidiaphragm. Disease extends over a roughly 9 x 6 x 13 cm area.  Appearance is typical of lobar pneumonia. Lungs elsewhere appear clear  except for a couple of granulomatous calcifications.     Included images of the upper abdomen show expected fatty liver change  for body habitus, and a several small round right and left lobe liver  lesions all of which measure water density, typical of liver cysts.  There are several calcified gallstones in the dependent portion of the  otherwise normal-appearing gallbladder. The spleen is not enlarged. No  significant abnormalities are noted of the included portions of the  pancreatic tail, adrenal glands, or upper renal poles. There appear to  be a couple of small left renal cysts.       Impression:      1. No evidence of pulmonary embolus.     2. Extensive right lower lobe pneumonia typical of lobar/bacterial  pneumonia. No evidence of pneumonia elsewhere.     3. Mild, likely reactive mediastinal and right hilar lymphadenopathy.     4. Gallstones.     5. Incidental hepatic and left renal cysts.     DICTATED:   11/01/2020  EDITED/ls :   11/01/2020         This report was finalized on 11/2/2020 8:30 AM by Dr. Landen Gao MD.       XR Chest 1 View [[id number]] Collected: 11/02/20 0532     Updated: 11/02/20 0535    Narrative:      CR Chest 1 Vw    INDICATION:    Shortness of air. Fever. Pneumonia.     COMPARISON:    11/1/2020    FINDINGS:  Single portable AP view(s) of the chest.    Cardiac and mediastinal contours remain normal. There is atherosclerotic disease in the aorta. Left lung is clear. There is persistent infiltrate at the right medial base. No pneumothorax.       Impression:      No significant change. Persistent right basilar infiltrate.    Signer Name: Omar Tse MD   Signed: 11/2/2020 5:32 AM   Workstation Name: [facility]    [facility]               I have reviewed the medications:  Scheduled Meds:acetaminophen, 325 mg, Oral, Once  aspirin, 81 mg, Oral, Daily  atorvastatin, 40 mg, Oral, Daily  calcium carb-cholecalciferol, 1 tablet, Oral, BID  doxycycline, 100 mg, Oral, Q12H  enoxaparin, 40 mg, Subcutaneous, Q24H  ferrous sulfate, 325 mg, Oral, Daily With Breakfast  levothyroxine, 25 mcg, Oral, Q AM  metoprolol tartrate, 25 mg, Oral, Daily  multivitamin with minerals, 1 tablet, Oral, Daily  piperacillin-tazobactam, 3.375 g, Intravenous, Q8H  sodium chloride, 10 mL, Intravenous, Q12H  tamsulosin, 0.4 mg, Oral, Daily      Continuous Infusions:   PRN Meds:.•  acetaminophen **OR** acetaminophen **OR** acetaminophen  •  Albuterol Sulfate NEB Orderable  •  docusate sodium  •  ketorolac  •  potassium chloride  •  potassium chloride  •  sodium chloride    Assessment/Plan   Assessment & Plan     Active Hospital Problems    Diagnosis  POA   • **Sepsis due to pneumonia (CMS/HCC) [J18.9, A41.9]  Yes   • Acute respiratory failure with hypoxia (CMS/HCC) [J96.01]  Yes   • CAD (coronary artery disease) [I25.10]  Yes   • HLD (hyperlipidemia) [E78.5]  Yes   • HTN (hypertension) [I10]  Yes   • Hypothyroid [E03.9]  Yes   • TRACEY on CPAP [G47.33, Z99.89]  Not Applicable      Resolved Hospital Problems   No resolved problems to display.        Brief Hospital Course to date:  Larry Dean Klinefelter is a 70 year old male presents to the ED with fever,  chills, fatigue, weakness over the past three days.  CTA consistent with Right lower lobe lobar pneumonia.  This is my first day assessing patient's active medical issues.     Acute hypoxic respiratory failure due to PNA  Sepsis due to RLL bacterial pneumonia  Leukocytosis  -Remains febrile and had episode of worsening hypoxia overnight requiring NRB. Has now been weaned down to 4L O2. Continue to wean as able.  -MRSA PCR neg, stop vanc. Continue IV zosyn. Add doxy.  -Pulmonary toilet, ICS, OOB w/ PT/OT.    HypoK  -Replace. Recheck.     Essential hypertension  -Labile. Continue current dose metoprolol for now while acutely ill.     Hyperlipidemia  -continue statin     Hypothyroidism  -continue levothyroxine     Morbid obesity  TRACEY  -CPAP at HS    This patient's problems and plans were partially entered by my partner and updated as appropriate by me 11/02/20.     DVT prophylaxis:  Scds, lovenox     Disposition: I expect the patient to be discharged home in 2-3 days.    CODE STATUS:   Code Status and Medical Interventions:   Ordered at: 11/01/20 2003     Level Of Support Discussed With:    Patient     Code Status:    CPR     Medical Interventions (Level of Support Prior to Arrest):    Full       Honey Frank II, DO  11/02/20

## 2020-11-02 NOTE — PROGRESS NOTES
Discharge Planning Assessment  [facility]     Patient Name: Larry Dean Klinefelter  MRN: [id number]  Today's Date: 11/2/2020    Admit Date: 11/1/2020    Discharge Needs Assessment     Row Name 11/02/20 0911       Living Environment    Lives With  spouse    Current Living Arrangements  home/apartment/condo    Primary Care Provided by  self    Able to Return to Prior Arrangements  yes       Transition Planning    Patient/Family Anticipates Transition to  home    Transportation Anticipated  family or friend will provide       Discharge Needs Assessment    Readmission Within the Last 30 Days  no previous admission in last 30 days    Equipment Currently Used at Home  rollator;shower chair    Concerns to be Addressed  denies needs/concerns at this time;no discharge needs identified    Anticipated Changes Related to Illness  none        Discharge Plan     Row Name 11/02/20 0912       Plan    Plan  home    Patient/Family in Agreement with Plan  yes    Plan Comments  I met with Mr. Klinefelter at the bedside. He lives in [facility] with his wife Sallie. He is independent with mobility and activities of daily living. He drives himself. He is admitted for sepsis/pneumonia. Case management will follow his progress and facilitate discharge planning.    Final Discharge Disposition Code  01 - home or self-care        Continued Care and Services - Admitted Since 11/1/2020    Coordination has not been started for this encounter.         Demographic Summary     Row Name 11/02/20 0911       General Information    General Information Comments  I confirmed that Kin Nunez is Mr. Klinefelter's PCP. [employer] is his insurer.        Functional Status     Row Name 11/02/20 0911       Functional Status, IADL    Medications  independent    Meal Preparation  independent    Housekeeping  independent    Laundry  independent    Shopping  independent        Psychosocial    No documentation.       Abuse/Neglect    No  documentation.       Legal    No documentation.       Substance Abuse    No documentation.       Patient Forms    No documentation.           Jimmie Mauricio RN

## 2020-11-02 NOTE — MBS/VFSS/FEES
Acute Care - Speech Language Pathology   Swallow Initial Evaluation  Trisha   Modified Barium Swallow Study (MBS)     Patient Name: Larry Dean Klinefelter  : 1950  MRN: [id number]  Today's Date: 2020               Admit Date: 2020    Visit Dx:     ICD-10-CM ICD-9-CM   1. Pneumonia of right lower lobe due to infectious organism  J18.9 486   2. Acute febrile illness  R50.9 780.60   3. Shortness of breath  R06.02 786.05   4. Generalized weakness  R53.1 780.79     Patient Active Problem List   Diagnosis   • Primary osteoarthritis of left knee   • TRACEY on CPAP   • HTN (hypertension)   • HLD (hyperlipidemia)   • Hypothyroid   • CAD (coronary artery disease)   • Status post total left knee replacement   • Postoperative urinary retention   • Leukocytosis, likely reactive   • Acute blood loss anemia, mild, asymptomatic   • Acute postoperative pain   • Sepsis due to pneumonia (CMS/HCC)   • Acute respiratory failure with hypoxia (CMS/HCC)     Past Medical History:   Diagnosis Date   • Anemia    • Bowel obstruction (CMS/HCC)    • Chest pain    • Constipation    • Coronary artery disease    • Disease of thyroid gland    • Hepatitis    • Hyperlipidemia    • Hypertension    • Macular degeneration    • Medial meniscus tear    • Obesity    • Obstructive sleep apnea on CPAP     setting - 13.   • Primary osteoarthritis of left knee     and right knee   • Wears glasses      Past Surgical History:   Procedure Laterality Date   • CARDIAC CATHETERIZATION  2019   • COLONOSCOPY     • FRACTURE SURGERY      left heel    • INGUINAL HERNIA REPAIR      x2    • KNEE ARTHROSCOPY Right    • REPLACEMENT TOTAL KNEE Right    • TONSILLECTOMY     • TOTAL KNEE ARTHROPLASTY Left 2019    Procedure: TOTAL KNEE ARTHROPLASTY LEFT;  Surgeon: Willian Jc MD;  Location: [facility];  Service: Orthopedics        SWALLOW EVALUATION (last 72 hours)      SLP Adult Swallow Evaluation     Row Name 20 1340 20 1005           Document Type  evaluation  -MP  evaluation  -MP    Subjective Information  no complaints  -MP  no complaints  -MP    Patient Observations  alert;cooperative  -MP  alert;cooperative  -MP    Patient/Family/Caregiver Comments/Observations  No family present  -MP  No family present  -MP    Patient Effort  good  -MP  good  -MP          Patient Profile Reviewed  yes  -MP  yes  -MP    Pertinent History Of Current Problem  See AM eval  -MP  Adm 11/1 w/ sepsis 2' PNA. CTA chest w/ RLL PNA. Hx HTN, HLD, CAD, hypothyroid, TRACEY.  -MP    Current Method of Nutrition  regular textures;thin liquids  -MP  regular textures;thin liquids  -MP    Precautions/Limitations, Vision  --  WFL with corrective lenses  -MP    Precautions/Limitations, Hearing  --  WFL;for purposes of eval  -MP    Prior Level of Function-Communication  --  WFL  -MP    Prior Level of Function-Swallowing  --  no diet consistency restrictions;other (see comments) pt reported occasional heartburn  -MP    Plans/Goals Discussed with  --  patient;agreed upon  -MP    Barriers to Rehab  --  none identified  -MP    Patient's Goals for Discharge  --  patient did not state  -MP          Additional Documentation  Pain Scale: FACES Pre/Post-Treatment (Group)  -MP  Pain Scale: FACES Pre/Post-Treatment (Group)  -MP          Pain: FACES Scale, Pretreatment  0-->no hurt  -MP  0-->no hurt  -MP    Posttreatment Pain Rating  0-->no hurt  -MP  0-->no hurt  -MP          Dentition Assessment  --  natural, present and adequate  -MP    Secretion Management  --  WNL/WFL  -MP    Mucosal Quality  --  moist, healthy  -MP          Oral Motor General Assessment  --  WFL  -MP          Respiratory Support Currently in Use  --  nasal cannula  -MP    Eating/Swallowing Skills  --  self-fed;appropriate self-feeding skills observed  -MP    Positioning During Eating  --  upright in chair  -MP    Utensils Used  --  spoon;cup;straw  -MP    Consistencies Trialed  --  thin liquids;pureed;regular textures   -MP          Pharyngeal Phase  --  -- r/o pharyngeal impairment  -MP    Clinical Swallow Evaluation Summary  --  Clinical swallow evaluation completed. Pt given trials of thin via cup/straw, puree, solid. Oral phase seemingly WNL. One throat clear noted following solid trial. Pt reported occasional heartburn. Given current PNA, will plan for MBS + limited upper GI to further assess. OK to continue current diet until MBS.  -MP          Pharyngeal Phase Concerns  --  throat clear  -MP    Throat Clear  --  regular consistencies  -MP          Utensils Used  spoon;cup;straw  -MP  --    Consistencies Trialed  thin liquids;pudding thick;regular textures  -MP  --          Oral Prep Phase  WFL  -MP  --    Oral Transit Phase  WFL  -MP  --    Oral Residue  WFL  -MP  --    VFSS Summary  Grossly functional oropharyngeal swallow. No penetration/aspiration or significant pharyngeal residue w/ any consistency tested. Small diverticulum noted, though did not contribute to significant amount of retention - majority of material cleared w/ consecutive swallows. No significant safety risk. Per Radiology PA, limited upper GI unremarkable. Pt appears safe to continue regular diet, thin liquids. Standard aspiration precautions.  -MP  --          Initiation of Pharyngeal Swallow  WFL  -MP  --    Pharyngeal Phase  functional pharyngeal phase of swallowing  -MP  --          SLP Swallowing Diagnosis  functional oral phase;functional pharyngeal phase  -MP  R/O pharyngeal dysphagia  -MP    Functional Impact  --  risk of aspiration/pneumonia  -    Rehab Potential/Prognosis, Swallowing  --  good, to achieve stated therapy goals  -    Swallow Criteria for Skilled Therapeutic Interventions Met  no problems identified which require skilled intervention  -  demonstrates skilled criteria  -          Therapy Frequency (Swallow)  evaluation only  -MP  --    SLP Diet Recommendation  regular textures;thin liquids  -MP  other (see comments) OK to  continue current diet until MBS  -MP    Recommended Diagnostics  --  VFSS (MBS);other (see comments) + limited upper GI  -MP    Recommended Precautions and Strategies  upright posture during/after eating;small bites of food and sips of liquid  -MP  upright posture during/after eating;small bites of food and sips of liquid;general aspiration precautions;reflux precautions  -MP    Oral Care Recommendations  Oral Care BID/PRN  -MP  Oral Care BID/PRN  -MP    SLP Rec. for Method of Medication Administration  meds whole;with thin liquids crush in pudding/puree PRN if any difficulty  -MP  meds whole;with thin liquids;with pudding or applesauce;as tolerated  -MP    Monitor for Signs of Aspiration  yes;notify SLP if any concerns  -MP  yes;notify SLP if any concerns  -MP    Anticipated Discharge Disposition (SLP)  home  -MP  unknown  -MP      User Key  (r) = Recorded By, (t) = Taken By, (c) = Cosigned By    Initials Name Effective Dates    Kee Mccall MS CCC-SLP 06/19/19 -           EDUCATION  The patient has been educated in the following areas:   Dysphagia (Swallowing Impairment).    SLP Recommendation and Plan  SLP Swallowing Diagnosis: functional oral phase, functional pharyngeal phase  SLP Diet Recommendation: regular textures, thin liquids  Recommended Precautions and Strategies: upright posture during/after eating, small bites of food and sips of liquid  SLP Rec. for Method of Medication Administration: meds whole, with thin liquids(crush in pudding/puree PRN if any difficulty)     Monitor for Signs of Aspiration: yes, notify SLP if any concerns  Recommended Diagnostics: VFSS (MBS), other (see comments)(+ limited upper GI)  Swallow Criteria for Skilled Therapeutic Interventions Met: no problems identified which require skilled intervention  Anticipated Discharge Disposition (SLP): home  Rehab Potential/Prognosis, Swallowing: good, to achieve stated therapy goals  Therapy Frequency (Swallow): evaluation only                             Plan of Care Reviewed With: patient           Time Calculation:   Time Calculation- SLP     Row Name 11/02/20 1355 11/02/20 1108          Time Calculation- SLP    SLP Start Time  1340  -MP  1005  -MP     SLP Received On  11/02/20  -MP  11/02/20  -MP       User Key  (r) = Recorded By, (t) = Taken By, (c) = Cosigned By    Initials Name Provider Type    Kee Mccall MS CCC-SLP Speech and Language Pathologist          Therapy Charges for Today     Code Description Service Date Service Provider Modifiers Qty    43378842382 HC ST EVAL ORAL PHARYNG SWALLOW 3 11/2/2020 Kee Carey MS CCC-SLP GN 1    [id number] HC ST MOTION FLUORO EVAL SWALLOW 3 11/2/2020 Kee Carey MS CCC-SLP GN 1          Patient was not wearing a face mask and did not exhibit coughing during this therapy encounter.  Procedure performed was aerosolizing, involved close contact (within 6 feet for at least 15 minutes or longer), and did not involve contact with infectious secretions or specimens.  Therapist used appropriate personal protective equipment including gloves, standard procedure mask and eye protection.  Appropriate PPE was worn during the entire therapy session.  Hand hygiene was completed before and after therapy session.        MS PAMELA Woo  11/2/2020

## 2020-11-02 NOTE — SIGNIFICANT NOTE
Rn called secondary to patient requiring more oxygen.  She reports patient was wearing his CPAP when he suddenly dropped his oxygen saturation to 88%.  She attempted to place on 6LNC with no improvement of his sats. He was then placed on a 100% NRB with oxygen saturation of 93%.  Additionally RN reports a rectal temp of 104.9.     Upon evaluation at bedside patient does not appear to be in any distress.  His oxygen saturation on 60% NRB is 99%.  Auscultation of lungs: crackles to bilateral bases which is new from admission.  Fluids held at this time, CBC, CMP, troponin, BNP and D-Dimer, Lactic acid, Procal, repeat blood cultures and mag ordered.  Patient was given 15 mg of Toradol IV.  Temp did improve to 100.0 orally.  CXR and ABG were ordered.  Was able to wean patient's oxygen to 3L NC with saturation of 93-94%.  Will await repeat labs and CXR.

## 2020-11-02 NOTE — PLAN OF CARE
Problem: Adult Inpatient Plan of Care  Goal: Plan of Care Review  Outcome: Ongoing, Progressing  Flowsheets (Taken 11/2/2020 1107)  Plan of Care Reviewed With: patient     SLP evaluation completed. Will  plan for MBS to further assess swallow function . Please see note for further details and recommendations.

## 2020-11-02 NOTE — THERAPY EVALUATION
Patient Name: Larry Dean Klinefelter  : 1950    MRN: [id number]                              Today's Date: 2020       Admit Date: 2020    Visit Dx:     ICD-10-CM ICD-9-CM   1. Pneumonia of right lower lobe due to infectious organism  J18.9 486   2. Acute febrile illness  R50.9 780.60   3. Shortness of breath  R06.02 786.05   4. Generalized weakness  R53.1 780.79     Patient Active Problem List   Diagnosis   • Primary osteoarthritis of left knee   • TRACEY on CPAP   • HTN (hypertension)   • HLD (hyperlipidemia)   • Hypothyroid   • CAD (coronary artery disease)   • Status post total left knee replacement   • Postoperative urinary retention   • Leukocytosis, likely reactive   • Acute blood loss anemia, mild, asymptomatic   • Acute postoperative pain   • Sepsis due to pneumonia (CMS/HCC)   • Acute respiratory failure with hypoxia (CMS/HCC)     Past Medical History:   Diagnosis Date   • Anemia    • Bowel obstruction (CMS/HCC)    • Chest pain    • Constipation    • Coronary artery disease    • Disease of thyroid gland    • Hepatitis    • Hyperlipidemia    • Hypertension    • Macular degeneration    • Medial meniscus tear    • Obesity    • Obstructive sleep apnea on CPAP     setting - 13.   • Primary osteoarthritis of left knee     and right knee   • Wears glasses      Past Surgical History:   Procedure Laterality Date   • CARDIAC CATHETERIZATION  2019   • COLONOSCOPY     • FRACTURE SURGERY      left heel    • INGUINAL HERNIA REPAIR      x2    • KNEE ARTHROSCOPY Right    • REPLACEMENT TOTAL KNEE Right    • TONSILLECTOMY     • TOTAL KNEE ARTHROPLASTY Left 2019    Procedure: TOTAL KNEE ARTHROPLASTY LEFT;  Surgeon: Willian Jc MD;  Location: [facility];  Service: Orthopedics     General Information     Row Name 20 1303          Physical Therapy Time and Intention    Document Type  evaluation  -KG     Mode of Treatment  physical therapy  -KG     Row Name 20 1303          General  Information    Patient Profile Reviewed  yes  -KG     Prior Level of Function  independent:;all household mobility;ADL's  -KG     Existing Precautions/Restrictions  no known precautions/restrictions  -KG     Barriers to Rehab  none identified  -KG     Row Name 11/02/20 1303          Living Environment    Lives With  spouse  -KG     Row Name 11/02/20 1303          Home Main Entrance    Number of Stairs, Main Entrance  two  -KG     Stair Railings, Main Entrance  railings safe and in good condition  -KG     Row Name 11/02/20 1303          Stairs Within Home, Primary    Stair Railings, Within Home, Primary  none  -KG     Row Name 11/02/20 1303          Cognition    Orientation Status (Cognition)  oriented x 4  -KG     Row Name 11/02/20 1303          Safety Issues, Functional Mobility    Safety Issues Affecting Function (Mobility)  impulsivity;insight into deficits/self-awareness;safety precaution awareness  -KG     Impairments Affecting Function (Mobility)  balance;strength;shortness of breath  -KG       User Key  (r) = Recorded By, (t) = Taken By, (c) = Cosigned By    Initials Name Provider Type    KG Koki Fernandez Physical Therapist        Mobility     Row Name 11/02/20 1305          Bed Mobility    Bed Mobility  sit-supine  -KG     Sit-Supine [place] (Bed Mobility)  modified independence  -KG     Assistive Device (Bed Mobility)  bed rails  -KG     Comment (Bed Mobility)  cues for positioning  -KG     Row Name 11/02/20 1305          Transfers    Comment (Transfers)  cues to push up from chair  -KG     Row Name 11/02/20 1305          Sit-Stand Transfer    Sit-Stand [place] (Transfers)  modified independence  -KG     Assistive Device (Sit-Stand Transfers)  walker, front-wheeled  -KG     Row Name 11/02/20 1305          Gait/Stairs (Locomotion)    [place] Level (Gait)  contact guard  -KG     Assistive Device (Gait)  walker, front-wheeled  -KG     Distance in Feet (Gait)  2x60  -KG      Deviations/Abnormal Patterns (Gait)  stride length decreased  -KG     Bilateral Gait Deviations  forward flexed posture;heel strike decreased  -KG     Comment (Gait/Stairs)  Pt able to ambulate 2x60' with RW and CGA.  Pt slightly SOA but sats remained in 90's.  Pt c/o achiness in anterior thighs with walking long distances, pt reports capable of ambulating farther distances but wanted to get back to bed.  -KG       User Key  (r) = Recorded By, (t) = Taken By, (c) = Cosigned By    Initials Name Provider Type    Koki Pride Physical Therapist        Obj/Interventions     Row Name 11/02/20 1310          Strength Comprehensive (MMT)    General Manual Muscle Testing (MMT) Assessment  lower extremity strength deficits identified  -KG     Comment, General Manual Muscle Testing (MMT) Assessment  4/5 LE  -KG     Row Name 11/02/20 1310          Balance    Balance Assessment  sitting static balance;standing static balance;standing dynamic balance  -KG     Static Sitting Balance  WNL  -KG     Static Standing Balance  WNL  -KG     Dynamic Standing Balance  WFL  -KG     Balance Interventions  sit to stand;standing;dynamic;dynamic reaching  -KG       User Key  (r) = Recorded By, (t) = Taken By, (c) = Cosigned By    Initials Name Provider Type    Koki Pride Physical Therapist        Goals/Plan     Row Name 11/02/20 1317          Transfer Goal 1 (PT)    Activity/Assistive Device (Transfer Goal 1, PT)  sit-to-stand/stand-to-sit;bed-to-chair/chair-to-bed;toilet  -KG     [place] Level/Cues Needed (Transfer Goal 1, PT)  standby assist  -KG     Time Frame (Transfer Goal 1, PT)  long term goal (LTG);10 days  -KG     Row Name 11/02/20 1317          Gait Training Goal 1 (PT)    Activity/Assistive Device (Gait Training Goal 1, PT)  gait (walking locomotion);walker, rolling  -KG     [place] Level (Gait Training Goal 1, PT)  standby assist  -KG     Distance (Gait Training Goal 1, PT)  150  -KG     Time Frame (Gait  Training Goal 1, PT)  long term goal (LTG);10 days  -KG     Progress/Outcome (Gait Training Goal 1, PT)  continuing progress toward goal  -KG     Row Name 11/02/20 1317          Stairs Goal 1 (PT)    Activity/Assistive Device (Stairs Goal 1, PT)  ascending stairs;descending stairs  -KG     [place] Level/Cues Needed (Stairs Goal 1, PT)  standby assist  -KG     Number of Stairs (Stairs Goal 1, PT)  2  -KG       User Key  (r) = Recorded By, (t) = Taken By, (c) = Cosigned By    Initials Name Provider Type    LIBBY Koki Fernandez Physical Therapist        Clinical Impression     Row Name 11/02/20 1311          Pain Scale: FACES Pre/Post-Treatment    Pain: FACES Scale, Pretreatment  0-->no hurt  -KG     Posttreatment Pain Rating  0-->no hurt  -KG     Row Name 11/02/20 1311          Plan of Care Review    Plan of Care Reviewed With  patient  -KG     Progress  no change  -KG     Outcome Summary  PT e4val performed: Pt presenting with some generalized weakness and mild SOA.  modified indep for bed mobility and transfers.Pt able to ambulate 2x60' with RW and CGA.  Pt slightly SOA but sats remained in 90's.  Pt c/o achiness in anterior thighs with walking long distances, pt reports he's capable of ambulating farther distances but wanted to get back to bed.  Recommend home with OP PT to address his c/o of stenosis related leg pain and mild balance and stamina deficits.  -KG     Row Name 11/02/20 1311          Therapy Assessment/Plan (PT)    Rehab Potential (PT)  good, to achieve stated therapy goals  -KG     Criteria for Skilled Interventions Met (PT)  yes;meets criteria;skilled treatment is necessary  -KG     Row Name 11/02/20 1311          Vital Signs    Pre Systolic BP Rehab  133  -KG     Pre Treatment Diastolic BP  61  -KG     Pre SpO2 (%)  98  -KG     O2 Delivery Pre Treatment  room air  -KG     Intra SpO2 (%)  94  -KG     O2 Delivery Intra Treatment  room air  -KG     Post SpO2 (%)  100  -KG     O2 Delivery Post  Treatment  room air  -KG     Pre Patient Position  Sitting  -KG     Intra Patient Position  Standing  -KG     Post Patient Position  Supine  -KG     Row Name 11/02/20 1311          Positioning and Restraints    Pre-Treatment Position  sitting in chair/recliner  -KG     Post Treatment Position  bed  -KG     In Bed  notified nsg;fowlers;call light within reach;encouraged to call for assist;exit alarm on  -KG       User Key  (r) = Recorded By, (t) = Taken By, (c) = Cosigned By    Initials Name Provider Type    Koki Pride Physical Therapist        Outcome Measures     Row Name 11/02/20 1318          How much help from another person do you currently need...    Turning from your back to your side while in flat bed without using bedrails?  4  -KG     Moving from lying on back to sitting on the side of a flat bed without bedrails?  4  -KG     Moving to and from a bed to a chair (including a wheelchair)?  3  -KG     Standing up from a chair using your arms (e.g., wheelchair, bedside chair)?  4  -KG     Climbing 3-5 steps with a railing?  2  -KG     To walk in hospital room?  3  -KG     AM-PAC 6 Clicks Score (PT)  20  -KG     Row Name 11/02/20 1318          Functional Assessment    Outcome Measure Options  AM-PAC 6 Clicks Basic Mobility (PT)  -KG       User Key  (r) = Recorded By, (t) = Taken By, (c) = Cosigned By    Initials Name Provider Type    Koki Pride Physical Therapist        Physical Therapy Education                 Title: PT OT SLP Therapies (In Progress)     Topic: Physical Therapy (Done)     Point: Mobility training (Done)     Learning Progress Summary           Patient Acceptance, E,TB, VU by KG at 11/2/2020 1318                   Point: Home exercise program (Done)     Learning Progress Summary           Patient Acceptance, E,TB, VU by KG at 11/2/2020 1318                   Point: Body mechanics (Done)     Learning Progress Summary           Patient Acceptance, E,TB, VU by KG at 11/2/2020  1318                   Point: Precautions (Done)     Learning Progress Summary           Patient Acceptance, E,TB, VU by LIBBY at 11/2/2020 1318                               User Key     Initials Effective Dates Name Provider Type Discipline    LIBBY 08/28/19 -  Koki Fernandez Physical Therapist PT              PT Recommendation and Plan     Plan of Care Reviewed With: patient  Progress: no change  Outcome Summary: PT e4val performed: Pt presenting with some generalized weakness and mild SOA.  modified indep for bed mobility and transfers.Pt able to ambulate 2x60' with RW and CGA.  Pt slightly SOA but sats remained in 90's.  Pt c/o achiness in anterior thighs with walking long distances, pt reports he's capable of ambulating farther distances but wanted to get back to bed.  Recommend home with OP PT to address his c/o of stenosis related leg pain and mild balance and stamina deficits.     Time Calculation:   PT Charges     Row Name 11/02/20 1319             Time Calculation    Start Time  1145  -KG      PT Received On  11/02/20  -KG      PT Goal Re-Cert Due Date  11/12/20  -KG         Time Calculation- PT    Total Timed Code Minutes- PT  15 minute(s)  -KG         Timed Charges    03442 - PT Therapeutic Activity Minutes  15  -KG        User Key  (r) = Recorded By, (t) = Taken By, (c) = Cosigned By    Initials Name Provider Type    LIBBY Koki Fernandez Physical Therapist        Therapy Charges for Today     Code Description Service Date Service Provider Modifiers Qty    20063949343 HC PT THERAPEUTIC ACT EA 15 MIN 11/2/2020 Koki Fernandez GP 1    00170218164 HC PT EVAL LOW COMPLEXITY 3 11/2/2020 Koki Fernandez GP 1          PT G-Codes  Outcome Measure Options: AM-PAC 6 Clicks Basic Mobility (PT)  AM-PAC 6 Clicks Score (PT): 20    Koki Fernandez  11/2/2020

## 2020-11-02 NOTE — PLAN OF CARE
Goal Outcome Evaluation:  Plan of Care Reviewed With: patient    • Patient alert & oriented. Pleasant.   • Continues to be febrile. Tmax 102.9.   • On room air. SR, -130. RR 18-20.  • Speech eval completed.  • PT/OT following.   • Antibiotics adjusted.   • Up with stand-by assist.  • K+ 3.4; replaced.

## 2020-11-02 NOTE — PLAN OF CARE
Problem: Adult Inpatient Plan of Care  Goal: Plan of Care Review  Recent Flowsheet Documentation  Taken 11/2/2020 1311 by Koki Fernandez  Progress: no change  Plan of Care Reviewed With: patient  Outcome Summary: PT e4val performed: Pt presenting with some generalized weakness and mild SOA.  modified indep for bed mobility and transfers.Pt able to ambulate 2x60' with RW and CGA.  Pt slightly SOA but sats remained in 90's.  Pt c/o achiness in anterior thighs with walking long distances, pt reports he's capable of ambulating farther distances but wanted to get back to bed.  Recommend home with OP PT to address his c/o of stenosis related leg pain and mild balance and stamina deficits.

## 2020-11-03 LAB
ANION GAP SERPL CALCULATED.3IONS-SCNC: 10 MMOL/L (ref 5–15)
BUN SERPL-MCNC: 17 MG/DL (ref 8–23)
BUN/CREAT SERPL: 16.8 (ref 7–25)
CALCIUM SPEC-SCNC: 8.5 MG/DL (ref 8.6–10.5)
CHLORIDE SERPL-SCNC: 105 MMOL/L (ref 98–107)
CO2 SERPL-SCNC: 24 MMOL/L (ref 22–29)
CREAT SERPL-MCNC: 1.01 MG/DL (ref 0.76–1.27)
DEPRECATED RDW RBC AUTO: 49.7 FL (ref 37–54)
ERYTHROCYTE [DISTWIDTH] IN BLOOD BY AUTOMATED COUNT: 13.3 % (ref 12.3–15.4)
GFR SERPL CREATININE-BSD FRML MDRD: 73 ML/MIN/1.73
GLUCOSE SERPL-MCNC: 151 MG/DL (ref 65–99)
HCT VFR BLD AUTO: 34.8 % (ref 37.5–51)
HGB BLD-MCNC: 11.3 G/DL (ref 13–17.7)
MCH RBC QN AUTO: 32.7 PG (ref 26.6–33)
MCHC RBC AUTO-ENTMCNC: 32.5 G/DL (ref 31.5–35.7)
MCV RBC AUTO: 100.6 FL (ref 79–97)
PLATELET # BLD AUTO: 196 10*3/MM3 (ref 140–450)
PMV BLD AUTO: 10.1 FL (ref 6–12)
POTASSIUM SERPL-SCNC: 4.4 MMOL/L (ref 3.5–5.2)
POTASSIUM SERPL-SCNC: 4.6 MMOL/L (ref 3.5–5.2)
RBC # BLD AUTO: 3.46 10*6/MM3 (ref 4.14–5.8)
SODIUM SERPL-SCNC: 139 MMOL/L (ref 136–145)
WBC # BLD AUTO: 15.06 10*3/MM3 (ref 3.4–10.8)

## 2020-11-03 PROCEDURE — 97165 OT EVAL LOW COMPLEX 30 MIN: CPT

## 2020-11-03 PROCEDURE — 25010000002 AZITHROMYCIN PER 500 MG: Performed by: INTERNAL MEDICINE

## 2020-11-03 PROCEDURE — 25010000002 PIPERACILLIN SOD-TAZOBACTAM PER 1 G: Performed by: NURSE PRACTITIONER

## 2020-11-03 PROCEDURE — 94660 CPAP INITIATION&MGMT: CPT

## 2020-11-03 PROCEDURE — 25010000002 ENOXAPARIN PER 10 MG: Performed by: NURSE PRACTITIONER

## 2020-11-03 PROCEDURE — 85027 COMPLETE CBC AUTOMATED: CPT | Performed by: INTERNAL MEDICINE

## 2020-11-03 PROCEDURE — 80048 BASIC METABOLIC PNL TOTAL CA: CPT | Performed by: INTERNAL MEDICINE

## 2020-11-03 PROCEDURE — 97530 THERAPEUTIC ACTIVITIES: CPT

## 2020-11-03 PROCEDURE — 99232 SBSQ HOSP IP/OBS MODERATE 35: CPT | Performed by: INTERNAL MEDICINE

## 2020-11-03 PROCEDURE — 94799 UNLISTED PULMONARY SVC/PX: CPT

## 2020-11-03 RX ADMIN — Medication 1 TABLET: at 20:04

## 2020-11-03 RX ADMIN — MULTIPLE VITAMINS W/ MINERALS TAB 1 TABLET: TAB at 08:01

## 2020-11-03 RX ADMIN — DOXYCYCLINE 100 MG: 100 CAPSULE ORAL at 08:01

## 2020-11-03 RX ADMIN — ENOXAPARIN SODIUM 40 MG: 40 INJECTION SUBCUTANEOUS at 08:00

## 2020-11-03 RX ADMIN — ACETAMINOPHEN 650 MG: 325 TABLET, FILM COATED ORAL at 12:53

## 2020-11-03 RX ADMIN — AZITHROMYCIN 500 MG: 500 INJECTION, POWDER, LYOPHILIZED, FOR SOLUTION INTRAVENOUS at 12:46

## 2020-11-03 RX ADMIN — METOPROLOL TARTRATE 25 MG: 25 TABLET, FILM COATED ORAL at 08:01

## 2020-11-03 RX ADMIN — ACETAMINOPHEN 650 MG: 325 TABLET, FILM COATED ORAL at 06:57

## 2020-11-03 RX ADMIN — FERROUS SULFATE TAB 325 MG (65 MG ELEMENTAL FE) 325 MG: 325 (65 FE) TAB at 08:01

## 2020-11-03 RX ADMIN — Medication 1 TABLET: at 08:01

## 2020-11-03 RX ADMIN — SODIUM CHLORIDE, PRESERVATIVE FREE 10 ML: 5 INJECTION INTRAVENOUS at 20:04

## 2020-11-03 RX ADMIN — LEVOTHYROXINE SODIUM 25 MCG: 25 TABLET ORAL at 05:51

## 2020-11-03 RX ADMIN — ASPIRIN 81 MG: 81 TABLET, COATED ORAL at 08:01

## 2020-11-03 RX ADMIN — ATORVASTATIN CALCIUM 40 MG: 40 TABLET, FILM COATED ORAL at 08:01

## 2020-11-03 RX ADMIN — TAZOBACTAM SODIUM AND PIPERACILLIN SODIUM 3.38 G: 375; 3 INJECTION, SOLUTION INTRAVENOUS at 03:33

## 2020-11-03 NOTE — PLAN OF CARE
Problem: Adult Inpatient Plan of Care  Goal: Plan of Care Review  Recent Flowsheet Documentation  Taken 11/3/2020 1444 by Gypsy Delgadillo OT  Plan of Care Reviewed With:   patient   family  Outcome Summary: OT IE completed. Pt alert, Ox4 and motivated to work with OT. Pt up with Mod. [place] using RW. BUE HEP initiated to support ADL performance. Education initiated for energy conservation with ADLs. Independent for LB dressing tasks. OT will follow IP to advance strength/endurance as tolerated. Plan is home with spouse assist. No DME needs.

## 2020-11-03 NOTE — PROGRESS NOTES
[facility] Medicine Services  PROGRESS NOTE    Patient Name: Larry Dean Klinefelter  : 1950  MRN: [id number]    Date of Admission: 2020  Primary Care Physician: Kin Nunez MD    Subjective   Subjective     CC: f/u PNA    HPI: Up in chair with sister on speaker phone. Febrile to 103 overnight. Feeling better this am. Breathing better.    Review of Systems  CV- No chest pain, palpitations  GI- No N/V/D, abd pain    Objective   Objective     Vital Signs:   Temp:  [98.4 °F (36.9 °C)-103.1 °F (39.5 °C)] 101.2 °F (38.4 °C)  Heart Rate:  [] 89  Resp:  [18-20] 18  BP: (103-187)/(57-82) 147/68        Physical Exam:  Constitutional: No acute distress, awake, alert  HENT: NCAT, mucous membranes moist  Respiratory: Clear to auscultation bilaterally, respiratory effort normal   Cardiovascular: RRR, no murmurs, rubs, or gallops, palpable pedal pulses bilaterally  Gastrointestinal: Positive bowel sounds, soft, nontender, nondistended  Musculoskeletal: No bilateral ankle edema  Psychiatric: Appropriate affect, cooperative  Neurologic: Oriented x 3, strength symmetric in all extremities, Cranial Nerves grossly intact to confrontation, speech clear  Skin: No rashes    Results Reviewed:  Results from last 7 days   Lab Units 20  0616 20  1452   WBC 10*3/mm3 15.06* 13.22* 16.19*   HEMOGLOBIN g/dL 11.3* 10.9* 12.4*   HEMATOCRIT % 34.8* 34.1* 37.5   PLATELETS 10*3/mm3 196 182 201   PROCALCITONIN ng/mL  --  0.55* 0.32*     Results from last 7 days   Lab Units 20  0616 20  2242 20  1452   SODIUM mmol/L 139  --  137 139   POTASSIUM mmol/L 4.4 4.6 3.4* 3.8   CHLORIDE mmol/L 105  --  106 104   CO2 mmol/L 24.0  --  21.0* 22.0   BUN mg/dL 17  --  11 15   CREATININE mg/dL 1.01  --  0.91 0.95   GLUCOSE mg/dL 151*  --  160* 146*   CALCIUM mg/dL 8.5*  --  8.2* 8.8   ALT (SGPT) U/L  --   --  15 15   AST (SGOT) U/L  --   --  20 18    TROPONIN T ng/mL  --   --  <0.010  --    PROBNP pg/mL  --   --  501.8  --      Estimated Creatinine Clearance: 81.9 mL/min (by C-G formula based on SCr of 1.01 mg/dL).    Microbiology Results Abnormal     Procedure Component Value - Date/Time    Blood Culture - Blood, Arm, Left [[id number]] Collected: 11/02/20 0428    Lab Status: Preliminary result Specimen: Blood from Arm, Left Updated: 11/03/20 0515     Blood Culture No growth at 24 hours    Blood Culture - Blood, Arm, Right [[id number]] Collected: 11/02/20 0428    Lab Status: Preliminary result Specimen: Blood from Arm, Right Updated: 11/03/20 0515     Blood Culture No growth at 24 hours    Blood Culture - Blood, Arm, Left [[id number]] Collected: 11/01/20 1520    Lab Status: Preliminary result Specimen: Blood from Arm, Left Updated: 11/02/20 1616     Blood Culture No growth at 24 hours    Legionella Antigen, Urine - Urine, Urine, Clean Catch [[id number]]  (Abnormal) Collected: 11/02/20 0731    Lab Status: Final result Specimen: Urine, Clean Catch Updated: 11/02/20 1605     LEGIONELLA ANTIGEN, URINE Positive    S. Pneumo Ag Urine or CSF - Urine, Urine, Clean Catch [[id number]]  (Normal) Collected: 11/02/20 0731    Lab Status: Final result Specimen: Urine, Clean Catch Updated: 11/02/20 1604     Strep Pneumo Ag Negative    Blood Culture - Blood, Arm, Right [[id number]] Collected: 11/01/20 1545    Lab Status: Preliminary result Specimen: Blood from Arm, Right Updated: 11/02/20 1600     Blood Culture No growth at 24 hours    COVID PRE-OP / PRE-PROCEDURE SCREENING ORDER (NO ISOLATION) - Swab, Nasopharynx [[id number]]  (Normal) Collected: 11/01/20 1817    Lab Status: Final result Specimen: Swab from Nasopharynx Updated: 11/01/20 2015    Narrative:      The following orders were created for panel order COVID PRE-OP / PRE-PROCEDURE SCREENING ORDER (NO ISOLATION) - Swab, Nasopharynx.  Procedure                               Abnormality         Status                      ---------                               -----------         ------                     Respiratory Panel PCR w/...[[id number]]  Normal              Final result                 Please view results for these tests on the individual orders.    Respiratory Panel PCR w/COVID-19(SARS-CoV-2) HUNTER/MIRELLA/LING/PAD/COR/MAD/CHRISTIN In-House, NP Swab in UTM/VTM, 3-4 HR TAT - Swab, Nasopharynx [[id number]]  (Normal) Collected: 11/01/20 1817    Lab Status: Final result Specimen: Swab from Nasopharynx Updated: 11/01/20 2015     ADENOVIRUS, PCR Not Detected     Coronavirus 229E Not Detected     Coronavirus HKU1 Not Detected     Coronavirus NL63 Not Detected     Coronavirus OC43 Not Detected     COVID19 Not Detected     Human Metapneumovirus Not Detected     Human Rhinovirus/Enterovirus Not Detected     Influenza A PCR Not Detected     Influenza A H1 Not Detected     Influenza A H1 2009 PCR Not Detected     Influenza A H3 Not Detected     Influenza B PCR Not Detected     Parainfluenza Virus 1 Not Detected     Parainfluenza Virus 2 Not Detected     Parainfluenza Virus 3 Not Detected     Parainfluenza Virus 4 Not Detected     RSV, PCR Not Detected     Bordetella pertussis pcr Not Detected     Bordetella parapertussis PCR Not Detected     Chlamydophila pneumoniae PCR Not Detected     Mycoplasma pneumo by PCR Not Detected    Narrative:      Fact sheet for providers: https://docs.[facility]/wp-content/uploads/RWJ1183-3918-RL9.1-EUA-Provider-Fact-Sheet-3.pdf    Fact sheet for patients: https://docs.[facility]/wp-content/uploads/WXU7192-6071-KP2.1-EUA-Patient-Fact-Sheet-1.pdf    MRSA Screen, PCR (Inpatient) - Swab, Nares [[id number]]  (Normal) Collected: 11/01/20 1826    Lab Status: Final result Specimen: Swab from Nares Updated: 11/01/20 1950     MRSA PCR Negative    Narrative:      MRSA Negative          Imaging Results (Last 24 Hours)     Procedure Component Value Units Date/Time    FL Video Swallow With Speech Single Contrast [[id number]]  Collected: 11/02/20 1533     Updated: 11/02/20 1700    Narrative:      EXAMINATION: FL VIDEO SWALLOW W SPEECH SINGLE-CONTRAST-, FL LIMITED UGI  FOR MBS REFLUX SINGLE-CONTRAST-     INDICATION: RLL PNA, r/o aspiration; J18.9-Pneumonia, unspecified  organism; R50.9-Fever, unspecified; R06.02-Shortness of breath;  R53.1-Weakness     TECHNIQUE: 54 seconds of fluoroscopic time was used for this exam. 7  associated fluoroscopic loops were saved. The patient was evaluated in  the seated lateral position while taking a variety of consistencies of  barium by mouth under the direction of speech pathology.     COMPARISON: NONE     FINDINGS: 54 seconds of fluoroscopy provided for a modified barium  swallow. Please see speech therapy report for full details and  recommendations. Limited upper GI series demonstrated a very small sized  anchors diverticulum, which measures approximately 2 mm x 2 mm, and  retains a minimal amount of contrast after swallows. There were no signs  of a focal esophageal stricture, or gastroesophageal reflux.          Impression:      Fluoroscopy provided for a modified barium swallow. Please  see speech therapy report for full details and recommendations. Limited  upper GI series demonstrated a very small sized Zenker's diverticulum.         This report was finalized on 11/2/2020 4:56 PM by Dr. Edu Coughlin.       FL Limited Ugi For Mbs Reflux Single-Contrast [[id number]] Collected: 11/02/20 1533     Updated: 11/02/20 1700    Narrative:      EXAMINATION: FL VIDEO SWALLOW W SPEECH SINGLE-CONTRAST-, FL LIMITED UGI  FOR MBS REFLUX SINGLE-CONTRAST-     INDICATION: RLL PNA, r/o aspiration; J18.9-Pneumonia, unspecified  organism; R50.9-Fever, unspecified; R06.02-Shortness of breath;  R53.1-Weakness     TECHNIQUE: 54 seconds of fluoroscopic time was used for this exam. 7  associated fluoroscopic loops were saved. The patient was evaluated in  the seated lateral position while taking a variety of consistencies  of  barium by mouth under the direction of speech pathology.     COMPARISON: NONE     FINDINGS: 54 seconds of fluoroscopy provided for a modified barium  swallow. Please see speech therapy report for full details and  recommendations. Limited upper GI series demonstrated a very small sized  anchors diverticulum, which measures approximately 2 mm x 2 mm, and  retains a minimal amount of contrast after swallows. There were no signs  of a focal esophageal stricture, or gastroesophageal reflux.          Impression:      Fluoroscopy provided for a modified barium swallow. Please  see speech therapy report for full details and recommendations. Limited  upper GI series demonstrated a very small sized Zenker's diverticulum.         This report was finalized on 11/2/2020 4:56 PM by Dr. Edu Coughlin.                  I have reviewed the medications:  Scheduled Meds:acetaminophen, 325 mg, Oral, Once  aspirin, 81 mg, Oral, Daily  atorvastatin, 40 mg, Oral, Daily  azithromycin, 500 mg, Intravenous, Q24H  calcium carb-cholecalciferol, 1 tablet, Oral, BID  enoxaparin, 40 mg, Subcutaneous, Q24H  ferrous sulfate, 325 mg, Oral, Daily With Breakfast  levothyroxine, 25 mcg, Oral, Q AM  metoprolol tartrate, 25 mg, Oral, Daily  multivitamin with minerals, 1 tablet, Oral, Daily  sodium chloride, 10 mL, Intravenous, Q12H  tamsulosin, 0.4 mg, Oral, Daily      Continuous Infusions:   PRN Meds:.•  acetaminophen **OR** acetaminophen **OR** acetaminophen  •  Albuterol Sulfate NEB Orderable  •  docusate sodium  •  ketorolac  •  potassium chloride  •  potassium chloride  •  sodium chloride    Assessment/Plan   Assessment & Plan     Active Hospital Problems    Diagnosis  POA   • **Sepsis due to pneumonia (CMS/[facility]) [J18.9, A41.9]  Yes   • Acute respiratory failure with hypoxia (CMS/[facility]) [J96.01]  Yes   • CAD (coronary artery disease) [I25.10]  Yes   • HLD (hyperlipidemia) [E78.5]  Yes   • HTN (hypertension) [I10]  Yes   • Hypothyroid [E03.9]  Yes    • TRACEY on CPAP [G47.33, Z99.89]  Not Applicable      Resolved Hospital Problems   No resolved problems to display.        Brief Hospital Course to date:  Larry Dean Klinefelter is a 70 year old male presents to the ED with fever, chills, fatigue, weakness over the past three days.  CTA consistent with Right lower lobe lobar pneumonia. His legionella urinary ag returned +     Acute hypoxic respiratory failure due to PNA  Sepsis due to Legionella pneumonia  Leukocytosis, worse  -Still febrile without improvement in WBC. D/W pharmacy, transition to PO azithro for 5 days total.  -Pulmonary toilet, ICS, OOB w/ PT/OT.  -Labs in am.      HypoK  -Better. Monitor periodically.     Essential hypertension  -Controlled today. Continue current dose metoprolol.     Hyperlipidemia  -continue statin     Hypothyroidism  -continue levothyroxine     Morbid obesity  TRACEY  -CPAP at HS     This patient's problems and plans were partially entered by my partner and updated as appropriate by me 11/03/20.       DVT prophylaxis:  Scds, lovenox      Disposition: I expect the patient to be discharged home in 1-2 days when fever curve improving and WBC improving.    CODE STATUS:   Code Status and Medical Interventions:   Ordered at: 11/01/20 2003     Level Of Support Discussed With:    Patient     Code Status:    CPR     Medical Interventions (Level of Support Prior to Arrest):    Full       Honey Frank II, DO  11/03/20

## 2020-11-03 NOTE — THERAPY EVALUATION
Patient Name: Larry Dean Klinefelter  : 1950    MRN: [id number]                              Today's Date: 11/3/2020       Admit Date: 2020    Visit Dx:     ICD-10-CM ICD-9-CM   1. Pneumonia of right lower lobe due to infectious organism  J18.9 486   2. Acute febrile illness  R50.9 780.60   3. Shortness of breath  R06.02 786.05   4. Generalized weakness  R53.1 780.79     Patient Active Problem List   Diagnosis   • Primary osteoarthritis of left knee   • TRACEY on CPAP   • HTN (hypertension)   • HLD (hyperlipidemia)   • Hypothyroid   • CAD (coronary artery disease)   • Status post total left knee replacement   • Postoperative urinary retention   • Leukocytosis, likely reactive   • Acute blood loss anemia, mild, asymptomatic   • Acute postoperative pain   • Sepsis due to pneumonia (CMS/HCC)   • Acute respiratory failure with hypoxia (CMS/HCC)     Past Medical History:   Diagnosis Date   • Anemia    • Bowel obstruction (CMS/HCC)    • Chest pain    • Constipation    • Coronary artery disease    • Disease of thyroid gland    • Hepatitis    • Hyperlipidemia    • Hypertension    • Macular degeneration    • Medial meniscus tear    • Obesity    • Obstructive sleep apnea on CPAP     setting - 13.   • Primary osteoarthritis of left knee     and right knee   • Wears glasses      Past Surgical History:   Procedure Laterality Date   • CARDIAC CATHETERIZATION  2019   • COLONOSCOPY     • FRACTURE SURGERY      left heel    • INGUINAL HERNIA REPAIR      x2    • KNEE ARTHROSCOPY Right    • REPLACEMENT TOTAL KNEE Right    • TONSILLECTOMY     • TOTAL KNEE ARTHROPLASTY Left 2019    Procedure: TOTAL KNEE ARTHROPLASTY LEFT;  Surgeon: Willian Jc MD;  Location: [facility];  Service: Orthopedics     General Information     Row Name 20 1439          OT Time and Intention    Document Type  evaluation  -TB     Mode of Treatment  occupational therapy;individual therapy  -TB     Row Name 20 1439           General Information    Patient Profile Reviewed  yes  -TB     Prior Level of Function  independent:;ADL's;all household mobility;community mobility;driving  -TB     Existing Precautions/Restrictions  no known precautions/restrictions  -TB     Barriers to Rehab  none identified  -TB     Row Name 11/03/20 1439          Living Environment    Lives With  spouse  -TB     Row Name 11/03/20 1439          Home Main Entrance    Number of Stairs, Main Entrance  two  -TB     Row Name 11/03/20 1439          Stairs Within Home, Primary    Stairs, Within Home, Primary  flight to basement  -TB     Stair Railings, Within Home, Primary  railings safe and in good condition  -TB     Row Name 11/03/20 1439          Cognition    Orientation Status (Cognition)  oriented x 4  -TB     Row Name 11/03/20 1439          Safety Issues, Functional Mobility    Safety Issues Affecting Function (Mobility)  insight into deficits/self-awareness;awareness of need for assistance  -TB     Impairments Affecting Function (Mobility)  balance;strength;endurance/activity tolerance  -TB     Comment, Safety Issues/Impairments (Mobility)  Pt up in room with assist x1  -TB       User Key  (r) = Recorded By, (t) = Taken By, (c) = Cosigned By    Initials Name Provider Type    TB Gypsy Delgadillo, OT Occupational Therapist        Mobility/ADL's     Row Name 11/03/20 1440          Bed Mobility    Bed Mobility  supine-sit-supine  -TB     Supine-Sit-Supine [place] (Bed Mobility)  modified independence  -TB     Assistive Device (Bed Mobility)  bed rails  -TB     Row Name 11/03/20 1440          Functional Mobility    Functional Mobility- Comment  Defer to PT  -TB     Row Name 11/03/20 1440          Activities of Daily Living    BADL Assessment/Intervention  lower body dressing;bathing  -TB     Row Name 11/03/20 1440          Lower Body Dressing Assessment/Training    [place] Level (Lower Body Dressing)  doff;don;socks;independent  -TB     Position (Lower  Body Dressing)  edge of bed sitting  -TB     Row Name 11/03/20 1440          Bathing Assessment/Intervention    [place] Level (Bathing)  set up;distal lower extremities/feet simulated task  -TB     Assistive Devices (Bathing)  long-handled sponge  -TB     Position (Bathing)  edge of bed sitting  -TB     Comment (Bathing)  Education initiated for energy conservation with ADLs and use of AE.  -TB       User Key  (r) = Recorded By, (t) = Taken By, (c) = Cosigned By    Initials Name Provider Type    TB Gypsy Delgadillo OT Occupational Therapist        Obj/Interventions     Row Name 11/03/20 1441          Sensory Assessment (Somatosensory)    Sensory Assessment (Somatosensory)  UE sensation intact  -TB     Row Name 11/03/20 1441          Vision Assessment/Intervention    Visual Impairment/Limitations  corrective lenses full-time  -TB     Row Name 11/03/20 1441          Range of Motion Comprehensive    General Range of Motion  bilateral upper extremity ROM WFL  -TB     Row Name 11/03/20 1441          Strength Comprehensive (MMT)    Comment, General Manual Muscle Testing (MMT) Assessment  Generalized weakness. BUE functionally 4/5  -TB     Row Name 11/03/20 1441          Shoulder (Therapeutic Exercise)    Shoulder (Therapeutic Exercise)  other (see comments);AROM (active range of motion) yellow theraband  -TB     Shoulder AROM (Therapeutic Exercise)  bilateral;horizontal aBduction/aDduction;5 repetitions;3 second hold  -TB     Row Name 11/03/20 1441          Balance    Balance Assessment  sitting dynamic balance  -TB     Dynamic Sitting Balance  WNL  -TB     Balance Interventions  sitting;sit to stand;dynamic;occupation based/functional task;UE activity with balance activity;dynamic reaching;weight shifting activity  -TB     Row Name 11/03/20 1441          Therapeutic Exercise    Therapeutic Exercise  shoulder Education initiated for BUE HEP to support ADL performance and posture (respiration)  -TB       User  Key  (r) = Recorded By, (t) = Taken By, (c) = Cosigned By    Initials Name Provider Type    Gypsy Chisholm OT Occupational Therapist        Goals/Plan     Row Name 11/03/20 1449          Transfer Goal 1 (OT)    Activity/Assistive Device (Transfer Goal 1, OT)  toilet  -TB     [place] Level/Cues Needed (Transfer Goal 1, OT)  modified independence  -TB     Time Frame (Transfer Goal 1, OT)  by discharge  -TB     Progress/Outcome (Transfer Goal 1, OT)  goal ongoing  -TB     Row Name 11/03/20 1449          Toileting Goal 1 (OT)    Activity/Device (Toileting Goal 1, OT)  toileting skills, all  -TB     [place] Level/Cues Needed (Toileting Goal 1, OT)  modified independence  -TB     Time Frame (Toileting Goal 1, OT)  by discharge  -TB     Row Name 11/03/20 1449          Grooming Goal 1 (OT)    Activity/Device (Grooming Goal 1, OT)  hair care;oral care;wash face, hands  -TB     [place] (Grooming Goal 1, OT)  modified independence  -TB     Time Frame (Grooming Goal 1, OT)  by discharge  -TB     Strategies/Barriers (Grooming Goal 1, OT)  standing sink side  -TB     Progress/Outcome (Grooming Goal 1, OT)  goal ongoing  -TB       User Key  (r) = Recorded By, (t) = Taken By, (c) = Cosigned By    Initials Name Provider Type    Gypsy Chisholm OT Occupational Therapist        Clinical Impression     Row Name 11/03/20 1444          Pain Assessment    Additional Documentation  Pain Scale: Numbers Pre/Post-Treatment (Group)  -TB     Row Name 11/03/20 1444          Pain Scale: Numbers Pre/Post-Treatment    Pretreatment Pain Rating  0/10 - no pain  -TB     Posttreatment Pain Rating  0/10 - no pain  -TB     Pre/Posttreatment Pain Comment  Pt denies pain with activity  -TB     Row Name 11/03/20 1444          Plan of Care Review    Plan of Care Reviewed With  patient;family  -TB     Outcome Summary  OT IE completed. Pt alert, Ox4 and motivated to work with OT. Pt up with Mod. [place] using RW. BENNIE  HEP initiated to support ADL performance. Education initiated for energy conservation with ADLs. Independent for LB dressing tasks. OT will follow IP to advance strength/endurance as tolerated. Plan is home with spouse assist. No DME needs.  -TB     Row Name 11/03/20 1444          Therapy Assessment/Plan (OT)    Rehab Potential (OT)  good, to achieve stated therapy goals  -TB     Criteria for Skilled Therapeutic Interventions Met (OT)  yes;skilled treatment is necessary  -TB     Therapy Frequency (OT)  daily  -TB     Row Name 11/03/20 6073          Therapy Plan Review/Discharge Plan (OT)    Equipment Needs Upon Discharge (OT)  -- None. Pt has shower seat and comfort height commode  -TB     Anticipated Discharge Disposition (OT)  home with assist;home with home health  -TB     Row Name 11/03/20 1446          Vital Signs    Pre Systolic BP Rehab  -- RN cleared OT  -TB     Pre SpO2 (%)  94  -TB     O2 Delivery Pre Treatment  room air  -TB     Intra SpO2 (%)  93  -TB     O2 Delivery Intra Treatment  room air  -TB     Post SpO2 (%)  95  -TB     O2 Delivery Post Treatment  room air  -TB     Pre Patient Position  Sitting  -TB     Post Patient Position  Sitting  -TB     Row Name 11/03/20 5088          Positioning and Restraints    Pre-Treatment Position  in bed  -TB     Post Treatment Position  bed  -TB     In Bed  sitting EOB;call light within reach;encouraged to call for assist;with family/caregiver  -TB       User Key  (r) = Recorded By, (t) = Taken By, (c) = Cosigned By    Initials Name Provider Type    TB Gypsy Delgadillo, OT Occupational Therapist        Outcome Measures     Row Name 11/03/20 0884          How much help from another is currently needed...    Putting on and taking off regular lower body clothing?  4  -TB     Bathing (including washing, rinsing, and drying)  3  -TB     Toileting (which includes using toilet bed pan or urinal)  3  -TB     Putting on and taking off regular upper body clothing  4   -TB     Taking care of personal grooming (such as brushing teeth)  3  -TB     Eating meals  4  -TB     AM-PAC 6 Clicks Score (OT)  21  -TB     Row Name 11/03/20 1450          Functional Assessment    Outcome Measure Options  AM-PAC 6 Clicks Daily Activity (OT)  -TB       User Key  (r) = Recorded By, (t) = Taken By, (c) = Cosigned By    Initials Name Provider Type    TB Gypsy Delgadillo, OT Occupational Therapist        Occupational Therapy Education                 Title: PT OT SLP Therapies (In Progress)     Topic: Occupational Therapy (In Progress)     Point: ADL training (Done)     Description:   Instruct learner(s) on proper safety adaptation and remediation techniques during self care or transfers.   Instruct in proper use of assistive devices.              Learning Progress Summary           Patient Acceptance, E,D,TB, VU,DU,NR by TB at 11/3/2020 1451    Comment: Education initiated for benefits of OOB activity/therapy, role of OT, energy conservation strategies with ADLs and BUE HEP to support self-care.                   Point: Home exercise program (Done)     Description:   Instruct learner(s) on appropriate technique for monitoring, assisting and/or progressing therapeutic exercises/activities.              Learning Progress Summary           Patient Acceptance, E,D,TB, VU,DU,NR by TB at 11/3/2020 1451    Comment: Education initiated for benefits of OOB activity/therapy, role of OT, energy conservation strategies with ADLs and BUE HEP to support self-care.                   Point: Precautions (Not Started)     Description:   Instruct learner(s) on prescribed precautions during self-care and functional transfers.              Learner Progress:  Not documented in this visit.          Point: Body mechanics (Not Started)     Description:   Instruct learner(s) on proper positioning and spine alignment during self-care, functional mobility activities and/or exercises.              Learner Progress:  Not  documented in this visit.                      User Key     Initials Effective Dates Name Provider Type Discipline     06/08/18 -  Gypsy Delgadillo OT Occupational Therapist OT              OT Recommendation and Plan  Therapy Frequency (OT): daily  Plan of Care Review  Plan of Care Reviewed With: patient, family  Outcome Summary: OT IE completed. Pt alert, Ox4 and motivated to work with OT. Pt up with Mod. [place] using RW. BUE HEP initiated to support ADL performance. Education initiated for energy conservation with ADLs. Independent for LB dressing tasks. OT will follow IP to advance strength/endurance as tolerated. Plan is home with spouse assist. No DME needs.     Time Calculation:   Time Calculation- OT     Row Name 11/03/20 1410             Time Calculation- OT    OT Start Time  1410  -TB      OT Received On  11/03/20  -TB      OT Goal Re-Cert Due Date  11/13/20  -TB         Timed Charges    02360 - OT Therapeutic Activity Minutes  12  -TB        User Key  (r) = Recorded By, (t) = Taken By, (c) = Cosigned By    Initials Name Provider Type    TB Gypsy Delgadillo OT Occupational Therapist        Therapy Charges for Today     Code Description Service Date Service Provider Modifiers Qty    [id number]  OT THERAPEUTIC ACT EA 15 MIN 11/3/2020 Gypsy Delgadillo OT GO 1    [id number]  OT EVAL LOW COMPLEXITY 2 11/3/2020 Gypsy Delgadillo OT GO 1               Gypsy Delgadillo OT  11/3/2020

## 2020-11-04 LAB
DEPRECATED RDW RBC AUTO: 48.9 FL (ref 37–54)
ERYTHROCYTE [DISTWIDTH] IN BLOOD BY AUTOMATED COUNT: 13.4 % (ref 12.3–15.4)
HCT VFR BLD AUTO: 34.8 % (ref 37.5–51)
HGB BLD-MCNC: 11.3 G/DL (ref 13–17.7)
MCH RBC QN AUTO: 32.1 PG (ref 26.6–33)
MCHC RBC AUTO-ENTMCNC: 32.5 G/DL (ref 31.5–35.7)
MCV RBC AUTO: 98.9 FL (ref 79–97)
PLATELET # BLD AUTO: 233 10*3/MM3 (ref 140–450)
PMV BLD AUTO: 9.9 FL (ref 6–12)
RBC # BLD AUTO: 3.52 10*6/MM3 (ref 4.14–5.8)
WBC # BLD AUTO: 13.35 10*3/MM3 (ref 3.4–10.8)

## 2020-11-04 PROCEDURE — 85027 COMPLETE CBC AUTOMATED: CPT | Performed by: INTERNAL MEDICINE

## 2020-11-04 PROCEDURE — 25010000002 ENOXAPARIN PER 10 MG: Performed by: NURSE PRACTITIONER

## 2020-11-04 PROCEDURE — 25010000002 AZITHROMYCIN PER 500 MG: Performed by: INTERNAL MEDICINE

## 2020-11-04 PROCEDURE — 94660 CPAP INITIATION&MGMT: CPT

## 2020-11-04 PROCEDURE — 94799 UNLISTED PULMONARY SVC/PX: CPT

## 2020-11-04 PROCEDURE — 99232 SBSQ HOSP IP/OBS MODERATE 35: CPT | Performed by: FAMILY MEDICINE

## 2020-11-04 RX ADMIN — ACETAMINOPHEN 650 MG: 325 TABLET, FILM COATED ORAL at 05:14

## 2020-11-04 RX ADMIN — TAMSULOSIN HYDROCHLORIDE 0.4 MG: 0.4 CAPSULE ORAL at 08:38

## 2020-11-04 RX ADMIN — ACETAMINOPHEN 650 MG: 325 TABLET, FILM COATED ORAL at 14:55

## 2020-11-04 RX ADMIN — Medication 1 TABLET: at 19:52

## 2020-11-04 RX ADMIN — METOPROLOL TARTRATE 25 MG: 25 TABLET, FILM COATED ORAL at 08:38

## 2020-11-04 RX ADMIN — FERROUS SULFATE TAB 325 MG (65 MG ELEMENTAL FE) 325 MG: 325 (65 FE) TAB at 08:38

## 2020-11-04 RX ADMIN — SODIUM CHLORIDE, PRESERVATIVE FREE 10 ML: 5 INJECTION INTRAVENOUS at 19:52

## 2020-11-04 RX ADMIN — ASPIRIN 81 MG: 81 TABLET, COATED ORAL at 08:37

## 2020-11-04 RX ADMIN — LEVOTHYROXINE SODIUM 25 MCG: 25 TABLET ORAL at 05:14

## 2020-11-04 RX ADMIN — Medication 1 TABLET: at 08:38

## 2020-11-04 RX ADMIN — AZITHROMYCIN 500 MG: 500 INJECTION, POWDER, LYOPHILIZED, FOR SOLUTION INTRAVENOUS at 11:56

## 2020-11-04 RX ADMIN — ENOXAPARIN SODIUM 40 MG: 40 INJECTION SUBCUTANEOUS at 08:37

## 2020-11-04 RX ADMIN — ATORVASTATIN CALCIUM 40 MG: 40 TABLET, FILM COATED ORAL at 08:38

## 2020-11-04 RX ADMIN — SODIUM CHLORIDE, PRESERVATIVE FREE 10 ML: 5 INJECTION INTRAVENOUS at 08:39

## 2020-11-04 RX ADMIN — MULTIPLE VITAMINS W/ MINERALS TAB 1 TABLET: TAB at 08:38

## 2020-11-04 NOTE — CONSULTS
INFECTIOUS DISEASE CONSULT/INITIAL HOSPITAL VISIT    Larry Dean Klinefelter  1950  [id number]    Date of Consult: 11/4/2020    Admission Date: 11/1/2020      Requesting Provider: No ref. provider found  Evaluating Physician: Kai Marion MD    Reason for Consultation: Legionella pneumonia    History of present illness:    Patient is a 70 y.o. male seen today for evaluation of Legionella pneumonia with sepsis and acute hypoxic respiratory failure.  He was swimming with his grandson at the [place] [employer] last Thursday when he noted the onset of chills.  He continued to have recurrent shaking chills along with malaise, and fatigue.  He noted the onset of a subjective fever.  He denies dyspnea but had a minimal cough with no sputum production.  He noted some nausea and slight diarrhea.  He presented to the emergency room at the insistence of his wife.  After his presentation to the emergency room, he spiked a fever to 104.9 degrees.  A chest x-ray and chest CTA revealed a right lower lobe lobar pneumonia.  He was admitted and started on intravenous vancomycin and ceftriaxone along with doxycycline.  A urinary Legionella antigen subsequently returned positive and he has been switched to intravenous azithromycin.  He denies any other exposure to a water source other than the [place] [employer].  His fever curve currently appears to be declining.  He is requiring 3 L of supplemental oxygen.    Past Medical History:   Diagnosis Date   • Anemia    • Bowel obstruction (CMS/HCC)    • Chest pain    • Constipation    • Coronary artery disease    • Disease of thyroid gland    • Hepatitis    • Hyperlipidemia    • Hypertension    • Macular degeneration    • Medial meniscus tear    • Obesity    • Obstructive sleep apnea on CPAP     setting - 13.   • Primary osteoarthritis of left knee     and right knee   • Wears glasses        Past Surgical History:   Procedure Laterality Date   • CARDIAC CATHETERIZATION  01/2019   • COLONOSCOPY   2013   • FRACTURE SURGERY      left heel    • INGUINAL HERNIA REPAIR      x2    • KNEE ARTHROSCOPY Right    • REPLACEMENT TOTAL KNEE Right    • TONSILLECTOMY     • TOTAL KNEE ARTHROPLASTY Left 5/16/2019    Procedure: TOTAL KNEE ARTHROPLASTY LEFT;  Surgeon: Willian Jc MD;  Location: [facility];  Service: Orthopedics       Family History   Problem Relation Age of Onset   • Rheum arthritis Mother    • Stroke Father    • Heart attack Father    • Hypertension Father    • Clotting disorder Father    • Rheum arthritis Father    • Hypertension Other        Social History     Socioeconomic History   • Marital status:      Spouse name: Not on file   • Number of children: Not on file   • Years of education: Not on file   • Highest education level: Not on file   Tobacco Use   • Smoking status: Never Smoker   • Smokeless tobacco: Never Used   Substance and Sexual Activity   • Alcohol use: No   • Drug use: No   • Sexual activity: Defer       Allergies   Allergen Reactions   • Lisinopril Angioedema         Medication:    Current Facility-Administered Medications:   •  acetaminophen (TYLENOL) tablet 650 mg, 650 mg, Oral, Q4H PRN, 650 mg at 11/04/20 0514 **OR** acetaminophen (TYLENOL) 160 MG/5ML solution 650 mg, 650 mg, Oral, Q4H PRN, 650 mg at 11/02/20 1511 **OR** acetaminophen (TYLENOL) suppository 650 mg, 650 mg, Rectal, Q4H PRN, Lauren, Naila, APRN  •  albuterol (PROVENTIL) nebulizer solution 0.083% 2.5 mg/3mL, 2.5 mg, Nebulization, Q4H PRN, Carmen Rogers MD  •  aspirin EC tablet 81 mg, 81 mg, Oral, Daily, Lauren, Naila, APRN, 81 mg at 11/04/20 0837  •  atorvastatin (LIPITOR) tablet 40 mg, 40 mg, Oral, Daily, Lauren, Naila, APRN, 40 mg at 11/04/20 0838  •  AZITHROMYCIN 500 MG/250 ML 0.9% NS IVPB (vial-mate), 500 mg, Intravenous, Q24H, Honey Frank II, DO, 500 mg at 11/04/20 1156  •  calcium carb-cholecalciferol 600-800 MG-UNIT tablet 1 tablet, 1 tablet, Oral, BID, Lauren, Naila, APRN, 1 tablet at  11/04/20 0838  •  docusate sodium (COLACE) capsule 100 mg, 100 mg, Oral, Daily PRN, Lauren, Naila, APRN  •  enoxaparin (LOVENOX) syringe 40 mg, 40 mg, Subcutaneous, Q24H, Lauren, Naila, APRN, 40 mg at 11/04/20 0837  •  ferrous sulfate tablet 325 mg, 325 mg, Oral, Daily With Breakfast, Lauren, Naila, APRN, 325 mg at 11/04/20 0838  •  ketorolac (TORADOL) injection 15 mg, 15 mg, Intravenous, Q6H PRN, Maya Bermeo, DO, 15 mg at 11/02/20 1711  •  levothyroxine (SYNTHROID, LEVOTHROID) tablet 25 mcg, 25 mcg, Oral, Q AM, Lauren, Naila, APRN, 25 mcg at 11/04/20 0514  •  metoprolol tartrate (LOPRESSOR) tablet 25 mg, 25 mg, Oral, Daily, Lauren, Naila, APRN, 25 mg at 11/04/20 0838  •  multivitamin with minerals 1 tablet, 1 tablet, Oral, Daily, Lauren, Naila, APRN, 1 tablet at 11/04/20 0838  •  potassium chloride (KLOR-CON) packet 40 mEq, 40 mEq, Oral, PRN, Zac, Honey M II, DO  •  potassium chloride (MICRO-K) CR capsule 40 mEq, 40 mEq, Oral, PRN, Zac, Honey M II, DO, 40 mEq at 11/02/20 1830  •  sodium chloride 0.9 % flush 10 mL, 10 mL, Intravenous, Q12H, Lauren, Naila, APRN, 10 mL at 11/04/20 0839  •  sodium chloride 0.9 % flush 10 mL, 10 mL, Intravenous, PRN, Lauren, Naila, APRN  •  tamsulosin (FLOMAX) 24 hr capsule 0.4 mg, 0.4 mg, Oral, Daily, Lauren, Naila, APRN, 0.4 mg at 11/04/20 0838    Antibiotics:  Anti-Infectives (From admission, onward)    Ordered     Dose/Rate Route Frequency Start Stop    11/03/20 1008  AZITHROMYCIN 500 MG/250 ML 0.9% NS IVPB (vial-mate)     Jordana Vigil, PharmD reviewed the order on 11/04/20 0931.   Ordering Provider: Honey Frank II, DO    500 mg  over 60 Minutes Intravenous Every 24 Hours 11/03/20 1100 11/08/20 1059    11/02/20 0508  piperacillin-tazobactam (ZOSYN) 3.375 g in iso-osmotic dextrose 50 ml (premix)     Ordering Provider: Naila Aguilar APRN    3.375 g  over 30 Minutes Intravenous Once 11/02/20 0600 11/02/20 0627    11/01/20 1632   cefTRIAXone (ROCEPHIN) 1 g/100 mL 0.9% NS (MBP)     Ordering Provider: Jermain Cordova PA    1 g  over 30 Minutes Intravenous Once 20 1634 20 1736    20 1632  vancomycin 2000 mg/500 mL 0.9% NS IVPB ([facility])     Ordering Provider: Jermain Cordova PA    20 mg/kg × 104 kg  over 120 Minutes Intravenous Once 20 1634 20            Review of Systems:  Constitutional--he has fevers, shaking chills, fatigue, and malaise.    HEENT-- No new vision, hearing or throat complaints.  He had a headache but this is partially abated.  CV-- No chest pain, palpitation or syncope  Resp--he denies pleuritic chest pain.  He did have some mild dyspnea and is now on supplemental oxygen.  GI-has some nausea and anorexia with mild diarrhea.    -- No dysuria, hematuria, or flank pain.  Denies hesitancy, urgency or flank pain.  Lymph- no swollen lymph nodes in neck/axilla or groin.   Heme- No active bruising or bleeding;   MS-- no swelling or pain in the bones or joints of arms/legs.    Neuro-- No acute focal weakness or numbness in the arms or legs.  .  Skin--No rashes or lesions      Physical Exam:   Vital Signs  Temp (24hrs), Av.3 °F (37.4 °C), Min:98.9 °F (37.2 °C), Max:100.2 °F (37.9 °C)    Temp  Min: 98.9 °F (37.2 °C)  Max: 100.2 °F (37.9 °C)  BP  Min: 131/86  Max: 158/82  Pulse  Min: 72  Max: 102  Resp  Min: 18  Max: 18  SpO2  Min: 86 %  Max: 96 %    GENERAL: Awake and alert, in no acute distress.   HEENT: Normocephalic, atraumatic.  PERRL. EOMI. No conjunctival injection. No icterus. Oropharynx clear without evidence of thrush or exudate.   NECK: Supple without nuchal rigidity.   LYMPH: No cervical, axillary or inguinal lymphadenopathy.  HEART: RRR; No murmur, rubs, gallops.   LUNGS: Mild right greater than left basilar crackles   ABDOMEN: Soft, nontender, nondistended. Positive bowel sounds. No rebound or guarding. NO mass or HSM.  EXT:  No cyanosis, clubbing or edema. No cord.  :   Without Jonas catheter.  MSK: Focal joint swelling or erythema   SKIN: Warm and dry without cutaneous eruptions on Inspection/palpation.    NEURO: Oriented to PPT.  Motor 5/5 strength bilaterally  PSYCHIATRIC: Normal insight and judgement. Cooperative with PE    Laboratory Data    Results from last 7 days   Lab Units 11/04/20  0517 11/03/20  0616 11/02/20  0428   WBC 10*3/mm3 13.35* 15.06* 13.22*   HEMOGLOBIN g/dL 11.3* 11.3* 10.9*   HEMATOCRIT % 34.8* 34.8* 34.1*   PLATELETS 10*3/mm3 233 196 182     Results from last 7 days   Lab Units 11/03/20  0616   SODIUM mmol/L 139   POTASSIUM mmol/L 4.4   CHLORIDE mmol/L 105   CO2 mmol/L 24.0   BUN mg/dL 17   CREATININE mg/dL 1.01   GLUCOSE mg/dL 151*   CALCIUM mg/dL 8.5*     Results from last 7 days   Lab Units 11/02/20  0428   ALK PHOS U/L 48   BILIRUBIN mg/dL 0.4   ALT (SGPT) U/L 15   AST (SGOT) U/L 20             Results from last 7 days   Lab Units 11/02/20  0428   LACTATE mmol/L 1.5             Estimated Creatinine Clearance: 81.9 mL/min (by C-G formula based on SCr of 1.01 mg/dL).      Microbiology:  Blood Culture   Date Value Ref Range Status   11/02/2020 No growth at 2 days  Preliminary   11/02/2020 No growth at 2 days  Preliminary     No results found for: BCIDPCR, CXREFLEX, CSFCX, CULTURETIS  No results found for: CULTURES, HSVCX, URCX  No results found for: EYECULTURE, GCCX, LABHSV  No results found for: LEGIONELLA, MRSACX, MUMPSCX, MYCOPLASCX  No results found for: NOCARDIACX, STOOLCX  No results found for: THROATCX, UNSTIMCULT, URINECX, CULTURE, VZVCULTUR  No results found for: VIRALCULTU, WOUNDCX        Radiology:  Imaging Results (Last 72 Hours)     Procedure Component Value Units Date/Time    FL Video Swallow With Speech Single Contrast [[id number]] Collected: 11/02/20 1533     Updated: 11/02/20 1700    Narrative:      EXAMINATION: FL VIDEO SWALLOW W SPEECH SINGLE-CONTRAST-, FL LIMITED UGI  FOR MBS REFLUX SINGLE-CONTRAST-     INDICATION: RLL PNA, r/o  aspiration; J18.9-Pneumonia, unspecified  organism; R50.9-Fever, unspecified; R06.02-Shortness of breath;  R53.1-Weakness     TECHNIQUE: 54 seconds of fluoroscopic time was used for this exam. 7  associated fluoroscopic loops were saved. The patient was evaluated in  the seated lateral position while taking a variety of consistencies of  barium by mouth under the direction of speech pathology.     COMPARISON: NONE     FINDINGS: 54 seconds of fluoroscopy provided for a modified barium  swallow. Please see speech therapy report for full details and  recommendations. Limited upper GI series demonstrated a very small sized  anchors diverticulum, which measures approximately 2 mm x 2 mm, and  retains a minimal amount of contrast after swallows. There were no signs  of a focal esophageal stricture, or gastroesophageal reflux.          Impression:      Fluoroscopy provided for a modified barium swallow. Please  see speech therapy report for full details and recommendations. Limited  upper GI series demonstrated a very small sized Zenker's diverticulum.         This report was finalized on 11/2/2020 4:56 PM by Dr. Edu Coughlin.       FL Limited Ugi For Mbs Reflux Single-Contrast [[id number]] Collected: 11/02/20 1533     Updated: 11/02/20 1700    Narrative:      EXAMINATION: FL VIDEO SWALLOW W SPEECH SINGLE-CONTRAST-, FL LIMITED UGI  FOR MBS REFLUX SINGLE-CONTRAST-     INDICATION: RLL PNA, r/o aspiration; J18.9-Pneumonia, unspecified  organism; R50.9-Fever, unspecified; R06.02-Shortness of breath;  R53.1-Weakness     TECHNIQUE: 54 seconds of fluoroscopic time was used for this exam. 7  associated fluoroscopic loops were saved. The patient was evaluated in  the seated lateral position while taking a variety of consistencies of  barium by mouth under the direction of speech pathology.     COMPARISON: NONE     FINDINGS: 54 seconds of fluoroscopy provided for a modified barium  swallow. Please see speech therapy report for full  details and  recommendations. Limited upper GI series demonstrated a very small sized  anchors diverticulum, which measures approximately 2 mm x 2 mm, and  retains a minimal amount of contrast after swallows. There were no signs  of a focal esophageal stricture, or gastroesophageal reflux.          Impression:      Fluoroscopy provided for a modified barium swallow. Please  see speech therapy report for full details and recommendations. Limited  upper GI series demonstrated a very small sized Zenker's diverticulum.         This report was finalized on 11/2/2020 4:56 PM by Dr. Edu Coughlin.       XR Chest 1 View [[id number]] Collected: 11/01/20 1600     Updated: 11/02/20 0844    Narrative:      EXAMINATION: XR CHEST 1 VW- 11/01/2020     INDICATION: fever     COMPARISON: 05/01/2019 chest radiograph     FINDINGS: Heart shadow and pulmonary vasculature appear normal in size.  Lungs are moderately well-expanded and appear grossly clear. A slight  hazy opacity of the medial right base, is seen to be much more extensive  on patient's subsequent angiographic chest CT scan. No effusion, edema  or pneumothorax is seen.       Impression:      Subtle increased opacity in the retrocardiac region of the  medial right lung base. No evidence of active disease elsewhere. Please  see patient's subsequent chest CT scan report.     D:  11/01/2020  E:  11/02/2020         This report was finalized on 11/2/2020 8:41 AM by Dr. Landen Gao MD.       CT Angiogram Chest [[id number]] Collected: 11/01/20 1701     Updated: 11/02/20 0833    Narrative:      EXAMINATION: CT ANGIOGRAM CHEST - 11/01/2020     INDICATION: Shortness of air. Fever.     TECHNIQUE: Spiral acquisition 3 mm post IV contrast images through the  chest with sagittal and coronal 2D reconstructions.     The radiation dose reduction device was turned on for each scan per the  ALARA (As Low as Reasonably Achievable) protocol.     COMPARISON: Portable chest radiograph of today's  date.     FINDINGS: There is good contrast opacification of the pulmonary  arteries. No filling defects are seen to suggest pulmonary embolic  disease. There is no evidence of thoracic aortic aneurysm or dissection,  pericardial or pleural effusion, or significant mediastinal adenopathy.  There do appear to be shotty mediastinal lymph nodes and mildly  prominent right hilar node, which may be reactive.     Lung window images show dense area of airspace disease in the posterior  medial right lower lobe, difficult to appreciate even in retrospect on  the patient's current chest radiograph, behind the heart and  hemidiaphragm. Disease extends over a roughly 9 x 6 x 13 cm area.  Appearance is typical of lobar pneumonia. Lungs elsewhere appear clear  except for a couple of granulomatous calcifications.     Included images of the upper abdomen show expected fatty liver change  for body habitus, and a several small round right and left lobe liver  lesions all of which measure water density, typical of liver cysts.  There are several calcified gallstones in the dependent portion of the  otherwise normal-appearing gallbladder. The spleen is not enlarged. No  significant abnormalities are noted of the included portions of the  pancreatic tail, adrenal glands, or upper renal poles. There appear to  be a couple of small left renal cysts.       Impression:      1. No evidence of pulmonary embolus.     2. Extensive right lower lobe pneumonia typical of lobar/bacterial  pneumonia. No evidence of pneumonia elsewhere.     3. Mild, likely reactive mediastinal and right hilar lymphadenopathy.     4. Gallstones.     5. Incidental hepatic and left renal cysts.     DICTATED:   11/01/2020  EDITED/ls :   11/01/2020         This report was finalized on 11/2/2020 8:30 AM by Dr. Landen Gao MD.       XR Chest 1 View [[id number]] Collected: 11/02/20 0532     Updated: 11/02/20 0535    Narrative:      CR Chest 1 Vw    INDICATION:   Shortness of  air. Fever. Pneumonia.     COMPARISON:    2020    FINDINGS:  Single portable AP view(s) of the chest.    Cardiac and mediastinal contours remain normal. There is atherosclerotic disease in the aorta. Left lung is clear. There is persistent infiltrate at the right medial base. No pneumothorax.       Impression:      No significant change. Persistent right basilar infiltrate.    Signer Name: Omar Tse MD   Signed: 2020 5:32 AM   Workstation Name: [facility]    [facility]            Impression:   1.  Legionella pneumonia-associated with acute hypoxic respiratory failure and sepsis.  Legionella is generally best treated with either azithromycin or Levaquin.  He is currently on azithromycin so I will leave him on azithromycin therapy.  He will need at least 5 days of therapy.  We may be able to discharge him tomorrow on oral azithromycin but he may require home O2.  2.  Acute hypoxic respiratory failure  3.  Sepsis-with severe hyperpyrexia (temperature of 104.9), tachycardia with a heart rate of 113, leukocytosis/neutrophilia, an elevated procalcitonin, known focus of infection.  4.  Leukocytosis/neutrophilia      PLAN/RECOMMENDATIONS:   Thank you for asking us to see Larry Dean Klinefelter, I recommend the followin.  Wean supplemental oxygen  2.  Continue azithromycin 500 mg IV daily-this can be switched to p.o. soon  3.  Possible discharge to home tomorrow    He does appear to be clinically improving.  He should be able to discharge soon on oral azithromycin but he may require home O2.     I discussed his situation with Dr. Mariama Hobbs.    Kai Marion MD  2020  12:55 EST                 - stable s/p resection and RT A1c: 5.7  - ISS

## 2020-11-04 NOTE — PROGRESS NOTES
Continued Stay Note   [place]     Patient Name: Larry Dean Klinefelter  MRN: [id number]  Today's Date: 11/4/2020    Admit Date: 11/1/2020    Discharge Plan     Row Name 11/04/20 1122       Plan    Plan  Update    Patient/Family in Agreement with Plan  other (see comments)    Plan Comments  Discussed pt in rounds. ID to be consulted. PT recommended OP PT, pt walked 60 ft x2. CM will f/u with pt once ID has seen. Will cont to follow. Dari Jimenez RN,  ext. [phone]        Discharge Codes    No documentation.       Expected Discharge Date and Time     Expected Discharge Date Expected Discharge Time    Nov 4, 2020             Dari Jimenez RN

## 2020-11-04 NOTE — PROGRESS NOTES
[facility] Medicine Services  PROGRESS NOTE    Patient Name: Larry Dean Klinefelter  : 1950  MRN: [id number]    Date of Admission: 2020  Primary Care Physician: Kin Nunez MD    Subjective   Subjective     CC:  Shortness of breath    HPI:  Patient is a 70-year-old male who was admitted with Legionella pneumonia.    2020-patient reports continued cough and shortness of breath.  He has continued to have fevers also.  Antibiotics were changed to azithromycin yesterday.  I discussed his case with Dr. Kai Collins who plans to follow the patient as an outpatient and report to the health department.  Possible exposure was this wound pool at the [facility].  He still complains of weakness.  PT consult pending.    Review of Systems  General: Positive fever, chills, fatigue  ENT: No sore throat, trouble swallowing or changes in vision  Respiratory: Positive shortness of breath, cough, denies wheezing or fast breathing  Cardiovascular: No chest pain, palpitations, positive dyspnea with exertion  Gastrointestinal: No nausea vomiting abdominal pain  Musculoskeletal: Positive difficulty walking, positive weakness  Vascular: No cyanosis or clubbing  Lymphatic: No peripheral edema, no lymphadenopathy  Neurologic: No headache, confusion, dizziness  Psychiatric: No changes in mood.  No depression or anxiety.       Objective   Objective     Vital Signs:   Temp:  [98.9 °F (37.2 °C)-101.2 °F (38.4 °C)] 99.2 °F (37.3 °C)  Heart Rate:  [] (P) 74  Resp:  [18] 18  BP: (133-158)/(65-84) (P) 133/88        Physical Exam:  Constitutional: No acute distress, awake, alert, nontoxic, elderly body habitus  Eyes: Pupils equal, sclerae anicteric, no conjunctival injection  HENT: NCAT, mucous membranes moist  Neck: Supple, no thyromegaly, no lymphadenopathy  Respiratory: Decreased breath sounds with coarse rales bilaterally, good effort, nonlabored respirations   Cardiovascular:  RRR  Gastrointestinal: Positive bowel sounds, soft, nontender, nondistended  Musculoskeletal: No peripheral edema, normal muscle tone for age  Psychiatric: Appropriate affect, good insight and judgement, cooperative  Neurologic: Oriented x 3, movements symmetric BUE and BLE, Cranial Nerves grossly intact, speech clear and fluent  Skin: No rashes, no jaundice, no petechiae, no mottling      Results Reviewed:  Results from last 7 days   Lab Units 11/04/20  0517 11/03/20  0616 11/02/20 0428 11/01/20  1452   WBC 10*3/mm3 13.35* 15.06* 13.22* 16.19*   HEMOGLOBIN g/dL 11.3* 11.3* 10.9* 12.4*   HEMATOCRIT % 34.8* 34.8* 34.1* 37.5   PLATELETS 10*3/mm3 233 196 182 201   PROCALCITONIN ng/mL  --   --  0.55* 0.32*     Results from last 7 days   Lab Units 11/03/20  0616 11/02/20  2242 11/02/20 0428 11/01/20  1452   SODIUM mmol/L 139  --  137 139   POTASSIUM mmol/L 4.4 4.6 3.4* 3.8   CHLORIDE mmol/L 105  --  106 104   CO2 mmol/L 24.0  --  21.0* 22.0   BUN mg/dL 17  --  11 15   CREATININE mg/dL 1.01  --  0.91 0.95   GLUCOSE mg/dL 151*  --  160* 146*   CALCIUM mg/dL 8.5*  --  8.2* 8.8   ALT (SGPT) U/L  --   --  15 15   AST (SGOT) U/L  --   --  20 18   TROPONIN T ng/mL  --   --  <0.010  --    PROBNP pg/mL  --   --  501.8  --      Estimated Creatinine Clearance: 81.9 mL/min (by C-G formula based on SCr of 1.01 mg/dL).    Microbiology Results Abnormal     Procedure Component Value - Date/Time    Blood Culture - Blood, Arm, Left [[id number]] Collected: 11/02/20 0428    Lab Status: Preliminary result Specimen: Blood from Arm, Left Updated: 11/04/20 0515     Blood Culture No growth at 2 days    Blood Culture - Blood, Arm, Right [[id number]] Collected: 11/02/20 0428    Lab Status: Preliminary result Specimen: Blood from Arm, Right Updated: 11/04/20 0515     Blood Culture No growth at 2 days    Blood Culture - Blood, Arm, Left [[id number]] Collected: 11/01/20 1520    Lab Status: Preliminary result Specimen: Blood from Arm, Left Updated:  11/03/20 1615     Blood Culture No growth at 2 days    Blood Culture - Blood, Arm, Right [[id number]] Collected: 11/01/20 1545    Lab Status: Preliminary result Specimen: Blood from Arm, Right Updated: 11/03/20 1600     Blood Culture No growth at 2 days    Legionella Antigen, Urine - Urine, Urine, Clean Catch [[id number]]  (Abnormal) Collected: 11/02/20 0731    Lab Status: Final result Specimen: Urine, Clean Catch Updated: 11/02/20 1605     LEGIONELLA ANTIGEN, URINE Positive    S. Pneumo Ag Urine or CSF - Urine, Urine, Clean Catch [[id number]]  (Normal) Collected: 11/02/20 0731    Lab Status: Final result Specimen: Urine, Clean Catch Updated: 11/02/20 1604     Strep Pneumo Ag Negative    COVID PRE-OP / PRE-PROCEDURE SCREENING ORDER (NO ISOLATION) - Swab, Nasopharynx [[id number]]  (Normal) Collected: 11/01/20 1817    Lab Status: Final result Specimen: Swab from Nasopharynx Updated: 11/01/20 2015    Narrative:      The following orders were created for panel order COVID PRE-OP / PRE-PROCEDURE SCREENING ORDER (NO ISOLATION) - Swab, Nasopharynx.  Procedure                               Abnormality         Status                     ---------                               -----------         ------                     Respiratory Panel PCR w/...[[id number]]  Normal              Final result                 Please view results for these tests on the individual orders.    Respiratory Panel PCR w/COVID-19(SARS-CoV-2) HUNTER/MIRELLA/LING/PAD/COR/MAD/CHRISTIN In-House, NP Swab in [place], 3-4 HR TAT - Swab, Nasopharynx [[id number]]  (Normal) Collected: 11/01/20 1817    Lab Status: Final result Specimen: Swab from Nasopharynx Updated: 11/01/20 2015     ADENOVIRUS, PCR Not Detected     Coronavirus 229E Not Detected     Coronavirus HKU1 Not Detected     Coronavirus NL63 Not Detected     Coronavirus OC43 Not Detected     COVID19 Not Detected     Human Metapneumovirus Not Detected     Human Rhinovirus/Enterovirus Not Detected     Influenza A PCR Not  Detected     Influenza A H1 Not Detected     Influenza A H1 2009 PCR Not Detected     Influenza A H3 Not Detected     Influenza B PCR Not Detected     Parainfluenza Virus 1 Not Detected     Parainfluenza Virus 2 Not Detected     Parainfluenza Virus 3 Not Detected     Parainfluenza Virus 4 Not Detected     RSV, PCR Not Detected     Bordetella pertussis pcr Not Detected     Bordetella parapertussis PCR Not Detected     Chlamydophila pneumoniae PCR Not Detected     Mycoplasma pneumo by PCR Not Detected    Narrative:      Fact sheet for providers: https://docs.[employer]/wp-content/uploads/UQO2301-7930-XZ8.1-EUA-Provider-Fact-Sheet-3.pdf    Fact sheet for patients: https://docs.[employer]/wp-content/uploads/FHV5262-9085-RS8.1-EUA-Patient-Fact-Sheet-1.pdf    MRSA Screen, PCR (Inpatient) - Swab, Nares [[id number]]  (Normal) Collected: 11/01/20 1826    Lab Status: Final result Specimen: Swab from Nares Updated: 11/01/20 1950     MRSA PCR Negative    Narrative:      MRSA Negative          Imaging Results (Last 24 Hours)     ** No results found for the last 24 hours. **               I have reviewed the medications:  Scheduled Meds:acetaminophen, 325 mg, Oral, Once  aspirin, 81 mg, Oral, Daily  atorvastatin, 40 mg, Oral, Daily  azithromycin, 500 mg, Intravenous, Q24H  calcium carb-cholecalciferol, 1 tablet, Oral, BID  enoxaparin, 40 mg, Subcutaneous, Q24H  ferrous sulfate, 325 mg, Oral, Daily With Breakfast  levothyroxine, 25 mcg, Oral, Q AM  metoprolol tartrate, 25 mg, Oral, Daily  multivitamin with minerals, 1 tablet, Oral, Daily  sodium chloride, 10 mL, Intravenous, Q12H  tamsulosin, 0.4 mg, Oral, Daily      Continuous Infusions:   PRN Meds:.•  acetaminophen **OR** acetaminophen **OR** acetaminophen  •  Albuterol Sulfate NEB Orderable  •  docusate sodium  •  ketorolac  •  potassium chloride  •  potassium chloride  •  sodium chloride    Assessment/Plan   Assessment & Plan     Active Hospital Problems    Diagnosis  POA    • **Sepsis due to pneumonia (CMS/HCC) [J18.9, A41.9]  Yes   • Acute respiratory failure with hypoxia (CMS/HCC) [J96.01]  Yes   • CAD (coronary artery disease) [I25.10]  Yes   • HLD (hyperlipidemia) [E78.5]  Yes   • HTN (hypertension) [I10]  Yes   • Hypothyroid [E03.9]  Yes   • TRACEY on CPAP [G47.33, Z99.89]  Not Applicable      Resolved Hospital Problems   No resolved problems to display.        Brief Hospital Course to date:  Larry Dean Klinefelter is a 70 y.o. male admitted with sepsis secondary to Legionella pneumonia.    Acute sepsis secondary to Legionella pneumonia  Pulmonary infiltrate in the right lower lobe  Fever  Acute respiratory failure with hypoxia  Leukocytosis  -Discussed with infectious disease Dr. Kai Collins  -Continue azithromycin 500 mg daily x5 days  -Wean O2 as tolerated    HTN  -Continue metoprolol    Hyperlipidemia  -Continue Lipitor    Morbid Obesity  TRACEY    Hypokalemia, resolved  -Replaced per protocol      DVT Prophylaxis:  lovenox      Disposition: I expect the patient to be discharged possibly home on 11/5/2020 pending PT consults and weaning O2.    CODE STATUS:   Code Status and Medical Interventions:   Ordered at: 11/01/20 2003     Level Of Support Discussed With:    Patient     Code Status:    CPR     Medical Interventions (Level of Support Prior to Arrest):    Full       Mariama Hobbs MD  11/04/20

## 2020-11-04 NOTE — PLAN OF CARE
Goal Outcome Evaluation:  Plan of Care Reviewed With: patient  Progress: improving  Outcome Summary: afebrile this shift. no new complaints at this time.

## 2020-11-05 VITALS
DIASTOLIC BLOOD PRESSURE: 63 MMHG | BODY MASS INDEX: 32.56 KG/M2 | OXYGEN SATURATION: 94 % | TEMPERATURE: 98.5 F | HEIGHT: 71 IN | SYSTOLIC BLOOD PRESSURE: 113 MMHG | WEIGHT: 232.6 LBS | HEART RATE: 75 BPM | RESPIRATION RATE: 18 BRPM

## 2020-11-05 PROCEDURE — 99239 HOSP IP/OBS DSCHRG MGMT >30: CPT | Performed by: FAMILY MEDICINE

## 2020-11-05 PROCEDURE — 25010000002 ENOXAPARIN PER 10 MG: Performed by: NURSE PRACTITIONER

## 2020-11-05 PROCEDURE — 94660 CPAP INITIATION&MGMT: CPT

## 2020-11-05 PROCEDURE — 25010000002 AZITHROMYCIN PER 500 MG: Performed by: INTERNAL MEDICINE

## 2020-11-05 PROCEDURE — 97116 GAIT TRAINING THERAPY: CPT

## 2020-11-05 PROCEDURE — 94799 UNLISTED PULMONARY SVC/PX: CPT

## 2020-11-05 RX ORDER — L.ACID,PARA/B.BIFIDUM/S.THERM 8B CELL
1 CAPSULE ORAL
Qty: 90 CAPSULE | Refills: 0 | Status: SHIPPED | OUTPATIENT
Start: 2020-11-05 | End: 2022-04-11

## 2020-11-05 RX ORDER — ALBUTEROL SULFATE 2.5 MG/3ML
2.5 SOLUTION RESPIRATORY (INHALATION) EVERY 4 HOURS PRN
Qty: 5 VIAL | Refills: 0 | Status: SHIPPED | OUTPATIENT
Start: 2020-11-05 | End: 2022-03-25

## 2020-11-05 RX ORDER — AZITHROMYCIN 500 MG/1
500 TABLET, FILM COATED ORAL DAILY
Qty: 5 TABLET | Refills: 0 | Status: SHIPPED | OUTPATIENT
Start: 2020-11-05 | End: 2020-11-10

## 2020-11-05 RX ORDER — L.ACID,PARA/B.BIFIDUM/S.THERM 8B CELL
1 CAPSULE ORAL
Status: DISCONTINUED | OUTPATIENT
Start: 2020-11-05 | End: 2020-11-05 | Stop reason: HOSPADM

## 2020-11-05 RX ORDER — TAMSULOSIN HYDROCHLORIDE 0.4 MG/1
1 CAPSULE ORAL DAILY
Qty: 30 CAPSULE
Start: 2020-11-05 | End: 2022-03-25

## 2020-11-05 RX ADMIN — MULTIPLE VITAMINS W/ MINERALS TAB 1 TABLET: TAB at 08:38

## 2020-11-05 RX ADMIN — ATORVASTATIN CALCIUM 40 MG: 40 TABLET, FILM COATED ORAL at 08:38

## 2020-11-05 RX ADMIN — Medication 1 CAPSULE: at 08:38

## 2020-11-05 RX ADMIN — ASPIRIN 81 MG: 81 TABLET, COATED ORAL at 08:38

## 2020-11-05 RX ADMIN — METOPROLOL TARTRATE 25 MG: 25 TABLET, FILM COATED ORAL at 08:37

## 2020-11-05 RX ADMIN — LEVOTHYROXINE SODIUM 25 MCG: 25 TABLET ORAL at 06:41

## 2020-11-05 RX ADMIN — SODIUM CHLORIDE, PRESERVATIVE FREE 10 ML: 5 INJECTION INTRAVENOUS at 08:39

## 2020-11-05 RX ADMIN — FERROUS SULFATE TAB 325 MG (65 MG ELEMENTAL FE) 325 MG: 325 (65 FE) TAB at 08:38

## 2020-11-05 RX ADMIN — AZITHROMYCIN 500 MG: 500 INJECTION, POWDER, LYOPHILIZED, FOR SOLUTION INTRAVENOUS at 11:18

## 2020-11-05 RX ADMIN — TAMSULOSIN HYDROCHLORIDE 0.4 MG: 0.4 CAPSULE ORAL at 08:37

## 2020-11-05 RX ADMIN — ACETAMINOPHEN 650 MG: 325 TABLET, FILM COATED ORAL at 15:22

## 2020-11-05 RX ADMIN — Medication 1 TABLET: at 08:38

## 2020-11-05 RX ADMIN — ACETAMINOPHEN 650 MG: 325 TABLET, FILM COATED ORAL at 03:52

## 2020-11-05 RX ADMIN — ENOXAPARIN SODIUM 40 MG: 40 INJECTION SUBCUTANEOUS at 08:38

## 2020-11-05 NOTE — PROGRESS NOTES
INFECTIOUS DISEASE Progress Note    Larry Dean Klinefelter  1950  [id number]      Admission Date: 11/1/2020      Requesting Provider: No ref. provider found  Evaluating Physician: Kai Marion MD    Reason for Consultation: Legionella pneumonia    History of present illness:    11/4/20: Patient is a 70 y.o. male seen today for evaluation of Legionella pneumonia with sepsis and acute hypoxic respiratory failure.  He was swimming with his grandson at the [employer] last Thursday when he noted the onset of chills.  He continued to have recurrent shaking chills along with malaise, and fatigue.  He noted the onset of a subjective fever.  He denies dyspnea but had a minimal cough with no sputum production.  He noted some nausea and slight diarrhea.  He presented to the emergency room at the insistence of his wife.  After his presentation to the emergency room, he spiked a fever to 104.9 degrees.  A chest x-ray and chest CTA revealed a right lower lobe lobar pneumonia.  He was admitted and started on intravenous vancomycin and ceftriaxone along with doxycycline.  A urinary Legionella antigen subsequently returned positive and he has been switched to intravenous azithromycin.  He denies any other exposure to a water source other than the [employer].  His fever curve currently appears to be declining.  He is requiring 3 L of supplemental oxygen.    11/5/20: His maximum temperature over the last 24 hours is 100.4°. He is on 2 L of oxygen. He denies increased cough and sputum production.  He denies nausea and vomiting.    Past Medical History:   Diagnosis Date   • Anemia    • Bowel obstruction (CMS/HCC)    • Chest pain    • Constipation    • Coronary artery disease    • Disease of thyroid gland    • Hepatitis    • Hyperlipidemia    • Hypertension    • Macular degeneration    • Medial meniscus tear    • Obesity    • Obstructive sleep apnea on CPAP     setting - 13.   • Primary osteoarthritis of left knee     and  right knee   • Wears glasses        Past Surgical History:   Procedure Laterality Date   • CARDIAC CATHETERIZATION  01/2019   • COLONOSCOPY  2013   • FRACTURE SURGERY      left heel    • INGUINAL HERNIA REPAIR      x2    • KNEE ARTHROSCOPY Right    • REPLACEMENT TOTAL KNEE Right    • TONSILLECTOMY     • TOTAL KNEE ARTHROPLASTY Left 5/16/2019    Procedure: TOTAL KNEE ARTHROPLASTY LEFT;  Surgeon: Willian Jc MD;  Location: [facility];  Service: Orthopedics       Family History   Problem Relation Age of Onset   • Rheum arthritis Mother    • Stroke Father    • Heart attack Father    • Hypertension Father    • Clotting disorder Father    • Rheum arthritis Father    • Hypertension Other        Social History     Socioeconomic History   • Marital status:      Spouse name: Not on file   • Number of children: Not on file   • Years of education: Not on file   • Highest education level: Not on file   Tobacco Use   • Smoking status: Never Smoker   • Smokeless tobacco: Never Used   Substance and Sexual Activity   • Alcohol use: No   • Drug use: No   • Sexual activity: Defer       Allergies   Allergen Reactions   • Lisinopril Angioedema         Medication:    Current Facility-Administered Medications:   •  acetaminophen (TYLENOL) tablet 650 mg, 650 mg, Oral, Q4H PRN, 650 mg at 11/05/20 0352 **OR** acetaminophen (TYLENOL) 160 MG/5ML solution 650 mg, 650 mg, Oral, Q4H PRN, 650 mg at 11/02/20 1511 **OR** acetaminophen (TYLENOL) suppository 650 mg, 650 mg, Rectal, Q4H PRN, Lauren, Naila, APRN  •  albuterol (PROVENTIL) nebulizer solution 0.083% 2.5 mg/3mL, 2.5 mg, Nebulization, Q4H PRN, Carmen Rogers MD  •  aspirin EC tablet 81 mg, 81 mg, Oral, Daily, Lauren, Naila, APRN, 81 mg at 11/04/20 0837  •  atorvastatin (LIPITOR) tablet 40 mg, 40 mg, Oral, Daily, Lauren, Naila, APRN, 40 mg at 11/04/20 0838  •  AZITHROMYCIN 500 MG/250 ML 0.9% NS IVPB (vial-mate), 500 mg, Intravenous, Q24H, Honey Frank II, DO, 500 mg  at 11/04/20 1156  •  calcium carb-cholecalciferol 600-800 MG-UNIT tablet 1 tablet, 1 tablet, Oral, BID, Lauren, Naila, APRN, 1 tablet at 11/04/20 1952  •  docusate sodium (COLACE) capsule 100 mg, 100 mg, Oral, Daily PRN, Lauren, Naila, APRN  •  enoxaparin (LOVENOX) syringe 40 mg, 40 mg, Subcutaneous, Q24H, Lauren, Naila, APRN, 40 mg at 11/04/20 0837  •  ferrous sulfate tablet 325 mg, 325 mg, Oral, Daily With Breakfast, Lauren, Naila, APRN, 325 mg at 11/04/20 0838  •  ketorolac (TORADOL) injection 15 mg, 15 mg, Intravenous, Q6H PRN, Maya Bermeo, DO, 15 mg at 11/02/20 1711  •  levothyroxine (SYNTHROID, LEVOTHROID) tablet 25 mcg, 25 mcg, Oral, Q AM, Lauren, Naila, APRN, 25 mcg at 11/04/20 0514  •  metoprolol tartrate (LOPRESSOR) tablet 25 mg, 25 mg, Oral, Daily, Lauren, Naila, APRN, 25 mg at 11/04/20 0838  •  multivitamin with minerals 1 tablet, 1 tablet, Oral, Daily, Lauren, Naila, APRN, 1 tablet at 11/04/20 0838  •  potassium chloride (KLOR-CON) packet 40 mEq, 40 mEq, Oral, PRN, Zac, Honey M II, DO  •  potassium chloride (MICRO-K) CR capsule 40 mEq, 40 mEq, Oral, PRN, Zac, Honey M II, DO, 40 mEq at 11/02/20 1830  •  sodium chloride 0.9 % flush 10 mL, 10 mL, Intravenous, Q12H, Lauren, Naila, APRN, 10 mL at 11/04/20 1952  •  sodium chloride 0.9 % flush 10 mL, 10 mL, Intravenous, PRN, Lauren, Naila, APRN  •  tamsulosin (FLOMAX) 24 hr capsule 0.4 mg, 0.4 mg, Oral, Daily, Lauren, Naila, APRN, 0.4 mg at 11/04/20 0838    Antibiotics:  Anti-Infectives (From admission, onward)    Ordered     Dose/Rate Route Frequency Start Stop    11/03/20 1008  AZITHROMYCIN 500 MG/250 ML 0.9% NS IVPB (vial-mate)     Jordana Vigil, PharmD reviewed the order on 11/04/20 0931.   Ordering Provider: Honey Frank II, DO    500 mg  over 60 Minutes Intravenous Every 24 Hours 11/03/20 1100 11/08/20 1059    11/02/20 0508  piperacillin-tazobactam (ZOSYN) 3.375 g in iso-osmotic dextrose 50 ml (premix)      Ordering Provider: Naila Aguilar APRN    3.375 g  over 30 Minutes Intravenous Once 20 0600 20 0627    20 1632  cefTRIAXone (ROCEPHIN) 1 g/100 mL 0.9% NS (MBP)     Ordering Provider: Jermain Cordova PA    1 g  over 30 Minutes Intravenous Once 20 1634 20 1736    20 1632  vancomycin 2000 mg/500 mL 0.9% NS IVPB ([facility])     Ordering Provider: Jermain Cordova PA    20 mg/kg × 104 kg  over 120 Minutes Intravenous Once 20 1634 20            Review of Systems:  Constitutional--he has fevers, shaking chills, fatigue, and malaise.    HEENT-- No new vision, hearing or throat complaints.  He had a headache but this is partially abated.  CV-- No chest pain, palpitation or syncope  Resp--he denies pleuritic chest pain.  He did have some mild dyspnea and is now on supplemental oxygen.  GI-has some nausea and anorexia with mild diarrhea.    -- No dysuria, hematuria, or flank pain.  Denies hesitancy, urgency or flank pain.  Lymph- no swollen lymph nodes in neck/axilla or groin.   Heme- No active bruising or bleeding;   MS-- no swelling or pain in the bones or joints of arms/legs.    Neuro-- No acute focal weakness or numbness in the arms or legs.  .  Skin--No rashes or lesions      Physical Exam:   Vital Signs  Temp (24hrs), Av.2 °F (37.3 °C), Min:98.7 °F (37.1 °C), Max:100.4 °F (38 °C)    Temp  Min: 98.7 °F (37.1 °C)  Max: 100.4 °F (38 °C)  BP  Min: 123/84  Max: 148/80  Pulse  Min: 70  Max: 84  Resp  Min: 16  Max: 18  SpO2  Min: 91 %  Max: 99 %    GENERAL: Awake and alert, in no acute distress.   HEENT: Normocephalic, atraumatic.  PERRL. EOMI. No conjunctival injection. No icterus. Oropharynx clear without evidence of thrush or exudate.   NECK: Supple without nuchal rigidity.   LYMPH: No cervical, axillary or inguinal lymphadenopathy.  HEART: RRR; No murmur, rubs, gallops.   LUNGS: Mild right greater than left basilar crackles   ABDOMEN: Soft, nontender,  nondistended. Positive bowel sounds. No rebound or guarding. NO mass or HSM.  EXT:  No cyanosis, clubbing or edema. No cord.  :  Without Jonas catheter.  MSK: Focal joint swelling or erythema   SKIN: Warm and dry without cutaneous eruptions on Inspection/palpation.    NEURO: Oriented to PPT.  Motor 5/5 strength bilaterally  PSYCHIATRIC: Normal insight and judgement. Cooperative with PE    Laboratory Data    Results from last 7 days   Lab Units 11/04/20  0517 11/03/20  0616 11/02/20  0428   WBC 10*3/mm3 13.35* 15.06* 13.22*   HEMOGLOBIN g/dL 11.3* 11.3* 10.9*   HEMATOCRIT % 34.8* 34.8* 34.1*   PLATELETS 10*3/mm3 233 196 182     Results from last 7 days   Lab Units 11/03/20  0616   SODIUM mmol/L 139   POTASSIUM mmol/L 4.4   CHLORIDE mmol/L 105   CO2 mmol/L 24.0   BUN mg/dL 17   CREATININE mg/dL 1.01   GLUCOSE mg/dL 151*   CALCIUM mg/dL 8.5*     Results from last 7 days   Lab Units 11/02/20  0428   ALK PHOS U/L 48   BILIRUBIN mg/dL 0.4   ALT (SGPT) U/L 15   AST (SGOT) U/L 20             Results from last 7 days   Lab Units 11/02/20  0428   LACTATE mmol/L 1.5             Estimated Creatinine Clearance: 81.9 mL/min (by C-G formula based on SCr of 1.01 mg/dL).      Microbiology:  Blood Culture   Date Value Ref Range Status   11/02/2020 No growth at 2 days  Preliminary   11/02/2020 No growth at 2 days  Preliminary     No results found for: BCIDPCR, CXREFLEX, CSFCX, CULTURETIS  No results found for: CULTURES, HSVCX, URCX  No results found for: EYECULTURE, GCCX, LABHSV  No results found for: LEGIONELLA, MRSACX, MUMPSCX, MYCOPLASCX  No results found for: NOCARDIACX, STOOLCX  No results found for: THROATCX, UNSTIMCULT, URINECX, CULTURE, VZVCULTUR  No results found for: VIRALCULTU, WOUNDCX        Radiology:  Imaging Results (Last 72 Hours)     Procedure Component Value Units Date/Time    FL Video Swallow With Speech Single Contrast [[id number]] Collected: 11/02/20 1533     Updated: 11/02/20 1700    Narrative:      EXAMINATION:  FL VIDEO SWALLOW W SPEECH SINGLE-CONTRAST-, FL LIMITED UGI  FOR MBS REFLUX SINGLE-CONTRAST-     INDICATION: RLL PNA, r/o aspiration; J18.9-Pneumonia, unspecified  organism; R50.9-Fever, unspecified; R06.02-Shortness of breath;  R53.1-Weakness     TECHNIQUE: 54 seconds of fluoroscopic time was used for this exam. 7  associated fluoroscopic loops were saved. The patient was evaluated in  the seated lateral position while taking a variety of consistencies of  barium by mouth under the direction of speech pathology.     COMPARISON: NONE     FINDINGS: 54 seconds of fluoroscopy provided for a modified barium  swallow. Please see speech therapy report for full details and  recommendations. Limited upper GI series demonstrated a very small sized  anchors diverticulum, which measures approximately 2 mm x 2 mm, and  retains a minimal amount of contrast after swallows. There were no signs  of a focal esophageal stricture, or gastroesophageal reflux.          Impression:      Fluoroscopy provided for a modified barium swallow. Please  see speech therapy report for full details and recommendations. Limited  upper GI series demonstrated a very small sized Zenker's diverticulum.         This report was finalized on 11/2/2020 4:56 PM by Dr. Edu Coughlin.       FL Limited Ugi For Mbs Reflux Single-Contrast [[id number]] Collected: 11/02/20 1533     Updated: 11/02/20 1700    Narrative:      EXAMINATION: FL VIDEO SWALLOW W SPEECH SINGLE-CONTRAST-, FL LIMITED UGI  FOR MBS REFLUX SINGLE-CONTRAST-     INDICATION: RLL PNA, r/o aspiration; J18.9-Pneumonia, unspecified  organism; R50.9-Fever, unspecified; R06.02-Shortness of breath;  R53.1-Weakness     TECHNIQUE: 54 seconds of fluoroscopic time was used for this exam. 7  associated fluoroscopic loops were saved. The patient was evaluated in  the seated lateral position while taking a variety of consistencies of  barium by mouth under the direction of speech pathology.     COMPARISON: NONE      FINDINGS: 54 seconds of fluoroscopy provided for a modified barium  swallow. Please see speech therapy report for full details and  recommendations. Limited upper GI series demonstrated a very small sized  anchors diverticulum, which measures approximately 2 mm x 2 mm, and  retains a minimal amount of contrast after swallows. There were no signs  of a focal esophageal stricture, or gastroesophageal reflux.          Impression:      Fluoroscopy provided for a modified barium swallow. Please  see speech therapy report for full details and recommendations. Limited  upper GI series demonstrated a very small sized Zenker's diverticulum.         This report was finalized on 11/2/2020 4:56 PM by Dr. Edu Coughlin.       XR Chest 1 View [[id number]] Collected: 11/01/20 1600     Updated: 11/02/20 0844    Narrative:      EXAMINATION: XR CHEST 1 VW- 11/01/2020     INDICATION: fever     COMPARISON: 05/01/2019 chest radiograph     FINDINGS: Heart shadow and pulmonary vasculature appear normal in size.  Lungs are moderately well-expanded and appear grossly clear. A slight  hazy opacity of the medial right base, is seen to be much more extensive  on patient's subsequent angiographic chest CT scan. No effusion, edema  or pneumothorax is seen.       Impression:      Subtle increased opacity in the retrocardiac region of the  medial right lung base. No evidence of active disease elsewhere. Please  see patient's subsequent chest CT scan report.     D:  11/01/2020  E:  11/02/2020         This report was finalized on 11/2/2020 8:41 AM by Dr. Landen Gao MD.       CT Angiogram Chest [[id number]] Collected: 11/01/20 1701     Updated: 11/02/20 0833    Narrative:      EXAMINATION: CT ANGIOGRAM CHEST - 11/01/2020     INDICATION: Shortness of air. Fever.     TECHNIQUE: Spiral acquisition 3 mm post IV contrast images through the  chest with sagittal and coronal 2D reconstructions.     The radiation dose reduction device was turned on for each  scan per the  ALARA (As Low as Reasonably Achievable) protocol.     COMPARISON: Portable chest radiograph of today's date.     FINDINGS: There is good contrast opacification of the pulmonary  arteries. No filling defects are seen to suggest pulmonary embolic  disease. There is no evidence of thoracic aortic aneurysm or dissection,  pericardial or pleural effusion, or significant mediastinal adenopathy.  There do appear to be shotty mediastinal lymph nodes and mildly  prominent right hilar node, which may be reactive.     Lung window images show dense area of airspace disease in the posterior  medial right lower lobe, difficult to appreciate even in retrospect on  the patient's current chest radiograph, behind the heart and  hemidiaphragm. Disease extends over a roughly 9 x 6 x 13 cm area.  Appearance is typical of lobar pneumonia. Lungs elsewhere appear clear  except for a couple of granulomatous calcifications.     Included images of the upper abdomen show expected fatty liver change  for body habitus, and a several small round right and left lobe liver  lesions all of which measure water density, typical of liver cysts.  There are several calcified gallstones in the dependent portion of the  otherwise normal-appearing gallbladder. The spleen is not enlarged. No  significant abnormalities are noted of the included portions of the  pancreatic tail, adrenal glands, or upper renal poles. There appear to  be a couple of small left renal cysts.       Impression:      1. No evidence of pulmonary embolus.     2. Extensive right lower lobe pneumonia typical of lobar/bacterial  pneumonia. No evidence of pneumonia elsewhere.     3. Mild, likely reactive mediastinal and right hilar lymphadenopathy.     4. Gallstones.     5. Incidental hepatic and left renal cysts.     DICTATED:   11/01/2020  EDITED/ls :   11/01/2020         This report was finalized on 11/2/2020 8:30 AM by Dr. Landen Gao MD.               Impression:   1.   Legionella pneumonia-associated with acute hypoxic respiratory failure and sepsis.  Legionella is generally best treated with either azithromycin or Levaquin.  He is currently on azithromycin so I will leave him on azithromycin therapy.  He will need at least 5 days of therapy.  We may be able to discharge him tomorrow on oral azithromycin but he may require home O2.  2.  Acute hypoxic respiratory failure  3.  Sepsis-with severe hyperpyrexia (temperature of 104.9), tachycardia with a heart rate of 113, leukocytosis/neutrophilia, an elevated procalcitonin, known focus of infection.  4.  Leukocytosis/neutrophilia      PLAN/RECOMMENDATIONS: 11/10 at 1230-this appointment has been made  1.  Discharge home today  2.  Continue azithromycin 500 mg by mouth daily ×5 more days  3.  Follow-up with me on Tuesday 11/10 at 1230-this appointment has been made    I discussed his clinical situation and disposition with Dr. Hobbs today.      Kai Marion MD  11/5/2020  06:35 EST

## 2020-11-05 NOTE — PLAN OF CARE
Goal Outcome Evaluation:  Plan of Care Reviewed With: patient  Progress: improving  Outcome Summary: Home today after antibiotic infusion. Discharge instructions provided. Pt to followup w/ PCP in 1 week. NSR, VSS. SpO2=92-93% on room air, even after ambulating to bathroom. No SOA reported.

## 2020-11-05 NOTE — PLAN OF CARE
Problem: Adult Inpatient Plan of Care  Goal: Plan of Care Review  Recent Flowsheet Documentation  Taken 11/5/2020 1016 by Michelle Hutchinson PTA  Progress: improving  Plan of Care Reviewed With: patient  Outcome Summary: patient ambulated 120 feet with CGA and use of rolling walker for support, verbal cues to stay inside walker and to improve posture. Ambulated on RA at 94%, no complaints of SOA. Distance limited by weakness, making good progress.

## 2020-11-05 NOTE — PROGRESS NOTES
Case Management Discharge Note      Final Note: I met wiht Mr. Klinefelter at the bedside. He is being discharged home today. He has been weaned to room air and is no longer requiring supplemental oxygen. Therapy has seen him this admission and they are recommending outpatient Physical therapy. Mr. Klinefelter is agreeable to this and would like a referral sent to [facility]. I faxed orders to them @ [phone]. They will contact him to schedule his first appointment. He denies having any additional discharge needs. His wife will transport him home today by car.         Selected Continued Care - Admitted Since 11/1/2020     Destination    No services have been selected for the patient.              Durable Medical Equipment    No services have been selected for the patient.              Dialysis/Infusion    No services have been selected for the patient.              Home Medical Care    No services have been selected for the patient.              Therapy Coordination complete    Service Provider Selected Services Address Phone Fax    [facility] - PHYSICAL THERAPY  Outpatient Physical Therapy [address] [facility] [address] [phone] --          Community Resources    No services have been selected for the patient.                       Final Discharge Disposition Code: 01 - home or self-care

## 2020-11-05 NOTE — DISCHARGE PLACEMENT REQUEST
"RennyrehanAlec shepardn (70 y.o. Male)     Date of Birth Social Security Number Address Home Phone MRN    1950  [address] [place] [address] [phone] [id number]    Alevism Marital Status          None        Admission Date Admission Type Admitting Provider Attending Provider Department, Room/Bed    20 Emergency Mariama Hobbs MD Stephen, Karla R, MD [facility] 3E, S334/1    Discharge Date Discharge Disposition Discharge Destination         Home or Self Care              Attending Provider: Mariama Hobbs MD    Allergies: Lisinopril    Isolation: None   Infection: None   Code Status: CPR    Ht: 180.3 cm (71\")   Wt: 106 kg (232 lb 9.6 oz)    Admission Cmt: None   Principal Problem: Sepsis due to pneumonia (CMS/[facility]) [J18.9,A41.9]                 Active Insurance as of 2020     Primary Coverage     Payor Plan Insurance Group Employer/Plan Group    UNITED HEALTHC[employer] MEDICARE REPLACEMENT UNITED [employer] MEDICARE REPLACEMENT [id number]     Payor Plan Address Payor Plan Phone Number Payor Plan Fax Number Effective Dates    [address] [address]   2017 - None Entered    [facility] [id number]       Subscriber Name Subscriber Birth Date Member ID       KLINEFELTER,LARRY DEAN 1950 [id number]                 Emergency Contacts      (Rel.) Home Phone Work Phone Mobile Phone    Klinefelter,Orlenjoy \"Bruna\" (Spouse) [phone] -- [phone]        [address] [address]  Phone:  [phone]  Fax:  [phone] Date: 2020      Ambulatory Referral to Physical Therapy Evaluate and treat     Patient:  Larry Dean Klinefelter MRN:  [id number]   [address] [place] [address] :  1950  SSN:    Phone: [phone] Sex:  M      INSURANCE PAYOR PLAN GROUP # SUBSCRIBER ID   Primary:    [employer] MEDICARE REPLACEMENT [id number]      Referring Provider " Information:  NICKOLAS HOBBS Phone: [phone] Fax: [phone]      Referral Information:   # Visits:  1 Referral Type: Physical Therapy [AE1]   Urgency:  Routine Referral Reason: Specialty Services Required   Start Date: Nov 5, 2020 End Date:  To be determined by Insurer   Diagnosis: Pneumonia of right lower lobe due to infectious organism (J18.9 [ICD-10-CM] 486 [ICD-9-CM])  Generalized weakness (R53.1 [ICD-10-CM] 780.79 [ICD-9-CM])  Sepsis due to pneumonia (CMS/HCC) (J18.9,A41.9 [ICD-10-CM] 486,995.91 [ICD-9-CM])  Acute respiratory failure with hypoxia (CMS/HCC) (J96.01 [ICD-10-CM] 518.81 [ICD-9-CM])      Refer to Dept:   Refer to Provider:   Refer to Facility:       Specialty needed: Evaluate and treat     This document serves as a request of services and does not constitute Insurance authorization or approval of services.  To determine eligibility, please contact the members Insurance carrier to verify and review coverage.     If you have medical questions regarding this request for services. Please contact [address] at [phone] during normal business hours.       Authorizing Provider:Nickolas Hobbs MD  Authorizing Provider's NPI: [id number]  Order Entered By: Jimmie Mauricio RN 11/5/2020 12:11 PM     Electronically signed by: Nickolas Hobbs MD 11/5/2020 12:11 PM

## 2020-11-05 NOTE — THERAPY TREATMENT NOTE
Patient Name: Larry Dean Klinefelter  : 1950    MRN: [id number]                              Today's Date: 2020       Admit Date: 2020    Visit Dx:     ICD-10-CM ICD-9-CM   1. Pneumonia of right lower lobe due to infectious organism  J18.9 486   2. Acute febrile illness  R50.9 780.60   3. Shortness of breath  R06.02 786.05   4. Generalized weakness  R53.1 780.79     Patient Active Problem List   Diagnosis   • Primary osteoarthritis of left knee   • TRACEY on CPAP   • HTN (hypertension)   • HLD (hyperlipidemia)   • Hypothyroid   • CAD (coronary artery disease)   • Status post total left knee replacement   • Postoperative urinary retention   • Leukocytosis, likely reactive   • Acute blood loss anemia, mild, asymptomatic   • Acute postoperative pain   • Sepsis due to pneumonia (CMS/HCC)   • Acute respiratory failure with hypoxia (CMS/HCC)     Past Medical History:   Diagnosis Date   • Anemia    • Bowel obstruction (CMS/HCC)    • Chest pain    • Constipation    • Coronary artery disease    • Disease of thyroid gland    • Hepatitis    • Hyperlipidemia    • Hypertension    • Macular degeneration    • Medial meniscus tear    • Obesity    • Obstructive sleep apnea on CPAP     setting - 13.   • Primary osteoarthritis of left knee     and right knee   • Wears glasses      Past Surgical History:   Procedure Laterality Date   • CARDIAC CATHETERIZATION  2019   • COLONOSCOPY     • FRACTURE SURGERY      left heel    • INGUINAL HERNIA REPAIR      x2    • KNEE ARTHROSCOPY Right    • REPLACEMENT TOTAL KNEE Right    • TONSILLECTOMY     • TOTAL KNEE ARTHROPLASTY Left 2019    Procedure: TOTAL KNEE ARTHROPLASTY LEFT;  Surgeon: Willian Jc MD;  Location: [facility];  Service: Orthopedics     General Information     Row Name 20 1011          Physical Therapy Time and Intention    Document Type  therapy note (daily note)  -AS     Mode of Treatment  physical therapy  -AS     Row Name 20 1011           General Information    Patient Profile Reviewed  yes  -AS     Existing Precautions/Restrictions  no known precautions/restrictions  -AS     Barriers to Rehab  none identified  -AS     [facility] Name 11/05/20 1011          Cognition    Orientation Status (Cognition)  oriented x 4  -AS     [facility] Name 11/05/20 1011          Safety Issues, Functional Mobility    Safety Issues Affecting Function (Mobility)  safety precaution awareness;safety precautions follow-through/compliance  -AS     Impairments Affecting Function (Mobility)  balance;strength;endurance/activity tolerance  -AS       User Key  (r) = Recorded By, (t) = Taken By, (c) = Cosigned By    Initials Name Provider Type    AS Michelle Hutchinson PTA Physical Therapy Assistant        Mobility     Row Name 11/05/20 1011          Bed Mobility    Sit-Supine [place] (Bed Mobility)  modified independence  -AS     Supine-Sit-Supine [place] (Bed Mobility)  modified independence  -AS     Assistive Device (Bed Mobility)  bed rails;head of bed elevated  -AS     Comment (Bed Mobility)  patient demonstrated safe technique  -AS     Row Name 11/05/20 1011          Transfers    Comment (Transfers)  cues to reach back for bed when sitting  -AS     Row Name 11/05/20 1011          Bed-Chair Transfer    Bed-Chair [place] (Transfers)  not tested patient had already been sitting [facility] this am requested back to bed  -AS     Row Name 11/05/20 1011          Sit-Stand Transfer    Sit-Stand [place] (Transfers)  verbal cues;modified independence  -AS     Assistive Device (Sit-Stand Transfers)  walker, front-wheeled  -AS     [facility] Name 11/05/20 1011          Gait/Stairs (Locomotion)    [place] Level (Gait)  verbal cues;contact guard  -AS     Assistive Device (Gait)  walker, front-wheeled  -AS     Distance in Feet (Gait)  120  -AS     Deviations/Abnormal Patterns (Gait)  bilateral deviations;festinating/shuffling  -AS     Bilateral Gait Deviations  forward flexed posture;heel  strike decreased  -AS     Comment (Gait/Stairs)  patient ambulated 120 feet with CGA and use of rolling walker for support, verbal cues to stay inside walker and to improve posture. Ambulated on RA at 94%, no complaints of SOA. Distance limited by weakness, making good progress.  -AS       User Key  (r) = Recorded By, (t) = Taken By, (c) = Cosigned By    Initials Name Provider Type    AS Michelle Hutchinson PTA Physical Therapy Assistant        Obj/Interventions    No documentation.       Goals/Plan    No documentation.       Clinical Impression     Row Name 11/05/20 1016          Pain    Additional Documentation  Pain Scale: Numbers Pre/Post-Treatment (Group)  -AS     Row Name 11/05/20 1016          Pain Scale: Numbers Pre/Post-Treatment    Pretreatment Pain Rating  0/10 - no pain  -AS     Posttreatment Pain Rating  0/10 - no pain  -AS     Row Name 11/05/20 1016          Plan of Care Review    Plan of Care Reviewed With  patient  -AS     Progress  improving  -AS     Outcome Summary  patient ambulated 120 feet with CGA and use of rolling walker for support, verbal cues to stay inside walker and to improve posture. Ambulated on RA at 94%, no complaints of SOA. Distance limited by weakness, making good progress.  -AS     Row Name 11/05/20 1016          Positioning and Restraints    Pre-Treatment Position  in bed  -AS     Post Treatment Position  bed  -AS     In Bed  supine;call light within reach;encouraged to call for assist  -AS       User Key  (r) = Recorded By, (t) = Taken By, (c) = Cosigned By    Initials Name Provider Type    AS Michelle Hutchinson PTA Physical Therapy Assistant        Outcome Measures     Row Name 11/05/20 1016          How much help from another person do you currently need...    Turning from your back to your side while in flat bed without using bedrails?  4  -AS     Moving from lying on back to sitting on the side of a flat bed without bedrails?  4  -AS     Moving to and from a bed to a  chair (including a wheelchair)?  3  -AS     Standing up from a chair using your arms (e.g., wheelchair, bedside chair)?  4  -AS     Climbing 3-5 steps with a railing?  3  -AS     To walk in hospital room?  3  -AS     AM-PAC 6 Clicks Score (PT)  21  -AS     Row Name 11/05/20 1016          Functional Assessment    Outcome Measure Options  AM-PAC 6 Clicks Basic Mobility (PT)  -AS       User Key  (r) = Recorded By, (t) = Taken By, (c) = Cosigned By    Initials Name Provider Type    AS Michelle Hutchinson PTA Physical Therapy Assistant        Physical Therapy Education                 Title: PT OT SLP Therapies (In Progress)     Topic: Physical Therapy (In Progress)     Point: Mobility training (In Progress)     Learning Progress Summary           Patient Acceptance, E, NR by AS at 11/5/2020 1016    Acceptance, E,TB, VU by KG at 11/2/2020 1318                   Point: Home exercise program (In Progress)     Learning Progress Summary           Patient Acceptance, E, NR by AS at 11/5/2020 1016    Acceptance, E,TB, VU by KG at 11/2/2020 1318                   Point: Body mechanics (In Progress)     Learning Progress Summary           Patient Acceptance, E, NR by AS at 11/5/2020 1016    Acceptance, E,TB, VU by KG at 11/2/2020 1318                   Point: Precautions (In Progress)     Learning Progress Summary           Patient Acceptance, E, NR by AS at 11/5/2020 1016    Acceptance, E,TB, VU by KG at 11/2/2020 1318                               User Key     Initials Effective Dates Name Provider Type Discipline    AS 06/22/15 -  Michelle Hutchinson PTA Physical Therapy Assistant PT    KG 08/28/19 -  Koki Fernandez Physical Therapist PT              PT Recommendation and Plan     Plan of Care Reviewed With: patient  Progress: improving  Outcome Summary: patient ambulated 120 feet with CGA and use of rolling walker for support, verbal cues to stay inside walker and to improve posture. Ambulated on RA at 94%, no  complaints of SOA. Distance limited by weakness, making good progress.     Time Calculation:   PT Charges     Row Name 11/05/20 1017             Time Calculation    Start Time  0942  -AS      PT Received On  11/05/20  -AS      PT Goal Re-Cert Due Date  11/12/20  -AS         Timed Charges    82470 - Gait Training Minutes   23  -AS        User Key  (r) = Recorded By, (t) = Taken By, (c) = Cosigned By    Initials Name Provider Type    AS Michelle Hutchinson PTA Physical Therapy Assistant        Therapy Charges for Today     Code Description Service Date Service Provider Modifiers Qty    [id number] HC GAIT TRAINING EA 15 MIN 11/5/2020 Michelle Hutchinson PTA GP 2          PT G-Codes  Outcome Measure Options: AM-PAC 6 Clicks Basic Mobility (PT)  AM-PAC 6 Clicks Score (PT): 21  AM-PAC 6 Clicks Score (OT): 21    Michelle Hutchinson PTA  11/5/2020

## 2020-11-05 NOTE — PLAN OF CARE
Goal Outcome Evaluation:  Plan of Care Reviewed With: patient  Progress: improving  Pt. A&O4, 2L NC when awake, CPAP when sleeping. VSS, NS-ST. Up w/ stand by to restroom. No c/o N/V/D, No c/o pain. Will continue to monitor.

## 2020-11-05 NOTE — DISCHARGE SUMMARY
[facility] Medicine Services  DISCHARGE SUMMARY    Patient Name: Larry Dean Klinefelter  : 1950  MRN: [id number]    Date of Admission: 2020  2:14 PM  Date of Discharge:  20    Primary Care Physician: Kin Nunez MD    Consults     Date and Time Order Name Status Description    2020 0901 Inpatient Infectious Diseases Consult Completed           Hospital Course     Presenting Problem:   Sepsis due to pneumonia (CMS/HCC) [J18.9, A41.9]  Sepsis due to pneumonia (CMS/HCC) [J18.9, A41.9]    Active Hospital Problems    Diagnosis  POA   • **Sepsis due to pneumonia (CMS/HCC) [J18.9, A41.9]  Yes   • Acute respiratory failure with hypoxia (CMS/HCC) [J96.01]  Yes   • CAD (coronary artery disease) [I25.10]  Yes   • HLD (hyperlipidemia) [E78.5]  Yes   • HTN (hypertension) [I10]  Yes   • Hypothyroid [E03.9]  Yes   • TRACEY on CPAP [G47.33, Z99.89]  Not Applicable      Resolved Hospital Problems   No resolved problems to display.          Hospital Course:  Larry Dean Klinefelter is a 70 y.o. male admitted with sepsis secondary to Legionella pneumonia.     Acute sepsis secondary to Legionella pneumonia  Pulmonary infiltrate in the right lower lobe  Fever  Acute respiratory failure with hypoxia  Leukocytosis  -Discussed in 1 to 2 weeks  -Continue azithromycin 500 mg daily x5 days at discharge  -Wean O2 as tolerated  -We will follow-up with Dr. Collins as an outpatient     HTN  -Continue metoprolol     Hyperlipidemia  -Continue Lipitor     Morbid Obesity  TRACEY     Hypokalemia, resolved  -Replaced per protocol      Discharge Follow Up Recommendations for outpatient labs/diagnostics:  Follow-up with Dr. Kai Collins in 1 to 2 weeks for Legionella pneumonia.    Day of Discharge     HPI:   Patient is feeling better today at 1 is okay with being discharged home.  He has continued to be a little short of breath and has required O2 to maintain sats greater than 90 at 2 L.  He is tolerating  p.o. well.  He is ambulating short distances in the room without assistance.  He lives with his wife at home but she keeps 4 grandchildren.    Review of Systems  General:  Improving fever, chills, fatigue  ENT: No sore throat, trouble swallowing or changes in vision  Respiratory: Positive shortness of breath, cough, denies wheezing or fast breathing  Cardiovascular: No chest pain, palpitations, positive dyspnea with exertion  Gastrointestinal: No nausea vomiting abdominal pain  Musculoskeletal: Positive difficulty walking, positive weakness  Vascular: No cyanosis or clubbing  Lymphatic: No peripheral edema, no lymphadenopathy  Neurologic: No headache, confusion, dizziness  Psychiatric: No changes in mood.  No depression or anxiety.         Vital Signs:   Temp:  [98.1 °F (36.7 °C)-100.4 °F (38 °C)] 98.1 °F (36.7 °C)  Heart Rate:  [70-86] 73  Resp:  [16-18] 18  BP: (123-148)/(69-86) 136/86     Physical Exam:  Constitutional: No acute distress, awake, alert, nontoxic, elderly body habitus  Eyes: Pupils equal, sclerae anicteric, no conjunctival injection  HENT: NCAT, mucous membranes moist  Neck: Supple, no thyromegaly, no lymphadenopathy  Respiratory: Decreased breath sounds with coarse rales bilaterally, good effort, nonlabored respirations   Cardiovascular: RRR  Gastrointestinal: Positive bowel sounds, soft, nontender, nondistended  Musculoskeletal: No peripheral edema, normal muscle tone for age  Psychiatric: Appropriate affect, good insight and judgement, cooperative  Neurologic: Oriented x 3, movements symmetric BUE and BLE, Cranial Nerves grossly intact, speech clear and fluent  Skin: No rashes, no jaundice, no petechiae, no mottling    Pertinent  and/or Most Recent Results     Results from last 7 days   Lab Units 11/04/20  0517 11/03/20  0616 11/02/20  2242 11/02/20  0428 11/01/20  1452   WBC 10*3/mm3 13.35* 15.06*  --  13.22* 16.19*   HEMOGLOBIN g/dL 11.3* 11.3*  --  10.9* 12.4*   HEMATOCRIT % 34.8* 34.8*  --   34.1* 37.5   PLATELETS 10*3/mm3 233 196  --  182 201   SODIUM mmol/L  --  139  --  137 139   POTASSIUM mmol/L  --  4.4 4.6 3.4* 3.8   CHLORIDE mmol/L  --  105  --  106 104   CO2 mmol/L  --  24.0  --  21.0* 22.0   BUN mg/dL  --  17  --  11 15   CREATININE mg/dL  --  1.01  --  0.91 0.95   GLUCOSE mg/dL  --  151*  --  160* 146*   CALCIUM mg/dL  --  8.5*  --  8.2* 8.8     Results from last 7 days   Lab Units 11/02/20 0428 11/01/20  1452   BILIRUBIN mg/dL 0.4 0.6   ALK PHOS U/L 48 54   ALT (SGPT) U/L 15 15   AST (SGOT) U/L 20 18           Invalid input(s): TG, LDLCALC, LDLREALC  Results from last 7 days   Lab Units 11/02/20 0428 11/01/20  1452   PROBNP pg/mL 501.8  --    TROPONIN T ng/mL <0.010  --    PROCALCITONIN ng/mL 0.55* 0.32*   LACTATE mmol/L 1.5 1.3       Brief Urine Lab Results  (Last result in the past 365 days)      Color   Clarity   Blood   Leuk Est   Nitrite   Protein   CREAT   Urine HCG        11/01/20 1559 Yellow Clear Small (1+) Negative Negative 30 mg/dL (1+)               Microbiology Results Abnormal     Procedure Component Value - Date/Time    Blood Culture - Blood, Arm, Left [[id number]] Collected: 11/02/20 0428    Lab Status: Preliminary result Specimen: Blood from Arm, Left Updated: 11/05/20 0515     Blood Culture No growth at 3 days    Blood Culture - Blood, Arm, Right [[id number]] Collected: 11/02/20 0428    Lab Status: Preliminary result Specimen: Blood from Arm, Right Updated: 11/05/20 0515     Blood Culture No growth at 3 days    Blood Culture - Blood, Arm, Left [[id number]] Collected: 11/01/20 1520    Lab Status: Preliminary result Specimen: Blood from Arm, Left Updated: 11/04/20 1615     Blood Culture No growth at 3 days    Blood Culture - Blood, Arm, Right [[id number]] Collected: 11/01/20 1545    Lab Status: Preliminary result Specimen: Blood from Arm, Right Updated: 11/04/20 1600     Blood Culture No growth at 3 days    Legionella Antigen, Urine - Urine, Urine, Clean Catch [[id number]]   (Abnormal) Collected: 11/02/20 0731    Lab Status: Final result Specimen: Urine, Clean Catch Updated: 11/02/20 1605     LEGIONELLA ANTIGEN, URINE Positive    S. Pneumo Ag Urine or CSF - Urine, Urine, Clean Catch [[id number]]  (Normal) Collected: 11/02/20 0731    Lab Status: Final result Specimen: Urine, Clean Catch Updated: 11/02/20 1604     Strep Pneumo Ag Negative    COVID PRE-OP / PRE-PROCEDURE SCREENING ORDER (NO ISOLATION) - Swab, Nasopharynx [[id number]]  (Normal) Collected: 11/01/20 1817    Lab Status: Final result Specimen: Swab from Nasopharynx Updated: 11/01/20 2015    Narrative:      The following orders were created for panel order COVID PRE-OP / PRE-PROCEDURE SCREENING ORDER (NO ISOLATION) - Swab, Nasopharynx.  Procedure                               Abnormality         Status                     ---------                               -----------         ------                     Respiratory Panel PCR w/...[[id number]]  Normal              Final result                 Please view results for these tests on the individual orders.    Respiratory Panel PCR w/COVID-19(SARS-CoV-2) HUNTER/MIRELLA/LING/PAD/COR/MAD/CHRISTIN In-House, NP Swab in UTM/VTM, 3-4 HR TAT - Swab, Nasopharynx [[id number]]  (Normal) Collected: 11/01/20 1817    Lab Status: Final result Specimen: Swab from Nasopharynx Updated: 11/01/20 2015     ADENOVIRUS, PCR Not Detected     Coronavirus 229E Not Detected     Coronavirus HKU1 Not Detected     Coronavirus NL63 Not Detected     Coronavirus OC43 Not Detected     COVID19 Not Detected     Human Metapneumovirus Not Detected     Human Rhinovirus/Enterovirus Not Detected     Influenza A PCR Not Detected     Influenza A H1 Not Detected     Influenza A H1 2009 PCR Not Detected     Influenza A H3 Not Detected     Influenza B PCR Not Detected     Parainfluenza Virus 1 Not Detected     Parainfluenza Virus 2 Not Detected     Parainfluenza Virus 3 Not Detected     Parainfluenza Virus 4 Not Detected     RSV, PCR Not  Detected     Bordetella pertussis pcr Not Detected     Bordetella parapertussis PCR Not Detected     Chlamydophila pneumoniae PCR Not Detected     Mycoplasma pneumo by PCR Not Detected    Narrative:      Fact sheet for providers: https://docs.[employer]/wp-content/uploads/IEE8128-0017-TT8.1-EUA-Provider-Fact-Sheet-3.pdf    Fact sheet for patients: https://docs.[employer]/wp-content/uploads/IFB0581-7638-RB4.1-EUA-Patient-Fact-Sheet-1.pdf    MRSA Screen, PCR (Inpatient) - Swab, Nares [[id number]]  (Normal) Collected: 11/01/20 1826    Lab Status: Final result Specimen: Swab from Nares Updated: 11/01/20 1950     MRSA PCR Negative    Narrative:      MRSA Negative          Imaging Results (All)     Procedure Component Value Units Date/Time    FL Video Swallow With Speech Single Contrast [[id number]] Collected: 11/02/20 1533     Updated: 11/02/20 1700    Narrative:      EXAMINATION: FL VIDEO SWALLOW W SPEECH SINGLE-CONTRAST-, FL LIMITED UGI  FOR MBS REFLUX SINGLE-CONTRAST-     INDICATION: RLL PNA, r/o aspiration; J18.9-Pneumonia, unspecified  organism; R50.9-Fever, unspecified; R06.02-Shortness of breath;  R53.1-Weakness     TECHNIQUE: 54 seconds of fluoroscopic time was used for this exam. 7  associated fluoroscopic loops were saved. The patient was evaluated in  the seated lateral position while taking a variety of consistencies of  barium by mouth under the direction of speech pathology.     COMPARISON: NONE     FINDINGS: 54 seconds of fluoroscopy provided for a modified barium  swallow. Please see speech therapy report for full details and  recommendations. Limited upper GI series demonstrated a very small sized  anchors diverticulum, which measures approximately 2 mm x 2 mm, and  retains a minimal amount of contrast after swallows. There were no signs  of a focal esophageal stricture, or gastroesophageal reflux.          Impression:      Fluoroscopy provided for a modified barium swallow. Please  see speech  therapy report for full details and recommendations. Limited  upper GI series demonstrated a very small sized Zenker's diverticulum.         This report was finalized on 11/2/2020 4:56 PM by Dr. Eud Coughlin.       FL Limited Ugi For Mbs Reflux Single-Contrast [[id number]] Collected: 11/02/20 1533     Updated: 11/02/20 1700    Narrative:      EXAMINATION: FL VIDEO SWALLOW W SPEECH SINGLE-CONTRAST-, FL LIMITED UGI  FOR MBS REFLUX SINGLE-CONTRAST-     INDICATION: RLL PNA, r/o aspiration; J18.9-Pneumonia, unspecified  organism; R50.9-Fever, unspecified; R06.02-Shortness of breath;  R53.1-Weakness     TECHNIQUE: 54 seconds of fluoroscopic time was used for this exam. 7  associated fluoroscopic loops were saved. The patient was evaluated in  the seated lateral position while taking a variety of consistencies of  barium by mouth under the direction of speech pathology.     COMPARISON: NONE     FINDINGS: 54 seconds of fluoroscopy provided for a modified barium  swallow. Please see speech therapy report for full details and  recommendations. Limited upper GI series demonstrated a very small sized  anchors diverticulum, which measures approximately 2 mm x 2 mm, and  retains a minimal amount of contrast after swallows. There were no signs  of a focal esophageal stricture, or gastroesophageal reflux.          Impression:      Fluoroscopy provided for a modified barium swallow. Please  see speech therapy report for full details and recommendations. Limited  upper GI series demonstrated a very small sized Zenker's diverticulum.         This report was finalized on 11/2/2020 4:56 PM by Dr. Edu Coughlin.       XR Chest 1 View [[id number]] Collected: 11/01/20 1600     Updated: 11/02/20 0844    Narrative:      EXAMINATION: XR CHEST 1 VW- 11/01/2020     INDICATION: fever     COMPARISON: 05/01/2019 chest radiograph     FINDINGS: Heart shadow and pulmonary vasculature appear normal in size.  Lungs are moderately well-expanded and appear  grossly clear. A slight  hazy opacity of the medial right base, is seen to be much more extensive  on patient's subsequent angiographic chest CT scan. No effusion, edema  or pneumothorax is seen.       Impression:      Subtle increased opacity in the retrocardiac region of the  medial right lung base. No evidence of active disease elsewhere. Please  see patient's subsequent chest CT scan report.     D:  11/01/2020  E:  11/02/2020         This report was finalized on 11/2/2020 8:41 AM by Dr. Landen Gao MD.       CT Angiogram Chest [[id number]] Collected: 11/01/20 1701     Updated: 11/02/20 0833    Narrative:      EXAMINATION: CT ANGIOGRAM CHEST - 11/01/2020     INDICATION: Shortness of air. Fever.     TECHNIQUE: Spiral acquisition 3 mm post IV contrast images through the  chest with sagittal and coronal 2D reconstructions.     The radiation dose reduction device was turned on for each scan per the  ALARA (As Low as Reasonably Achievable) protocol.     COMPARISON: Portable chest radiograph of today's date.     FINDINGS: There is good contrast opacification of the pulmonary  arteries. No filling defects are seen to suggest pulmonary embolic  disease. There is no evidence of thoracic aortic aneurysm or dissection,  pericardial or pleural effusion, or significant mediastinal adenopathy.  There do appear to be shotty mediastinal lymph nodes and mildly  prominent right hilar node, which may be reactive.     Lung window images show dense area of airspace disease in the posterior  medial right lower lobe, difficult to appreciate even in retrospect on  the patient's current chest radiograph, behind the heart and  hemidiaphragm. Disease extends over a roughly 9 x 6 x 13 cm area.  Appearance is typical of lobar pneumonia. Lungs elsewhere appear clear  except for a couple of granulomatous calcifications.     Included images of the upper abdomen show expected fatty liver change  for body habitus, and a several small round right  and left lobe liver  lesions all of which measure water density, typical of liver cysts.  There are several calcified gallstones in the dependent portion of the  otherwise normal-appearing gallbladder. The spleen is not enlarged. No  significant abnormalities are noted of the included portions of the  pancreatic tail, adrenal glands, or upper renal poles. There appear to  be a couple of small left renal cysts.       Impression:      1. No evidence of pulmonary embolus.     2. Extensive right lower lobe pneumonia typical of lobar/bacterial  pneumonia. No evidence of pneumonia elsewhere.     3. Mild, likely reactive mediastinal and right hilar lymphadenopathy.     4. Gallstones.     5. Incidental hepatic and left renal cysts.     DICTATED:   11/01/2020  EDITED/ls :   11/01/2020         This report was finalized on 11/2/2020 8:30 AM by Dr. Landen Gao MD.       XR Chest 1 View [[id number]] Collected: 11/02/20 0532     Updated: 11/02/20 0535    Narrative:      CR Chest 1 Vw    INDICATION:   Shortness of air. Fever. Pneumonia.     COMPARISON:    11/1/2020    FINDINGS:  Single portable AP view(s) of the chest.    Cardiac and mediastinal contours remain normal. There is atherosclerotic disease in the aorta. Left lung is clear. There is persistent infiltrate at the right medial base. No pneumothorax.       Impression:      No significant change. Persistent right basilar infiltrate.    Signer Name: Omar Tse MD   Signed: 11/2/2020 5:32 AM   Workstation Name: [id number]    [facility]                         Plan for Follow-up of Pending Labs/Results: Blood cultures pending at the time of discharge, negative thus far  Pending Labs     Order Current Status    Blood Culture - Blood, Arm, Left Preliminary result    Blood Culture - Blood, Arm, Left Preliminary result    Blood Culture - Blood, Arm, Right Preliminary result    Blood Culture - Blood, Arm, Right Preliminary result        Discharge Details         Discharge Medications      New Medications      Instructions Start Date   albuterol (2.5 MG/3ML) 0.083% nebulizer solution  Commonly known as: PROVENTIL   2.5 mg, Nebulization, Every 4 Hours PRN      azithromycin 500 MG tablet  Commonly known as: Zithromax   500 mg, Oral, Daily      lactobacillus acidophilus capsule capsule   1 capsule, Oral, 3 Times Daily With Meals         Continue These Medications      Instructions Start Date   aspirin 81 MG EC tablet   81 mg, Oral, Daily      atorvastatin 40 MG tablet  Commonly known as: LIPITOR   40 mg, Oral, Nightly      CALCIUM PLUS VITAMIN D3 PO   1 tablet, Oral, 2 Times Daily      Co Q-10 100 MG capsule   100 mg, Oral, 2 Times Daily      ferrous sulfate 324 (65 Fe) MG tablet delayed-release EC tablet   324 mg, Oral, Nightly      metoprolol tartrate 25 MG tablet  Commonly known as: LOPRESSOR   25 mg, Oral, Daily      MIRALAX PO   1 dose, Oral, 2 Times Daily      nitroglycerin 0.4 MG SL tablet  Commonly known as: NITROSTAT   0.4 mg, Sublingual, As Needed, Take no more than 3 doses in 15 minutes.      Synthroid 25 MCG tablet  Generic drug: levothyroxine   25 mcg, Oral, Daily      tamsulosin 0.4 MG capsule 24 hr capsule  Commonly known as: FLOMAX   0.4 mg, Oral, Daily      VITEYES AREDS FORMULA PO   1 capsule, Oral, 2 Times Daily      Zetia 10 MG tablet  Generic drug: ezetimibe   10 mg, Oral, Daily         Stop These Medications    docusate sodium 250 MG capsule  Commonly known as: COLACE            Allergies   Allergen Reactions   • Lisinopril Angioedema         Discharge Disposition:  Home or Self Care    Diet:  Hospital:  Diet Order   Procedures   • Diet Regular; Cardiac       Activity:  Activity Instructions     Activity as Tolerated            Restrictions or Other Recommendations:  Due to covid 19 pandemic practice physical distancing, wear a mask when you are out in public, wash hands frequently.        CODE STATUS:    Code Status and Medical Interventions:    Ordered at: 11/01/20 2003     Level Of Support Discussed With:    Patient     Code Status:    CPR     Medical Interventions (Level of Support Prior to Arrest):    Full       No future appointments.    Additional Instructions for the Follow-ups that You Need to Schedule     Discharge Follow-up with PCP   As directed       Currently Documented PCP:    Kin Nunez MD    PCP Phone Number:    [phone]     Follow Up Details: 1 week to check oxygen level                     Mariama Hobbs MD  11/05/20      Time Spent on Discharge:  I spent  45  minutes on this discharge activity which included: face-to-face encounter with the patient, reviewing the data in the system, coordination of the care with the nursing staff as well as consultants, documentation, and entering orders.

## 2020-11-06 ENCOUNTER — NURSE TRIAGE (OUTPATIENT)
Dept: CALL CENTER | Facility: HOSPITAL | Age: 70
End: 2020-11-06

## 2020-11-06 LAB
BACTERIA SPEC AEROBE CULT: NORMAL
BACTERIA SPEC AEROBE CULT: NORMAL

## 2020-11-06 NOTE — OUTREACH NOTE
Case Management Call Center Follow-up      Responses   [facility] Call Center Tracking Reason?  No DME   Has the Call Center Case Management Follow-up issue been resolved?  Yes   Follow-up Comments  Spoke with patient who explains the issue is resolved,  he has a appointment with his PCP at 2:45 today who will order DME for him. Patient reports he would rather follow up with PCP to resolve issue.           ABY Alvarado (Kay)    11/6/2020, 11:07 CST

## 2020-11-06 NOTE — TELEPHONE ENCOUNTER
States he was discharged yesterday and was sent home with nebulizer solution but was not sent home with a nebulizer and do not see that one was ordered. Patient requesting order for nebulizer be sent. Message sent to case management. Best number for patient is [phone].

## 2020-11-06 NOTE — TELEPHONE ENCOUNTER
"Called [facility] and left message for CM about this concern. Caller would like prescription to go to [facility] in [place], [place]. [address]16 Called Mr. Klinefelter to let him know message was left with CM. States he thought he would try to get an appointment with his PCP today. His Insurance had told him he has to have a face-to-face visit in order for the Nebulizer machine to be paid for by the insurance.     Reason for Disposition  • Health Information question, no triage required and triager able to answer question    Additional Information  • Negative: [1] Caller is not with the adult (patient) AND [2] reporting urgent symptoms  • Negative: Lab result questions  • Negative: Medication questions  • Negative: Caller can't be reached by phone  • Negative: Caller has already spoken to PCP or another triager  • Negative: RN needs further essential information from caller in order to complete triage  • Negative: Requesting regular office appointment  • Negative: [1] Caller requesting NON-URGENT health information AND [2] PCP's office is the best resource    Answer Assessment - Initial Assessment Questions  1. REASON FOR CALL or QUESTION: \"What is your reason for calling today?\" or \"How can I best help you?\" or \"What question do you have that I can help answer?\"      Caller states he was discharged yesterday from [facility] with order for nebulizer treatments. He got the medication for these but not the machine. Asking how he can get the machine.    Protocols used: INFORMATION ONLY CALL - NO TRIAGE-ADULT-      "

## 2020-11-07 LAB
BACTERIA SPEC AEROBE CULT: NORMAL
BACTERIA SPEC AEROBE CULT: NORMAL

## 2021-01-13 ENCOUNTER — OFFICE VISIT (OUTPATIENT)
Dept: NEUROSURGERY | Facility: CLINIC | Age: 71
End: 2021-01-13

## 2021-01-13 ENCOUNTER — HOSPITAL ENCOUNTER (OUTPATIENT)
Dept: GENERAL RADIOLOGY | Facility: HOSPITAL | Age: 71
Discharge: HOME | End: 2021-01-13
Admitting: NEUROLOGICAL SURGERY

## 2021-01-13 VITALS — TEMPERATURE: 97.1 F | HEIGHT: 69 IN | BODY MASS INDEX: 35.25 KG/M2 | WEIGHT: 238 LBS

## 2021-01-13 DIAGNOSIS — M48.062 SPINAL STENOSIS OF LUMBAR REGION WITH NEUROGENIC CLAUDICATION: Primary | ICD-10-CM

## 2021-01-13 DIAGNOSIS — M51.36 DDD (DEGENERATIVE DISC DISEASE), LUMBAR: ICD-10-CM

## 2021-01-13 DIAGNOSIS — M48.062 SPINAL STENOSIS OF LUMBAR REGION WITH NEUROGENIC CLAUDICATION: ICD-10-CM

## 2021-01-13 DIAGNOSIS — M54.9 MECHANICAL BACK PAIN: ICD-10-CM

## 2021-01-13 PROCEDURE — 99203 OFFICE O/P NEW LOW 30 MIN: CPT | Performed by: NEUROLOGICAL SURGERY

## 2021-01-13 PROCEDURE — 72114 X-RAY EXAM L-S SPINE BENDING: CPT

## 2021-01-13 RX ORDER — OXYCODONE AND ACETAMINOPHEN 10; 325 MG/1; MG/1
1 TABLET ORAL EVERY 6 HOURS
COMMUNITY
Start: 2021-01-07 | End: 2021-12-15

## 2021-01-13 RX ORDER — MULTIVITAMIN/IRON/FOLIC ACID 18MG-0.4MG
1 TABLET ORAL 2 TIMES DAILY
COMMUNITY

## 2021-01-13 NOTE — PROGRESS NOTES
Patient: Larry Dean Klinefelter  : 1950    Primary Care Provider: Kin Nunez MD    Requesting Provider: As above        History    Chief Complaint: Low back and lower extremity pain with walking and standing intolerance.    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  Since the end of last year his symptoms have been worse.  At one point he was hospitalized for pneumonia and then underwent some therapy which seemed to stir up his symptoms.  He has back pain that extends variably into his legs.  It bothers him when lying down at night.  He does have significant walking and standing intolerance.  He does better by sitting down or flexing forward at the waist.  The pain involves his thighs and occasionally his feet.  Percocet 10 has been helpful.  He denies any bowel or bladder dysfunction.    Review of Systems   Constitutional: Negative for activity change, appetite change, chills, diaphoresis, fatigue, fever and unexpected weight change.   HENT: Negative for congestion, dental problem, drooling, ear discharge, ear pain, facial swelling, hearing loss, mouth sores, nosebleeds, postnasal drip, rhinorrhea, sinus pressure, sinus pain, sneezing, sore throat, tinnitus, trouble swallowing and voice change.    Eyes: Negative for photophobia, pain, discharge, redness, itching and visual disturbance.   Respiratory: Negative for apnea, cough, choking, chest tightness, shortness of breath, wheezing and stridor.    Cardiovascular: Negative for chest pain, palpitations and leg swelling.   Gastrointestinal: Positive for constipation and diarrhea. Negative for abdominal distention, abdominal pain, anal bleeding, blood in stool, nausea, rectal pain and vomiting.   Endocrine: Negative for cold intolerance, heat intolerance, polydipsia, polyphagia and polyuria.   Genitourinary: Negative for decreased urine volume, difficulty urinating,  "discharge, dysuria, enuresis, flank pain, frequency, genital sores, hematuria, penile pain, penile swelling, scrotal swelling, testicular pain and urgency.   Musculoskeletal: Positive for back pain and gait problem. Negative for arthralgias, joint swelling, myalgias, neck pain and neck stiffness.   Skin: Negative for color change, pallor, rash and wound.   Allergic/Immunologic: Negative for environmental allergies, food allergies and immunocompromised state.   Neurological: Positive for dizziness, weakness and light-headedness. Negative for tremors, seizures, syncope, facial asymmetry, speech difficulty, numbness and headaches.   Hematological: Negative for adenopathy. Does not bruise/bleed easily.   Psychiatric/Behavioral: Positive for sleep disturbance. Negative for agitation, behavioral problems, confusion, decreased concentration, dysphoric mood, hallucinations, self-injury and suicidal ideas. The patient is not nervous/anxious and is not hyperactive.        The patient's past medical history, past surgical history, family history, and social history have been reviewed at length in the electronic medical record.    Physical Exam:   Temp 97.1 °F (36.2 °C)   Ht 175.3 cm (69\")   Wt 108 kg (238 lb)   BMI 35.15 kg/m²   CONSTITUTIONAL: Patient is well-nourished, pleasant and appears stated age.  CV: Heart regular rate and rhythm without murmur, rub, or gallop.  His feet are warm but pedal pulses are difficult to palpate.  PULMONARY: Lungs are clear to ascultation.  MUSCULOSKELETAL:  Straight leg raising is negative.  Franco's Sign is negative.  ROM in back is somewhat limited in all directions.  Tenderness in the back to palpation is not observed.  NEUROLOGICAL:  Orientation, memory, attention span, language function, and cognition have been examined and are intact.  Strength is intact in the lower extremities to direct testing.  Muscle tone is normal throughout.  Station and gait are stooped forward.  Sensation " is intact to light touch testing throughout.  Deep tendon reflexes are difficult to elicit throughout.  Coordination is intact.      Medical Decision Making    Data Review:   MRI of the lumbar spine dated 12/30/2020 demonstrates some scoliosis.  There is high-grade stenosis at L3-4 and L4-5.  There is mild stenosis at L2-3.  There could be just a slight offset of L3 on L4.    Diagnosis:   1.  Lumbar stenosis with neurogenic claudication.  2.  Mechanical low back pain.    Treatment Options:   I discussed treatment options with the patient.  He has elected to pursue an epidural injection or 2.  He will follow-up thereafter with some plain flexion-extension upright lateral lumbar spine x-rays to rule out any instability.  If he continues to struggle then I would strongly consider decompressive laminectomies at L3-4 and L4-5.       Diagnosis Plan   1. Spinal stenosis of lumbar region with neurogenic claudication  Ambulatory Referral to Pain Management    XR Spine Lumbar Complete With Flex & Ext   2. Mechanical back pain     3. DDD (degenerative disc disease), lumbar         Scribed for Conrad Givens MD by LUCAS Valdez 1/13/2021 15:48 EST      I, Dr. Givens, personally performed the services described in the documentation, as scribed in my presence, and it is both accurate and complete.

## 2021-02-16 PROBLEM — M51.37 DEGENERATION OF LUMBAR OR LUMBOSACRAL INTERVERTEBRAL DISC: Status: ACTIVE | Noted: 2021-02-16

## 2021-02-16 PROBLEM — M47.816 SPONDYLOSIS OF LUMBAR REGION WITHOUT MYELOPATHY OR RADICULOPATHY: Status: ACTIVE | Noted: 2021-02-16

## 2021-02-16 PROBLEM — M51.379 DEGENERATION OF LUMBAR OR LUMBOSACRAL INTERVERTEBRAL DISC: Status: ACTIVE | Noted: 2021-02-16

## 2021-02-16 PROBLEM — M48.062 LUMBAR STENOSIS WITH NEUROGENIC CLAUDICATION: Status: ACTIVE | Noted: 2021-02-16

## 2021-02-16 NOTE — PROGRESS NOTES
"Chief Complaint: \"Pain in my lower back and legs\"         History of Present Illness:   Patient: Mr. Larry Dean Klinefelter, 70 y.o. male   Referring Physician: Dr. Conrad Givens  Reason for Referral: Consultation for chronic intractable lower back pain.   Pain History: Patient reports a several year history of progressive lower back pain associated with severe neurogenic claudication, which began without incident.  Patient complains of lower back pain that radiates into the lower extremities associated with severe neurogenic claudication. Patient has failed to obtain pain relief with conservative measures including analgesics, opioids, and physical therapy. MRI of the lumbar spine from 12/30/2020 revealed severe stenosis at L3-L4 and L4-L5. Larry Dean Klinefelter underwent neurosurgical consultation with Dr. Conrad Givens on 01/13/2021, and was found to be a surgical candidate for lumbar decompression at L3-4 and L4-L5. Pain has progressed in intensity over the past year.   Pain Description: Constant pain with intermittent exacerbation, described as aching, burning and throbbing sensation.   Radiation of Pain: The pain radiates from the lumbar region into the posterior aspect of the lower extremities into his calves and feet  Pain intensity today: 8/10  Average pain intensity last week: 8/10  Pain intensity ranges from: 7/10 to 9/10  Aggravating factors: Pain increases with standing, walking. Patient describes neurogenic claudication. Patient uses a rolling walker  Alleviating factors: Pain decreases with sitting down, lying down   Associated Symptoms:   Patient reports pain, numbness, and weakness in the lower extremities that occur during claudication.   Patient denies any new bladder or bowel problems.   Patient reports difficulties with his balance but denies recent falls.     Review of previous therapies and additional medical records:  Larry Dean Klinefelter has already failed the following measures, " including:   Conservative Measures: Oral analgesics, opioids, topical analgesics, ice, heat, chiropractic therapy, massage, physical therapy   Interventional Measures: None  Surgical Measures: No history of previous lumbar spine or hip surgery   Larry Dean Klinefelter underwent neurosurgical consultation with Dr. Conrad Givens on 01/13/2021, and was found to be a surgical candidate for lumbar decompression.  Larry Dean Klinefelter presents with significant comorbidities including TRACEY on CPAP, hypertension, hyperlipidemia, coronary artery disease, s/p bilateral total knee arthroplasty, obesity  In terms of current analgesics, Larry Dean Klinefelter takes: Percocet  I have reviewed [facility] Report #[id number] consistent with medication reconciliation.  SOAPP: Low Risk    Global Pain Scale 02-23 2021          Pain 20          Feelings 15          Clinical outcomes 15          Activities 20          GPS Total: 70            Review of Diagnostic Studies:    MRI of the lumbar spine without contrast December 30, 2020.  I have reviewed the images and the radiology report.  Mild extra scoliosis.  Diffuse degenerative disc and facet joint disease throughout the lumbar spine.  Mild anterolisthesis of L3 on L4.  Right lateral subluxation of L3 on L4.  The conus has an unremarkable appearance.  Axial imaging:  L1-L2: Annular bulge, facet arthropathy.  Moderate bilateral neuroforaminal stenosis  L2-L3: Annular bulge, facet arthropathy.  Moderate bilateral neuroforaminal stenosis  L3-L4: Annular bulge, facet arthropathy.  Moderate right and severe left neuroforaminal stenosis.  Bilateral lateral recess stenosis.  Mild central canal stenosis  L4-L5: Annular bulge, facet arthropathy, right paracentral disc protrusion resulting in right L5 nerve root compression.  Severe right and moderate left neuroforaminal stenosis.  Mild central canal stenosis  L5-S1: Annular bulge and facet arthropathy.  Severe right and moderate left  neuroforaminal stenosis  X-rays of the lumbar spine including flexion and extension 1/14/2021.  I have reviewed the images.  Diffuse degenerative disc and facet joint disease with loss of vertebral body height throughout the lumbar spine.  Grade 1 anterolisthesis of L3 on L4 stable during flexion and extension.    Review of Systems   Constitutional: Positive for fatigue.   Respiratory: Positive for apnea.    Gastrointestinal: Positive for constipation.   Musculoskeletal: Positive for back pain and myalgias.   All other systems reviewed and are negative.        Patient Active Problem List   Diagnosis   • Primary osteoarthritis of left knee   • TRACEY on CPAP   • HTN (hypertension)   • HLD (hyperlipidemia)   • Hypothyroid   • CAD (coronary artery disease)   • Postoperative urinary retention   • Leukocytosis, likely reactive   • Acute blood loss anemia, mild, asymptomatic   • Acute postoperative pain   • Sepsis due to pneumonia (CMS/HCC)   • Acute respiratory failure with hypoxia (CMS/HCC)   • Degeneration of lumbar or lumbosacral intervertebral disc   • Lumbar stenosis with neurogenic claudication   • Spondylosis of lumbar region without myelopathy or radiculopathy   • Gait disturbance   • Physical deconditioning       Past Medical History:   Diagnosis Date   • Anemia    • Bowel obstruction (CMS/HCC)    • Chest pain    • Constipation    • Coronary artery disease    • Disease of thyroid gland    • Hepatitis    • Hyperlipidemia    • Hypertension    • Low back pain    • Macular degeneration    • Medial meniscus tear    • Obesity    • Obstructive sleep apnea on CPAP     setting - 13.   • Primary osteoarthritis of left knee     and right knee   • Wears glasses          Past Surgical History:   Procedure Laterality Date   • CARDIAC CATHETERIZATION  01/2019   • COLONOSCOPY  2013   • FRACTURE SURGERY      left heel    • INGUINAL HERNIA REPAIR      x2    • KNEE ARTHROSCOPY Right    • REPLACEMENT TOTAL KNEE Right    •  TONSILLECTOMY     • TOTAL KNEE ARTHROPLASTY Left 5/16/2019    Procedure: TOTAL KNEE ARTHROPLASTY LEFT;  Surgeon: Willian Jc MD;  Location: [facility];  Service: Orthopedics         Family History   Problem Relation Age of Onset   • Rheum arthritis Mother    • Stroke Father    • Heart attack Father    • Hypertension Father    • Clotting disorder Father    • Rheum arthritis Father    • Hypertension Other          Social History     Socioeconomic History   • Marital status:      Spouse name: Not on file   • Number of children: Not on file   • Years of education: Not on file   • Highest education level: Not on file   Tobacco Use   • Smoking status: Never Smoker   • Smokeless tobacco: Never Used   Substance and Sexual Activity   • Alcohol use: No   • Drug use: No   • Sexual activity: Defer           Current Outpatient Medications:   •  albuterol (PROVENTIL) (2.5 MG/3ML) 0.083% nebulizer solution, Take 2.5 mg by nebulization Every 4 (Four) Hours As Needed for Shortness of Air., Disp: 5 vial, Rfl: 0  •  aspirin 81 MG EC tablet, Take 81 mg by mouth Daily., Disp: , Rfl:   •  atorvastatin (LIPITOR) 40 MG tablet, Take 40 mg by mouth Every Night., Disp: , Rfl:   •  Calcium Carb-Cholecalciferol (CALCIUM PLUS VITAMIN D3 PO), Take 1 tablet by mouth 2 (Two) Times a Day., Disp: , Rfl:   •  Coenzyme Q10 (CO Q-10) 100 MG capsule, Take 100 mg by mouth 2 (Two) Times a Day., Disp: , Rfl:   •  ezetimibe (ZETIA) 10 MG tablet, Take 10 mg by mouth Daily., Disp: , Rfl:   •  ferrous sulfate 324 (65 Fe) MG tablet delayed-release EC tablet, Take 324 mg by mouth Every Night., Disp: , Rfl:   •  Glucosamine-Chondroitin 7934-4168 MG/30ML liquid, Take 1 capsule by mouth 3 (Three) Times a Day With Meals., Disp: , Rfl:   •  lactobacillus acidophilus (RISAQUAD) capsule capsule, Take 1 capsule by mouth 3 (Three) Times a Day With Meals., Disp: 90 capsule, Rfl: 0  •  losartan (COZAAR) 25 MG tablet, , Disp: , Rfl:   •  metoprolol tartrate  "(LOPRESSOR) 25 MG tablet, Take 25 mg by mouth Daily., Disp: , Rfl:   •  Multiple Vitamins-Minerals (VITEYES AREDS FORMULA PO), Take 1 capsule by mouth 2 (Two) Times a Day., Disp: , Rfl:   •  nitroglycerin (NITROSTAT) 0.4 MG SL tablet, Place 0.4 mg under the tongue As Needed for Chest Pain. Take no more than 3 doses in 15 minutes. , Disp: , Rfl:   •  oxyCODONE-acetaminophen (PERCOCET)  MG per tablet, Take 1 tablet by mouth Every 6 (Six) Hours., Disp: , Rfl:   •  Polyethylene Glycol 3350 (MIRALAX PO), Take 1 dose by mouth 2 (Two) Times a Day., Disp: , Rfl:   •  SYNTHROID 25 MCG tablet, Take 25 mcg by mouth Daily., Disp: , Rfl:   •  tamsulosin (FLOMAX) 0.4 MG capsule 24 hr capsule, Take 1 capsule by mouth Daily., Disp: 30 capsule, Rfl:       Allergies   Allergen Reactions   • Lisinopril Angioedema         Ht 175.3 cm (69\")   Wt 107 kg (236 lb)   BMI 34.85 kg/m²       Physical Exam:  Constitutional: Patient appears well-developed, well-nourished, well-hydrated  HEENT: Head: Normocephalic and atraumatic  Eyes: Conjunctivae and lids are normal  Pupils: Equal, round, reactive to light  Neck: Trachea normal. Neck supple. No JVD present.   Pulmonary: No increased work of breathing or signs of respiratory distress. Auscultation of lungs: Clear to auscultation.   Cardiovascular: Normal rate and rhythm, normal S1 and S2, no murmurs.   Peripheral vascular exam: Femoral: right 2+, left 2+. Posterior tibialis: right 2+ and left 2+. Dorsalis pedis: right 2+ and left 2+. 1+ edema. Presence of varicose veins.  Musculoskeletal   Gait and station: Gait evaluation demonstrated shuffling and a forward flexion posture.   Lumbar spine: Passive and active range of motion are appropriate for his age and condition. Extension, flexion, lateral flexion, rotation of the lumbar spine did not increase or reproduce pain. Lumbar facet joint loading maneuvers are negative.   Franco test and Gaenslen's test are negative   Piriformis maneuvers " are negative   Palpation of the bilateral greater trochanter, unrevealing   Examination of the Iliotibial band: unrevealing   Hip joints: The range of motion of the hip joints is limited to flexion and internal/external rotation, symmetrical but without pain  Neurological:   Patient is alert and oriented to person, place, and time.   Speech: Normal.   Cortical function: Normal mental status.   Cranial nerves 2-12: intact.   Reflex Scores:  Right patellar: 0+  Left patellar: 0+  Right Achilles: 0+  Left Achilles: 0+  Motor strength: 5/5  Motor Tone: Normal .   Involuntary movements: None.   Superficial/Primitive Reflexes: Primitive reflexes were absent.   Right Zurita: Absent  Left [place]: Absent  Right ankle clonus: Absent  Left ankle clonus: Absent   Babinsky: Absent  Long tract signs: Negative. Straight leg raising test: Negative.   Sensory exam: Intact to light touch, intact pain and temperature sensation, intact vibration sensation and normal proprioception.   Coordination: Normal finger to nose and heel to shin. Normal balance and negative Romberg's sign   Skin and subcutaneous tissue: Skin is warm and intact. No rash noted. No cyanosis.   Psychiatric: Judgment and insight: Normal. Recent and remote memory: Intact. Mood and affect: Normal.     ASSESSMENT:   1. Lumbar stenosis with neurogenic claudication    2. Spondylosis of lumbar region without myelopathy or radiculopathy    3. Degeneration of lumbar or lumbosacral intervertebral disc    4. Status post total bilateral knee replacement    5. TRACEY on CPAP    6. Gait disturbance    7. Physical deconditioning        PLAN/MEDICAL DECISION MAKING:  Patient reports a several year history of progressive lower back pain associated with severe neurogenic claudication, which began without incident.  Patient complains of severe lower back pain that radiates into the lower extremities associated with severe neurogenic claudication. Patient has failed to obtain pain  relief with conservative measures including analgesics, opioids, and physical therapy. MRI of the lumbar spine from 12/30/2020 revealed severe stenosis at L3-L4 and L4-L5. Larry Dean Klinefelter underwent neurosurgical consultation with Dr. Conrad Givens on 01/13/2021, and was found to be a surgical candidate for lumbar decompression at L3-L4 and L4-L5. Pain has progressed in intensity over the past year. I have reviewed all available patient's medical records as well as previous therapies as referenced above. I had a lengthy conversation with . Larry Dean Klinefelter regarding his chronic pain condition and potential therapeutic options including risks, benefits, alternative therapies, to name a few. Therefore, I have proposed the following plan:  1. Diagnostic studies: CBC, PT, PTT, COVID before SCS trial  2. Pharmacological measures: Reviewed and discussed; Patient takes Percocet PRN. Patient has declined additional pharmacological measures   3. Interventional pain management measures: Patient will be scheduled for diagnostic and therapeutic bilateral L4-L5 transforaminal epidural steroid injections. We may repeat epidural depending on patient's outcome. Patient will follow-up with Dr. Givens thereafter.  Patient requested additional information on other procedures that we can use to treat chronic pain.  Particularly, we have discussed the possibility of a spinal cord stimulator trial with [facility]. Patient has received formal education from me including risks, benefits, and alternative treatments, as well as detailed information regarding the procedure and specific goals for the trial. Patient has also received didactic materials including educational booklets and DVDs on spinal cord stimulation therapies.  4. Long-term rehabilitation efforts:  A. The patient does not have a history of falls. I did complete a risk assessment for falls  B. Patient will start a comprehensive physical therapy program for  water therapy, therapeutic exercise, core strengthening, gait and balance training, neurodynamics, myofascial release, cupping and dry needling   C. Start an exercise program such as chair yoga and water therapy  D. Contrast therapy: Apply ice-packs for 15-20 minutes, followed by heating pads for 15-20 minutes to affected area   E. Referral to Dr. Jaxson Hernandes for psychological screening for spinal cord stimulation and intrathecal therapies.  5. The patient has been instructed to contact my office with any questions or difficulties. The patient understands the plan and agrees to proceed accordingly.      I spent 50 minutes face-to-face with the patient, of which more than 50% of the time were spent counseling regarding evaluation, diagnosis, prognosis, diagnostic testing, treatment options for chronic pain condition and overall rehabilitation, coordination of care with other providers involved in patient's care, long-term management of concurrent comorbidities affecting effective pain control, risk and benefits of different interventions, alternative therapies, a comprehensive plan of care to address physical deconditioning, risks and benefits as it relates to spinal cord stimulator devices for trial and implantation and long-term management and functional goals of spinal cord stimulation       Patient Care Team:  Kin Nunez MD as PCP - General  Kin Nunez MD as PCP - Family Medicine  NewmanRosario toro MD as Consulting Physician (Cardiology)     No orders of the defined types were placed in this encounter.        No future appointments.      Johnny Pearson MD     EMR Dragon/Transcription disclaimer:  Much of this encounter note is an electronic transcription of spoken language to printed text. Electronic transcription of spoken language may permit erroneous, or at times, nonsensical words or phrases to be inadvertently transcribed. Although I have reviewed the note for such errors, some may still  exist.

## 2021-02-23 ENCOUNTER — OFFICE VISIT (OUTPATIENT)
Dept: PAIN MEDICINE | Facility: CLINIC | Age: 71
End: 2021-02-23

## 2021-02-23 VITALS — HEIGHT: 69 IN | WEIGHT: 236 LBS | BODY MASS INDEX: 34.96 KG/M2

## 2021-02-23 DIAGNOSIS — R53.81 PHYSICAL DECONDITIONING: ICD-10-CM

## 2021-02-23 DIAGNOSIS — R26.9 GAIT DISTURBANCE: ICD-10-CM

## 2021-02-23 DIAGNOSIS — Z96.653 STATUS POST TOTAL BILATERAL KNEE REPLACEMENT: ICD-10-CM

## 2021-02-23 DIAGNOSIS — M48.062 LUMBAR STENOSIS WITH NEUROGENIC CLAUDICATION: ICD-10-CM

## 2021-02-23 DIAGNOSIS — G47.33 OSA ON CPAP: ICD-10-CM

## 2021-02-23 DIAGNOSIS — M47.816 SPONDYLOSIS OF LUMBAR REGION WITHOUT MYELOPATHY OR RADICULOPATHY: ICD-10-CM

## 2021-02-23 DIAGNOSIS — Z99.89 OSA ON CPAP: ICD-10-CM

## 2021-02-23 DIAGNOSIS — M51.37 DEGENERATION OF LUMBAR OR LUMBOSACRAL INTERVERTEBRAL DISC: ICD-10-CM

## 2021-02-23 DIAGNOSIS — Z00.8 PRE-SURGICAL PSYCHOLOGICAL ASSESSMENT, ENCOUNTER FOR: Primary | ICD-10-CM

## 2021-02-23 PROBLEM — Z96.652 STATUS POST TOTAL LEFT KNEE REPLACEMENT: Status: RESOLVED | Noted: 2019-05-16 | Resolved: 2021-02-23

## 2021-02-23 PROCEDURE — 99204 OFFICE O/P NEW MOD 45 MIN: CPT | Performed by: ANESTHESIOLOGY

## 2021-02-23 RX ORDER — LOSARTAN POTASSIUM 25 MG/1
TABLET ORAL
COMMUNITY
Start: 2021-02-17 | End: 2021-12-22

## 2021-03-08 ENCOUNTER — TELEPHONE (OUTPATIENT)
Dept: PAIN MEDICINE | Facility: CLINIC | Age: 71
End: 2021-03-08

## 2021-03-08 NOTE — TELEPHONE ENCOUNTER
TR[employer]ARM TRANSFERRING W/NO ANSWER    Provider: DR. JOY  Caller: LARRY KLINEFELTER  Relationship to Patient: SELF    Phone Number: [phone]  Reason for Call: 3- APPT  When was the patient last seen: 2-23-21    PATIENT HAS APPT 3-15-21 FOR EPIDURAL INJECTION.  HE STATED THAT THE ONLY AVAILABILITY FOR PSYCHOLOGIST IS 3-15-21.  HE WOULD LIKE A CALL BACK TO DISCUSS IF THE INJECTION APPT NEEDS TO BE RESCHEDULED.

## 2021-03-09 DIAGNOSIS — Z00.8 PRE-SURGICAL PSYCHOLOGICAL ASSESSMENT, ENCOUNTER FOR: Primary | ICD-10-CM

## 2021-03-12 ENCOUNTER — APPOINTMENT (OUTPATIENT)
Dept: PREADMISSION TESTING | Facility: HOSPITAL | Age: 71
End: 2021-03-12

## 2021-03-12 PROCEDURE — C9803 HOPD COVID-19 SPEC COLLECT: HCPCS

## 2021-03-12 PROCEDURE — U0004 COV-19 TEST NON-CDC HGH THRU: HCPCS

## 2021-03-13 LAB — SARS-COV-2 RNA NOSE QL NAA+PROBE: NOT DETECTED

## 2021-03-15 ENCOUNTER — OUTSIDE FACILITY SERVICE (OUTPATIENT)
Dept: PAIN MEDICINE | Facility: CLINIC | Age: 71
End: 2021-03-15

## 2021-03-15 PROCEDURE — 64483 NJX AA&/STRD TFRM EPI L/S 1: CPT | Performed by: ANESTHESIOLOGY

## 2021-03-15 PROCEDURE — 99152 MOD SED SAME PHYS/QHP 5/>YRS: CPT | Performed by: ANESTHESIOLOGY

## 2021-03-16 ENCOUNTER — TELEPHONE (OUTPATIENT)
Dept: PAIN MEDICINE | Facility: CLINIC | Age: 71
End: 2021-03-16

## 2021-03-16 NOTE — TELEPHONE ENCOUNTER
Patient states he is okay had a few pain surges,but did not happen since this morning, and  states he is more concerned about possibly losing his Key Fob for his [place] including other keys.  He was given the [facility] number to reach out.

## 2021-04-12 NOTE — PROGRESS NOTES
"Chief Complaint: \"I am better, my symptoms are not as constant.\"    History of Present Illness:   Patient: Mr. Larry Dean Klinefelter, 70 y.o. male originally referred by Dr. Conrad Givens in consultation for chronic intractable lower back pain. Patient reports a several year history of  lower back pain associated with severe neurogenic claudication, which began without incident.  He was last seen on March 15, 2021, when he underwent diagnostic and therapeutic bilateral L4-L5 transforaminal epidural steroid injections, from which he reports experiencing 50% pain relief and functional improvement that is ongoing.  He just recently began physical therapy over the last several weeks.  Additionally, at his last office visit he had inquiries on spinal cord stimulation.  He did undergo psychological evaluation with Ivy grier, and was found to be an appropriate candidate for a spinal cord stimulator or intrathecal pain pump device.  He returns today for post procedure follow-up and evaluation.  Pain Description: intermittent exacerbation (previously constant), described as aching, burning and throbbing sensation.   Radiation of Pain: The pain radiates from the lumbar region into the posterior aspect of the lower extremities into his calves and feet  Pain intensity today: 2/10  Average pain intensity last week: 3/10  Pain intensity ranges from: 2/10 to 7/10  Aggravating factors: Pain increases with standing, walking. Patient describes neurogenic claudication. Patient uses a rolling walker  Alleviating factors: Pain decreases with sitting down, lying down   Associated Symptoms:   Patient reports since epidural his pain, numbness, and weakness in the lower extremities that occur during claudication is intermittent.   Patient denies any new bladder or bowel problems.   Patient reports difficulties with his balance but denies recent falls.     Review of previous therapies and additional medical records:  Alec Myles " "Klinefelter has already failed the following measures, including:   Conservative Measures: Oral analgesics, opioids, topical analgesics, ice, heat, chiropractic therapy, massage, physical therapy   Interventional Measures: 03/15/2021: DxTx bilateral L4-L5 transforaminal epidural steroid injections  Surgical Measures: No history of previous lumbar spine or hip surgery   Larry Dean Klinefelter underwent neurosurgical consultation with Dr. Conrad Givens on 01/13/2021, and was found to be a surgical candidate for lumbar decompression.  [facility] psychological evaluation with Ivy Ledezma on 3/15/2021 per note: \"From a psychological perspective patient appears to be able to tolerate interventional pain procedures at this time.  From a psychological perspective, patient is considered to be an appropriate candidate for an implantable spinal cord stimulator device or an intrathecal pain pump device at this time.\"  Larry Dean Klinefelter presents with significant comorbidities including TRACEY on CPAP, hypertension, hyperlipidemia, coronary artery disease, s/p bilateral total knee arthroplasty, obesity  In terms of current analgesics, Larry Dean Klinefelter takes: Percocet  I have reviewed [facility] Report #[id number] consistent with medication reconciliation.  SOAPP: Low Risk    Global Pain Scale 02-23 2021 04-14  2021         Pain 20 10         Feelings 15 2         Clinical outcomes 15 7         Activities 20 5         GPS Total: 70 24           Review of Diagnostic Studies:    MRI of the lumbar spine without contrast December 30, 2020.  I have reviewed the images.  Mild extra scoliosis.  Diffuse degenerative disc and facet joint disease throughout the lumbar spine.  Mild anterolisthesis of L3 on L4.  Right lateral subluxation of L3 on L4.  The conus has an unremarkable appearance.  Axial imaging:  L1-L2: Annular bulge, facet arthropathy.  Moderate bilateral neuroforaminal stenosis  L2-L3: Annular bulge, facet arthropathy.  " Moderate bilateral neuroforaminal stenosis  L3-L4: Annular bulge, facet arthropathy.  Moderate right and severe left neuroforaminal stenosis.  Bilateral lateral recess stenosis.  Mild central canal stenosis  L4-L5: Annular bulge, facet arthropathy, right paracentral disc protrusion resulting in right L5 nerve root compression.  Severe right and moderate left neuroforaminal stenosis.  Mild central canal stenosis  L5-S1: Annular bulge and facet arthropathy.  Severe right and moderate left neuroforaminal stenosis  X-rays of the lumbar spine including flexion and extension 1/14/2021.  I have reviewed the images.  Diffuse degenerative disc and facet joint disease with loss of vertebral body height throughout the lumbar spine.  Grade 1 anterolisthesis of L3 on L4 stable during flexion and extension.    Review of Systems   Constitutional: Positive for activity change and fatigue.   Respiratory: Positive for apnea.    Genitourinary: Positive for difficulty urinating.   Musculoskeletal: Positive for myalgias.   Allergic/Immunologic: Positive for environmental allergies.   All other systems reviewed and are negative.        Patient Active Problem List   Diagnosis   • Primary osteoarthritis of left knee   • TRACEY on CPAP   • HTN (hypertension)   • HLD (hyperlipidemia)   • Hypothyroid   • CAD (coronary artery disease)   • Postoperative urinary retention   • Leukocytosis, likely reactive   • Acute blood loss anemia, mild, asymptomatic   • Acute postoperative pain   • Sepsis due to pneumonia (CMS/HCC)   • Acute respiratory failure with hypoxia (CMS/HCC)   • Degeneration of lumbar or lumbosacral intervertebral disc   • Lumbar stenosis with neurogenic claudication   • Spondylosis of lumbar region without myelopathy or radiculopathy   • Gait disturbance   • Physical deconditioning       Past Medical History:   Diagnosis Date   • Anemia    • Bowel obstruction (CMS/HCC)    • Chest pain    • Constipation    • Coronary artery disease    •  Disease of thyroid gland    • Hepatitis    • Hyperlipidemia    • Hypertension    • Low back pain    • Macular degeneration    • Medial meniscus tear    • Obesity    • Obstructive sleep apnea on CPAP     setting - 13.   • Primary osteoarthritis of left knee     and right knee   • Wears glasses          Past Surgical History:   Procedure Laterality Date   • CARDIAC CATHETERIZATION  01/2019   • COLONOSCOPY  2013   • FRACTURE SURGERY      left heel    • INGUINAL HERNIA REPAIR      x2    • KNEE ARTHROSCOPY Right    • REPLACEMENT TOTAL KNEE Right    • TONSILLECTOMY     • TOTAL KNEE ARTHROPLASTY Left 5/16/2019    Procedure: TOTAL KNEE ARTHROPLASTY LEFT;  Surgeon: Willian Jc MD;  Location: [facility];  Service: Orthopedics         Family History   Problem Relation Age of Onset   • Rheum arthritis Mother    • Stroke Father    • Heart attack Father    • Hypertension Father    • Clotting disorder Father    • Rheum arthritis Father    • Hypertension Other          Social History     Socioeconomic History   • Marital status:      Spouse name: Not on file   • Number of children: Not on file   • Years of education: Not on file   • Highest education level: Not on file   Tobacco Use   • Smoking status: Never Smoker   • Smokeless tobacco: Never Used   Substance and Sexual Activity   • Alcohol use: No   • Drug use: No   • Sexual activity: Defer           Current Outpatient Medications:   •  aspirin 81 MG EC tablet, Take 81 mg by mouth Daily., Disp: , Rfl:   •  atorvastatin (LIPITOR) 40 MG tablet, Take 40 mg by mouth Every Night., Disp: , Rfl:   •  Calcium Carb-Cholecalciferol (CALCIUM PLUS VITAMIN D3 PO), Take 1 tablet by mouth 2 (Two) Times a Day., Disp: , Rfl:   •  Coenzyme Q10 (CO Q-10) 100 MG capsule, Take 100 mg by mouth 2 (Two) Times a Day., Disp: , Rfl:   •  cyclobenzaprine (FLEXERIL) 10 MG tablet, Take 10 mg by mouth 3 (Three) Times a Day As Needed. for muscle spams, Disp: , Rfl:   •  ezetimibe (ZETIA) 10 MG  "tablet, Take 10 mg by mouth Daily., Disp: , Rfl:   •  ferrous sulfate 324 (65 Fe) MG tablet delayed-release EC tablet, Take 324 mg by mouth Every Night., Disp: , Rfl:   •  Glucosamine-Chondroitin 7786-4612 MG/30ML liquid, Take 1 capsule by mouth 3 (Three) Times a Day With Meals., Disp: , Rfl:   •  lactobacillus acidophilus (RISAQUAD) capsule capsule, Take 1 capsule by mouth 3 (Three) Times a Day With Meals., Disp: 90 capsule, Rfl: 0  •  losartan (COZAAR) 25 MG tablet, , Disp: , Rfl:   •  metoprolol tartrate (LOPRESSOR) 25 MG tablet, Take 25 mg by mouth Daily., Disp: , Rfl:   •  Multiple Vitamins-Minerals (VITEYES AREDS FORMULA PO), Take 1 capsule by mouth 2 (Two) Times a Day., Disp: , Rfl:   •  nitroglycerin (NITROSTAT) 0.4 MG SL tablet, Place 0.4 mg under the tongue As Needed for Chest Pain. Take no more than 3 doses in 15 minutes. , Disp: , Rfl:   •  oxyCODONE-acetaminophen (PERCOCET)  MG per tablet, Take 1 tablet by mouth Every 6 (Six) Hours. 5 mg prn, Disp: , Rfl:   •  Polyethylene Glycol 3350 (MIRALAX PO), Take 1 dose by mouth 2 (Two) Times a Day., Disp: , Rfl:   •  SYNTHROID 25 MCG tablet, Take 25 mcg by mouth Daily., Disp: , Rfl:   •  tamsulosin (FLOMAX) 0.4 MG capsule 24 hr capsule, Take 1 capsule by mouth Daily., Disp: 30 capsule, Rfl:   •  albuterol (PROVENTIL) (2.5 MG/3ML) 0.083% nebulizer solution, Take 2.5 mg by nebulization Every 4 (Four) Hours As Needed for Shortness of Air., Disp: 5 vial, Rfl: 0      Allergies   Allergen Reactions   • Lisinopril Angioedema         /80 (BP Location: Left arm)   Pulse 65   Temp 98.6 °F (37 °C)   Resp 15   Ht 175.3 cm (69.02\")   Wt 107 kg (236 lb 6.4 oz)   SpO2 97%   BMI 34.89 kg/m²       Physical Exam:  Constitutional: Patient appears well-developed, well-nourished, well-hydrated  HEENT: Head: Normocephalic and atraumatic  Eyes: Conjunctivae and lids are normal  Pupils: Equal, round, reactive to light  Neck: Trachea normal. Neck supple. No JVD " present.   Pulmonary: No increased work of breathing or signs of respiratory distress. Auscultation of lungs: Clear to auscultation.   Cardiovascular: Normal rate and rhythm, normal S1 and S2, no murmurs.   Peripheral vascular exam: Femoral: right 2+, left 2+. Posterior tibialis: right 2+ and left 2+. Dorsalis pedis: right 2+ and left 2+. 1+ edema. Presence of varicose veins.  Musculoskeletal   Gait and station: Gait evaluation demonstrated shuffling and a forward flexion posture.   Lumbar spine: Passive and active range of motion are appropriate for his age and condition. Extension, flexion, lateral flexion, rotation of the lumbar spine did not increase or reproduce pain. Lumbar facet joint loading maneuvers are negative.   Franco test and Gaenslen's test are negative   Piriformis maneuvers are negative   Palpation of the bilateral greater trochanter, unrevealing   Examination of the Iliotibial band: unrevealing   Hip joints: The range of motion of the hip joints is limited to flexion and internal/external rotation, symmetrical but without pain  Neurological:   Patient is alert and oriented to person, place, and time.   Speech: Normal.   Cortical function: Normal mental status.   Cranial nerves 2-12: intact.   Reflex Scores:  Right patellar: 0+  Left patellar: 0+  Right Achilles: 0+  Left Achilles: 0+  Motor strength: 5/5  Motor Tone: Normal .   Involuntary movements: None.   Superficial/Primitive Reflexes: Primitive reflexes were absent.   Right Zurita: Absent  Left Zur[place]: Absent  Right ankle clonus: Absent  Left ankle clonus: Absent   Babinsky: Absent  Long tract signs: Negative. Straight leg raising test: Negative.   Sensory exam: Intact to light touch, intact pain and temperature sensation, intact vibration sensation and normal proprioception.   Coordination: Normal finger to nose and heel to shin. Normal balance and negative Romberg's sign   Skin and subcutaneous tissue: Skin is warm and intact. No rash  noted. No cyanosis.   Psychiatric: Judgment and insight: Normal. Recent and remote memory: Intact. Mood and affect: Normal.     I have reviewed the physical exam dated 02/23/2021. Upon examination of the patient today, there are no changes noted.      ASSESSMENT:   1. Lumbar stenosis with neurogenic claudication    2. Degeneration of lumbar or lumbosacral intervertebral disc    3. Spondylosis of lumbar region without myelopathy or radiculopathy    4. Status post total bilateral knee replacement    5. Physical deconditioning    6. Gait disturbance    7. TRACEY on CPAP        PLAN/MEDICAL DECISION MAKING:  Patient reports a several year history of progressive lower back pain associated with severe neurogenic claudication. Patient has failed to obtain pain relief with conservative measures including analgesics, opioids, and physical therapy. MRI of the lumbar spine from 12/30/2020 revealed severe stenosis at L3-L4 and L4-L5. Larry Dean Klinefelter underwent neurosurgical consultation with Dr. Conrad Givens on 01/13/2021, and was found to be a surgical candidate for lumbar decompression at L3-L4 and L4-L5.  I have reviewed all available patient's medical records as well as previous therapies as referenced above. I had a lengthy conversation with Mr. Larry Dean Klinefelter regarding his chronic pain condition and potential therapeutic options including risks, benefits, alternative therapies, to name a few. Therefore, I have proposed the following plan:  1. Diagnostic studies: CBC, PT, PTT, COVID before SCS trial  2. Pharmacological measures: Reviewed and discussed; Patient takes Percocet PRN. Patient has declined additional pharmacological measures   3. Interventional pain management measures: Patient will be scheduled for bilateral L4-L5 transforaminal epidural steroid injections. We may repeat epidural depending on patient's outcome. Patient will follow-up with Dr. Givens thereafter.  Per patient request at his last  office visit additional information on other procedures that we can use to treat chronic pain, including discussion of the possibility of a spinal cord stimulator trial with [facility] was entertained. Patient has received formal education from including risks, benefits, and alternative treatments, as well as detailed information regarding the procedure and specific goals for the trial. Patient has also received didactic materials including educational booklets and DVDs on spinal cord stimulation therapies.  4. Long-term rehabilitation efforts:  A. The patient does not have a history of falls. I did complete a risk assessment for falls  B. Patient will continue a comprehensive physical therapy program for water therapy, therapeutic exercise, core strengthening, gait and balance training, neurodynamics, myofascial release, cupping and dry needling   C. Start an exercise program such as chair yoga and water therapy  D. Contrast therapy: Apply ice-packs for 15-20 minutes, followed by heating pads for 15-20 minutes to affected area   5. The patient has been instructed to contact my office with any questions or difficulties. The patient understands the plan and agrees to proceed accordingly.      Patient Care Team:  Kin Nunez MD as PCP - General  Kin Nunez MD as PCP - Family Medicine  Rosario Newman MD as Consulting Physician (Cardiology)     No orders of the defined types were placed in this encounter.        No future appointments.      JASSI Soria/Transcription disclaimer:  Much of this encounter note is an electronic transcription of spoken language to printed text. Electronic transcription of spoken language may permit erroneous, or at times, nonsensical words or phrases to be inadvertently transcribed. Although I have reviewed the note for such errors, some may still exist.

## 2021-04-14 ENCOUNTER — OFFICE VISIT (OUTPATIENT)
Dept: PAIN MEDICINE | Facility: CLINIC | Age: 71
End: 2021-04-14

## 2021-04-14 VITALS
BODY MASS INDEX: 35.01 KG/M2 | RESPIRATION RATE: 15 BRPM | WEIGHT: 236.4 LBS | DIASTOLIC BLOOD PRESSURE: 80 MMHG | OXYGEN SATURATION: 97 % | HEART RATE: 65 BPM | TEMPERATURE: 98.6 F | HEIGHT: 69 IN | SYSTOLIC BLOOD PRESSURE: 133 MMHG

## 2021-04-14 DIAGNOSIS — Z99.89 OSA ON CPAP: ICD-10-CM

## 2021-04-14 DIAGNOSIS — M51.37 DEGENERATION OF LUMBAR OR LUMBOSACRAL INTERVERTEBRAL DISC: ICD-10-CM

## 2021-04-14 DIAGNOSIS — M48.062 LUMBAR STENOSIS WITH NEUROGENIC CLAUDICATION: ICD-10-CM

## 2021-04-14 DIAGNOSIS — R53.81 PHYSICAL DECONDITIONING: ICD-10-CM

## 2021-04-14 DIAGNOSIS — G47.33 OSA ON CPAP: ICD-10-CM

## 2021-04-14 DIAGNOSIS — Z96.653 STATUS POST TOTAL BILATERAL KNEE REPLACEMENT: ICD-10-CM

## 2021-04-14 DIAGNOSIS — M47.816 SPONDYLOSIS OF LUMBAR REGION WITHOUT MYELOPATHY OR RADICULOPATHY: ICD-10-CM

## 2021-04-14 DIAGNOSIS — R26.9 GAIT DISTURBANCE: ICD-10-CM

## 2021-04-14 DIAGNOSIS — M48.062 LUMBAR STENOSIS WITH NEUROGENIC CLAUDICATION: Primary | ICD-10-CM

## 2021-04-14 PROCEDURE — 99213 OFFICE O/P EST LOW 20 MIN: CPT | Performed by: NURSE PRACTITIONER

## 2021-04-14 RX ORDER — CYCLOBENZAPRINE HCL 10 MG
10 TABLET ORAL 3 TIMES DAILY PRN
COMMUNITY
Start: 2021-03-29 | End: 2022-03-25

## 2021-04-15 ENCOUNTER — TELEPHONE (OUTPATIENT)
Dept: PAIN MEDICINE | Facility: CLINIC | Age: 71
End: 2021-04-15

## 2021-04-15 NOTE — TELEPHONE ENCOUNTER
Caller:LARRY KLINEFELTER    Relationship to patient:SELF     Best call back number: [phone]    Chief complaint: WANTING TO RESCHEDULE HIS COVID TEST TIME TO BETWEEN 2-3 PM ON SAME DATE IF POSSIBLE    Type of visit: COVID SCREEN    Requested date: 04/30/2021      Additional notes:HAS A VISION APPT AT THE SAME CURRENT TIME- NEEDS APPT BETWEEN 2 - 3

## 2021-04-21 DIAGNOSIS — M48.062 LUMBAR STENOSIS WITH NEUROGENIC CLAUDICATION: Primary | ICD-10-CM

## 2021-04-30 ENCOUNTER — APPOINTMENT (OUTPATIENT)
Dept: PREADMISSION TESTING | Facility: HOSPITAL | Age: 71
End: 2021-04-30

## 2021-04-30 PROCEDURE — C9803 HOPD COVID-19 SPEC COLLECT: HCPCS

## 2021-04-30 PROCEDURE — U0004 COV-19 TEST NON-CDC HGH THRU: HCPCS

## 2021-05-01 LAB — SARS-COV-2 RNA NOSE QL NAA+PROBE: NOT DETECTED

## 2021-05-03 ENCOUNTER — OUTSIDE FACILITY SERVICE (OUTPATIENT)
Dept: PAIN MEDICINE | Facility: CLINIC | Age: 71
End: 2021-05-03

## 2021-05-03 PROCEDURE — 64483 NJX AA&/STRD TFRM EPI L/S 1: CPT | Performed by: ANESTHESIOLOGY

## 2021-05-03 PROCEDURE — 99152 MOD SED SAME PHYS/QHP 5/>YRS: CPT | Performed by: ANESTHESIOLOGY

## 2021-05-04 ENCOUNTER — TELEPHONE (OUTPATIENT)
Dept: PAIN MEDICINE | Facility: CLINIC | Age: 71
End: 2021-05-04

## 2021-05-04 NOTE — TELEPHONE ENCOUNTER
Called and spoke with patient regarding his procedure, stated that he had a rough night but that he's doing better. Pt hopes that the injections will work so he can come off his pain medicine. Reminded pt of follow up appointment, reminder card in the mail.

## 2021-06-15 NOTE — PROGRESS NOTES
"Chief Complaint: \"My leg pain keeps me from doing most activities.\"    History of Present Illness:   Patient: Mr. Larry Dean Klinefelter, 71 y.o. male originally referred by Dr. Conrad Givens in consultation for chronic intractable lower back pain. Patient reports a several year history of  lower back pain associated with severe neurogenic claudication, which began without incident.  We last saw him on May 30,021, when he underwent bilateral L4-L5 transforaminal epidural steroid injection, from which he reports experiencing some pain relief and functional improvement lasting, but he is continuing to struggle especially with significant leg pain..  Prior to that on March 15, 2021, he underwent diagnostic and therapeutic bilateral L4-L5 transforaminal epidural steroid injections, that provided him with 50% pain relief. He has been participating in physical therapy without significant relief.  Additionally, at his last office visit he had inquiries on spinal cord stimulation.  He did undergo psychological evaluation with Ivy grier, and was found to be an appropriate candidate for a spinal cord stimulator or intrathecal pain pump device.  He returns today for post procedure follow-up and evaluation.  Pain Description: intermittent exacerbation (previously constant), described as aching, burning and throbbing sensation.   Radiation of Pain: The pain radiates from the lumbar region into the posterior aspect of the lower extremities into his calves and feet  Pain intensity today: 6/10  Average pain intensity last week: 4/10  Pain intensity ranges from: 4/10 to 8/10  Aggravating factors: Pain increases with standing, walking. Patient describes neurogenic claudication. Patient uses a rolling walker  Alleviating factors: Pain decreases with sitting down, lying down   Associated Symptoms:   Patient reports since epidural his pain, numbness, and weakness in the lower extremities that occur during claudication is " "intermittent.   Patient denies any new bladder or bowel problems.   Patient reports difficulties with his balance but denies recent falls.     Review of previous therapies and additional medical records:  Larry Dean Klinefelter has already failed the following measures, including:   Conservative Measures: Oral analgesics, opioids, topical analgesics, ice, heat, chiropractic therapy, massage, physical therapy   Interventional Measures: 03/15/2021: DxTx bilateral L4-L5 transforaminal epidural steroid injections  05/03/2021: bilateral L4-L5 transforaminal epidural steroid injections  Surgical Measures: No history of previous lumbar spine or hip surgery   Larry Dean Klinefelter underwent neurosurgical consultation with Dr. Conrad Givens on 01/13/2021, and was found to be a surgical candidate for lumbar decompression.  [facility] psychological evaluation with Ivy Ledezma on 3/15/2021 per note: \"From a psychological perspective patient appears to be able to tolerate interventional pain procedures at this time.  From a psychological perspective, patient is considered to be an appropriate candidate for an implantable spinal cord stimulator device or an intrathecal pain pump device at this time.\"  Larry Dean Klinefelter presents with significant comorbidities including TRACEY on CPAP, hypertension, hyperlipidemia, coronary artery disease, s/p bilateral total knee arthroplasty, obesity  In terms of current analgesics, Larry Dean Klinefelter takes: Percocet  I have reviewed Humberto Report #[id number] consistent with medication reconciliation.  SOAPP: Low Risk    Global Pain Scale 02-23  2021 04-14  2021 06-17  2021        Pain 20 10 14        Feelings 15 2 0        Clinical outcomes 15 7 9        Activities 20 5 25        GPS Total: 70 24 48          Review of Diagnostic Studies:    MRI of the lumbar spine without contrast December 30, 2020.  I have reviewed the images.  Mild extra scoliosis.  Diffuse degenerative disc and facet " joint disease throughout the lumbar spine.  Mild anterolisthesis of L3 on L4.  Right lateral subluxation of L3 on L4.  The conus has an unremarkable appearance.  Axial imaging:  L1-L2: Annular bulge, facet arthropathy.  Moderate bilateral neuroforaminal stenosis  L2-L3: Annular bulge, facet arthropathy.  Moderate bilateral neuroforaminal stenosis  L3-L4: Annular bulge, facet arthropathy.  Moderate right and severe left neuroforaminal stenosis.  Bilateral lateral recess stenosis.  Mild central canal stenosis  L4-L5: Annular bulge, facet arthropathy, right paracentral disc protrusion resulting in right L5 nerve root compression.  Severe right and moderate left neuroforaminal stenosis.  Mild central canal stenosis  L5-S1: Annular bulge and facet arthropathy.  Severe right and moderate left neuroforaminal stenosis  X-rays of the lumbar spine including flexion and extension 1/14/2021.  I have reviewed the images.  Diffuse degenerative disc and facet joint disease with loss of vertebral body height throughout the lumbar spine.  Grade 1 anterolisthesis of L3 on L4 stable during flexion and extension.    Review of Systems   Constitutional: Positive for activity change.   Eyes: Positive for photophobia.   Respiratory: Positive for apnea.    Musculoskeletal: Positive for arthralgias.   All other systems reviewed and are negative.        Patient Active Problem List   Diagnosis   • Primary osteoarthritis of left knee   • TRACEY on CPAP   • HTN (hypertension)   • HLD (hyperlipidemia)   • Hypothyroid   • CAD (coronary artery disease)   • Postoperative urinary retention   • Leukocytosis, likely reactive   • Acute blood loss anemia, mild, asymptomatic   • Acute postoperative pain   • Sepsis due to pneumonia (CMS/HCC)   • Acute respiratory failure with hypoxia (CMS/HCC)   • Degeneration of lumbar or lumbosacral intervertebral disc   • Lumbar stenosis with neurogenic claudication   • Spondylosis of lumbar region without myelopathy or  radiculopathy   • Gait disturbance   • Physical deconditioning       Past Medical History:   Diagnosis Date   • Anemia    • Bowel obstruction (CMS/HCC)    • Chest pain    • Constipation    • Coronary artery disease    • Disease of thyroid gland    • Hepatitis    • Hyperlipidemia    • Hypertension    • Low back pain    • Macular degeneration    • Medial meniscus tear    • Obesity    • Obstructive sleep apnea on CPAP     setting - 13.   • Primary osteoarthritis of left knee     and right knee   • Wears glasses          Past Surgical History:   Procedure Laterality Date   • CARDIAC CATHETERIZATION  01/2019   • COLONOSCOPY  2013   • FRACTURE SURGERY      left heel    • INGUINAL HERNIA REPAIR      x2    • KNEE ARTHROSCOPY Right    • REPLACEMENT TOTAL KNEE Right    • TONSILLECTOMY     • TOTAL KNEE ARTHROPLASTY Left 5/16/2019    Procedure: TOTAL KNEE ARTHROPLASTY LEFT;  Surgeon: Willian Jc MD;  Location: [facility];  Service: Orthopedics         Family History   Problem Relation Age of Onset   • Rheum arthritis Mother    • Stroke Father    • Heart attack Father    • Hypertension Father    • Clotting disorder Father    • Rheum arthritis Father    • Hypertension Other          Social History     Socioeconomic History   • Marital status:      Spouse name: Not on file   • Number of children: Not on file   • Years of education: Not on file   • Highest education level: Not on file   Tobacco Use   • Smoking status: Never Smoker   • Smokeless tobacco: Never Used   Substance and Sexual Activity   • Alcohol use: No   • Drug use: No   • Sexual activity: Defer           Current Outpatient Medications:   •  aspirin 81 MG EC tablet, Take 81 mg by mouth Daily., Disp: , Rfl:   •  atorvastatin (LIPITOR) 40 MG tablet, Take 40 mg by mouth Every Night., Disp: , Rfl:   •  Calcium Carb-Cholecalciferol (CALCIUM PLUS VITAMIN D3 PO), Take 1 tablet by mouth 2 (Two) Times a Day., Disp: , Rfl:   •  Coenzyme Q10 (CO Q-10) 100 MG capsule,  Take 100 mg by mouth 2 (Two) Times a Day., Disp: , Rfl:   •  cyclobenzaprine (FLEXERIL) 10 MG tablet, Take 10 mg by mouth 3 (Three) Times a Day As Needed. for muscle spams, Disp: , Rfl:   •  ezetimibe (ZETIA) 10 MG tablet, Take 10 mg by mouth Daily., Disp: , Rfl:   •  ferrous sulfate 324 (65 Fe) MG tablet delayed-release EC tablet, Take 324 mg by mouth Every Night., Disp: , Rfl:   •  Glucosamine-Chondroitin 7372-3964 MG/30ML liquid, Take 1 capsule by mouth 3 (Three) Times a Day With Meals., Disp: , Rfl:   •  lactobacillus acidophilus (RISAQUAD) capsule capsule, Take 1 capsule by mouth 3 (Three) Times a Day With Meals., Disp: 90 capsule, Rfl: 0  •  losartan (COZAAR) 25 MG tablet, , Disp: , Rfl:   •  Multiple Vitamins-Minerals (VITEYES AREDS FORMULA PO), Take 1 capsule by mouth 2 (Two) Times a Day., Disp: , Rfl:   •  nitroglycerin (NITROSTAT) 0.4 MG SL tablet, Place 0.4 mg under the tongue As Needed for Chest Pain. Take no more than 3 doses in 15 minutes. , Disp: , Rfl:   •  oxyCODONE-acetaminophen (PERCOCET)  MG per tablet, Take 1 tablet by mouth Every 6 (Six) Hours. 5 mg prn, Disp: , Rfl:   •  Polyethylene Glycol 3350 (MIRALAX PO), Take 1 dose by mouth 2 (Two) Times a Day., Disp: , Rfl:   •  SYNTHROID 25 MCG tablet, Take 25 mcg by mouth Daily., Disp: , Rfl:   •  vitamin C (ASCORBIC ACID) 250 MG tablet, Take 250 mg by mouth Daily., Disp: , Rfl:   •  albuterol (PROVENTIL) (2.5 MG/3ML) 0.083% nebulizer solution, Take 2.5 mg by nebulization Every 4 (Four) Hours As Needed for Shortness of Air., Disp: 5 vial, Rfl: 0  •  metoprolol succinate XL (TOPROL-XL) 25 MG 24 hr tablet, , Disp: , Rfl:   •  metoprolol tartrate (LOPRESSOR) 25 MG tablet, Take 25 mg by mouth Daily., Disp: , Rfl:   •  tamsulosin (FLOMAX) 0.4 MG capsule 24 hr capsule, Take 1 capsule by mouth Daily., Disp: 30 capsule, Rfl:       Allergies   Allergen Reactions   • Lisinopril Angioedema         /76 (BP Location: Left arm, Patient Position:  "Sitting, Cuff Size: Adult)   Pulse 71   Temp 97.1 °F (36.2 °C)   Ht 177.8 cm (70\")   Wt 109 kg (240 lb 3.2 oz)   SpO2 97%   BMI 34.47 kg/m²       Physical Exam:  Constitutional: Patient appears well-developed, well-nourished, well-hydrated  HEENT: Head: Normocephalic and atraumatic  Eyes: Conjunctivae and lids are normal  Pupils: Equal, round, reactive to light  Neck: Trachea normal. Neck supple. No JVD present.   Pulmonary: No increased work of breathing or signs of respiratory distress. Auscultation of lungs: Clear to auscultation.   Cardiovascular: Normal rate and rhythm, normal S1 and S2, no murmurs.   Peripheral vascular exam: Femoral: right 2+, left 2+. Posterior tibialis: right 2+ and left 2+. Dorsalis pedis: right 2+ and left 2+. 1+ edema. Presence of varicose veins.  Musculoskeletal   Gait and station: Gait evaluation demonstrated shuffling and a forward flexion posture.   Lumbar spine: Passive and active range of motion are appropriate for his age and condition. Extension, flexion, lateral flexion, rotation of the lumbar spine did not increase or reproduce pain. Lumbar facet joint loading maneuvers are negative.   Franco test and Gaenslen's test are negative   Piriformis maneuvers are negative   Palpation of the bilateral greater trochanter, unrevealing   Examination of the Iliotibial band: unrevealing   Hip joints: The range of motion of the hip joints is limited to flexion and internal/external rotation, symmetrical but without pain  Neurological:   Patient is alert and oriented to person, place, and time.   Speech: Normal.   Cortical function: Normal mental status.   Cranial nerves 2-12: intact.   Reflex Scores:  Right patellar: 0+  Left patellar: 0+  Right Achilles: 0+  Left Achilles: 0+  Motor strength: 5/5  Motor Tone: Normal .   Involuntary movements: None.   Superficial/Primitive Reflexes: Primitive reflexes were absent.   Right Zurita: Absent  Left Zur[place]: Absent  Right ankle clonus: " Absent  Left ankle clonus: Absent   Babinsky: Absent  Long tract signs: Negative. Straight leg raising test: Negative.   Sensory exam: Intact to light touch, intact pain and temperature sensation, intact vibration sensation and normal proprioception.   Coordination: Normal finger to nose and heel to shin. Normal balance and negative Romberg's sign   Skin and subcutaneous tissue: Skin is warm and intact. No rash noted. No cyanosis.   Psychiatric: Judgment and insight: Normal. Recent and remote memory: Intact. Mood and affect: Normal.     I have completed a physical examination and have updated or changed the appropriate documentation from previous visit, otherwise physical exam is unchanged.         ASSESSMENT:   1. Lumbar stenosis with neurogenic claudication    2. Degeneration of lumbar or lumbosacral intervertebral disc    3. Spondylosis of lumbar region without myelopathy or radiculopathy    4. Status post total bilateral knee replacement    5. TRACEY on CPAP    6. Physical deconditioning    7. Gait disturbance    8. Preop testing    9. Encounter for other specified aftercare         PLAN/MEDICAL DECISION MAKING:  Patient reports a several year history of progressive lower back pain associated with severe neurogenic claudication. Patient has failed to obtain pain relief with conservative measures including analgesics, opioids, and physical therapy. MRI of the lumbar spine from 12/30/2020 revealed severe stenosis at L3-L4 and L4-L5. Alec Myles Klinefelter underwent neurosurgical consultation with Dr. Conrad Givens on 01/13/2021, and was found to be a surgical candidate for lumbar decompression at L3-L4 and L4-L5.  At this point, we have attempted to transforaminal epidurals, and he is continuing to struggle significantly with neurogenic claudication, and lower extremity symptoms.  He would like to move forward with a spinal cord stimulator trial with [employer].  I have discussed the elements of the procedure and  goals for the trial.  I have reviewed all available patient's medical records as well as previous therapies as referenced above. I had a lengthy conversation with . Alec Myles Shareedevyn regarding his chronic pain condition and potential therapeutic options including risks, benefits, alternative therapies, to name a few. Therefore, I have proposed the following plan:  1. Diagnostic studies: CBC, PT, PTT,  before SCS trial  2. Pharmacological measures: Reviewed and discussed; Patient takes Percocet PRN. Patient has declined additional pharmacological measures   3. Interventional pain management measures: Patient will be scheduled for a spinal cord stimulator trial with [employer]. Patient has received formal education from including risks, benefits, and alternative treatments, as well as detailed information regarding the procedure and specific goals for the trial. Patient has also received didactic materials including educational booklets and DVDs on spinal cord stimulation therapies.  4. Long-term rehabilitation efforts:  A. The patient does not have a history of falls. I did complete a risk assessment for falls  B. Patient will continue a comprehensive physical therapy program for water therapy, therapeutic exercise, core strengthening, gait and balance training, neurodynamics, myofascial release, cupping and dry needling   C. Start an exercise program such as chair yoga and water therapy  D. Contrast therapy: Apply ice-packs for 15-20 minutes, followed by heating pads for 15-20 minutes to affected area   5. The patient has been instructed to contact my office with any questions or difficulties. The patient understands the plan and agrees to proceed accordingly.      Patient Care Team:  Kin Nunez MD as PCP - General  Kin Nunez MD as PCP - Family Medicine  NewmanRosario toro MD as Consulting Physician (Cardiology)     No orders of the defined types were placed in this encounter.        Future  Appointments   Date Time Provider Department Center   7/19/2021  8:00 AM Johnny Pearson MD [facility]   8/4/2021 11:10 AM Willian Jc MD [facility]         JASSI Soria/Transcription disclaimer:  Much of this encounter note is an electronic transcription of spoken language to printed text. Electronic transcription of spoken language may permit erroneous, or at times, nonsensical words or phrases to be inadvertently transcribed. Although I have reviewed the note for such errors, some may still exist.

## 2021-06-17 ENCOUNTER — OFFICE VISIT (OUTPATIENT)
Dept: PAIN MEDICINE | Facility: CLINIC | Age: 71
End: 2021-06-17

## 2021-06-17 VITALS
TEMPERATURE: 97.1 F | OXYGEN SATURATION: 97 % | SYSTOLIC BLOOD PRESSURE: 125 MMHG | WEIGHT: 240.2 LBS | HEART RATE: 71 BPM | HEIGHT: 70 IN | DIASTOLIC BLOOD PRESSURE: 76 MMHG | BODY MASS INDEX: 34.39 KG/M2

## 2021-06-17 DIAGNOSIS — R26.9 GAIT DISTURBANCE: ICD-10-CM

## 2021-06-17 DIAGNOSIS — Z51.89 ENCOUNTER FOR OTHER SPECIFIED AFTERCARE: ICD-10-CM

## 2021-06-17 DIAGNOSIS — M48.062 LUMBAR STENOSIS WITH NEUROGENIC CLAUDICATION: ICD-10-CM

## 2021-06-17 DIAGNOSIS — Z96.653 STATUS POST TOTAL BILATERAL KNEE REPLACEMENT: ICD-10-CM

## 2021-06-17 DIAGNOSIS — G47.33 OSA ON CPAP: ICD-10-CM

## 2021-06-17 DIAGNOSIS — Z01.818 PREOP TESTING: ICD-10-CM

## 2021-06-17 DIAGNOSIS — Z01.812 PRE-PROCEDURAL LABORATORY EXAMINATION: ICD-10-CM

## 2021-06-17 DIAGNOSIS — M47.816 SPONDYLOSIS OF LUMBAR REGION WITHOUT MYELOPATHY OR RADICULOPATHY: ICD-10-CM

## 2021-06-17 DIAGNOSIS — Z99.89 OSA ON CPAP: ICD-10-CM

## 2021-06-17 DIAGNOSIS — M51.37 DEGENERATION OF LUMBAR OR LUMBOSACRAL INTERVERTEBRAL DISC: ICD-10-CM

## 2021-06-17 DIAGNOSIS — M48.062 LUMBAR STENOSIS WITH NEUROGENIC CLAUDICATION: Primary | ICD-10-CM

## 2021-06-17 DIAGNOSIS — R53.81 PHYSICAL DECONDITIONING: ICD-10-CM

## 2021-06-17 PROCEDURE — 99213 OFFICE O/P EST LOW 20 MIN: CPT | Performed by: NURSE PRACTITIONER

## 2021-06-17 RX ORDER — METOPROLOL SUCCINATE 25 MG/1
50 TABLET, EXTENDED RELEASE ORAL DAILY
COMMUNITY
Start: 2021-06-09

## 2021-06-17 RX ORDER — MULTIVIT WITH MINERALS/LUTEIN
250 TABLET ORAL DAILY
COMMUNITY
End: 2022-03-25

## 2021-06-28 ENCOUNTER — TELEPHONE (OUTPATIENT)
Dept: PAIN MEDICINE | Facility: CLINIC | Age: 71
End: 2021-06-28

## 2021-06-28 NOTE — TELEPHONE ENCOUNTER
Caller: LARRY KLINEFELTER    Relationship to patient: SELF    Best call back number: [phone]    Chief complaint: N/A    Type of visit: SPINAL CORD PROCEDURE    Requested date: 07.26.21     If rescheduling, when is the original appointment: 07.19.21     Additional notes: REQUESTING A 9AM APPT

## 2021-07-20 DIAGNOSIS — M48.062 LUMBAR STENOSIS WITH NEUROGENIC CLAUDICATION: Primary | ICD-10-CM

## 2021-07-26 ENCOUNTER — OUTSIDE FACILITY SERVICE (OUTPATIENT)
Dept: PAIN MEDICINE | Facility: CLINIC | Age: 71
End: 2021-07-26

## 2021-07-26 PROBLEM — Z96.653 STATUS POST TOTAL BILATERAL KNEE REPLACEMENT: Status: ACTIVE | Noted: 2019-05-16

## 2021-07-26 PROBLEM — Z46.2 ENCOUNTER FOR FITTING AND ADJUSTMENT OF NEUROPACEMAKER OF SPINAL CORD: Status: ACTIVE | Noted: 2021-07-26

## 2021-07-26 PROCEDURE — 63650 IMPLANT NEUROELECTRODES: CPT | Performed by: ANESTHESIOLOGY

## 2021-07-26 PROCEDURE — 99152 MOD SED SAME PHYS/QHP 5/>YRS: CPT | Performed by: ANESTHESIOLOGY

## 2021-07-26 NOTE — PROGRESS NOTES
"Chief Complaint: \"I did well during the trial.\"      Brief History: Mr. Larry Dean Klinefelter is a 71 y.o. male, who returns to the clinic for possible spinal cord stimulator reprogramming and removal of spinal cord stimulator trial leads placed on 07/26/2021. Mr. Larry Dean Klinefelter reports more than 60% pain relief of his chronic lower back and gluteal pain along with remarkable functional improvement with the use of his stimulator device. He experienced significant pain relief of his gluteal pain and lower extremity pain. Patient reports minimal relief of his previously severe neurogenic claudication.   Larry Dean Klinefelter did not require of SCS reprogramming during the trial. Patient was able to operate his stimulator device without difficulties. He would like to proceed with SCS reprogramming this morning to obtain more coverage in his buttocks.   Patient did not take additional analgesics throughout his spinal cord stimulator trial. He has remained afebrile throughout the trial. Dressings are dry and intact. Patient denies any complications related to the procedure.   Patient is satisfied with the trial and would like to move forward with implantation of a spinal cord stimulator device.   Pain level is rated as 3/10 with the stimulator \"turned on.”   Patient level ranges from 3/10 to 6/10 with the use of the spinal cord stimulator.    Patient reports some improvement of his pain, numbness and weakness in the lower extremities with the use of his SCS.  Patient denies  any new bladder or bowel problems.     Pain History, Review of previous therapies and additional medical records, Diagnostic Studies: Please refer to note on 6/17/2021    The following portions of the patient's history were reviewed and updated as appropriate: problem list, past medical history, past surgery history, social history, family history, medications, and allergies    Review of Systems   All other systems reviewed and are " "negative.      /67 (BP Location: Left arm, Patient Position: Sitting, Cuff Size: Adult)   Pulse 72   Temp 96.8 °F (36 °C)   Ht 177.8 cm (70\")   Wt 112 kg (247 lb 9.6 oz)   SpO2 95%   BMI 35.53 kg/m²       Physical Exam: Confirmed findings of today's exam, as follows;  Constitutional: Patient appears well-developed, well-nourished, well-hydrated  HEENT: Head: Normocephalic and atraumatic  Eyes: Conjunctivae and lids are normal  Pupils: Equal, round, reactive to light  Neck: Trachea normal. Neck supple. No JVD present.   Musculoskeletal   Gait and station: Gait evaluation demonstrated a more upright posture and less shuffling   Neurological:   Patient is alert and oriented to person, place, and time.   Speech: Normal.   Cortical function: Normal mental status.   Motor strength: 5/5  Motor Tone: Normal .   Involuntary movements: None.   Sensory exam: Intact to light touch, intact pain and temperature sensation, intact vibration sensation and normal proprioception.   Coordination: Romberg's sign: Negative   Skin and subcutaneous tissue: Skin is warm and intact. No rash noted. No cyanosis. The stimulator placement site looks unremarkable without erythema, drainage or fluid accumulation  Psychiatric: Judgment and insight: Normal. Recent and remote memory: Intact. Mood and affect: Normal.     Analysis of the spinal cord stimulator device with complex spinal cord stimulator reprogramming   Patient used the Saint Jude spinal cord stimulator device 100% of the time since initiation of the trial phase. The spinal cord stimulator device was reprogrammed under my direct supervision and one new program was created for a total of seven programs were created by adjusting electrode polarities, amplitude, pulse width, pulse rate, BurstDR, micro-dosing, in the following fashion;  Program SEVEN (Preferred Program):    Electrode polarities: 4-, 7+  Amplitude: 0.5-2 mA     Pulse width: 1000 mcs  Rate: 40 Hz  IntraBurst rate: " 500 Hz  Micro-dosing ratio: 30:90     Patient experienced significant pain relief with coverage with pleasant paresthesia in all areas of chronic pain.  Time spent reprogrammin minutes  A copy of the telemetry report will be scanned in the patient's chart.    Removal of spinal cord stimulator trial leads.   Two leads were removed with tips intact. There is no erythema, drainage, or fluid accumulation at the site. The area was cleansed with chlorhexidine.  A small Covaderm was applied.     ASSESSMENT:   1. Lumbar stenosis with neurogenic claudication    2. Degeneration of lumbar or lumbosacral intervertebral disc    3. Spondylosis of lumbar region without myelopathy or radiculopathy    4. Status post total bilateral knee replacement    5. TRACEY on CPAP    6. Physical deconditioning    7. Gait disturbance    8. Encounter for fitting and adjustment of neuropacemaker of spinal cord    9. Preoperative testing        PLAN/MEDICAL DECISION MAKING: Patient's chronic pain condition has been significantly improved during his SCS trial. Patient reports a several year history of progressive lower back pain associated with severe neurogenic claudication. Patient has previously failed to obtain pain relief with conservative measures including analgesics, opioids, and physical therapy. MRI of the lumbar spine from 2020 revealed severe stenosis at L3-L4 and L4-L5. Alec Myles Shareedevyn underwent neurosurgical consultation with Dr. Conrad Givens on 2021, and was found to be a potential surgical candidate for lumbar decompression at L3-L4 and L4-L5. He opted to move forward with a spinal cord stimulator trial with Saint Jude.  Patient is satisfied with the trial and would like to move forward with implantation of a spinal cord stimulator device.  I have discussed the elements of the procedure and goals for the trial.  I have reviewed all available patient's medical records as well as previous therapies as  referenced above. I had a lengthy conversation with Mr. Larry Dean Klinefelter regarding his chronic pain condition and potential therapeutic options including risks, benefits, alternative therapies, to name a few. Therefore, I have proposed the following plan  1. Referral to Dr. Conrad Givens in consultation for implantation of a spinal cord stimulator device. I have recommended Saint Александр Penta paddle lead with the top electrodes projecting at the level of the midportion of the T7 vertebral body level. I have recommended Saint Александр Proclaim XR IPG Non-Rechargeable (Full Body MRI compatible). Patient will follow-up with [facility].   2. Diagnostics: MRI of the thoracic spine without contrast to assess patency of thoracic epidural space for placement of surgical leads.  3. The patient has been instructed to contact my office with any questions or difficulties. The patient understands the plan and agrees to proceed accordingly.      Patient Care Team:  Kin Nunez MD as PCP - General  Kin Nunze MD as PCP - Family Medicine  NewmanRosario toro MD as Consulting Physician (Cardiology)     No orders of the defined types were placed in this encounter.        Future Appointments   Date Time Provider Department Center   8/4/2021 11:10 AM Willian Jc MD [facility]         Johnny Pearson MD      EMR Dragon/Transcription disclaimer:  Much of this encounter note is an electronic transcription of spoken language to printed text. Electronic transcription of spoken language may permit erroneous, or at times, nonsensical words or phrases to be inadvertently transcribed. Although I have reviewed the note for such errors, some may still exist.

## 2021-07-27 ENCOUNTER — TELEPHONE (OUTPATIENT)
Dept: PAIN MEDICINE | Facility: CLINIC | Age: 71
End: 2021-07-27

## 2021-07-29 ENCOUNTER — OFFICE VISIT (OUTPATIENT)
Dept: PAIN MEDICINE | Facility: CLINIC | Age: 71
End: 2021-07-29

## 2021-07-29 VITALS
BODY MASS INDEX: 35.45 KG/M2 | TEMPERATURE: 96.8 F | WEIGHT: 247.6 LBS | DIASTOLIC BLOOD PRESSURE: 67 MMHG | SYSTOLIC BLOOD PRESSURE: 138 MMHG | HEART RATE: 72 BPM | OXYGEN SATURATION: 95 % | HEIGHT: 70 IN

## 2021-07-29 DIAGNOSIS — Z46.2 ENCOUNTER FOR FITTING AND ADJUSTMENT OF NEUROPACEMAKER OF SPINAL CORD: ICD-10-CM

## 2021-07-29 DIAGNOSIS — Z99.89 OSA ON CPAP: ICD-10-CM

## 2021-07-29 DIAGNOSIS — Z01.818 PREOPERATIVE TESTING: ICD-10-CM

## 2021-07-29 DIAGNOSIS — M51.37 DEGENERATION OF LUMBAR OR LUMBOSACRAL INTERVERTEBRAL DISC: ICD-10-CM

## 2021-07-29 DIAGNOSIS — G47.33 OSA ON CPAP: ICD-10-CM

## 2021-07-29 DIAGNOSIS — R53.81 PHYSICAL DECONDITIONING: ICD-10-CM

## 2021-07-29 DIAGNOSIS — M48.062 LUMBAR STENOSIS WITH NEUROGENIC CLAUDICATION: ICD-10-CM

## 2021-07-29 DIAGNOSIS — M47.816 SPONDYLOSIS OF LUMBAR REGION WITHOUT MYELOPATHY OR RADICULOPATHY: ICD-10-CM

## 2021-07-29 DIAGNOSIS — R26.9 GAIT DISTURBANCE: ICD-10-CM

## 2021-07-29 DIAGNOSIS — Z96.653 STATUS POST TOTAL BILATERAL KNEE REPLACEMENT: ICD-10-CM

## 2021-07-29 PROCEDURE — 99024 POSTOP FOLLOW-UP VISIT: CPT | Performed by: ANESTHESIOLOGY

## 2021-07-29 PROCEDURE — 95972 ALYS CPLX SP/PN NPGT W/PRGRM: CPT | Performed by: ANESTHESIOLOGY

## 2021-08-04 ENCOUNTER — TELEPHONE (OUTPATIENT)
Dept: PAIN MEDICINE | Facility: CLINIC | Age: 71
End: 2021-08-04

## 2021-08-04 NOTE — TELEPHONE ENCOUNTER
LVM for patient to return call with what location he wanted his MRI completed at, additionally, advised that we would have to have authorization for the MRI to be completed. Requested that patient return call.

## 2021-08-09 ENCOUNTER — TELEPHONE (OUTPATIENT)
Dept: PAIN MEDICINE | Facility: CLINIC | Age: 71
End: 2021-08-09

## 2021-08-09 NOTE — TELEPHONE ENCOUNTER
Caller: LARRY KLINEFELTER     Relationship: SELF     Best call back number: [phone]    What orders are you requesting (i.e. lab or imaging): MRI THORACIC SPINE WO CONTRAST    Where will you receive your lab/imaging services: [facility]     Additional notes: ELICIA @ [facility] CALLED AND STATED SHE HAD A PATIENT ON THE LINE THAT WAS CONCERNED WITH HIS MRI ORDERS - ORDERS WERE SENT TO [facility] TO HAVE MRI THORACIC SPINE COMPLETED - PATIENT WOULD RATHER HAVE ORDERS SENT TO [facility] TO HAVE MRI COMPLETED THERE - FAX PROVIDED BY PATIENT: [phone] - FAX PROVIDED BY [facility]: [phone]    PLEASE ADVISE IF ORDERS CAN BE SENT TO [facility] AND/OR WHEN THOSE ORDERS ARE SENT TO [facility] RADIOLOGY

## 2021-08-09 NOTE — TELEPHONE ENCOUNTER
Spoke with patient. Advised patient that we had sent orders for [facility] by fax [phone] and they would be calling the patient to set up the MRI. Understanding verbalized.   No further needs expressed.

## 2021-08-19 ENCOUNTER — TELEPHONE (OUTPATIENT)
Dept: NEUROSURGERY | Facility: CLINIC | Age: 71
End: 2021-08-19

## 2021-08-19 DIAGNOSIS — M54.9 MECHANICAL BACK PAIN: Primary | ICD-10-CM

## 2021-08-19 DIAGNOSIS — M48.062 SPINAL STENOSIS OF LUMBAR REGION WITH NEUROGENIC CLAUDICATION: ICD-10-CM

## 2021-08-19 NOTE — TELEPHONE ENCOUNTER
Provider:  Chon  Caller: patient  Time of call: 12:02  Phone #:  [phone]  Surgery:    Surgery Date:    Last visit:   01/13/21  Next visit:     EMANUEL:         Reason for call:     Patient called and would like to proceed with a Laminectomy rather that do a spinal cord stimulator with Dr. Pearson.    He has some questions regarding this and requests to speak with a PA.

## 2021-08-19 NOTE — TELEPHONE ENCOUNTER
He first requested for patient be seen back to discuss laminectomy with flexion-extension x-rays as well as AP and lateral x-rays standing.

## 2021-08-19 NOTE — TELEPHONE ENCOUNTER
Patient notified of x-rays Dr. Givens requested and he will have those done prior to his visit.    Patient will await for Rayna's call.    (Patient states he has a new phone, so if it does anything strange, he extends his apologies.)

## 2021-08-24 ENCOUNTER — APPOINTMENT (OUTPATIENT)
Dept: GENERAL RADIOLOGY | Facility: HOSPITAL | Age: 71
End: 2021-08-24

## 2021-08-24 ENCOUNTER — HOSPITAL ENCOUNTER (OUTPATIENT)
Dept: GENERAL RADIOLOGY | Facility: HOSPITAL | Age: 71
Discharge: HOME | End: 2021-08-24
Admitting: PHYSICIAN ASSISTANT

## 2021-08-24 DIAGNOSIS — M48.062 SPINAL STENOSIS OF LUMBAR REGION WITH NEUROGENIC CLAUDICATION: ICD-10-CM

## 2021-08-24 DIAGNOSIS — M54.9 MECHANICAL BACK PAIN: ICD-10-CM

## 2021-08-24 PROCEDURE — 72110 X-RAY EXAM L-2 SPINE 4/>VWS: CPT

## 2021-08-27 ENCOUNTER — OFFICE VISIT (OUTPATIENT)
Dept: NEUROSURGERY | Facility: CLINIC | Age: 71
End: 2021-08-27

## 2021-08-27 ENCOUNTER — PREP FOR SURGERY (OUTPATIENT)
Dept: OTHER | Facility: HOSPITAL | Age: 71
End: 2021-08-27

## 2021-08-27 VITALS — WEIGHT: 247 LBS | TEMPERATURE: 96.9 F | HEIGHT: 70 IN | BODY MASS INDEX: 35.36 KG/M2

## 2021-08-27 DIAGNOSIS — M54.9 MECHANICAL BACK PAIN: ICD-10-CM

## 2021-08-27 DIAGNOSIS — M48.062 LUMBAR STENOSIS WITH NEUROGENIC CLAUDICATION: Primary | ICD-10-CM

## 2021-08-27 DIAGNOSIS — M51.36 DDD (DEGENERATIVE DISC DISEASE), LUMBAR: ICD-10-CM

## 2021-08-27 DIAGNOSIS — M48.062 SPINAL STENOSIS OF LUMBAR REGION WITH NEUROGENIC CLAUDICATION: Primary | ICD-10-CM

## 2021-08-27 PROCEDURE — 99214 OFFICE O/P EST MOD 30 MIN: CPT | Performed by: NEUROLOGICAL SURGERY

## 2021-08-27 RX ORDER — ACETAMINOPHEN 500 MG
1000 TABLET ORAL ONCE
Status: CANCELLED | OUTPATIENT
Start: 2021-08-27 | End: 2021-08-27

## 2021-08-27 RX ORDER — CEFAZOLIN SODIUM 2 G/100ML
2 INJECTION, SOLUTION INTRAVENOUS ONCE
Status: CANCELLED | OUTPATIENT
Start: 2021-08-27 | End: 2021-08-27

## 2021-08-27 RX ORDER — OXYCODONE HCL 10 MG/1
10 TABLET, FILM COATED, EXTENDED RELEASE ORAL ONCE
Status: CANCELLED | OUTPATIENT
Start: 2021-08-27 | End: 2021-08-27

## 2021-08-27 RX ORDER — IBUPROFEN 800 MG/1
800 TABLET ORAL ONCE
Status: CANCELLED | OUTPATIENT
Start: 2021-08-27 | End: 2021-08-27

## 2021-08-27 RX ORDER — FAMOTIDINE 20 MG/1
20 TABLET, FILM COATED ORAL
Status: CANCELLED | OUTPATIENT
Start: 2021-08-27

## 2021-08-27 NOTE — PROGRESS NOTES
Patient: Larry Dean Klinefelter  : 1950    Primary Care Provider: Kin Nunez MD    Requesting Provider: As above        History    Chief Complaint: Low back and lower extremity pain with walking and standing intolerance.    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  Since the end of last year his symptoms have been worse.  At one point he was hospitalized for pneumonia and then underwent some therapy which seemed to stir up his symptoms.  He has back pain that extends variably into his legs.  It bothers him when lying down at night.  He does have significant walking and standing intolerance.  He does better by sitting down or flexing forward at the waist.  The pain involves his thighs and occasionally his feet.  Percocet 10 was helpful at one point.  He denies any bowel or bladder dysfunction.  Injections have transiently helped.  He apparently had a trial of a spinal cord stimulator which helped modestly to moderately.       Review of Systems   Constitutional: Negative for activity change, appetite change, chills, diaphoresis, fatigue, fever and unexpected weight change.   HENT: Negative for congestion, dental problem, drooling, ear discharge, ear pain, facial swelling, hearing loss, mouth sores, nosebleeds, postnasal drip, rhinorrhea, sinus pressure, sinus pain, sneezing, sore throat, tinnitus, trouble swallowing and voice change.    Eyes: Negative for photophobia, pain, discharge, redness, itching and visual disturbance.   Respiratory: Negative for apnea, cough, choking, chest tightness, shortness of breath, wheezing and stridor.    Cardiovascular: Negative for chest pain, palpitations and leg swelling.   Gastrointestinal: Negative for abdominal distention, abdominal pain, anal bleeding, blood in stool, constipation, diarrhea, nausea, rectal pain and vomiting.   Endocrine: Negative for cold intolerance, heat  "intolerance, polydipsia, polyphagia and polyuria.   Genitourinary: Negative for decreased urine volume, difficulty urinating, dysuria, enuresis, flank pain, frequency, genital sores, hematuria and urgency.   Musculoskeletal: Positive for back pain. Negative for arthralgias, gait problem, joint swelling, myalgias, neck pain and neck stiffness.        Legs and his bottom hurts the most   Skin: Negative for color change, pallor, rash and wound.   Allergic/Immunologic: Negative for environmental allergies, food allergies and immunocompromised state.   Neurological: Negative for dizziness, tremors, seizures, syncope, facial asymmetry, speech difficulty, weakness, light-headedness, numbness and headaches.   Hematological: Negative for adenopathy. Does not bruise/bleed easily.   Psychiatric/Behavioral: Negative for agitation, behavioral problems, confusion, decreased concentration, dysphoric mood, hallucinations, self-injury, sleep disturbance and suicidal ideas. The patient is not nervous/anxious and is not hyperactive.        The patient's past medical history, past surgical history, family history, and social history have been reviewed at length in the electronic medical record.    Physical Exam:   Temp 96.9 °F (36.1 °C)   Ht 177.8 cm (70\")   Wt 112 kg (247 lb)   BMI 35.44 kg/m²   MUSCULOSKELETAL:  Straight leg raising is negative.  Franco's Sign is negative.  Tenderness in the back to palpation is not observed.  NEUROLOGICAL:  Strength is intact in the lower extremities to direct testing.  Muscle tone is normal throughout.  Station and gait are normal.  Sensation is intact to light touch testing throughout.      Medical Decision Making    Data Review:   (All imaging studies were personally reviewed unless stated otherwise)  Flexion-extension plain films demonstrate diffuse spondylosis and mild deformity in the coronal plane.  There is no translational instability.    Lumbar MRI from 12/30/2020 once again " demonstrates some scoliosis as well as high-grade stenosis at L3-4 and L4-5.  There is more mild narrowing at L2-3.    Diagnosis:   1.  Lumbar stenosis with neurogenic claudication.  2.  Mechanical low back pain.    Treatment Options:   After lengthy discussion with the patient and his wife I have recommended decompressive laminectomies at L3-4 and L4-5.  This will help with his claudication symptoms.  It is unlikely to address all of his pain  given that some of it is mechanical in nature.  The nature of the procedure as well as the potential risks, complications, limitations, and alternatives to the procedure were discussed at length with the patient and the patient has agreed to proceed with surgery.  He does have an echocardiogram scheduled for next week.  Formal cardiac clearance from Dr. Newman will be necessary.       Diagnosis Plan   1. Spinal stenosis of lumbar region with neurogenic claudication     2. Mechanical back pain     3. DDD (degenerative disc disease), lumbar         Scribed for Conrad Givens MD by Zakia Valle GEORGE 8/27/2021 17:22 EDT      I, Dr. Givens, personally performed the services described in the documentation, as scribed in my presence, and it is both accurate and complete.

## 2021-08-27 NOTE — H&P
Patient: Larry Dean Klinefelter  : 1950     Primary Care Provider: Kin Nunez MD     Requesting Provider: As above           History     Chief Complaint: Low back and lower extremity pain with walking and standing intolerance.     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  Since the end of last year his symptoms have been worse.  At one point he was hospitalized for pneumonia and then underwent some therapy which seemed to stir up his symptoms.  He has back pain that extends variably into his legs.  It bothers him when lying down at night.  He does have significant walking and standing intolerance.  He does better by sitting down or flexing forward at the waist.  The pain involves his thighs and occasionally his feet.  Percocet 10 was helpful at one point.  He denies any bowel or bladder dysfunction.  Injections have transiently helped.  He apparently had a trial of a spinal cord stimulator which helped modestly to moderately.        Review of Systems   Constitutional: Negative for activity change, appetite change, chills, diaphoresis, fatigue, fever and unexpected weight change.   HENT: Negative for congestion, dental problem, drooling, ear discharge, ear pain, facial swelling, hearing loss, mouth sores, nosebleeds, postnasal drip, rhinorrhea, sinus pressure, sinus pain, sneezing, sore throat, tinnitus, trouble swallowing and voice change.    Eyes: Negative for photophobia, pain, discharge, redness, itching and visual disturbance.   Respiratory: Negative for apnea, cough, choking, chest tightness, shortness of breath, wheezing and stridor.    Cardiovascular: Negative for chest pain, palpitations and leg swelling.   Gastrointestinal: Negative for abdominal distention, abdominal pain, anal bleeding, blood in stool, constipation, diarrhea, nausea, rectal pain and vomiting.   Endocrine: Negative for cold intolerance,  heat intolerance, polydipsia, polyphagia and polyuria.   Genitourinary: Negative for decreased urine volume, difficulty urinating, dysuria, enuresis, flank pain, frequency, genital sores, hematuria and urgency.   Musculoskeletal: Positive for back pain. Negative for arthralgias, gait problem, joint swelling, myalgias, neck pain and neck stiffness.        Legs and his bottom hurts the most   Skin: Negative for color change, pallor, rash and wound.   Allergic/Immunologic: Negative for environmental allergies, food allergies and immunocompromised state.   Neurological: Negative for dizziness, tremors, seizures, syncope, facial asymmetry, speech difficulty, weakness, light-headedness, numbness and headaches.   Hematological: Negative for adenopathy. Does not bruise/bleed easily.   Psychiatric/Behavioral: Negative for agitation, behavioral problems, confusion, decreased concentration, dysphoric mood, hallucinations, self-injury, sleep disturbance and suicidal ideas. The patient is not nervous/anxious and is not hyperactive.          The patient's past medical history, past surgical history, family history, and social history have been reviewed at length in the electronic medical record.     Past Medical History:   Diagnosis Date   • Anemia    • Bowel obstruction (CMS/HCC)    • Chest pain    • Constipation    • Coronary artery disease    • Disease of thyroid gland    • Hepatitis    • Hyperlipidemia    • Hypertension    • Low back pain    • Macular degeneration    • Medial meniscus tear    • Obesity    • Obstructive sleep apnea on CPAP     setting - 13.   • Primary osteoarthritis of left knee     and right knee   • Wears glasses      Past Surgical History:   Procedure Laterality Date   • CARDIAC CATHETERIZATION  01/2019   • COLONOSCOPY  2013   • FRACTURE SURGERY      left heel    • INGUINAL HERNIA REPAIR      x2    • KNEE ARTHROSCOPY Right    • REPLACEMENT TOTAL KNEE Right    • TONSILLECTOMY     • TOTAL KNEE ARTHROPLASTY  Left 5/16/2019    Procedure: TOTAL KNEE ARTHROPLASTY LEFT;  Surgeon: Willian Jc MD;  Location: [facility];  Service: Orthopedics     Family History   Problem Relation Age of Onset   • Rheum arthritis Mother    • Stroke Father    • Heart attack Father    • Hypertension Father    • Clotting disorder Father    • Rheum arthritis Father    • Hypertension Other      Social History     Socioeconomic History   • Marital status:      Spouse name: Not on file   • Number of children: Not on file   • Years of education: Not on file   • Highest education level: Not on file   Tobacco Use   • Smoking status: Never Smoker   • Smokeless tobacco: Never Used   Vaping Use   • Vaping Use: Never assessed   Substance and Sexual Activity   • Alcohol use: No   • Drug use: No   • Sexual activity: Defer       Allergies   Allergen Reactions   • Lisinopril Angioedema       Current Outpatient Medications on File Prior to Visit   Medication Sig Dispense Refill   • albuterol (PROVENTIL) (2.5 MG/3ML) 0.083% nebulizer solution Take 2.5 mg by nebulization Every 4 (Four) Hours As Needed for Shortness of Air. 5 vial 0   • aspirin 81 MG EC tablet Take 81 mg by mouth Daily.     • atorvastatin (LIPITOR) 40 MG tablet Take 40 mg by mouth Every Night.     • Calcium Carb-Cholecalciferol (CALCIUM PLUS VITAMIN D3 PO) Take 1 tablet by mouth 2 (Two) Times a Day.     • Coenzyme Q10 (CO Q-10) 100 MG capsule Take 100 mg by mouth 2 (Two) Times a Day.     • cyclobenzaprine (FLEXERIL) 10 MG tablet Take 10 mg by mouth 3 (Three) Times a Day As Needed. for muscle spams     • ezetimibe (ZETIA) 10 MG tablet Take 10 mg by mouth Daily.     • ferrous sulfate 324 (65 Fe) MG tablet delayed-release EC tablet Take 324 mg by mouth Every Night.     • Glucosamine-Chondroitin 4275-7350 MG/30ML liquid Take 1 capsule by mouth 3 (Three) Times a Day With Meals.     • lactobacillus acidophilus (RISAQUAD) capsule capsule Take 1 capsule by mouth 3 (Three) Times a Day With Meals.  "90 capsule 0   • losartan (COZAAR) 25 MG tablet      • metoprolol succinate XL (TOPROL-XL) 25 MG 24 hr tablet      • metoprolol tartrate (LOPRESSOR) 25 MG tablet Take 25 mg by mouth Daily.     • Multiple Vitamins-Minerals (VITEYES AREDS FORMULA PO) Take 1 capsule by mouth 2 (Two) Times a Day.     • nitroglycerin (NITROSTAT) 0.4 MG SL tablet Place 0.4 mg under the tongue As Needed for Chest Pain. Take no more than 3 doses in 15 minutes.      • oxyCODONE-acetaminophen (PERCOCET)  MG per tablet Take 1 tablet by mouth Every 6 (Six) Hours. 5 mg prn     • Polyethylene Glycol 3350 (MIRALAX PO) Take 1 dose by mouth 2 (Two) Times a Day.     • SYNTHROID 25 MCG tablet Take 25 mcg by mouth Daily.     • tamsulosin (FLOMAX) 0.4 MG capsule 24 hr capsule Take 1 capsule by mouth Daily. 30 capsule    • vitamin C (ASCORBIC ACID) 250 MG tablet Take 250 mg by mouth Daily.       No current facility-administered medications on file prior to visit.        Physical Exam:   Temp 96.9 °F (36.1 °C)   Ht 177.8 cm (70\")   Wt 112 kg (247 lb)   BMI 35.44 kg/m²   MUSCULOSKELETAL:  Straight leg raising is negative.  Franco's Sign is negative.  Tenderness in the back to palpation is not observed.  NEUROLOGICAL:  Strength is intact in the lower extremities to direct testing.  Muscle tone is normal throughout.  Station and gait are normal.  Sensation is intact to light touch testing throughout.        Medical Decision Making     Data Review:   (All imaging studies were personally reviewed unless stated otherwise)  Flexion-extension plain films demonstrate diffuse spondylosis and mild deformity in the coronal plane.  There is no translational instability.     Lumbar MRI from 12/30/2020 once again demonstrates some scoliosis as well as high-grade stenosis at L3-4 and L4-5.  There is more mild narrowing at L2-3.     Diagnosis:   1.  Lumbar stenosis with neurogenic claudication.  2.  Mechanical low back pain.     Treatment Options:   After " lengthy discussion with the patient and his wife I have recommended decompressive laminectomies at L3-4 and L4-5.  This will help with his claudication symptoms.  It is unlikely to address all of his pain  given that some of it is mechanical in nature.  The nature of the procedure as well as the potential risks, complications, limitations, and alternatives to the procedure were discussed at length with the patient and the patient has agreed to proceed with surgery.  He does have an echocardiogram scheduled for next week.  Formal cardiac clearance from Dr. Newman will be necessary.          Diagnosis Plan   1. Spinal stenosis of lumbar region with neurogenic claudication      2. Mechanical back pain      3. DDD (degenerative disc disease), lumbar

## 2021-09-17 ENCOUNTER — TELEPHONE (OUTPATIENT)
Dept: NEUROSURGERY | Facility: CLINIC | Age: 71
End: 2021-09-17

## 2021-09-17 NOTE — TELEPHONE ENCOUNTER
Provider:  Chon  Caller: pt  Time of call:   4:40  Phone #: [phone]   Surgery:  TBD  Surgery Date:  TBD  Last visit:   8-27-21  Next visit: ?    EMANUEL:         Reason for call:   Patient called and had several questions about his upcoming surgery when it gets scheduled. He would like someone to call him back. His phone lost signal. Please Advise. Thank you.

## 2021-09-17 NOTE — TELEPHONE ENCOUNTER
Please let patient know that Dr. Givens's PA will contact him early next week, when she can between OR cases.

## 2021-09-17 NOTE — TELEPHONE ENCOUNTER
I spoke to patient and let him know that Dr. Givens's PA will contact him next week to answer surgery questions.     Patient asked me when I might know when his surgery will be scheduled. I stated to patient, I'm sorry but I do not have access to that information.    Patient voiced understanding.

## 2021-09-20 NOTE — TELEPHONE ENCOUNTER
Called and spoke with patient  - answered all questions   - he was thankful for the call and will await our call to move forward with surgery

## 2021-10-07 ENCOUNTER — TELEPHONE (OUTPATIENT)
Dept: NEUROSURGERY | Facility: CLINIC | Age: 71
End: 2021-10-07

## 2021-10-07 NOTE — TELEPHONE ENCOUNTER
We would like to see him in FU in 3 weeks after surgery   - if everything looks ok at that visit he can travel

## 2021-10-07 NOTE — TELEPHONE ENCOUNTER
Provider:  HAILE  Caller: PATIENT  Time of call:  12:40P  Phone #:  [phone]  Surgery:  UPCOMING LUMBAR LAMINECTOMY DISCECTOMY DECOMPRESSION POSTERIOR 1-2 LEVELS L3-5  Surgery Date: 10/28/21    Last visit:     Next visit:      EMANUEL:          Reason for call:   PATIENT LEFT VM WANTING TO KNOW HOW LONG AFTER SURGERY BEFORE HE CAN TRAVEL AS HE HAS PLANS TO TRAVEL OVER THE THANKSGIVING HOLIDAY.  PLEASE CALL PATIENT TO ADVISE.

## 2021-10-13 ENCOUNTER — TELEPHONE (OUTPATIENT)
Dept: NEUROSURGERY | Facility: CLINIC | Age: 71
End: 2021-10-13

## 2021-10-13 NOTE — TELEPHONE ENCOUNTER
Called and s/w pt's wife.  She verbalized understanding and would like to go ahead and schedule the post op follow up visit.  Pt is scheduled to leave for vacation on 11/20.      Please schedule pt's post op follow up prior to 11/20

## 2021-10-13 NOTE — TELEPHONE ENCOUNTER
Yes. He will be 3 weeks post-op. He just needs to schedule his post-op FU prior to his departure and he will be fine

## 2021-10-13 NOTE — TELEPHONE ENCOUNTER
Provider:  Chon  Surgery:  Upcoming [facility]I   Surgery Date:  10/28/21 (upcoming)   Last visit:   Office Visit with Conrad Givens MD (08/27/2021)    Next visit: sx    Reason for call:     Pt states he has a scheduled vacation 21 days after surgery and wants to know if he will be well enough to travel.

## 2021-10-20 ENCOUNTER — HOSPITAL ENCOUNTER (INPATIENT)
Facility: HOSPITAL | Age: 71
LOS: 4 days | Discharge: HOME | End: 2021-10-25
Attending: EMERGENCY MEDICINE | Admitting: INTERNAL MEDICINE

## 2021-10-20 ENCOUNTER — APPOINTMENT (OUTPATIENT)
Dept: CT IMAGING | Facility: HOSPITAL | Age: 71
End: 2021-10-20

## 2021-10-20 DIAGNOSIS — M48.062 SPINAL STENOSIS OF LUMBAR REGION WITH NEUROGENIC CLAUDICATION: Primary | ICD-10-CM

## 2021-10-20 DIAGNOSIS — R26.9 GAIT DISTURBANCE: ICD-10-CM

## 2021-10-20 DIAGNOSIS — G89.18 ACUTE POSTOPERATIVE PAIN: ICD-10-CM

## 2021-10-20 DIAGNOSIS — K56.609 SMALL BOWEL OBSTRUCTION (HCC): Primary | ICD-10-CM

## 2021-10-20 DIAGNOSIS — M51.36 DDD (DEGENERATIVE DISC DISEASE), LUMBAR: ICD-10-CM

## 2021-10-20 DIAGNOSIS — R53.81 PHYSICAL DECONDITIONING: ICD-10-CM

## 2021-10-20 DIAGNOSIS — R10.9 ABDOMINAL PAIN: ICD-10-CM

## 2021-10-20 DIAGNOSIS — Z96.653 STATUS POST TOTAL BILATERAL KNEE REPLACEMENT: ICD-10-CM

## 2021-10-20 DIAGNOSIS — R10.9 ABDOMINAL PAIN, UNSPECIFIED ABDOMINAL LOCATION: ICD-10-CM

## 2021-10-20 DIAGNOSIS — D72.825 BANDEMIA: ICD-10-CM

## 2021-10-20 DIAGNOSIS — M54.9 MECHANICAL BACK PAIN: ICD-10-CM

## 2021-10-20 DIAGNOSIS — R11.2 NON-INTRACTABLE VOMITING WITH NAUSEA, UNSPECIFIED VOMITING TYPE: ICD-10-CM

## 2021-10-20 LAB
ALBUMIN SERPL-MCNC: 5.1 G/DL (ref 3.5–5.2)
ALBUMIN/GLOB SERPL: 1.5 G/DL
ALP SERPL-CCNC: 53 U/L (ref 39–117)
ALT SERPL W P-5'-P-CCNC: 26 U/L (ref 1–41)
ANION GAP SERPL CALCULATED.3IONS-SCNC: 16 MMOL/L (ref 5–15)
AST SERPL-CCNC: 26 U/L (ref 1–40)
BACTERIA UR QL AUTO: NORMAL /HPF
BASOPHILS # BLD AUTO: 0.04 10*3/MM3 (ref 0–0.2)
BASOPHILS NFR BLD AUTO: 0.2 % (ref 0–1.5)
BILIRUB SERPL-MCNC: 0.6 MG/DL (ref 0–1.2)
BILIRUB UR QL STRIP: NEGATIVE
BUN SERPL-MCNC: 18 MG/DL (ref 8–23)
BUN/CREAT SERPL: 18.4 (ref 7–25)
CALCIUM SPEC-SCNC: 10.2 MG/DL (ref 8.6–10.5)
CHLORIDE SERPL-SCNC: 98 MMOL/L (ref 98–107)
CLARITY UR: CLEAR
CO2 SERPL-SCNC: 26 MMOL/L (ref 22–29)
COLOR UR: YELLOW
CREAT SERPL-MCNC: 0.98 MG/DL (ref 0.76–1.27)
D-LACTATE SERPL-SCNC: 3.9 MMOL/L (ref 0.5–2)
DEPRECATED RDW RBC AUTO: 44.7 FL (ref 37–54)
EOSINOPHIL # BLD AUTO: 0 10*3/MM3 (ref 0–0.4)
EOSINOPHIL NFR BLD AUTO: 0 % (ref 0.3–6.2)
ERYTHROCYTE [DISTWIDTH] IN BLOOD BY AUTOMATED COUNT: 12.5 % (ref 12.3–15.4)
GFR SERPL CREATININE-BSD FRML MDRD: 75 ML/MIN/1.73
GLOBULIN UR ELPH-MCNC: 3.5 GM/DL
GLUCOSE SERPL-MCNC: 161 MG/DL (ref 65–99)
GLUCOSE UR STRIP-MCNC: NEGATIVE MG/DL
HCT VFR BLD AUTO: 44.1 % (ref 37.5–51)
HGB BLD-MCNC: 15.1 G/DL (ref 13–17.7)
HGB UR QL STRIP.AUTO: NEGATIVE
HOLD SPECIMEN: NORMAL
HYALINE CASTS UR QL AUTO: NORMAL /LPF
IMM GRANULOCYTES # BLD AUTO: 0.09 10*3/MM3 (ref 0–0.05)
IMM GRANULOCYTES NFR BLD AUTO: 0.6 % (ref 0–0.5)
KETONES UR QL STRIP: ABNORMAL
LEUKOCYTE ESTERASE UR QL STRIP.AUTO: NEGATIVE
LIPASE SERPL-CCNC: 40 U/L (ref 13–60)
LYMPHOCYTES # BLD AUTO: 0.59 10*3/MM3 (ref 0.7–3.1)
LYMPHOCYTES NFR BLD AUTO: 3.6 % (ref 19.6–45.3)
MCH RBC QN AUTO: 33.3 PG (ref 26.6–33)
MCHC RBC AUTO-ENTMCNC: 34.2 G/DL (ref 31.5–35.7)
MCV RBC AUTO: 97.1 FL (ref 79–97)
MONOCYTES # BLD AUTO: 0.71 10*3/MM3 (ref 0.1–0.9)
MONOCYTES NFR BLD AUTO: 4.3 % (ref 5–12)
NEUTROPHILS NFR BLD AUTO: 14.9 10*3/MM3 (ref 1.7–7)
NEUTROPHILS NFR BLD AUTO: 91.3 % (ref 42.7–76)
NITRITE UR QL STRIP: NEGATIVE
NRBC BLD AUTO-RTO: 0 /100 WBC (ref 0–0.2)
PH UR STRIP.AUTO: <=5 [PH] (ref 5–8)
PLATELET # BLD AUTO: 258 10*3/MM3 (ref 140–450)
PMV BLD AUTO: 9.6 FL (ref 6–12)
POTASSIUM SERPL-SCNC: 4.3 MMOL/L (ref 3.5–5.2)
PROT SERPL-MCNC: 8.6 G/DL (ref 6–8.5)
PROT UR QL STRIP: ABNORMAL
RBC # BLD AUTO: 4.54 10*6/MM3 (ref 4.14–5.8)
RBC # UR: NORMAL /HPF
REF LAB TEST METHOD: NORMAL
SODIUM SERPL-SCNC: 140 MMOL/L (ref 136–145)
SP GR UR STRIP: 1.03 (ref 1–1.03)
SQUAMOUS #/AREA URNS HPF: NORMAL /HPF
UROBILINOGEN UR QL STRIP: ABNORMAL
WBC # BLD AUTO: 16.33 10*3/MM3 (ref 3.4–10.8)
WBC UR QL AUTO: NORMAL /HPF
WHOLE BLOOD HOLD SPECIMEN: NORMAL
WHOLE BLOOD HOLD SPECIMEN: NORMAL

## 2021-10-20 PROCEDURE — 83605 ASSAY OF LACTIC ACID: CPT

## 2021-10-20 PROCEDURE — 81001 URINALYSIS AUTO W/SCOPE: CPT | Performed by: EMERGENCY MEDICINE

## 2021-10-20 PROCEDURE — 84145 PROCALCITONIN (PCT): CPT | Performed by: NURSE PRACTITIONER

## 2021-10-20 PROCEDURE — 83690 ASSAY OF LIPASE: CPT

## 2021-10-20 PROCEDURE — 74176 CT ABD & PELVIS W/O CONTRAST: CPT

## 2021-10-20 PROCEDURE — 85025 COMPLETE CBC W/AUTO DIFF WBC: CPT

## 2021-10-20 PROCEDURE — 80053 COMPREHEN METABOLIC PANEL: CPT

## 2021-10-20 PROCEDURE — 99285 EMERGENCY DEPT VISIT HI MDM: CPT

## 2021-10-20 RX ORDER — SODIUM CHLORIDE 9 MG/ML
100 INJECTION, SOLUTION INTRAVENOUS CONTINUOUS
Status: DISCONTINUED | OUTPATIENT
Start: 2021-10-20 | End: 2021-10-22

## 2021-10-20 RX ORDER — CHLORHEXIDINE GLUCONATE 4 G/100ML
SOLUTION TOPICAL
Qty: 120 ML | Refills: 0 | Status: SHIPPED | OUTPATIENT
Start: 2021-10-20 | End: 2021-12-22

## 2021-10-20 RX ORDER — CHLORHEXIDINE GLUCONATE 4 G/100ML
SOLUTION TOPICAL
Qty: 120 ML | Refills: 0 | Status: ON HOLD | OUTPATIENT
Start: 2021-10-20 | End: 2022-03-28

## 2021-10-20 RX ORDER — SODIUM CHLORIDE 9 MG/ML
10 INJECTION INTRAVENOUS AS NEEDED
Status: DISCONTINUED | OUTPATIENT
Start: 2021-10-20 | End: 2021-10-25 | Stop reason: HOSPADM

## 2021-10-20 RX ADMIN — SODIUM CHLORIDE 125 ML/HR: 9 INJECTION, SOLUTION INTRAVENOUS at 22:31

## 2021-10-20 RX ADMIN — SODIUM CHLORIDE 500 ML: 9 INJECTION, SOLUTION INTRAVENOUS at 21:55

## 2021-10-21 ENCOUNTER — ANESTHESIA EVENT CONVERTED (OUTPATIENT)
Dept: ANESTHESIOLOGY | Facility: HOSPITAL | Age: 71
End: 2021-10-21

## 2021-10-21 ENCOUNTER — ANESTHESIA (OUTPATIENT)
Dept: PERIOP | Facility: HOSPITAL | Age: 71
End: 2021-10-21

## 2021-10-21 ENCOUNTER — APPOINTMENT (OUTPATIENT)
Dept: GENERAL RADIOLOGY | Facility: HOSPITAL | Age: 71
End: 2021-10-21

## 2021-10-21 ENCOUNTER — TELEPHONE (OUTPATIENT)
Dept: NEUROSURGERY | Facility: CLINIC | Age: 71
End: 2021-10-21

## 2021-10-21 ENCOUNTER — ANESTHESIA EVENT (OUTPATIENT)
Dept: PERIOP | Facility: HOSPITAL | Age: 71
End: 2021-10-21

## 2021-10-21 PROBLEM — H57.89 EYE DRAINAGE: Status: ACTIVE | Noted: 2021-10-21

## 2021-10-21 PROBLEM — K56.609 SMALL BOWEL OBSTRUCTION: Status: ACTIVE | Noted: 2021-10-21

## 2021-10-21 PROBLEM — H00.011 HORDEOLUM OF RIGHT UPPER EYELID: Status: ACTIVE | Noted: 2021-10-21

## 2021-10-21 PROBLEM — N99.89 POSTOPERATIVE URINARY RETENTION: Status: RESOLVED | Noted: 2019-05-17 | Resolved: 2021-10-21

## 2021-10-21 PROBLEM — A41.9 SEPSIS DUE TO PNEUMONIA (HCC): Status: RESOLVED | Noted: 2020-11-01 | Resolved: 2021-10-21

## 2021-10-21 PROBLEM — E87.20 LACTIC ACIDOSIS: Status: ACTIVE | Noted: 2021-10-21

## 2021-10-21 PROBLEM — J18.9 SEPSIS DUE TO PNEUMONIA (HCC): Status: RESOLVED | Noted: 2020-11-01 | Resolved: 2021-10-21

## 2021-10-21 PROBLEM — D72.829 LEUKOCYTOSIS: Status: ACTIVE | Noted: 2021-10-21

## 2021-10-21 PROBLEM — D72.829 LEUKOCYTOSIS: Status: RESOLVED | Noted: 2019-05-17 | Resolved: 2021-10-21

## 2021-10-21 PROBLEM — R33.8 POSTOPERATIVE URINARY RETENTION: Status: RESOLVED | Noted: 2019-05-17 | Resolved: 2021-10-21

## 2021-10-21 LAB
ANION GAP SERPL CALCULATED.3IONS-SCNC: 12 MMOL/L (ref 5–15)
BASOPHILS # BLD AUTO: 0.03 10*3/MM3 (ref 0–0.2)
BASOPHILS NFR BLD AUTO: 0.2 % (ref 0–1.5)
BUN SERPL-MCNC: 18 MG/DL (ref 8–23)
BUN/CREAT SERPL: 18.4 (ref 7–25)
CALCIUM SPEC-SCNC: 9.1 MG/DL (ref 8.6–10.5)
CHLORIDE SERPL-SCNC: 101 MMOL/L (ref 98–107)
CO2 SERPL-SCNC: 25 MMOL/L (ref 22–29)
CREAT SERPL-MCNC: 0.98 MG/DL (ref 0.76–1.27)
D-LACTATE SERPL-SCNC: 3.3 MMOL/L (ref 0.5–2)
DEPRECATED RDW RBC AUTO: 45 FL (ref 37–54)
EOSINOPHIL # BLD AUTO: 0.02 10*3/MM3 (ref 0–0.4)
EOSINOPHIL NFR BLD AUTO: 0.2 % (ref 0.3–6.2)
ERYTHROCYTE [DISTWIDTH] IN BLOOD BY AUTOMATED COUNT: 12.8 % (ref 12.3–15.4)
GFR SERPL CREATININE-BSD FRML MDRD: 75 ML/MIN/1.73
GLUCOSE SERPL-MCNC: 143 MG/DL (ref 65–99)
HBA1C MFR BLD: 5.3 % (ref 4.8–5.6)
HCT VFR BLD AUTO: 40.7 % (ref 37.5–51)
HGB BLD-MCNC: 13.7 G/DL (ref 13–17.7)
IMM GRANULOCYTES # BLD AUTO: 0.06 10*3/MM3 (ref 0–0.05)
IMM GRANULOCYTES NFR BLD AUTO: 0.5 % (ref 0–0.5)
LYMPHOCYTES # BLD AUTO: 0.97 10*3/MM3 (ref 0.7–3.1)
LYMPHOCYTES NFR BLD AUTO: 7.6 % (ref 19.6–45.3)
MAGNESIUM SERPL-MCNC: 1.9 MG/DL (ref 1.6–2.4)
MCH RBC QN AUTO: 32.5 PG (ref 26.6–33)
MCHC RBC AUTO-ENTMCNC: 33.7 G/DL (ref 31.5–35.7)
MCV RBC AUTO: 96.4 FL (ref 79–97)
MONOCYTES # BLD AUTO: 1.02 10*3/MM3 (ref 0.1–0.9)
MONOCYTES NFR BLD AUTO: 8 % (ref 5–12)
NEUTROPHILS NFR BLD AUTO: 10.64 10*3/MM3 (ref 1.7–7)
NEUTROPHILS NFR BLD AUTO: 83.5 % (ref 42.7–76)
NRBC BLD AUTO-RTO: 0 /100 WBC (ref 0–0.2)
PLATELET # BLD AUTO: 249 10*3/MM3 (ref 140–450)
PMV BLD AUTO: 9.6 FL (ref 6–12)
POTASSIUM SERPL-SCNC: 4.1 MMOL/L (ref 3.5–5.2)
PROCALCITONIN SERPL-MCNC: 0.13 NG/ML (ref 0–0.25)
RBC # BLD AUTO: 4.22 10*6/MM3 (ref 4.14–5.8)
SARS-COV-2 RDRP RESP QL NAA+PROBE: NORMAL
SODIUM SERPL-SCNC: 138 MMOL/L (ref 136–145)
WBC # BLD AUTO: 12.74 10*3/MM3 (ref 3.4–10.8)

## 2021-10-21 PROCEDURE — 99223 1ST HOSP IP/OBS HIGH 75: CPT | Performed by: FAMILY MEDICINE

## 2021-10-21 PROCEDURE — 25010000002 PIPERACILLIN SOD-TAZOBACTAM PER 1 G: Performed by: NURSE PRACTITIONER

## 2021-10-21 PROCEDURE — 0WPF0JZ REMOVAL OF SYNTHETIC SUBSTITUTE FROM ABDOMINAL WALL, OPEN APPROACH: ICD-10-PCS | Performed by: SURGERY

## 2021-10-21 PROCEDURE — 25010000002 PIPERACILLIN SOD-TAZOBACTAM PER 1 G: Performed by: FAMILY MEDICINE

## 2021-10-21 PROCEDURE — 25010000002 PIPERACILLIN SOD-TAZOBACTAM PER 1 G: Performed by: INTERNAL MEDICINE

## 2021-10-21 PROCEDURE — 25010000002 FENTANYL CITRATE (PF) 50 MCG/ML SOLUTION: Performed by: NURSE ANESTHETIST, CERTIFIED REGISTERED

## 2021-10-21 PROCEDURE — 71045 X-RAY EXAM CHEST 1 VIEW: CPT

## 2021-10-21 PROCEDURE — 85025 COMPLETE CBC W/AUTO DIFF WBC: CPT | Performed by: FAMILY MEDICINE

## 2021-10-21 PROCEDURE — 93005 ELECTROCARDIOGRAM TRACING: CPT | Performed by: NURSE PRACTITIONER

## 2021-10-21 PROCEDURE — 93010 ELECTROCARDIOGRAM REPORT: CPT | Performed by: INTERNAL MEDICINE

## 2021-10-21 PROCEDURE — 83735 ASSAY OF MAGNESIUM: CPT | Performed by: NURSE PRACTITIONER

## 2021-10-21 PROCEDURE — 80048 BASIC METABOLIC PNL TOTAL CA: CPT | Performed by: NURSE PRACTITIONER

## 2021-10-21 PROCEDURE — 83036 HEMOGLOBIN GLYCOSYLATED A1C: CPT | Performed by: NURSE PRACTITIONER

## 2021-10-21 PROCEDURE — 83605 ASSAY OF LACTIC ACID: CPT | Performed by: SURGERY

## 2021-10-21 PROCEDURE — 88300 SURGICAL PATH GROSS: CPT | Performed by: SURGERY

## 2021-10-21 PROCEDURE — 25010000002 SUCCINYLCHOLINE PER 20 MG: Performed by: NURSE ANESTHETIST, CERTIFIED REGISTERED

## 2021-10-21 PROCEDURE — 87635 SARS-COV-2 COVID-19 AMP PRB: CPT | Performed by: FAMILY MEDICINE

## 2021-10-21 PROCEDURE — P9041 ALBUMIN (HUMAN),5%, 50ML: HCPCS | Performed by: NURSE ANESTHETIST, CERTIFIED REGISTERED

## 2021-10-21 PROCEDURE — 25010000002 DEXAMETHASONE SODIUM PHOSPHATE 10 MG/ML SOLUTION: Performed by: NURSE ANESTHETIST, CERTIFIED REGISTERED

## 2021-10-21 PROCEDURE — 87040 BLOOD CULTURE FOR BACTERIA: CPT | Performed by: FAMILY MEDICINE

## 2021-10-21 PROCEDURE — 25010000002 NEOSTIGMINE 10 MG/10ML SOLUTION: Performed by: NURSE ANESTHETIST, CERTIFIED REGISTERED

## 2021-10-21 PROCEDURE — 25010000002 PROPOFOL 10 MG/ML EMULSION: Performed by: NURSE ANESTHETIST, CERTIFIED REGISTERED

## 2021-10-21 PROCEDURE — 25010000002 DEXAMETHASONE PER 1 MG: Performed by: NURSE ANESTHETIST, CERTIFIED REGISTERED

## 2021-10-21 PROCEDURE — 25010000002 ONDANSETRON PER 1 MG: Performed by: NURSE PRACTITIONER

## 2021-10-21 PROCEDURE — 44005 FREEING OF BOWEL ADHESION: CPT | Performed by: PHYSICIAN ASSISTANT

## 2021-10-21 PROCEDURE — 25010000002 PIPERACILLIN SOD-TAZOBACTAM PER 1 G: Performed by: SURGERY

## 2021-10-21 PROCEDURE — 25010000003 LIDOCAINE 1 % SOLUTION: Performed by: NURSE ANESTHETIST, CERTIFIED REGISTERED

## 2021-10-21 PROCEDURE — 25010000002 ALBUMIN HUMAN 5% PER 50 ML: Performed by: NURSE ANESTHETIST, CERTIFIED REGISTERED

## 2021-10-21 RX ORDER — DEXAMETHASONE SODIUM PHOSPHATE 4 MG/ML
INJECTION, SOLUTION INTRA-ARTICULAR; INTRALESIONAL; INTRAMUSCULAR; INTRAVENOUS; SOFT TISSUE AS NEEDED
Status: DISCONTINUED | OUTPATIENT
Start: 2021-10-21 | End: 2021-10-21 | Stop reason: SURG

## 2021-10-21 RX ORDER — FAMOTIDINE 20 MG/1
20 TABLET, FILM COATED ORAL ONCE
Status: CANCELLED | OUTPATIENT
Start: 2021-10-21 | End: 2021-10-21

## 2021-10-21 RX ORDER — HYDROMORPHONE HYDROCHLORIDE 1 MG/ML
0.5 INJECTION, SOLUTION INTRAMUSCULAR; INTRAVENOUS; SUBCUTANEOUS
Status: DISCONTINUED | OUTPATIENT
Start: 2021-10-21 | End: 2021-10-21 | Stop reason: HOSPADM

## 2021-10-21 RX ORDER — SODIUM CHLORIDE, SODIUM LACTATE, POTASSIUM CHLORIDE, CALCIUM CHLORIDE 600; 310; 30; 20 MG/100ML; MG/100ML; MG/100ML; MG/100ML
125 INJECTION, SOLUTION INTRAVENOUS CONTINUOUS
Status: DISCONTINUED | OUTPATIENT
Start: 2021-10-21 | End: 2021-10-24

## 2021-10-21 RX ORDER — FAMOTIDINE 10 MG/ML
20 INJECTION, SOLUTION INTRAVENOUS ONCE
Status: CANCELLED | OUTPATIENT
Start: 2021-10-21 | End: 2021-10-21

## 2021-10-21 RX ORDER — HYDROMORPHONE HCL 110MG/55ML
0.5 PATIENT CONTROLLED ANALGESIA SYRINGE INTRAVENOUS
Status: DISCONTINUED | OUTPATIENT
Start: 2021-10-21 | End: 2021-10-25 | Stop reason: HOSPADM

## 2021-10-21 RX ORDER — NITROGLYCERIN 0.4 MG/1
0.4 TABLET SUBLINGUAL AS NEEDED
Status: DISCONTINUED | OUTPATIENT
Start: 2021-10-21 | End: 2021-10-25 | Stop reason: HOSPADM

## 2021-10-21 RX ORDER — MAGNESIUM HYDROXIDE 1200 MG/15ML
LIQUID ORAL AS NEEDED
Status: DISCONTINUED | OUTPATIENT
Start: 2021-10-21 | End: 2021-10-21 | Stop reason: HOSPADM

## 2021-10-21 RX ORDER — LIDOCAINE HYDROCHLORIDE 10 MG/ML
INJECTION, SOLUTION INFILTRATION; PERINEURAL AS NEEDED
Status: DISCONTINUED | OUTPATIENT
Start: 2021-10-21 | End: 2021-10-21 | Stop reason: SURG

## 2021-10-21 RX ORDER — MORPHINE SULFATE 2 MG/ML
2 INJECTION, SOLUTION INTRAMUSCULAR; INTRAVENOUS
Status: ACTIVE | OUTPATIENT
Start: 2021-10-21 | End: 2021-10-23

## 2021-10-21 RX ORDER — ALBUTEROL SULFATE 2.5 MG/3ML
2.5 SOLUTION RESPIRATORY (INHALATION) EVERY 4 HOURS PRN
Status: DISCONTINUED | OUTPATIENT
Start: 2021-10-21 | End: 2021-10-25 | Stop reason: HOSPADM

## 2021-10-21 RX ORDER — LIDOCAINE HYDROCHLORIDE 10 MG/ML
0.5 INJECTION, SOLUTION EPIDURAL; INFILTRATION; INTRACAUDAL; PERINEURAL ONCE AS NEEDED
Status: CANCELLED | OUTPATIENT
Start: 2021-10-21

## 2021-10-21 RX ORDER — FENTANYL CITRATE 50 UG/ML
INJECTION, SOLUTION INTRAMUSCULAR; INTRAVENOUS
Status: DISPENSED
Start: 2021-10-21 | End: 2021-10-22

## 2021-10-21 RX ORDER — SODIUM CHLORIDE, SODIUM LACTATE, POTASSIUM CHLORIDE, CALCIUM CHLORIDE 600; 310; 30; 20 MG/100ML; MG/100ML; MG/100ML; MG/100ML
9 INJECTION, SOLUTION INTRAVENOUS CONTINUOUS
Status: DISCONTINUED | OUTPATIENT
Start: 2021-10-21 | End: 2021-10-21

## 2021-10-21 RX ORDER — SODIUM CHLORIDE 0.9 % (FLUSH) 0.9 %
10 SYRINGE (ML) INJECTION AS NEEDED
Status: DISCONTINUED | OUTPATIENT
Start: 2021-10-21 | End: 2021-10-25 | Stop reason: HOSPADM

## 2021-10-21 RX ORDER — SODIUM CHLORIDE 0.9 % (FLUSH) 0.9 %
10 SYRINGE (ML) INJECTION EVERY 12 HOURS SCHEDULED
Status: DISCONTINUED | OUTPATIENT
Start: 2021-10-21 | End: 2021-10-25 | Stop reason: HOSPADM

## 2021-10-21 RX ORDER — FAMOTIDINE 10 MG/ML
20 INJECTION, SOLUTION INTRAVENOUS EVERY 12 HOURS SCHEDULED
Status: DISCONTINUED | OUTPATIENT
Start: 2021-10-21 | End: 2021-10-21 | Stop reason: HOSPADM

## 2021-10-21 RX ORDER — SUCCINYLCHOLINE CHLORIDE 20 MG/ML
INJECTION INTRAMUSCULAR; INTRAVENOUS AS NEEDED
Status: DISCONTINUED | OUTPATIENT
Start: 2021-10-21 | End: 2021-10-21 | Stop reason: SURG

## 2021-10-21 RX ORDER — PROMETHAZINE HYDROCHLORIDE 25 MG/1
25 TABLET ORAL ONCE AS NEEDED
Status: DISCONTINUED | OUTPATIENT
Start: 2021-10-21 | End: 2021-10-21 | Stop reason: HOSPADM

## 2021-10-21 RX ORDER — SODIUM CHLORIDE, SODIUM LACTATE, POTASSIUM CHLORIDE, CALCIUM CHLORIDE 600; 310; 30; 20 MG/100ML; MG/100ML; MG/100ML; MG/100ML
9 INJECTION, SOLUTION INTRAVENOUS CONTINUOUS
Status: CANCELLED | OUTPATIENT
Start: 2021-10-21

## 2021-10-21 RX ORDER — ONDANSETRON 2 MG/ML
4 INJECTION INTRAMUSCULAR; INTRAVENOUS EVERY 6 HOURS PRN
Status: DISCONTINUED | OUTPATIENT
Start: 2021-10-21 | End: 2021-10-25 | Stop reason: HOSPADM

## 2021-10-21 RX ORDER — NALOXONE HCL 0.4 MG/ML
0.1 VIAL (ML) INJECTION
Status: DISCONTINUED | OUTPATIENT
Start: 2021-10-21 | End: 2021-10-25 | Stop reason: HOSPADM

## 2021-10-21 RX ORDER — FENTANYL CITRATE 50 UG/ML
50 INJECTION, SOLUTION INTRAMUSCULAR; INTRAVENOUS
Status: DISCONTINUED | OUTPATIENT
Start: 2021-10-21 | End: 2021-10-21 | Stop reason: HOSPADM

## 2021-10-21 RX ORDER — PROPOFOL 10 MG/ML
VIAL (ML) INTRAVENOUS AS NEEDED
Status: DISCONTINUED | OUTPATIENT
Start: 2021-10-21 | End: 2021-10-21 | Stop reason: SURG

## 2021-10-21 RX ORDER — BUPIVACAINE HYDROCHLORIDE 2.5 MG/ML
INJECTION, SOLUTION EPIDURAL; INFILTRATION; INTRACAUDAL
Status: COMPLETED | OUTPATIENT
Start: 2021-10-21 | End: 2021-10-21

## 2021-10-21 RX ORDER — BUPIVACAINE HCL/0.9 % NACL/PF 0.125 %
PLASTIC BAG, INJECTION (ML) EPIDURAL AS NEEDED
Status: DISCONTINUED | OUTPATIENT
Start: 2021-10-21 | End: 2021-10-21 | Stop reason: SURG

## 2021-10-21 RX ORDER — ALBUMIN, HUMAN INJ 5% 5 %
SOLUTION INTRAVENOUS CONTINUOUS PRN
Status: DISCONTINUED | OUTPATIENT
Start: 2021-10-21 | End: 2021-10-21 | Stop reason: SURG

## 2021-10-21 RX ORDER — FENTANYL CITRATE 50 UG/ML
INJECTION, SOLUTION INTRAMUSCULAR; INTRAVENOUS AS NEEDED
Status: DISCONTINUED | OUTPATIENT
Start: 2021-10-21 | End: 2021-10-21 | Stop reason: SURG

## 2021-10-21 RX ORDER — DEXAMETHASONE SODIUM PHOSPHATE 10 MG/ML
INJECTION, SOLUTION INTRAMUSCULAR; INTRAVENOUS
Status: COMPLETED | OUTPATIENT
Start: 2021-10-21 | End: 2021-10-21

## 2021-10-21 RX ORDER — MIDAZOLAM HYDROCHLORIDE 1 MG/ML
0.5 INJECTION INTRAMUSCULAR; INTRAVENOUS
Status: CANCELLED | OUTPATIENT
Start: 2021-10-21

## 2021-10-21 RX ORDER — NEOSTIGMINE METHYLSULFATE 1 MG/ML
INJECTION, SOLUTION INTRAVENOUS AS NEEDED
Status: DISCONTINUED | OUTPATIENT
Start: 2021-10-21 | End: 2021-10-21 | Stop reason: SURG

## 2021-10-21 RX ORDER — FAMOTIDINE 10 MG/ML
20 INJECTION, SOLUTION INTRAVENOUS 2 TIMES DAILY
Status: DISCONTINUED | OUTPATIENT
Start: 2021-10-21 | End: 2021-10-24

## 2021-10-21 RX ORDER — SODIUM CHLORIDE 0.9 % (FLUSH) 0.9 %
10 SYRINGE (ML) INJECTION EVERY 12 HOURS SCHEDULED
Status: CANCELLED | OUTPATIENT
Start: 2021-10-21

## 2021-10-21 RX ORDER — PROMETHAZINE HYDROCHLORIDE 25 MG/1
25 SUPPOSITORY RECTAL ONCE AS NEEDED
Status: DISCONTINUED | OUTPATIENT
Start: 2021-10-21 | End: 2021-10-21 | Stop reason: HOSPADM

## 2021-10-21 RX ORDER — SODIUM CHLORIDE 0.9 % (FLUSH) 0.9 %
10 SYRINGE (ML) INJECTION AS NEEDED
Status: CANCELLED | OUTPATIENT
Start: 2021-10-21

## 2021-10-21 RX ORDER — GLYCOPYRROLATE 0.2 MG/ML
INJECTION INTRAMUSCULAR; INTRAVENOUS AS NEEDED
Status: DISCONTINUED | OUTPATIENT
Start: 2021-10-21 | End: 2021-10-21 | Stop reason: SURG

## 2021-10-21 RX ORDER — VECURONIUM BROMIDE 1 MG/ML
INJECTION, POWDER, LYOPHILIZED, FOR SOLUTION INTRAVENOUS AS NEEDED
Status: DISCONTINUED | OUTPATIENT
Start: 2021-10-21 | End: 2021-10-21 | Stop reason: SURG

## 2021-10-21 RX ORDER — ONDANSETRON 4 MG/1
4 TABLET, FILM COATED ORAL EVERY 6 HOURS PRN
Status: DISCONTINUED | OUTPATIENT
Start: 2021-10-21 | End: 2021-10-25 | Stop reason: HOSPADM

## 2021-10-21 RX ORDER — ROCURONIUM BROMIDE 10 MG/ML
INJECTION, SOLUTION INTRAVENOUS AS NEEDED
Status: DISCONTINUED | OUTPATIENT
Start: 2021-10-21 | End: 2021-10-21 | Stop reason: SURG

## 2021-10-21 RX ADMIN — PHENOL 2 SPRAY: 1.5 LIQUID ORAL at 19:59

## 2021-10-21 RX ADMIN — ALBUMIN HUMAN: 0.05 INJECTION, SOLUTION INTRAVENOUS at 15:46

## 2021-10-21 RX ADMIN — SODIUM CHLORIDE, POTASSIUM CHLORIDE, SODIUM LACTATE AND CALCIUM CHLORIDE: 600; 310; 30; 20 INJECTION, SOLUTION INTRAVENOUS at 17:18

## 2021-10-21 RX ADMIN — TAZOBACTAM SODIUM AND PIPERACILLIN SODIUM 3.38 G: 375; 3 INJECTION, SOLUTION INTRAVENOUS at 15:28

## 2021-10-21 RX ADMIN — FENTANYL CITRATE 100 MCG: 50 INJECTION, SOLUTION INTRAMUSCULAR; INTRAVENOUS at 15:35

## 2021-10-21 RX ADMIN — DEXAMETHASONE SODIUM PHOSPHATE 8 MG: 4 INJECTION, SOLUTION INTRA-ARTICULAR; INTRALESIONAL; INTRAMUSCULAR; INTRAVENOUS; SOFT TISSUE at 15:38

## 2021-10-21 RX ADMIN — SODIUM CHLORIDE, POTASSIUM CHLORIDE, SODIUM LACTATE AND CALCIUM CHLORIDE: 600; 310; 30; 20 INJECTION, SOLUTION INTRAVENOUS at 16:44

## 2021-10-21 RX ADMIN — FAMOTIDINE 20 MG: 10 INJECTION, SOLUTION INTRAVENOUS at 21:46

## 2021-10-21 RX ADMIN — FAMOTIDINE 20 MG: 10 INJECTION INTRAVENOUS at 14:56

## 2021-10-21 RX ADMIN — BUPIVACAINE HYDROCHLORIDE 60 ML: 2.5 INJECTION, SOLUTION EPIDURAL; INFILTRATION; INTRACAUDAL; PERINEURAL at 15:37

## 2021-10-21 RX ADMIN — DEXAMETHASONE SODIUM PHOSPHATE 4 MG: 10 INJECTION, SOLUTION INTRAMUSCULAR; INTRAVENOUS at 15:37

## 2021-10-21 RX ADMIN — PROPOFOL 200 MG: 10 INJECTION, EMULSION INTRAVENOUS at 15:35

## 2021-10-21 RX ADMIN — TAZOBACTAM SODIUM AND PIPERACILLIN SODIUM 3.38 G: 375; 3 INJECTION, SOLUTION INTRAVENOUS at 21:45

## 2021-10-21 RX ADMIN — SODIUM CHLORIDE, POTASSIUM CHLORIDE, SODIUM LACTATE AND CALCIUM CHLORIDE 9 ML/HR: 600; 310; 30; 20 INJECTION, SOLUTION INTRAVENOUS at 14:50

## 2021-10-21 RX ADMIN — PHENOL 2 SPRAY: 1.5 LIQUID ORAL at 04:39

## 2021-10-21 RX ADMIN — LIDOCAINE HYDROCHLORIDE 50 MG: 10 INJECTION, SOLUTION INFILTRATION; PERINEURAL at 15:35

## 2021-10-21 RX ADMIN — TAZOBACTAM SODIUM AND PIPERACILLIN SODIUM 3.38 G: 375; 3 INJECTION, SOLUTION INTRAVENOUS at 07:46

## 2021-10-21 RX ADMIN — Medication 100 MCG: at 15:48

## 2021-10-21 RX ADMIN — SODIUM CHLORIDE, PRESERVATIVE FREE 10 ML: 5 INJECTION INTRAVENOUS at 21:47

## 2021-10-21 RX ADMIN — ROCURONIUM BROMIDE 5 MG: 10 INJECTION INTRAVENOUS at 15:35

## 2021-10-21 RX ADMIN — TAZOBACTAM SODIUM AND PIPERACILLIN SODIUM 3.38 G: 375; 3 INJECTION, SOLUTION INTRAVENOUS at 15:20

## 2021-10-21 RX ADMIN — SODIUM CHLORIDE, POTASSIUM CHLORIDE, SODIUM LACTATE AND CALCIUM CHLORIDE 125 ML/HR: 600; 310; 30; 20 INJECTION, SOLUTION INTRAVENOUS at 21:46

## 2021-10-21 RX ADMIN — TAZOBACTAM SODIUM AND PIPERACILLIN SODIUM 3.38 G: 375; 3 INJECTION, SOLUTION INTRAVENOUS at 04:56

## 2021-10-21 RX ADMIN — VECURONIUM BROMIDE 1 MG: 1 INJECTION, POWDER, LYOPHILIZED, FOR SOLUTION INTRAVENOUS at 16:58

## 2021-10-21 RX ADMIN — VECURONIUM BROMIDE 2 MG: 1 INJECTION, POWDER, LYOPHILIZED, FOR SOLUTION INTRAVENOUS at 17:10

## 2021-10-21 RX ADMIN — Medication 180 MG: at 15:35

## 2021-10-21 RX ADMIN — GLYCOPYRROLATE 0.4 MG: 0.2 INJECTION INTRAMUSCULAR; INTRAVENOUS at 17:40

## 2021-10-21 RX ADMIN — FENTANYL CITRATE 50 MCG: 50 INJECTION, SOLUTION INTRAMUSCULAR; INTRAVENOUS at 17:59

## 2021-10-21 RX ADMIN — VECURONIUM BROMIDE 2 MG: 1 INJECTION, POWDER, LYOPHILIZED, FOR SOLUTION INTRAVENOUS at 16:30

## 2021-10-21 RX ADMIN — ALBUMIN HUMAN: 0.05 INJECTION, SOLUTION INTRAVENOUS at 16:13

## 2021-10-21 RX ADMIN — Medication 100 MCG: at 15:50

## 2021-10-21 RX ADMIN — NEOSTIGMINE 2 MG: 1 INJECTION INTRAVENOUS at 17:40

## 2021-10-21 RX ADMIN — ONDANSETRON 4 MG: 2 INJECTION INTRAMUSCULAR; INTRAVENOUS at 17:22

## 2021-10-21 NOTE — TELEPHONE ENCOUNTER
Patient's wife notified that Viktoria will be calling her in the near future.    She was thankful for the call.

## 2021-10-21 NOTE — ANESTHESIA PROCEDURE NOTES
Airway  Urgency: elective    Date/Time: 10/21/2021 3:36 PM  Airway not difficult    General Information and Staff    Patient location during procedure: OR  CRNA: Pamella Khan CRNA    Indications and Patient Condition  Indications for airway management: airway protection    Preoxygenated: yes  MILS not maintained throughout  Mask difficulty assessment: 1 - vent by mask    Final Airway Details  Final airway type: endotracheal airway      Successful airway: ETT  Cuffed: yes   Successful intubation technique: direct laryngoscopy  Endotracheal tube insertion site: oral  Blade: Cindy  Blade size: 3  ETT size (mm): 7.5  Cormack-Lehane Classification: grade I - full view of glottis  Placement verified by: chest auscultation and capnometry   Cuff volume (mL): 10  Measured from: lips  ETT/EBT  to lips (cm): 20  Number of attempts at approach: 1  Assessment: lips, teeth, and gum same as pre-op and atraumatic intubation    Additional Comments  Negative epigastric sounds, Breath sound equal bilaterally with symmetric chest rise and fall

## 2021-10-21 NOTE — ANESTHESIA PROCEDURE NOTES
Peripheral Block      Patient reassessed immediately prior to procedure    Patient location during procedure: OR  Reason for block: at surgeon's request and post-op pain management  Performed by  Anesthesiologist: Michelle Nunez DO  Assisted by: Harry Calhoun PA-C  Preanesthetic Checklist  Completed: patient identified, IV checked, site marked, risks and benefits discussed, surgical consent, monitors and equipment checked, pre-op evaluation and timeout performed  Prep:  Pt Position: supine  Sterile barriers:cap, gloves, sterile barriers and mask  Prep: ChloraPrep  Patient monitoring: blood pressure monitoring, continuous pulse oximetry and EKG  Procedure    Sedation: yes  Performed under: general  Guidance:ultrasound guided  Images:still images obtained, printed/placed on chart    Laterality:Bilateral  Block Type:TAP  Injection Technique:single-shot  Needle Type:short-bevel and echogenic  Needle Gauge:20 G  Resistance on Injection: none    Medications Used: bupivacaine PF (MARCAINE) 0.25 % injection, 60 mL  dexamethasone sodium phosphate injection, 4 mg  Med administered at 10/21/2021 3:37 PM      Medications  Comment:Block Injection:  LA dose divided between Right and Left block        Post Assessment  Injection Assessment: negative aspiration for heme, incremental injection and no paresthesia on injection  Patient Tolerance:comfortable throughout block  Complications:no  Additional Notes      Under Ultrasound guidance, a BBraun 4inch 360 degree needle was advanced with Normal Saline hydro dissection of tissue.  The Internal Oblique and Transversus Abdominus muscles where visualized.  At or before the aponeurosis of Internal Oblique, local anesthetic spread was visualized in the Transversus Abdominus Plane. Injection was made incrementally with aspiration every 5 mls.  There was no  intravascular injection,  injection pressure was normal, there was no neural injection, and the procedure was completed without  difficulty.  Thank You.

## 2021-10-21 NOTE — TELEPHONE ENCOUNTER
Please see message below. Patient currently admitted @ [facility]. Should patient r/s his surgery.     LUMBAR LAMINECTOMY DECOMPRESSION L3-5 is scheduled for 10/28 with Dr. Givens.

## 2021-10-21 NOTE — ANESTHESIA POSTPROCEDURE EVALUATION
Patient: Larry Dean Klinefelter    Procedure Summary     Date: 10/21/21 Room / Location:  [facility]  [facility]  [facility]    Anesthesia Start: 1528 Anesthesia Stop: 1802    Procedure: LAPAROTOMY EXPLORATORY, EXPLANT OF ABDOMINAL MESH AND LYSIS OF ADHESIONS (N/A Abdomen) Diagnosis:     Surgeons: Quan Alberto MD Provider: Michelle Nunez DO    Anesthesia Type: general with block ASA Status: 3          Anesthesia Type: general with block    Vitals  No vitals data found for the desired time range.          Post Anesthesia Care and Evaluation    Patient location during evaluation: PACU  Patient participation: complete - patient participated  Level of consciousness: awake and alert  Pain management: adequate  Airway patency: patent  Anesthetic complications: No anesthetic complications  PONV Status: none  Cardiovascular status: hemodynamically stable and acceptable  Respiratory status: nonlabored ventilation, acceptable and nasal cannula  Hydration status: acceptable

## 2021-10-21 NOTE — ANESTHESIA PREPROCEDURE EVALUATION
Anesthesia Evaluation     Patient summary reviewed   no history of anesthetic complications:  NPO Solid Status: > 8 hours  NPO Liquid Status: > 8 hours           Airway   Mallampati: II  TM distance: >3 FB  Neck ROM: full  No difficulty expected  Dental - normal exam     Pulmonary - normal exam   (+) sleep apnea on CPAP,   (-) asthma, not a smoker, no home oxygen  Cardiovascular - normal exam    ECG reviewed  Patient on routine beta blocker    (+) hypertension, CAD, hyperlipidemia,   (-) dysrhythmias, angina, cardiac stents    ROS comment: NSR ekg    Neuro/Psych  (-) TIA, CVA  GI/Hepatic/Renal/Endo    (+) obesity,   thyroid problem     Musculoskeletal     (+) chronic pain,   Abdominal    Substance History - negative use     OB/GYN          Other   arthritis,      ROS/Med Hx Other: hgb 13.7 k 4.1                Anesthesia Plan    ASA 3     general with block   Rapid sequence(Risks and benefits of general anesthesia discussed with patient (including MI, CVA, death, recall, aspiration, oropharyngeal/dental damage), questions answered, agreeable to proceed.  Benefits and risks TAPs discussed with patient ((including failed block, damage to nerve/nearby structures, intravascular injection)); questions answered, agreeable to proceed.    OGT to suction prior to induction + RSI )  intravenous induction     Anesthetic plan, all risks, benefits, and alternatives have been provided, discussed and informed consent has been obtained with: patient.  Use of blood products discussed with patient  Consented to blood products.   Plan discussed with CRNA.

## 2021-10-21 NOTE — TELEPHONE ENCOUNTER
PATIENT IS CURRENTLY IN THE HOSPITAL UNDERGOING TREATMENT FOR BOWEL BLOCKAGE.  HE IS SCHEDULED FOR SURGERY WITH DR BE NEXT WEEK.  PATIENT'S WIFE IS CONCERN THAT HE MAY NOT BE ABLE TO HAVE HIS SURGERY.  PLEASE CALL PATIENT AND ADVISE AS TO WHAT PATIENT SHOULD DO    [phone] - KEVIN  [phone] - KEVIN CELL

## 2021-10-22 LAB
ANION GAP SERPL CALCULATED.3IONS-SCNC: 13 MMOL/L (ref 5–15)
BASOPHILS # BLD AUTO: 0.01 10*3/MM3 (ref 0–0.2)
BASOPHILS NFR BLD AUTO: 0.1 % (ref 0–1.5)
BUN SERPL-MCNC: 17 MG/DL (ref 8–23)
BUN/CREAT SERPL: 17.7 (ref 7–25)
CALCIUM SPEC-SCNC: 8.4 MG/DL (ref 8.6–10.5)
CHLORIDE SERPL-SCNC: 103 MMOL/L (ref 98–107)
CO2 SERPL-SCNC: 25 MMOL/L (ref 22–29)
CREAT SERPL-MCNC: 0.96 MG/DL (ref 0.76–1.27)
D-LACTATE SERPL-SCNC: 1.9 MMOL/L (ref 0.5–2)
D-LACTATE SERPL-SCNC: 2.3 MMOL/L (ref 0.5–2)
DEPRECATED RDW RBC AUTO: 48.3 FL (ref 37–54)
EOSINOPHIL # BLD AUTO: 0 10*3/MM3 (ref 0–0.4)
EOSINOPHIL NFR BLD AUTO: 0 % (ref 0.3–6.2)
ERYTHROCYTE [DISTWIDTH] IN BLOOD BY AUTOMATED COUNT: 13 % (ref 12.3–15.4)
GFR SERPL CREATININE-BSD FRML MDRD: 77 ML/MIN/1.73
GLUCOSE SERPL-MCNC: 138 MG/DL (ref 65–99)
HCT VFR BLD AUTO: 36 % (ref 37.5–51)
HGB BLD-MCNC: 11.8 G/DL (ref 13–17.7)
IMM GRANULOCYTES # BLD AUTO: 0.06 10*3/MM3 (ref 0–0.05)
IMM GRANULOCYTES NFR BLD AUTO: 0.5 % (ref 0–0.5)
LYMPHOCYTES # BLD AUTO: 0.75 10*3/MM3 (ref 0.7–3.1)
LYMPHOCYTES NFR BLD AUTO: 5.7 % (ref 19.6–45.3)
MCH RBC QN AUTO: 32.9 PG (ref 26.6–33)
MCHC RBC AUTO-ENTMCNC: 32.8 G/DL (ref 31.5–35.7)
MCV RBC AUTO: 100.3 FL (ref 79–97)
MONOCYTES # BLD AUTO: 0.75 10*3/MM3 (ref 0.1–0.9)
MONOCYTES NFR BLD AUTO: 5.7 % (ref 5–12)
NEUTROPHILS NFR BLD AUTO: 11.53 10*3/MM3 (ref 1.7–7)
NEUTROPHILS NFR BLD AUTO: 88 % (ref 42.7–76)
NRBC BLD AUTO-RTO: 0 /100 WBC (ref 0–0.2)
PLATELET # BLD AUTO: 200 10*3/MM3 (ref 140–450)
PMV BLD AUTO: 9.9 FL (ref 6–12)
POTASSIUM SERPL-SCNC: 4 MMOL/L (ref 3.5–5.2)
RBC # BLD AUTO: 3.59 10*6/MM3 (ref 4.14–5.8)
SODIUM SERPL-SCNC: 141 MMOL/L (ref 136–145)
WBC # BLD AUTO: 13.1 10*3/MM3 (ref 3.4–10.8)

## 2021-10-22 PROCEDURE — 97116 GAIT TRAINING THERAPY: CPT

## 2021-10-22 PROCEDURE — 97535 SELF CARE MNGMENT TRAINING: CPT

## 2021-10-22 PROCEDURE — 83605 ASSAY OF LACTIC ACID: CPT | Performed by: INTERNAL MEDICINE

## 2021-10-22 PROCEDURE — 99233 SBSQ HOSP IP/OBS HIGH 50: CPT | Performed by: INTERNAL MEDICINE

## 2021-10-22 PROCEDURE — 85025 COMPLETE CBC W/AUTO DIFF WBC: CPT | Performed by: SURGERY

## 2021-10-22 PROCEDURE — 97530 THERAPEUTIC ACTIVITIES: CPT

## 2021-10-22 PROCEDURE — 80048 BASIC METABOLIC PNL TOTAL CA: CPT | Performed by: SURGERY

## 2021-10-22 PROCEDURE — 94799 UNLISTED PULMONARY SVC/PX: CPT

## 2021-10-22 PROCEDURE — 97166 OT EVAL MOD COMPLEX 45 MIN: CPT

## 2021-10-22 PROCEDURE — 25010000002 PIPERACILLIN SOD-TAZOBACTAM PER 1 G: Performed by: SURGERY

## 2021-10-22 PROCEDURE — 97162 PT EVAL MOD COMPLEX 30 MIN: CPT

## 2021-10-22 RX ADMIN — TAZOBACTAM SODIUM AND PIPERACILLIN SODIUM 3.38 G: 375; 3 INJECTION, SOLUTION INTRAVENOUS at 21:20

## 2021-10-22 RX ADMIN — SODIUM CHLORIDE, POTASSIUM CHLORIDE, SODIUM LACTATE AND CALCIUM CHLORIDE 125 ML/HR: 600; 310; 30; 20 INJECTION, SOLUTION INTRAVENOUS at 21:20

## 2021-10-22 RX ADMIN — FAMOTIDINE 20 MG: 10 INJECTION, SOLUTION INTRAVENOUS at 08:24

## 2021-10-22 RX ADMIN — SODIUM CHLORIDE, POTASSIUM CHLORIDE, SODIUM LACTATE AND CALCIUM CHLORIDE 125 ML/HR: 600; 310; 30; 20 INJECTION, SOLUTION INTRAVENOUS at 05:27

## 2021-10-22 RX ADMIN — SODIUM CHLORIDE, PRESERVATIVE FREE 10 ML: 5 INJECTION INTRAVENOUS at 08:25

## 2021-10-22 RX ADMIN — TAZOBACTAM SODIUM AND PIPERACILLIN SODIUM 3.38 G: 375; 3 INJECTION, SOLUTION INTRAVENOUS at 05:18

## 2021-10-22 RX ADMIN — SODIUM CHLORIDE, POTASSIUM CHLORIDE, SODIUM LACTATE AND CALCIUM CHLORIDE 125 ML/HR: 600; 310; 30; 20 INJECTION, SOLUTION INTRAVENOUS at 14:10

## 2021-10-22 RX ADMIN — SODIUM CHLORIDE, PRESERVATIVE FREE 10 ML: 5 INJECTION INTRAVENOUS at 21:21

## 2021-10-22 RX ADMIN — FAMOTIDINE 20 MG: 10 INJECTION, SOLUTION INTRAVENOUS at 21:20

## 2021-10-22 RX ADMIN — TAZOBACTAM SODIUM AND PIPERACILLIN SODIUM 3.38 G: 375; 3 INJECTION, SOLUTION INTRAVENOUS at 14:10

## 2021-10-22 NOTE — TELEPHONE ENCOUNTER
Left a message on Bruna's voicemail. Told her I'll cancel his surgery for next week and to call back to reschedule once the pt is discharged from hospital and feeling better.

## 2021-10-23 LAB
ANION GAP SERPL CALCULATED.3IONS-SCNC: 9 MMOL/L (ref 5–15)
BASOPHILS # BLD AUTO: 0.03 10*3/MM3 (ref 0–0.2)
BASOPHILS NFR BLD AUTO: 0.3 % (ref 0–1.5)
BUN SERPL-MCNC: 17 MG/DL (ref 8–23)
BUN/CREAT SERPL: 19.5 (ref 7–25)
CALCIUM SPEC-SCNC: 8.3 MG/DL (ref 8.6–10.5)
CHLORIDE SERPL-SCNC: 105 MMOL/L (ref 98–107)
CO2 SERPL-SCNC: 26 MMOL/L (ref 22–29)
CREAT SERPL-MCNC: 0.87 MG/DL (ref 0.76–1.27)
DEPRECATED RDW RBC AUTO: 47.6 FL (ref 37–54)
EOSINOPHIL # BLD AUTO: 0.21 10*3/MM3 (ref 0–0.4)
EOSINOPHIL NFR BLD AUTO: 2.3 % (ref 0.3–6.2)
ERYTHROCYTE [DISTWIDTH] IN BLOOD BY AUTOMATED COUNT: 12.8 % (ref 12.3–15.4)
GFR SERPL CREATININE-BSD FRML MDRD: 86 ML/MIN/1.73
GLUCOSE SERPL-MCNC: 115 MG/DL (ref 65–99)
HCT VFR BLD AUTO: 34.2 % (ref 37.5–51)
HGB BLD-MCNC: 11.4 G/DL (ref 13–17.7)
IMM GRANULOCYTES # BLD AUTO: 0.03 10*3/MM3 (ref 0–0.05)
IMM GRANULOCYTES NFR BLD AUTO: 0.3 % (ref 0–0.5)
LYMPHOCYTES # BLD AUTO: 1.2 10*3/MM3 (ref 0.7–3.1)
LYMPHOCYTES NFR BLD AUTO: 13.1 % (ref 19.6–45.3)
MCH RBC QN AUTO: 33.5 PG (ref 26.6–33)
MCHC RBC AUTO-ENTMCNC: 33.3 G/DL (ref 31.5–35.7)
MCV RBC AUTO: 100.6 FL (ref 79–97)
MONOCYTES # BLD AUTO: 0.86 10*3/MM3 (ref 0.1–0.9)
MONOCYTES NFR BLD AUTO: 9.4 % (ref 5–12)
NEUTROPHILS NFR BLD AUTO: 6.81 10*3/MM3 (ref 1.7–7)
NEUTROPHILS NFR BLD AUTO: 74.6 % (ref 42.7–76)
NRBC BLD AUTO-RTO: 0 /100 WBC (ref 0–0.2)
PLATELET # BLD AUTO: 190 10*3/MM3 (ref 140–450)
PMV BLD AUTO: 9.8 FL (ref 6–12)
POTASSIUM SERPL-SCNC: 3.7 MMOL/L (ref 3.5–5.2)
RBC # BLD AUTO: 3.4 10*6/MM3 (ref 4.14–5.8)
SODIUM SERPL-SCNC: 140 MMOL/L (ref 136–145)
WBC # BLD AUTO: 9.14 10*3/MM3 (ref 3.4–10.8)

## 2021-10-23 PROCEDURE — 99232 SBSQ HOSP IP/OBS MODERATE 35: CPT | Performed by: INTERNAL MEDICINE

## 2021-10-23 PROCEDURE — 80048 BASIC METABOLIC PNL TOTAL CA: CPT | Performed by: INTERNAL MEDICINE

## 2021-10-23 PROCEDURE — 25010000002 PIPERACILLIN SOD-TAZOBACTAM PER 1 G: Performed by: SURGERY

## 2021-10-23 PROCEDURE — 85025 COMPLETE CBC W/AUTO DIFF WBC: CPT | Performed by: INTERNAL MEDICINE

## 2021-10-23 RX ADMIN — FAMOTIDINE 20 MG: 10 INJECTION, SOLUTION INTRAVENOUS at 21:01

## 2021-10-23 RX ADMIN — FAMOTIDINE 20 MG: 10 INJECTION, SOLUTION INTRAVENOUS at 09:08

## 2021-10-23 RX ADMIN — TAZOBACTAM SODIUM AND PIPERACILLIN SODIUM 3.38 G: 375; 3 INJECTION, SOLUTION INTRAVENOUS at 15:09

## 2021-10-23 RX ADMIN — TAZOBACTAM SODIUM AND PIPERACILLIN SODIUM 3.38 G: 375; 3 INJECTION, SOLUTION INTRAVENOUS at 21:01

## 2021-10-23 RX ADMIN — SODIUM CHLORIDE, POTASSIUM CHLORIDE, SODIUM LACTATE AND CALCIUM CHLORIDE 125 ML/HR: 600; 310; 30; 20 INJECTION, SOLUTION INTRAVENOUS at 13:23

## 2021-10-23 RX ADMIN — SODIUM CHLORIDE, POTASSIUM CHLORIDE, SODIUM LACTATE AND CALCIUM CHLORIDE 125 ML/HR: 600; 310; 30; 20 INJECTION, SOLUTION INTRAVENOUS at 21:05

## 2021-10-23 RX ADMIN — SODIUM CHLORIDE, POTASSIUM CHLORIDE, SODIUM LACTATE AND CALCIUM CHLORIDE 125 ML/HR: 600; 310; 30; 20 INJECTION, SOLUTION INTRAVENOUS at 05:26

## 2021-10-23 RX ADMIN — TAZOBACTAM SODIUM AND PIPERACILLIN SODIUM 3.38 G: 375; 3 INJECTION, SOLUTION INTRAVENOUS at 05:26

## 2021-10-24 LAB
ANION GAP SERPL CALCULATED.3IONS-SCNC: 11 MMOL/L (ref 5–15)
BASOPHILS # BLD AUTO: 0.05 10*3/MM3 (ref 0–0.2)
BASOPHILS NFR BLD AUTO: 0.6 % (ref 0–1.5)
BUN SERPL-MCNC: 11 MG/DL (ref 8–23)
BUN/CREAT SERPL: 13.4 (ref 7–25)
CALCIUM SPEC-SCNC: 8.6 MG/DL (ref 8.6–10.5)
CHLORIDE SERPL-SCNC: 103 MMOL/L (ref 98–107)
CO2 SERPL-SCNC: 25 MMOL/L (ref 22–29)
CREAT SERPL-MCNC: 0.82 MG/DL (ref 0.76–1.27)
DEPRECATED RDW RBC AUTO: 46.5 FL (ref 37–54)
EOSINOPHIL # BLD AUTO: 0.49 10*3/MM3 (ref 0–0.4)
EOSINOPHIL NFR BLD AUTO: 6 % (ref 0.3–6.2)
ERYTHROCYTE [DISTWIDTH] IN BLOOD BY AUTOMATED COUNT: 12.6 % (ref 12.3–15.4)
GFR SERPL CREATININE-BSD FRML MDRD: 92 ML/MIN/1.73
GLUCOSE SERPL-MCNC: 116 MG/DL (ref 65–99)
HCT VFR BLD AUTO: 36.6 % (ref 37.5–51)
HGB BLD-MCNC: 12 G/DL (ref 13–17.7)
IMM GRANULOCYTES # BLD AUTO: 0.03 10*3/MM3 (ref 0–0.05)
IMM GRANULOCYTES NFR BLD AUTO: 0.4 % (ref 0–0.5)
LYMPHOCYTES # BLD AUTO: 1.31 10*3/MM3 (ref 0.7–3.1)
LYMPHOCYTES NFR BLD AUTO: 16 % (ref 19.6–45.3)
MCH RBC QN AUTO: 32.8 PG (ref 26.6–33)
MCHC RBC AUTO-ENTMCNC: 32.8 G/DL (ref 31.5–35.7)
MCV RBC AUTO: 100 FL (ref 79–97)
MONOCYTES # BLD AUTO: 0.76 10*3/MM3 (ref 0.1–0.9)
MONOCYTES NFR BLD AUTO: 9.3 % (ref 5–12)
NEUTROPHILS NFR BLD AUTO: 5.53 10*3/MM3 (ref 1.7–7)
NEUTROPHILS NFR BLD AUTO: 67.7 % (ref 42.7–76)
NRBC BLD AUTO-RTO: 0 /100 WBC (ref 0–0.2)
PLATELET # BLD AUTO: 216 10*3/MM3 (ref 140–450)
PMV BLD AUTO: 9.7 FL (ref 6–12)
POTASSIUM SERPL-SCNC: 3.8 MMOL/L (ref 3.5–5.2)
QT INTERVAL: 366 MS
QTC INTERVAL: 462 MS
RBC # BLD AUTO: 3.66 10*6/MM3 (ref 4.14–5.8)
SODIUM SERPL-SCNC: 139 MMOL/L (ref 136–145)
WBC # BLD AUTO: 8.17 10*3/MM3 (ref 3.4–10.8)

## 2021-10-24 PROCEDURE — 99232 SBSQ HOSP IP/OBS MODERATE 35: CPT | Performed by: INTERNAL MEDICINE

## 2021-10-24 PROCEDURE — 87070 CULTURE OTHR SPECIMN AEROBIC: CPT | Performed by: SURGERY

## 2021-10-24 PROCEDURE — 85025 COMPLETE CBC W/AUTO DIFF WBC: CPT | Performed by: INTERNAL MEDICINE

## 2021-10-24 PROCEDURE — 94799 UNLISTED PULMONARY SVC/PX: CPT

## 2021-10-24 PROCEDURE — 80048 BASIC METABOLIC PNL TOTAL CA: CPT | Performed by: INTERNAL MEDICINE

## 2021-10-24 PROCEDURE — 25010000002 PIPERACILLIN SOD-TAZOBACTAM PER 1 G: Performed by: SURGERY

## 2021-10-24 PROCEDURE — 87205 SMEAR GRAM STAIN: CPT | Performed by: SURGERY

## 2021-10-24 RX ORDER — ASPIRIN 81 MG/1
81 TABLET ORAL DAILY
Status: DISCONTINUED | OUTPATIENT
Start: 2021-10-24 | End: 2021-10-25 | Stop reason: HOSPADM

## 2021-10-24 RX ORDER — ATORVASTATIN CALCIUM 40 MG/1
40 TABLET, FILM COATED ORAL NIGHTLY
Status: DISCONTINUED | OUTPATIENT
Start: 2021-10-24 | End: 2021-10-25 | Stop reason: HOSPADM

## 2021-10-24 RX ORDER — METOPROLOL SUCCINATE 25 MG/1
25 TABLET, EXTENDED RELEASE ORAL
Status: DISCONTINUED | OUTPATIENT
Start: 2021-10-24 | End: 2021-10-25 | Stop reason: HOSPADM

## 2021-10-24 RX ORDER — TAMSULOSIN HYDROCHLORIDE 0.4 MG/1
0.4 CAPSULE ORAL DAILY
Status: DISCONTINUED | OUTPATIENT
Start: 2021-10-24 | End: 2021-10-25 | Stop reason: HOSPADM

## 2021-10-24 RX ORDER — FAMOTIDINE 20 MG/1
20 TABLET, FILM COATED ORAL
Status: DISCONTINUED | OUTPATIENT
Start: 2021-10-24 | End: 2021-10-25 | Stop reason: HOSPADM

## 2021-10-24 RX ORDER — LOSARTAN POTASSIUM 25 MG/1
25 TABLET ORAL
Status: DISCONTINUED | OUTPATIENT
Start: 2021-10-24 | End: 2021-10-25 | Stop reason: HOSPADM

## 2021-10-24 RX ORDER — LEVOTHYROXINE SODIUM 0.03 MG/1
25 TABLET ORAL
Status: DISCONTINUED | OUTPATIENT
Start: 2021-10-24 | End: 2021-10-25 | Stop reason: HOSPADM

## 2021-10-24 RX ADMIN — FAMOTIDINE 20 MG: 10 INJECTION, SOLUTION INTRAVENOUS at 08:19

## 2021-10-24 RX ADMIN — LEVOTHYROXINE SODIUM 25 MCG: 25 TABLET ORAL at 08:19

## 2021-10-24 RX ADMIN — TAZOBACTAM SODIUM AND PIPERACILLIN SODIUM 3.38 G: 375; 3 INJECTION, SOLUTION INTRAVENOUS at 05:05

## 2021-10-24 RX ADMIN — ATORVASTATIN CALCIUM 40 MG: 40 TABLET, FILM COATED ORAL at 22:00

## 2021-10-24 RX ADMIN — TAZOBACTAM SODIUM AND PIPERACILLIN SODIUM 3.38 G: 375; 3 INJECTION, SOLUTION INTRAVENOUS at 13:55

## 2021-10-24 RX ADMIN — FAMOTIDINE 20 MG: 20 TABLET, FILM COATED ORAL at 16:55

## 2021-10-24 RX ADMIN — SODIUM CHLORIDE, PRESERVATIVE FREE 10 ML: 5 INJECTION INTRAVENOUS at 22:00

## 2021-10-24 RX ADMIN — SODIUM CHLORIDE, POTASSIUM CHLORIDE, SODIUM LACTATE AND CALCIUM CHLORIDE 125 ML/HR: 600; 310; 30; 20 INJECTION, SOLUTION INTRAVENOUS at 05:05

## 2021-10-24 RX ADMIN — TAMSULOSIN HYDROCHLORIDE 0.4 MG: 0.4 CAPSULE ORAL at 08:19

## 2021-10-24 RX ADMIN — METOPROLOL SUCCINATE 25 MG: 25 TABLET, EXTENDED RELEASE ORAL at 08:19

## 2021-10-24 RX ADMIN — ASPIRIN 81 MG: 81 TABLET, COATED ORAL at 08:19

## 2021-10-24 RX ADMIN — TAZOBACTAM SODIUM AND PIPERACILLIN SODIUM 3.38 G: 375; 3 INJECTION, SOLUTION INTRAVENOUS at 22:04

## 2021-10-24 RX ADMIN — LOSARTAN POTASSIUM 25 MG: 25 TABLET, FILM COATED ORAL at 08:19

## 2021-10-24 RX ADMIN — SODIUM CHLORIDE, PRESERVATIVE FREE 10 ML: 5 INJECTION INTRAVENOUS at 08:22

## 2021-10-25 VITALS
WEIGHT: 245 LBS | TEMPERATURE: 98 F | OXYGEN SATURATION: 96 % | RESPIRATION RATE: 17 BRPM | BODY MASS INDEX: 35.07 KG/M2 | SYSTOLIC BLOOD PRESSURE: 161 MMHG | HEART RATE: 89 BPM | HEIGHT: 70 IN | DIASTOLIC BLOOD PRESSURE: 93 MMHG

## 2021-10-25 LAB
ANION GAP SERPL CALCULATED.3IONS-SCNC: 12 MMOL/L (ref 5–15)
BASOPHILS # BLD AUTO: 0.05 10*3/MM3 (ref 0–0.2)
BASOPHILS NFR BLD AUTO: 0.7 % (ref 0–1.5)
BUN SERPL-MCNC: 11 MG/DL (ref 8–23)
BUN/CREAT SERPL: 12.2 (ref 7–25)
CALCIUM SPEC-SCNC: 8.5 MG/DL (ref 8.6–10.5)
CHLORIDE SERPL-SCNC: 104 MMOL/L (ref 98–107)
CO2 SERPL-SCNC: 23 MMOL/L (ref 22–29)
CREAT SERPL-MCNC: 0.9 MG/DL (ref 0.76–1.27)
DEPRECATED RDW RBC AUTO: 45.4 FL (ref 37–54)
EOSINOPHIL # BLD AUTO: 0.51 10*3/MM3 (ref 0–0.4)
EOSINOPHIL NFR BLD AUTO: 6.9 % (ref 0.3–6.2)
ERYTHROCYTE [DISTWIDTH] IN BLOOD BY AUTOMATED COUNT: 12.5 % (ref 12.3–15.4)
GFR SERPL CREATININE-BSD FRML MDRD: 83 ML/MIN/1.73
GLUCOSE SERPL-MCNC: 103 MG/DL (ref 65–99)
HCT VFR BLD AUTO: 35.6 % (ref 37.5–51)
HGB BLD-MCNC: 11.6 G/DL (ref 13–17.7)
IMM GRANULOCYTES # BLD AUTO: 0.03 10*3/MM3 (ref 0–0.05)
IMM GRANULOCYTES NFR BLD AUTO: 0.4 % (ref 0–0.5)
LYMPHOCYTES # BLD AUTO: 1.4 10*3/MM3 (ref 0.7–3.1)
LYMPHOCYTES NFR BLD AUTO: 18.9 % (ref 19.6–45.3)
MCH RBC QN AUTO: 32.4 PG (ref 26.6–33)
MCHC RBC AUTO-ENTMCNC: 32.6 G/DL (ref 31.5–35.7)
MCV RBC AUTO: 99.4 FL (ref 79–97)
MONOCYTES # BLD AUTO: 0.7 10*3/MM3 (ref 0.1–0.9)
MONOCYTES NFR BLD AUTO: 9.4 % (ref 5–12)
NEUTROPHILS NFR BLD AUTO: 4.72 10*3/MM3 (ref 1.7–7)
NEUTROPHILS NFR BLD AUTO: 63.7 % (ref 42.7–76)
NRBC BLD AUTO-RTO: 0 /100 WBC (ref 0–0.2)
PLATELET # BLD AUTO: 225 10*3/MM3 (ref 140–450)
PMV BLD AUTO: 9.5 FL (ref 6–12)
POTASSIUM SERPL-SCNC: 3.9 MMOL/L (ref 3.5–5.2)
RBC # BLD AUTO: 3.58 10*6/MM3 (ref 4.14–5.8)
SODIUM SERPL-SCNC: 139 MMOL/L (ref 136–145)
WBC # BLD AUTO: 7.41 10*3/MM3 (ref 3.4–10.8)

## 2021-10-25 PROCEDURE — 94799 UNLISTED PULMONARY SVC/PX: CPT

## 2021-10-25 PROCEDURE — 80048 BASIC METABOLIC PNL TOTAL CA: CPT | Performed by: INTERNAL MEDICINE

## 2021-10-25 PROCEDURE — 99239 HOSP IP/OBS DSCHRG MGMT >30: CPT | Performed by: INTERNAL MEDICINE

## 2021-10-25 PROCEDURE — 25010000002 PIPERACILLIN SOD-TAZOBACTAM PER 1 G: Performed by: SURGERY

## 2021-10-25 PROCEDURE — 85025 COMPLETE CBC W/AUTO DIFF WBC: CPT | Performed by: INTERNAL MEDICINE

## 2021-10-25 RX ORDER — POLYETHYLENE GLYCOL 3350 17 G/17G
17 POWDER, FOR SOLUTION ORAL DAILY
Status: DISCONTINUED | OUTPATIENT
Start: 2021-10-25 | End: 2021-10-25 | Stop reason: HOSPADM

## 2021-10-25 RX ORDER — ACETAMINOPHEN 325 MG/1
650 TABLET ORAL EVERY 6 HOURS PRN
Status: DISCONTINUED | OUTPATIENT
Start: 2021-10-25 | End: 2021-10-25 | Stop reason: HOSPADM

## 2021-10-25 RX ADMIN — LEVOTHYROXINE SODIUM 25 MCG: 25 TABLET ORAL at 06:05

## 2021-10-25 RX ADMIN — ASPIRIN 81 MG: 81 TABLET, COATED ORAL at 09:19

## 2021-10-25 RX ADMIN — FAMOTIDINE 20 MG: 20 TABLET, FILM COATED ORAL at 06:06

## 2021-10-25 RX ADMIN — POLYETHYLENE GLYCOL 3350 17 G: 17 POWDER, FOR SOLUTION ORAL at 09:19

## 2021-10-25 RX ADMIN — TAMSULOSIN HYDROCHLORIDE 0.4 MG: 0.4 CAPSULE ORAL at 09:19

## 2021-10-25 RX ADMIN — METOPROLOL SUCCINATE 25 MG: 25 TABLET, EXTENDED RELEASE ORAL at 09:19

## 2021-10-25 RX ADMIN — TAZOBACTAM SODIUM AND PIPERACILLIN SODIUM 3.38 G: 375; 3 INJECTION, SOLUTION INTRAVENOUS at 06:07

## 2021-10-25 RX ADMIN — LOSARTAN POTASSIUM 25 MG: 25 TABLET, FILM COATED ORAL at 09:19

## 2021-10-25 RX ADMIN — ACETAMINOPHEN 650 MG: 325 TABLET, FILM COATED ORAL at 03:43

## 2021-10-26 ENCOUNTER — APPOINTMENT (OUTPATIENT)
Dept: PREADMISSION TESTING | Facility: HOSPITAL | Age: 71
End: 2021-10-26

## 2021-10-26 LAB
BACTERIA SPEC AEROBE CULT: NORMAL
BACTERIA SPEC AEROBE CULT: NORMAL
LAB AP CASE REPORT: NORMAL
LAB AP CLINICAL INFORMATION: NORMAL
PATH REPORT.FINAL DX SPEC: NORMAL
PATH REPORT.GROSS SPEC: NORMAL

## 2021-10-27 LAB
BACTERIA SPEC AEROBE CULT: NORMAL
GRAM STN SPEC: NORMAL

## 2021-11-01 ENCOUNTER — OFFICE VISIT (OUTPATIENT)
Dept: CARDIOLOGY | Facility: HOSPITAL | Age: 71
End: 2021-11-01

## 2021-11-01 VITALS
OXYGEN SATURATION: 98 % | HEART RATE: 64 BPM | TEMPERATURE: 96.5 F | HEIGHT: 70 IN | SYSTOLIC BLOOD PRESSURE: 135 MMHG | WEIGHT: 240 LBS | BODY MASS INDEX: 34.36 KG/M2 | DIASTOLIC BLOOD PRESSURE: 67 MMHG | RESPIRATION RATE: 17 BRPM

## 2021-11-01 DIAGNOSIS — R06.09 DOE (DYSPNEA ON EXERTION): ICD-10-CM

## 2021-11-01 DIAGNOSIS — E78.49 OTHER HYPERLIPIDEMIA: ICD-10-CM

## 2021-11-01 DIAGNOSIS — I25.10 CORONARY ARTERY DISEASE INVOLVING NATIVE CORONARY ARTERY OF NATIVE HEART WITHOUT ANGINA PECTORIS: ICD-10-CM

## 2021-11-01 DIAGNOSIS — I10 PRIMARY HYPERTENSION: Primary | ICD-10-CM

## 2021-11-01 NOTE — PLAN OF CARE
Problem: Patient Care Overview  Goal: Plan of Care Review  Outcome: Outcome(s) achieved Date Met: 05/18/19    Goal: Individualization and Mutuality  Outcome: Outcome(s) achieved Date Met: 05/18/19    Goal: Discharge Needs Assessment  Outcome: Outcome(s) achieved Date Met: 05/18/19    Goal: Interprofessional Rounds/Family Conf  Outcome: Outcome(s) achieved Date Met: 05/18/19      Problem: Knee Arthroplasty (Total, Partial) (Adult)  Goal: Signs and Symptoms of Listed Potential Problems Will be Absent, Minimized or Managed (Knee Arthroplasty)  Outcome: Outcome(s) achieved Date Met: 05/18/19    Goal: Anesthesia/Sedation Recovery  Outcome: Outcome(s) achieved Date Met: 05/18/19         Spoke with the pharmacist regarding the medication. Pharmacist stated that the medication is ready, but they do not have a . I asked that the pharmacy please reach out to the patient as he has called the office twice and the medication was originally sent to them on 10/11/2021. Pharmacist stated that they would contact the patient.

## 2021-11-09 NOTE — PROGRESS NOTES
"Chief Complaint  Coronary Artery Disease and Establish Care    Subjective    History of Present Illness {CC  Problem List  Visit  Diagnosis   Encounters  Notes  Medications  Labs  Result Review Imaging  Media :23}       History of Present Illness   71-year-old man presents the office today for ongoing evaluation of his coronary artery disease, hypertension, hyperlipidemia.  Patient also has a hypothyroidism, macular degeneration osteoarthritis, low back pain, spondylolithiasis,  lumbar spinal stenosis, obstructive sleep apnea on CPAP.  Patient has been followed by Dr. Cornell Rdz at [facility] and Dr. Ana Maria Newman.  He underwent a left heart cath 5 years ago, data deficient.  Recently hospitalized at [facility] with small bowel obstruction.  On 10/21/2021 patient underwent exploratory laparotomy with explant of abdominal mesh and lysis of adhesions.  Patient will be undergoing lumbar laminectomy per Dr. Givens in the near future.  He would like to establish care here at [facility].  Does report ongoing dyspnea on exertion.  Objective     Vital Signs:   Vitals:    11/01/21 1422 11/01/21 1423 11/01/21 1425   BP: 127/66 148/71 135/67   BP Location: Right arm Left arm Left arm   Patient Position: Sitting Standing Sitting   Cuff Size: Adult Adult Adult   Pulse: 65 74 64   Resp:   17   Temp:   96.5 °F (35.8 °C)   TempSrc:   Temporal   SpO2: 98% 95% 98%   Weight:   109 kg (240 lb)   Height:   177.8 cm (70\")     Body mass index is 34.44 kg/m².  Physical Exam  Vitals and nursing note reviewed.   Constitutional:       Appearance: Normal appearance.   HENT:      Head: Normocephalic.   Eyes:      Pupils: Pupils are equal, round, and reactive to light.   Cardiovascular:      Rate and Rhythm: Normal rate and regular rhythm.      Pulses: Normal pulses.      Heart sounds: Normal heart sounds. No murmur heard.      Pulmonary:      Effort: Pulmonary effort is normal.      Breath sounds: Normal breath sounds. "   Abdominal:      General: Bowel sounds are normal.      Palpations: Abdomen is soft.   Musculoskeletal:         General: Normal range of motion.      Cervical back: Normal range of motion.      Right lower leg: No edema.      Left lower leg: No edema.   Skin:     General: Skin is warm and dry.      Capillary Refill: Capillary refill takes less than 2 seconds.   Neurological:      Mental Status: He is alert and oriented to person, place, and time.   Psychiatric:         Mood and Affect: Mood normal.         Thought Content: Thought content normal.              Result Review  Data Reviewed:{ Labs  Result Review  Imaging  Med Tab  Media :23}   Lab Results   Component Value Date    GLUCOSE 103 (H) 10/25/2021    CALCIUM 8.5 (L) 10/25/2021     10/25/2021    K 3.9 10/25/2021    CO2 23.0 10/25/2021     10/25/2021    BUN 11 10/25/2021    CREATININE 0.90 10/25/2021    EGFRIFNONA 83 10/25/2021    BCR 12.2 10/25/2021    ANIONGAP 12.0 10/25/2021     Lab Results   Component Value Date    WBC 7.41 10/25/2021    HGB 11.6 (L) 10/25/2021    HCT 35.6 (L) 10/25/2021    MCV 99.4 (H) 10/25/2021     10/25/2021                Assessment and Plan {CC Problem List  Visit Diagnosis  ROS  Review (Popup)  Health Maintenance  Quality  BestPractice  Medications  SmartSets  SnapShot Encounters  Media :23}   1. Primary hypertension  Well-controlled on losartan, Toprol  - Adult Transthoracic Echo Complete W/ Cont if Necessary Per Protocol; Future  - Ambulatory Referral to Cardiology    2. HANCOCK (dyspnea on exertion)    - Adult Transthoracic Echo Complete W/ Cont if Necessary Per Protocol; Future  - Ambulatory Referral to Cardiology    3. Coronary artery disease involving native coronary artery of native heart without angina pectoris  Currently without angina  Continue aspirin, Lipitor, Toprol   will request left heart cath from [facility] for review  - Ambulatory Referral to Cardiology    4. Other  hyperlipidemia  Stable on Lipitor, Zetia  - Ambulatory Referral to Cardiology          Follow Up {Instructions Charge Capture  Follow-up Communications :23}   Return if symptoms worsen or fail to improve.    Patient was given instructions and counseling regarding his condition or for health maintenance advice. Please see specific information pulled into the AVS if appropriate.  Patient was instructed to call the [facility] with any questions, concerns, or worsening symptoms.

## 2021-11-11 ENCOUNTER — HOSPITAL ENCOUNTER (OUTPATIENT)
Dept: CARDIOLOGY | Facility: HOSPITAL | Age: 71
Discharge: HOME | End: 2021-11-11
Admitting: NURSE PRACTITIONER

## 2021-11-11 VITALS — BODY MASS INDEX: 34.4 KG/M2 | WEIGHT: 240.3 LBS | HEIGHT: 70 IN

## 2021-11-11 DIAGNOSIS — I10 PRIMARY HYPERTENSION: ICD-10-CM

## 2021-11-11 DIAGNOSIS — R06.09 DOE (DYSPNEA ON EXERTION): ICD-10-CM

## 2021-11-11 PROCEDURE — 93306 TTE W/DOPPLER COMPLETE: CPT | Performed by: INTERNAL MEDICINE

## 2021-11-11 PROCEDURE — 93306 TTE W/DOPPLER COMPLETE: CPT

## 2021-11-12 ENCOUNTER — TELEPHONE (OUTPATIENT)
Dept: CARDIOLOGY | Facility: HOSPITAL | Age: 71
End: 2021-11-12

## 2021-11-12 LAB
ASCENDING AORTA: 3.6 CM
BH CV ECHO MEAS - AO MAX PG (FULL): 4.1 MMHG
BH CV ECHO MEAS - AO MAX PG: 8 MMHG
BH CV ECHO MEAS - AO MEAN PG (FULL): 2 MMHG
BH CV ECHO MEAS - AO MEAN PG: 4 MMHG
BH CV ECHO MEAS - AO ROOT AREA (BSA CORRECTED): 1.4
BH CV ECHO MEAS - AO ROOT AREA: 8 CM^2
BH CV ECHO MEAS - AO ROOT DIAM: 3.2 CM
BH CV ECHO MEAS - AO V2 MAX: 142 CM/SEC
BH CV ECHO MEAS - AO V2 MEAN: 97.5 CM/SEC
BH CV ECHO MEAS - AO V2 VTI: 31.9 CM
BH CV ECHO MEAS - AVA(I,A): 1.9 CM^2
BH CV ECHO MEAS - AVA(I,D): 1.9 CM^2
BH CV ECHO MEAS - AVA(V,A): 2.2 CM^2
BH CV ECHO MEAS - AVA(V,D): 2.2 CM^2
BH CV ECHO MEAS - BSA(HAYCOCK): 2.4 M^2
BH CV ECHO MEAS - BSA: 2.3 M^2
BH CV ECHO MEAS - BZI_BMI: 34.4 KILOGRAMS/M^2
BH CV ECHO MEAS - BZI_METRIC_HEIGHT: 177.8 CM
BH CV ECHO MEAS - BZI_METRIC_WEIGHT: 108.9 KG
BH CV ECHO MEAS - EDV(CUBED): 93 ML
BH CV ECHO MEAS - EDV(MOD-SP2): 124 ML
BH CV ECHO MEAS - EDV(MOD-SP4): 123 ML
BH CV ECHO MEAS - EDV(TEICH): 93.9 ML
BH CV ECHO MEAS - EF(CUBED): 71.5 %
BH CV ECHO MEAS - EF(MOD-BP): 56 %
BH CV ECHO MEAS - EF(MOD-SP2): 56.5 %
BH CV ECHO MEAS - EF(MOD-SP4): 56.9 %
BH CV ECHO MEAS - EF(TEICH): 63.3 %
BH CV ECHO MEAS - ESV(CUBED): 26.5 ML
BH CV ECHO MEAS - ESV(MOD-SP2): 54 ML
BH CV ECHO MEAS - ESV(MOD-SP4): 53 ML
BH CV ECHO MEAS - ESV(TEICH): 34.4 ML
BH CV ECHO MEAS - FS: 34.2 %
BH CV ECHO MEAS - IVS/LVPW: 0.95
BH CV ECHO MEAS - IVSD: 1.2 CM
BH CV ECHO MEAS - LA DIMENSION: 4 CM
BH CV ECHO MEAS - LA/AO: 1.3
BH CV ECHO MEAS - LAD MAJOR: 5.3 CM
BH CV ECHO MEAS - LAT PEAK E' VEL: 5.6 CM/SEC
BH CV ECHO MEAS - LATERAL E/E' RATIO: 12.8
BH CV ECHO MEAS - LV DIASTOLIC VOL/BSA (35-75): 54.5 ML/M^2
BH CV ECHO MEAS - LV IVRT: 0.1 SEC
BH CV ECHO MEAS - LV MASS(C)D: 213.6 GRAMS
BH CV ECHO MEAS - LV MASS(C)DI: 94.7 GRAMS/M^2
BH CV ECHO MEAS - LV MAX PG: 3.9 MMHG
BH CV ECHO MEAS - LV MEAN PG: 2 MMHG
BH CV ECHO MEAS - LV SYSTOLIC VOL/BSA (12-30): 23.5 ML/M^2
BH CV ECHO MEAS - LV V1 MAX: 98.5 CM/SEC
BH CV ECHO MEAS - LV V1 MEAN: 61.4 CM/SEC
BH CV ECHO MEAS - LV V1 VTI: 19.6 CM
BH CV ECHO MEAS - LVIDD: 4.5 CM
BH CV ECHO MEAS - LVIDS: 3 CM
BH CV ECHO MEAS - LVLD AP2: 8.5 CM
BH CV ECHO MEAS - LVLD AP4: 8.3 CM
BH CV ECHO MEAS - LVLS AP2: 7.3 CM
BH CV ECHO MEAS - LVLS AP4: 6.7 CM
BH CV ECHO MEAS - LVOT AREA (M): 3.1 CM^2
BH CV ECHO MEAS - LVOT AREA: 3.1 CM^2
BH CV ECHO MEAS - LVOT DIAM: 2 CM
BH CV ECHO MEAS - LVPWD: 1.3 CM
BH CV ECHO MEAS - MED PEAK E' VEL: 5 CM/SEC
BH CV ECHO MEAS - MEDIAL E/E' RATIO: 14.3
BH CV ECHO MEAS - MV A MAX VEL: 107 CM/SEC
BH CV ECHO MEAS - MV DEC SLOPE: 236 CM/SEC^2
BH CV ECHO MEAS - MV DEC TIME: 0.3 SEC
BH CV ECHO MEAS - MV E MAX VEL: 71.6 CM/SEC
BH CV ECHO MEAS - MV E/A: 0.67
BH CV ECHO MEAS - MV MAX PG: 4.4 MMHG
BH CV ECHO MEAS - MV MEAN PG: 2 MMHG
BH CV ECHO MEAS - MV P1/2T MAX VEL: 89 CM/SEC
BH CV ECHO MEAS - MV P1/2T: 110.5 MSEC
BH CV ECHO MEAS - MV V2 MAX: 105 CM/SEC
BH CV ECHO MEAS - MV V2 MEAN: 58 CM/SEC
BH CV ECHO MEAS - MV V2 VTI: 39.5 CM
BH CV ECHO MEAS - MVA P1/2T LCG: 2.5 CM^2
BH CV ECHO MEAS - MVA(P1/2T): 2 CM^2
BH CV ECHO MEAS - MVA(VTI): 1.6 CM^2
BH CV ECHO MEAS - PA ACC SLOPE: 369 CM/SEC^2
BH CV ECHO MEAS - PA ACC TIME: 0.17 SEC
BH CV ECHO MEAS - PA MAX PG: 8.8 MMHG
BH CV ECHO MEAS - PA PR(ACCEL): 4.8 MMHG
BH CV ECHO MEAS - PA V2 MAX: 148 CM/SEC
BH CV ECHO MEAS - RAP SYSTOLE: 8 MMHG
BH CV ECHO MEAS - RVSP: 27 MMHG
BH CV ECHO MEAS - SI(AO): 113.7 ML/M^2
BH CV ECHO MEAS - SI(CUBED): 29.5 ML/M^2
BH CV ECHO MEAS - SI(LVOT): 27.3 ML/M^2
BH CV ECHO MEAS - SI(MOD-SP2): 31 ML/M^2
BH CV ECHO MEAS - SI(MOD-SP4): 31 ML/M^2
BH CV ECHO MEAS - SI(TEICH): 26.4 ML/M^2
BH CV ECHO MEAS - SV(AO): 256.6 ML
BH CV ECHO MEAS - SV(CUBED): 66.5 ML
BH CV ECHO MEAS - SV(LVOT): 61.6 ML
BH CV ECHO MEAS - SV(MOD-SP2): 70 ML
BH CV ECHO MEAS - SV(MOD-SP4): 70 ML
BH CV ECHO MEAS - SV(TEICH): 59.5 ML
BH CV ECHO MEAS - TAPSE (>1.6): 1.7 CM
BH CV ECHO MEAS - TR MAX PG: 19 MMHG
BH CV ECHO MEAS - TR MAX VEL: 220 CM/SEC
BH CV ECHO MEASUREMENTS AVERAGE E/E' RATIO: 13.51
BH CV VAS BP LEFT ARM: NORMAL MMHG
BH CV XLRA - RV BASE: 4 CM
BH CV XLRA - RV LENGTH: 7.1 CM
BH CV XLRA - RV MID: 3 CM
BH CV XLRA - TDI S': 17.5 CM/SEC
LEFT ATRIUM VOLUME INDEX: 29.3 ML/M^2
LEFT ATRIUM VOLUME: 66 ML

## 2021-12-15 ENCOUNTER — PREP FOR SURGERY (OUTPATIENT)
Dept: OTHER | Facility: HOSPITAL | Age: 71
End: 2021-12-15

## 2021-12-15 ENCOUNTER — OFFICE VISIT (OUTPATIENT)
Dept: NEUROSURGERY | Facility: CLINIC | Age: 71
End: 2021-12-15

## 2021-12-15 ENCOUNTER — TELEPHONE (OUTPATIENT)
Dept: NEUROSURGERY | Facility: CLINIC | Age: 71
End: 2021-12-15

## 2021-12-15 VITALS — HEIGHT: 70 IN | WEIGHT: 241.1 LBS | TEMPERATURE: 96.8 F | BODY MASS INDEX: 34.52 KG/M2

## 2021-12-15 DIAGNOSIS — M48.062 LUMBAR STENOSIS WITH NEUROGENIC CLAUDICATION: Primary | ICD-10-CM

## 2021-12-15 DIAGNOSIS — M54.9 MECHANICAL BACK PAIN: ICD-10-CM

## 2021-12-15 DIAGNOSIS — M51.36 DDD (DEGENERATIVE DISC DISEASE), LUMBAR: ICD-10-CM

## 2021-12-15 DIAGNOSIS — M48.062 SPINAL STENOSIS OF LUMBAR REGION WITH NEUROGENIC CLAUDICATION: Primary | ICD-10-CM

## 2021-12-15 PROCEDURE — 99214 OFFICE O/P EST MOD 30 MIN: CPT | Performed by: NEUROLOGICAL SURGERY

## 2021-12-15 NOTE — H&P
Patient: Larry Dean Klinefelter  : 1950     Primary Care Provider: Kin Nunez MD     Requesting Provider: As above           History     Chief Complaint: Low back and lower extremity pain with walking and standing intolerance.     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  Since the end of last year his symptoms have been worse.  At one point he was hospitalized for pneumonia and then underwent some therapy which seemed to stir up his symptoms.  He has back pain that extends variably into his legs.  The right leg bothers him more than the left.  It bothers him when lying down at night.  He does have significant walking and standing intolerance.  He does better by sitting down or flexing forward at the waist.  The pain involves his thighs and occasionally his feet.  Percocet 10 was helpful at one point.  He denies any bowel or bladder dysfunction.  Injections have transiently helped.  He apparently had a trial of a spinal cord stimulator which helped modestly to moderately.  He is known to have lumbar spinal stenosis and at one point surgery was discussed.  In the interim he had a bowel obstruction and required bowel surgery.  He has a cardiology evaluation next week.        Review of Systems   Constitutional: Negative for activity change, appetite change, chills, diaphoresis, fatigue, fever and unexpected weight change.   HENT: Negative for congestion, dental problem, drooling, ear discharge, ear pain, facial swelling, hearing loss, mouth sores, nosebleeds, postnasal drip, rhinorrhea, sinus pressure, sneezing, sore throat, tinnitus, trouble swallowing and voice change.    Eyes: Negative for photophobia, pain, discharge, redness, itching and visual disturbance.   Respiratory: Negative for apnea, cough, choking, chest tightness, shortness of breath, wheezing and stridor.    Cardiovascular: Negative for chest pain,  palpitations and leg swelling.   Gastrointestinal: Negative for abdominal distention, abdominal pain, anal bleeding, blood in stool, constipation, diarrhea, nausea, rectal pain and vomiting.   Endocrine: Negative for cold intolerance, heat intolerance, polydipsia, polyphagia and polyuria.   Genitourinary: Negative for decreased urine volume, difficulty urinating, dysuria, enuresis, flank pain, frequency, genital sores, hematuria and urgency.   Musculoskeletal: Negative for arthralgias, back pain, gait problem, joint swelling, myalgias, neck pain and neck stiffness.   Skin: Negative for color change, pallor, rash and wound.   Allergic/Immunologic: Negative for environmental allergies, food allergies and immunocompromised state.   Neurological: Negative for dizziness, tremors, seizures, syncope, facial asymmetry, speech difficulty, weakness, light-headedness, numbness and headaches.   Hematological: Negative for adenopathy. Does not bruise/bleed easily.   Psychiatric/Behavioral: Negative for agitation, behavioral problems, confusion, decreased concentration, dysphoric mood, hallucinations, self-injury, sleep disturbance and suicidal ideas. The patient is not nervous/anxious and is not hyperactive.    All other systems reviewed and are negative.        The patient's past medical history, past surgical history, family history, and social history have been reviewed at length in the electronic medical record.     Past Medical History:   Diagnosis Date   • Anemia    • Bowel obstruction (HCC)    • Chest pain    • Constipation    • Coronary artery disease    • Disease of thyroid gland    • Hepatitis    • Hyperlipidemia    • Hypertension    • Low back pain    • Lumbar spinal stenosis    • Macular degeneration    • Medial meniscus tear    • Obesity    • Obstructive sleep apnea on CPAP     setting - 13.   • Primary osteoarthritis of left knee     and right knee   • Sepsis due to pneumonia (HCC) 11/1/2020   • Wears glasses       Past Surgical History:   Procedure Laterality Date   • CARDIAC CATHETERIZATION  01/2019   • COLONOSCOPY  2013   • EXPLORATORY LAPAROTOMY N/A 10/21/2021    Procedure: LAPAROTOMY EXPLORATORY, EXPLANT OF ABDOMINAL MESH AND LYSIS OF ADHESIONS;  Surgeon: Quan Alberto MD;  [facility];  Service: General;  Laterality: N/A;   • FRACTURE SURGERY      left heel    • INGUINAL HERNIA REPAIR      x2    • KNEE ARTHROSCOPY Right    • REPLACEMENT TOTAL KNEE Right    • TONSILLECTOMY     • TOTAL KNEE ARTHROPLASTY Left 5/16/2019    Procedure: TOTAL KNEE ARTHROPLASTY LEFT;  Surgeon: Willian Jc MD;  [facility];  Service: Orthopedics     Family History   Problem Relation Age of Onset   • Rheum arthritis Mother    • Dementia Mother    • Parkinsonism Mother    • Stroke Father    • Heart attack Father    • Hypertension Father    • Clotting disorder Father    • Rheum arthritis Father    • Hypertension Other      Social History     Socioeconomic History   • Marital status:    Tobacco Use   • Smoking status: Never Smoker   • Smokeless tobacco: Never Used   Substance and Sexual Activity   • Alcohol use: Never   • Drug use: Never   • Sexual activity: Defer       Allergies   Allergen Reactions   • Lisinopril Angioedema       Current Outpatient Medications on File Prior to Visit   Medication Sig Dispense Refill   • albuterol (PROVENTIL) (2.5 MG/3ML) 0.083% nebulizer solution Take 2.5 mg by nebulization Every 4 (Four) Hours As Needed for Shortness of Air. 5 vial 0   • aspirin 81 MG EC tablet Take 81 mg by mouth Daily.     • atorvastatin (LIPITOR) 40 MG tablet Take 40 mg by mouth Every Night.     • Calcium Carb-Cholecalciferol (CALCIUM PLUS VITAMIN D3 PO) Take 1 tablet by mouth 2 (Two) Times a Day.     • chlorhexidine (HIBICLENS) 4 % external liquid Shower every day for 5 day beginning 5 days before surgery. 120 mL 0   • chlorhexidine (HIBICLENS) 4 % external liquid Shower every day with solution for 5 days  prior to surgery. 120 mL 0   • Coenzyme Q10 (CO Q-10) 100 MG capsule Take 100 mg by mouth 2 (Two) Times a Day.     • cyclobenzaprine (FLEXERIL) 10 MG tablet Take 10 mg by mouth 3 (Three) Times a Day As Needed. for muscle spams     • ezetimibe (ZETIA) 10 MG tablet Take 10 mg by mouth Daily.     • ferrous sulfate 324 (65 Fe) MG tablet delayed-release EC tablet Take 324 mg by mouth Every Night.     • Glucosamine-Chondroitin 3331-4044 MG/30ML liquid Take 1 capsule by mouth 3 (Three) Times a Day With Meals.     • lactobacillus acidophilus (RISAQUAD) capsule capsule Take 1 capsule by mouth 3 (Three) Times a Day With Meals. 90 capsule 0   • losartan (COZAAR) 25 MG tablet      • metoprolol succinate XL (TOPROL-XL) 25 MG 24 hr tablet      • Multiple Vitamins-Minerals (VITEYES AREDS FORMULA PO) Take 1 capsule by mouth 2 (Two) Times a Day.     • mupirocin (Bactroban) 2 % ointment Apply to the inside of the nostril with a cotton swab bid every day, morning and evening, 5 days before surgery.  Do NOT use the day of pre-admission testing. 22 g 0   • mupirocin (Bactroban) 2 % ointment Apply to the inside of each nostril bid, morning and evening, beginning 5 days before surgery. Do NOT use the day of pre-admission testing. 22 g 0   • nitroglycerin (NITROSTAT) 0.4 MG SL tablet Place 0.4 mg under the tongue As Needed for Chest Pain. Take no more than 3 doses in 15 minutes.      • Polyethylene Glycol 3350 (MIRALAX PO) Take 1 dose by mouth 2 (Two) Times a Day.     • SYNTHROID 25 MCG tablet Take 25 mcg by mouth Daily.     • tamsulosin (FLOMAX) 0.4 MG capsule 24 hr capsule Take 1 capsule by mouth Daily. 30 capsule    • vitamin C (ASCORBIC ACID) 250 MG tablet Take 250 mg by mouth Daily.     • [DISCONTINUED] oxyCODONE-acetaminophen (PERCOCET)  MG per tablet Take 1 tablet by mouth Every 6 (Six) Hours. 5 mg prn       No current facility-administered medications on file prior to visit.        Physical Exam:   Temp 96.8 °F (36 °C)  "(Infrared)   Ht 177.8 cm (70\")   Wt 109 kg (241 lb 1.6 oz)   BMI 34.59 kg/m²   MUSCULOSKELETAL:  Straight leg raising is negative.  Franco's Sign is negative.  ROM in the low back is normal.  Tenderness in the back to palpation is not observed.  NEUROLOGICAL:  Strength is intact in the lower extremities to direct testing.  Muscle tone is normal throughout.  Station and gait are normal.  Sensation is intact to light touch testing throughout.        Medical Decision Making     Data Review:   (All imaging studies were personally reviewed unless stated otherwise)  I have once again reviewed his lumbar MRI study dated 12/30/2020.  There is mild stenosis at L2-3 and high-grade stenosis at L3-4 and L4-5.  There may be significant disc protrusion at L4-5.     Diagnosis:   1.  Lumbar stenosis with neurogenic claudication.  2.  Mechanical low back pain.     Treatment Options:   I have recommended decompressive laminectomies at L3-4 and L4-5 with possible discectomy if necessary.  The nature of the procedure as well as the potential risks, complications, limitations, and alternatives to the procedure were discussed at length with the patient and the patient has agreed to proceed with surgery.  In the interim I am going to check a new MRI of his lumbar spine to ensure that he is not developed additional findings that would need to be addressed at surgery.  Formal cardiac clearance will be necessary.          Diagnosis Plan   1. Spinal stenosis of lumbar region with neurogenic claudication      2. Mechanical back pain      3. DDD (degenerative disc disease), lumbar          "

## 2021-12-15 NOTE — PROGRESS NOTES
Patient: Larry Dean Klinefelter  : 1950    Primary Care Provider: Kin Nunez MD    Requesting Provider: As above        History    Chief Complaint: Low back and lower extremity pain with walking and standing intolerance.    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  Since the end of last year his symptoms have been worse.  At one point he was hospitalized for pneumonia and then underwent some therapy which seemed to stir up his symptoms.  He has back pain that extends variably into his legs.  The right leg bothers him more than the left.  It bothers him when lying down at night.  He does have significant walking and standing intolerance.  He does better by sitting down or flexing forward at the waist.  The pain involves his thighs and occasionally his feet.  Percocet 10 was helpful at one point.  He denies any bowel or bladder dysfunction.  Injections have transiently helped.  He apparently had a trial of a spinal cord stimulator which helped modestly to moderately.  He is known to have lumbar spinal stenosis and at one point surgery was discussed.  In the interim he had a bowel obstruction and required bowel surgery.  He has a cardiology evaluation next week.       Review of Systems   Constitutional: Negative for activity change, appetite change, chills, diaphoresis, fatigue, fever and unexpected weight change.   HENT: Negative for congestion, dental problem, drooling, ear discharge, ear pain, facial swelling, hearing loss, mouth sores, nosebleeds, postnasal drip, rhinorrhea, sinus pressure, sneezing, sore throat, tinnitus, trouble swallowing and voice change.    Eyes: Negative for photophobia, pain, discharge, redness, itching and visual disturbance.   Respiratory: Negative for apnea, cough, choking, chest tightness, shortness of breath, wheezing and stridor.    Cardiovascular: Negative for chest pain,  "palpitations and leg swelling.   Gastrointestinal: Negative for abdominal distention, abdominal pain, anal bleeding, blood in stool, constipation, diarrhea, nausea, rectal pain and vomiting.   Endocrine: Negative for cold intolerance, heat intolerance, polydipsia, polyphagia and polyuria.   Genitourinary: Negative for decreased urine volume, difficulty urinating, dysuria, enuresis, flank pain, frequency, genital sores, hematuria and urgency.   Musculoskeletal: Negative for arthralgias, back pain, gait problem, joint swelling, myalgias, neck pain and neck stiffness.   Skin: Negative for color change, pallor, rash and wound.   Allergic/Immunologic: Negative for environmental allergies, food allergies and immunocompromised state.   Neurological: Negative for dizziness, tremors, seizures, syncope, facial asymmetry, speech difficulty, weakness, light-headedness, numbness and headaches.   Hematological: Negative for adenopathy. Does not bruise/bleed easily.   Psychiatric/Behavioral: Negative for agitation, behavioral problems, confusion, decreased concentration, dysphoric mood, hallucinations, self-injury, sleep disturbance and suicidal ideas. The patient is not nervous/anxious and is not hyperactive.    All other systems reviewed and are negative.      The patient's past medical history, past surgical history, family history, and social history have been reviewed at length in the electronic medical record.    Physical Exam:   Temp 96.8 °F (36 °C) (Infrared)   Ht 177.8 cm (70\")   Wt 109 kg (241 lb 1.6 oz)   BMI 34.59 kg/m²   MUSCULOSKELETAL:  Straight leg raising is negative.  Franco's Sign is negative.  ROM in the low back is normal.  Tenderness in the back to palpation is not observed.  NEUROLOGICAL:  Strength is intact in the lower extremities to direct testing.  Muscle tone is normal throughout.  Station and gait are normal.  Sensation is intact to light touch testing throughout.      Medical Decision " Making    Data Review:   (All imaging studies were personally reviewed unless stated otherwise)  I have once again reviewed his lumbar MRI study dated 12/30/2020.  There is mild stenosis at L2-3 and high-grade stenosis at L3-4 and L4-5.  There may be significant disc protrusion at L4-5.    Diagnosis:   1.  Lumbar stenosis with neurogenic claudication.  2.  Mechanical low back pain.    Treatment Options:   I have recommended decompressive laminectomies at L3-4 and L4-5 with possible discectomy if necessary.  The nature of the procedure as well as the potential risks, complications, limitations, and alternatives to the procedure were discussed at length with the patient and the patient has agreed to proceed with surgery.  In the interim I am going to check a new MRI of his lumbar spine to ensure that he is not developed additional findings that would need to be addressed at surgery.  Formal cardiac clearance will be necessary.       Diagnosis Plan   1. Spinal stenosis of lumbar region with neurogenic claudication     2. Mechanical back pain     3. DDD (degenerative disc disease), lumbar         Scribed for Conrad Givens MD by Chelly Fierro CMA on 12/15/2021 09:24 EST       I, Dr. Givens, personally performed the services described in the documentation, as scribed in my presence, and it is both accurate and complete.

## 2021-12-22 ENCOUNTER — OFFICE VISIT (OUTPATIENT)
Dept: CARDIOLOGY | Facility: CLINIC | Age: 71
End: 2021-12-22

## 2021-12-22 VITALS
SYSTOLIC BLOOD PRESSURE: 142 MMHG | WEIGHT: 243.4 LBS | BODY MASS INDEX: 34.84 KG/M2 | HEIGHT: 70 IN | DIASTOLIC BLOOD PRESSURE: 80 MMHG | OXYGEN SATURATION: 97 % | HEART RATE: 60 BPM

## 2021-12-22 DIAGNOSIS — I25.10 CORONARY ARTERY DISEASE INVOLVING NATIVE CORONARY ARTERY OF NATIVE HEART WITHOUT ANGINA PECTORIS: Primary | ICD-10-CM

## 2021-12-22 DIAGNOSIS — I10 PRIMARY HYPERTENSION: ICD-10-CM

## 2021-12-22 DIAGNOSIS — E78.2 MIXED HYPERLIPIDEMIA: ICD-10-CM

## 2021-12-22 PROCEDURE — 93000 ELECTROCARDIOGRAM COMPLETE: CPT | Performed by: INTERNAL MEDICINE

## 2021-12-22 PROCEDURE — 99204 OFFICE O/P NEW MOD 45 MIN: CPT | Performed by: INTERNAL MEDICINE

## 2021-12-22 RX ORDER — LOSARTAN POTASSIUM 50 MG/1
50 TABLET ORAL DAILY
Qty: 90 TABLET | Refills: 1 | Status: SHIPPED | OUTPATIENT
Start: 2021-12-22

## 2021-12-22 NOTE — PROGRESS NOTES
[facility] at [facility]  Office visit  Larry Dean Klinefelter  1950  [phone]  There is no work phone number on file.    VISIT DATE:  12/22/2021    PCP: Kin Nunez MD  [address] [place], [place] [address] [place] [address]    CC:  Chief Complaint   Patient presents with   • Hypertension       Previous cardiac studies and procedures:  January 2018 cardiac catheterization: 30 to 40% mid LAD.  60% ostial first diagonal, FFR 0.9.    November 2021 TTE  · Left ventricular ejection fraction appears to be 61 - 65%. Left ventricular systolic function is normal.  · Left ventricular diastolic function is consistent with (grade Ia w/high LAP) impaired relaxation.  · Left ventricular wall thickness is consistent with mild concentric hypertrophy.  · Estimated right ventricular systolic pressure from tricuspid regurgitation is normal (<35 mmHg). Calculated right ventricular systolic pressure from tricuspid regurgitation is 27 mmHg.      ASSESSMENT:   Diagnosis Plan   1. Coronary artery disease involving native coronary artery of native heart without angina pectoris     2. Mixed hyperlipidemia     3. Primary hypertension         PLAN:  Coronary artery disease: Currently stable and asymptomatic.  Nonobstructive coronary atherosclerosis based on most recent cardiac catheterization.  Continue aspirin, statin and afterload reduction.    Hyperlipidemia: Goal LDL less than 70.  Continue combination of atorvastatin 40 mg p.o. daily and Zetia 10 mg p.o. daily.    Hypertension: Labile.  Increasing losartan to 50 mg p.o. daily.  Otherwise continue current medical therapy.  Continue keep home blood pressure log.  History of angioedema with lisinopril.    Preoperative cardiac evaluation: Acceptable risk for major perioperative cardiovascular complications from upcoming decompressive laminectomies.  May hold aspirin 7 days prior to procedure.    Subjective  Denies chest pain, palpitations or dyspnea on exertion.  Using a wheeled walker  "due to limitations from spinal stenosis.  Scheduled for MRI in January with potential subsequent surgical intervention under the care of Dr. Givens.  Blood pressure levels have been somewhat erratic with systolic blood pressures ranging from 120 up to 165 mmHg.  Intermittent mild orthostasis but no episodes of symptomatic hypotension.  He is compliant with medical therapy.    PHYSICAL EXAMINATION:  Vitals:    12/22/21 1039   BP: 142/80   BP Location: Left arm   Patient Position: Sitting   Pulse: 60   SpO2: 97%   Weight: 110 kg (243 lb 6.4 oz)   Height: 177.8 cm (70\")     General Appearance:    Alert, cooperative, no distress, appears stated age   Head:    Normocephalic, without obvious abnormality, atraumatic   Eyes:    conjunctiva/corneas clear   Nose:   Nares normal, septum midline, mucosa normal, no drainage   Throat:   Lips, teeth and gums normal   Neck:   Supple, symmetrical, trachea midline, no carotid    bruit or JVD   Lungs:     Clear to auscultation bilaterally, respirations unlabored   Chest Wall:    No tenderness or deformity    Heart:    Regular rate and rhythm, S1 and S2 normal, no murmur, rub   or gallop, normal carotid impulse bilaterally without bruit.   Abdomen:     Soft, non-tender   Extremities:   Extremities normal, atraumatic, no cyanosis or edema   Pulses:   2+ and symmetric all extremities   Skin:   Skin color, texture, turgor normal, no rashes or lesions       Diagnostic Data:    ECG 12 Lead    Date/Time: 12/22/2021 11:12 AM  Performed by: Jermain Alfaro III, MD  Authorized by: Jermain Alfaro III, MD   Comparison: compared with previous ECG from 10/24/2021  Similar to previous ECG  Rhythm: sinus rhythm    Clinical impression: normal ECG          No results found for: CHLPL, TRIG, HDL, LDLDIRECT  Lab Results   Component Value Date    GLUCOSE 103 (H) 10/25/2021    BUN 11 10/25/2021    CREATININE 0.90 10/25/2021     10/25/2021    K 3.9 10/25/2021     10/25/2021    CO2 23.0 10/25/2021 "     Lab Results   Component Value Date    HGBA1C 5.30 10/21/2021     Lab Results   Component Value Date    WBC 7.41 10/25/2021    HGB 11.6 (L) 10/25/2021    HCT 35.6 (L) 10/25/2021     10/25/2021       Allergies  Allergies   Allergen Reactions   • Lisinopril Angioedema       Current Medications    Current Outpatient Medications:   •  albuterol (PROVENTIL) (2.5 MG/3ML) 0.083% nebulizer solution, Take 2.5 mg by nebulization Every 4 (Four) Hours As Needed for Shortness of Air., Disp: 5 vial, Rfl: 0  •  aspirin 81 MG EC tablet, Take 81 mg by mouth Daily., Disp: , Rfl:   •  atorvastatin (LIPITOR) 40 MG tablet, Take 40 mg by mouth Every Night., Disp: , Rfl:   •  calcium carbonate-cholecalciferol 500-400 MG-UNIT tablet tablet, Take  by mouth Daily., Disp: , Rfl:   •  chlorhexidine (HIBICLENS) 4 % external liquid, Shower every day for 5 day beginning 5 days before surgery., Disp: 120 mL, Rfl: 0  •  Coenzyme Q10 (CO Q-10) 100 MG capsule, Take 100 mg by mouth 2 (Two) Times a Day., Disp: , Rfl:   •  cyclobenzaprine (FLEXERIL) 10 MG tablet, Take 10 mg by mouth 3 (Three) Times a Day As Needed. for muscle spams, Disp: , Rfl:   •  ezetimibe (ZETIA) 10 MG tablet, Take 10 mg by mouth Daily., Disp: , Rfl:   •  ferrous sulfate 324 (65 Fe) MG tablet delayed-release EC tablet, Take 324 mg by mouth Every Night., Disp: , Rfl:   •  Glucosamine-Chondroitin 0061-6474 MG/30ML liquid, Take 1 capsule by mouth 3 (Three) Times a Day With Meals., Disp: , Rfl:   •  lactobacillus acidophilus (RISAQUAD) capsule capsule, Take 1 capsule by mouth 3 (Three) Times a Day With Meals., Disp: 90 capsule, Rfl: 0  •  metoprolol succinate XL (TOPROL-XL) 25 MG 24 hr tablet, , Disp: , Rfl:   •  Multiple Vitamins-Minerals (VITEYES AREDS FORMULA PO), Take 1 capsule by mouth 2 (Two) Times a Day., Disp: , Rfl:   •  mupirocin (Bactroban) 2 % ointment, Apply to the inside of the nostril with a cotton swab bid every day, morning and evening, 5 days before surgery.   Do NOT use the day of pre-admission testing., Disp: 22 g, Rfl: 0  •  nitroglycerin (NITROSTAT) 0.4 MG SL tablet, Place 0.4 mg under the tongue As Needed for Chest Pain. Take no more than 3 doses in 15 minutes. , Disp: , Rfl:   •  Polyethylene Glycol 3350 (MIRALAX PO), Take 1 dose by mouth 2 (Two) Times a Day., Disp: , Rfl:   •  SYNTHROID 25 MCG tablet, Take 25 mcg by mouth Daily., Disp: , Rfl:   •  tamsulosin (FLOMAX) 0.4 MG capsule 24 hr capsule, Take 1 capsule by mouth Daily., Disp: 30 capsule, Rfl:   •  vitamin C (ASCORBIC ACID) 250 MG tablet, Take 250 mg by mouth Daily., Disp: , Rfl:   •  losartan (Cozaar) 50 MG tablet, Take 1 tablet by mouth Daily., Disp: 90 tablet, Rfl: 1          ROS  ROS      SOCIAL HX  Social History     Socioeconomic History   • Marital status:    Tobacco Use   • Smoking status: Never Smoker   • Smokeless tobacco: Never Used   Substance and Sexual Activity   • Alcohol use: Never   • Drug use: Never   • Sexual activity: Defer       FAMILY HX  Family History   Problem Relation Age of Onset   • Rheum arthritis Mother    • Dementia Mother    • Parkinsonism Mother    • Stroke Father    • Heart attack Father    • Hypertension Father    • Clotting disorder Father    • Rheum arthritis Father    • Hypertension Other              Jermain Alfaro III, MD, [facility]

## 2022-01-12 ENCOUNTER — HOSPITAL ENCOUNTER (OUTPATIENT)
Dept: MRI IMAGING | Facility: HOSPITAL | Age: 72
Discharge: HOME | End: 2022-01-12
Admitting: NEUROLOGICAL SURGERY

## 2022-01-12 DIAGNOSIS — M48.062 SPINAL STENOSIS OF LUMBAR REGION WITH NEUROGENIC CLAUDICATION: ICD-10-CM

## 2022-01-12 PROCEDURE — 72148 MRI LUMBAR SPINE W/O DYE: CPT

## 2022-01-18 NOTE — TELEPHONE ENCOUNTER
Dr. Givens has reviewed the MRI Lumbar done on 01/12/2022. Nothing has changed, plan will remain the same regarding surgery. Patient does not need the f/u appointment that is scheduled for 01/22/2022.    I called and advised the patient. Patient verbalized understanding, and was thankful for the call. Viktoria, Patient would like you to call him for a possible time frame.

## 2022-01-24 NOTE — TELEPHONE ENCOUNTER
Spoke with the patient. I explained that we cannot schedule his surgery at this time due to the covid restrictions at the hospital. I'll call him as soon as we can schedule.

## 2022-03-15 ENCOUNTER — TELEPHONE (OUTPATIENT)
Dept: NEUROSURGERY | Facility: CLINIC | Age: 72
End: 2022-03-15

## 2022-03-15 NOTE — TELEPHONE ENCOUNTER
Caller: Klinefelter, Larry Shar    Relationship: Self    Best call back number: [phone]    Requested Prescriptions: PRE SURGERY SOAP     Pharmacy where request should be sent: [facility]     Additional details provided by patient: PATIENT WAS CALLING TO GET STATUS UPDATE ON PRE SURGERY SOAP AND PACKET

## 2022-03-15 NOTE — TELEPHONE ENCOUNTER
Provider:  Chon  Caller: patient   Time of call:   11:34  Phone #:  [phone]  Surgery:  Lumbar Laminectomy  Surgery Date:  3-  Last visit:  12-15-21   Next visit: MIGUEL CASTELLANOS:         Reason for call: Patient called and wanted to know about the surgery packet and the surgery medicine. I have called the patient back to let him know that he will get his packet in the mail and that the medicine will go to [facility]. He was thankful for the call back.

## 2022-03-24 ENCOUNTER — PREP FOR SURGERY (OUTPATIENT)
Dept: OTHER | Facility: HOSPITAL | Age: 72
End: 2022-03-24

## 2022-03-24 DIAGNOSIS — M48.062 LUMBAR STENOSIS WITH NEUROGENIC CLAUDICATION: Primary | ICD-10-CM

## 2022-03-24 DIAGNOSIS — R94.31 ABNORMAL ELECTROCARDIOGRAM (ECG) (EKG): ICD-10-CM

## 2022-03-24 RX ORDER — ACETAMINOPHEN 325 MG/1
650 TABLET ORAL ONCE
Status: CANCELLED | OUTPATIENT
Start: 2022-03-24 | End: 2022-03-24

## 2022-03-24 RX ORDER — HYDROCODONE BITARTRATE AND ACETAMINOPHEN 7.5; 325 MG/1; MG/1
1 TABLET ORAL ONCE
Status: CANCELLED | OUTPATIENT
Start: 2022-03-24 | End: 2022-03-24

## 2022-03-24 RX ORDER — SODIUM CHLORIDE, SODIUM LACTATE, POTASSIUM CHLORIDE, CALCIUM CHLORIDE 600; 310; 30; 20 MG/100ML; MG/100ML; MG/100ML; MG/100ML
100 INJECTION, SOLUTION INTRAVENOUS CONTINUOUS
Status: CANCELLED | OUTPATIENT
Start: 2022-03-24

## 2022-03-24 RX ORDER — SODIUM CHLORIDE 0.9 % (FLUSH) 0.9 %
3-10 SYRINGE (ML) INJECTION AS NEEDED
Status: CANCELLED | OUTPATIENT
Start: 2022-03-24

## 2022-03-24 RX ORDER — CEFAZOLIN SODIUM 2 G/100ML
2 INJECTION, SOLUTION INTRAVENOUS ONCE
Status: CANCELLED | OUTPATIENT
Start: 2022-03-24 | End: 2022-03-24

## 2022-03-24 RX ORDER — IBUPROFEN 800 MG/1
800 TABLET ORAL ONCE
Status: CANCELLED | OUTPATIENT
Start: 2022-03-24 | End: 2022-03-24

## 2022-03-25 ENCOUNTER — HOSPITAL ENCOUNTER (OUTPATIENT)
Dept: GENERAL RADIOLOGY | Facility: HOSPITAL | Age: 72
Discharge: HOME | End: 2022-03-25

## 2022-03-25 ENCOUNTER — PRE-ADMISSION TESTING (OUTPATIENT)
Dept: PREADMISSION TESTING | Facility: HOSPITAL | Age: 72
End: 2022-03-25

## 2022-03-25 VITALS — HEIGHT: 70 IN | WEIGHT: 251.54 LBS | BODY MASS INDEX: 36.01 KG/M2

## 2022-03-25 DIAGNOSIS — M48.062 LUMBAR STENOSIS WITH NEUROGENIC CLAUDICATION: ICD-10-CM

## 2022-03-25 DIAGNOSIS — R94.31 ABNORMAL ELECTROCARDIOGRAM (ECG) (EKG): ICD-10-CM

## 2022-03-25 LAB
ALBUMIN SERPL-MCNC: 4.8 G/DL (ref 3.5–5.2)
ALBUMIN/GLOB SERPL: 2.2 G/DL
ALP SERPL-CCNC: 47 U/L (ref 39–117)
ALT SERPL W P-5'-P-CCNC: 23 U/L (ref 1–41)
ANION GAP SERPL CALCULATED.3IONS-SCNC: 10 MMOL/L (ref 5–15)
AST SERPL-CCNC: 18 U/L (ref 1–40)
BILIRUB SERPL-MCNC: 0.4 MG/DL (ref 0–1.2)
BILIRUB UR QL STRIP: NEGATIVE
BUN SERPL-MCNC: 16 MG/DL (ref 8–23)
BUN/CREAT SERPL: 19.8 (ref 7–25)
CALCIUM SPEC-SCNC: 9.4 MG/DL (ref 8.6–10.5)
CHLORIDE SERPL-SCNC: 105 MMOL/L (ref 98–107)
CLARITY UR: CLEAR
CO2 SERPL-SCNC: 25 MMOL/L (ref 22–29)
COLOR UR: YELLOW
CREAT SERPL-MCNC: 0.81 MG/DL (ref 0.76–1.27)
DEPRECATED RDW RBC AUTO: 44.1 FL (ref 37–54)
EGFRCR SERPLBLD CKD-EPI 2021: 93.1 ML/MIN/1.73
ERYTHROCYTE [DISTWIDTH] IN BLOOD BY AUTOMATED COUNT: 12.7 % (ref 12.3–15.4)
GLOBULIN UR ELPH-MCNC: 2.2 GM/DL
GLUCOSE SERPL-MCNC: 95 MG/DL (ref 65–99)
GLUCOSE UR STRIP-MCNC: NEGATIVE MG/DL
HBA1C MFR BLD: 5.6 % (ref 4.8–5.6)
HCT VFR BLD AUTO: 37.4 % (ref 37.5–51)
HGB BLD-MCNC: 13 G/DL (ref 13–17.7)
HGB UR QL STRIP.AUTO: NEGATIVE
KETONES UR QL STRIP: NEGATIVE
LEUKOCYTE ESTERASE UR QL STRIP.AUTO: NEGATIVE
MCH RBC QN AUTO: 33.2 PG (ref 26.6–33)
MCHC RBC AUTO-ENTMCNC: 34.8 G/DL (ref 31.5–35.7)
MCV RBC AUTO: 95.4 FL (ref 79–97)
NITRITE UR QL STRIP: NEGATIVE
PH UR STRIP.AUTO: 6.5 [PH] (ref 5–8)
PLATELET # BLD AUTO: 209 10*3/MM3 (ref 140–450)
PMV BLD AUTO: 9.6 FL (ref 6–12)
POTASSIUM SERPL-SCNC: 4.3 MMOL/L (ref 3.5–5.2)
PROT SERPL-MCNC: 7 G/DL (ref 6–8.5)
PROT UR QL STRIP: NEGATIVE
RBC # BLD AUTO: 3.92 10*6/MM3 (ref 4.14–5.8)
SARS-COV-2 RNA PNL SPEC NAA+PROBE: NOT DETECTED
SODIUM SERPL-SCNC: 140 MMOL/L (ref 136–145)
SP GR UR STRIP: 1.01 (ref 1–1.03)
UROBILINOGEN UR QL STRIP: NORMAL
WBC NRBC COR # BLD: 6.9 10*3/MM3 (ref 3.4–10.8)

## 2022-03-25 PROCEDURE — 83036 HEMOGLOBIN GLYCOSYLATED A1C: CPT

## 2022-03-25 PROCEDURE — 85027 COMPLETE CBC AUTOMATED: CPT

## 2022-03-25 PROCEDURE — C9803 HOPD COVID-19 SPEC COLLECT: HCPCS

## 2022-03-25 PROCEDURE — 36415 COLL VENOUS BLD VENIPUNCTURE: CPT

## 2022-03-25 PROCEDURE — U0004 COV-19 TEST NON-CDC HGH THRU: HCPCS

## 2022-03-25 PROCEDURE — 87081 CULTURE SCREEN ONLY: CPT

## 2022-03-25 PROCEDURE — 80053 COMPREHEN METABOLIC PANEL: CPT

## 2022-03-25 PROCEDURE — 71046 X-RAY EXAM CHEST 2 VIEWS: CPT

## 2022-03-25 PROCEDURE — 81003 URINALYSIS AUTO W/O SCOPE: CPT

## 2022-03-25 RX ORDER — VIT C/VIT D3/E/ZINC/ELDERBERRY 65 MG-3.15
1 TABLET,CHEWABLE ORAL DAILY
COMMUNITY

## 2022-03-25 RX ORDER — CETIRIZINE HYDROCHLORIDE 10 MG/1
10 TABLET ORAL DAILY
COMMUNITY

## 2022-03-25 RX ORDER — ACETAMINOPHEN 500 MG
1000 TABLET ORAL EVERY 6 HOURS PRN
COMMUNITY

## 2022-03-25 RX ORDER — DOCUSATE SODIUM 250 MG
250 CAPSULE ORAL DAILY
COMMUNITY

## 2022-03-25 NOTE — PAT
Patient instructed to drink 20 ounces (or until full) of Gatorade and it needs to be completed 1 hour (for Main OR patients) or 2 hours (scheduled  section patients) before given arrival time for procedure (NO RED Gatorade)    Patient verbalized understanding.    Per Anesthesia Request, patient instructed not to take their ACE/ARB medications on the AM of surgery.    Patient to apply Chlorhexadine wipes  to surgical area (as instructed) the night before procedure and the AM of procedure. Wipes provided.    covid in pat.     ekg on chart from 21.     Prescription for Bactroban and Chlorhexidine shower called into patient's pharmacy or [facility] pharmacy by patient's surgeon.  Reinforced with patient to  the prescription from applicable pharmacy if they haven't already.  Verbal and written instructions given regarding proper use of the Bactroban and Chlorhexidine to patient and/or famlily during PAT visit. Patient/family also instructed to complete checklist and return it to Pre-op on the day of surgery.  Patient and/or family verbalized understanding.    Patient viewed general PAT education video as instructed in their preoperative information received from their surgeon.  Patient stated the general PAT education video was viewed in its entirety and survey completed.  Copies of PAT general education handouts (Incentive Spirometry, Meds to Beds Program, Patient Belongings, Pre-op skin preparation instructions, Blood Glucose testing, Visitor policy, Surgery FAQ, Code H) distributed to patient if not printed. Education related to the PAT pass and skin preparation for surgery (if applicable) completed in PAT as a reinforcement to PAT education video. Patient instructed to return PAT pass provided today as well as completed skin preparation sheet (if applicable) on the day of procedure.     Additionally if patient had not viewed video yet but intended to view it at home or in our waiting area, then referred  them to the handout with QR code/link provided during PAT visit.  Instructed patient to complete survey after viewing the video in its entirety.  Encouraged patient/family to read PAT general education handouts thoroughly and notify PAT staff with any questions or concerns. Patient verbalized understanding of all information and priority content.

## 2022-03-26 ENCOUNTER — ANESTHESIA EVENT (OUTPATIENT)
Dept: PERIOP | Facility: HOSPITAL | Age: 72
End: 2022-03-26

## 2022-03-26 LAB — MRSA SPEC QL CULT: NORMAL

## 2022-03-26 RX ORDER — SODIUM CHLORIDE 0.9 % (FLUSH) 0.9 %
10 SYRINGE (ML) INJECTION EVERY 12 HOURS SCHEDULED
Status: CANCELLED | OUTPATIENT
Start: 2022-03-26

## 2022-03-28 ENCOUNTER — APPOINTMENT (OUTPATIENT)
Dept: GENERAL RADIOLOGY | Facility: HOSPITAL | Age: 72
End: 2022-03-28

## 2022-03-28 ENCOUNTER — HOSPITAL ENCOUNTER (OUTPATIENT)
Facility: HOSPITAL | Age: 72
Discharge: HOME | End: 2022-03-30
Attending: NEUROLOGICAL SURGERY | Admitting: NEUROLOGICAL SURGERY

## 2022-03-28 ENCOUNTER — ANESTHESIA (OUTPATIENT)
Dept: PERIOP | Facility: HOSPITAL | Age: 72
End: 2022-03-28

## 2022-03-28 DIAGNOSIS — Z46.2 ENCOUNTER FOR FITTING AND ADJUSTMENT OF NEUROPACEMAKER OF SPINAL CORD: ICD-10-CM

## 2022-03-28 DIAGNOSIS — M51.37 DEGENERATION OF LUMBAR OR LUMBOSACRAL INTERVERTEBRAL DISC: ICD-10-CM

## 2022-03-28 DIAGNOSIS — J96.01 ACUTE RESPIRATORY FAILURE WITH HYPOXIA: ICD-10-CM

## 2022-03-28 DIAGNOSIS — G89.18 ACUTE POSTOPERATIVE PAIN: ICD-10-CM

## 2022-03-28 DIAGNOSIS — Z96.653 STATUS POST TOTAL BILATERAL KNEE REPLACEMENT: ICD-10-CM

## 2022-03-28 DIAGNOSIS — M17.12 PRIMARY OSTEOARTHRITIS OF LEFT KNEE: ICD-10-CM

## 2022-03-28 DIAGNOSIS — R26.9 GAIT DISTURBANCE: Primary | ICD-10-CM

## 2022-03-28 DIAGNOSIS — M48.062 LUMBAR STENOSIS WITH NEUROGENIC CLAUDICATION: ICD-10-CM

## 2022-03-28 DIAGNOSIS — M47.816 SPONDYLOSIS OF LUMBAR REGION WITHOUT MYELOPATHY OR RADICULOPATHY: ICD-10-CM

## 2022-03-28 DIAGNOSIS — E87.20 LACTIC ACIDOSIS: ICD-10-CM

## 2022-03-28 DIAGNOSIS — Z99.89 OSA ON CPAP: ICD-10-CM

## 2022-03-28 DIAGNOSIS — G47.33 OSA ON CPAP: ICD-10-CM

## 2022-03-28 DIAGNOSIS — R53.81 PHYSICAL DECONDITIONING: ICD-10-CM

## 2022-03-28 DIAGNOSIS — H57.89 EYE DRAINAGE: ICD-10-CM

## 2022-03-28 DIAGNOSIS — D62 ACUTE BLOOD LOSS ANEMIA: ICD-10-CM

## 2022-03-28 DIAGNOSIS — K56.609 SMALL BOWEL OBSTRUCTION: ICD-10-CM

## 2022-03-28 PROCEDURE — 76000 FLUOROSCOPY <1 HR PHYS/QHP: CPT

## 2022-03-28 PROCEDURE — 63048 LAM FACETEC &FORAMOT EA ADDL: CPT | Performed by: PHYSICIAN ASSISTANT

## 2022-03-28 PROCEDURE — 63047 LAM FACETEC & FORAMOT LUMBAR: CPT | Performed by: PHYSICIAN ASSISTANT

## 2022-03-28 PROCEDURE — 63710000001 HYDROCODONE-ACETAMINOPHEN 7.5-325 MG TABLET: Performed by: NEUROLOGICAL SURGERY

## 2022-03-28 PROCEDURE — 63710000001 IBUPROFEN 800 MG TABLET: Performed by: NEUROLOGICAL SURGERY

## 2022-03-28 PROCEDURE — 25010000002 PROPOFOL 10 MG/ML EMULSION: Performed by: NURSE ANESTHETIST, CERTIFIED REGISTERED

## 2022-03-28 PROCEDURE — A9270 NON-COVERED ITEM OR SERVICE: HCPCS | Performed by: NEUROLOGICAL SURGERY

## 2022-03-28 PROCEDURE — 63710000001 DOCUSATE SODIUM 100 MG CAPSULE: Performed by: NEUROLOGICAL SURGERY

## 2022-03-28 PROCEDURE — 25010000002 CEFAZOLIN IN DEXTROSE 2-4 GM/100ML-% SOLUTION: Performed by: NEUROLOGICAL SURGERY

## 2022-03-28 PROCEDURE — 63710000001 SENNOSIDES-DOCUSATE 8.6-50 MG TABLET: Performed by: NEUROLOGICAL SURGERY

## 2022-03-28 PROCEDURE — 63710000001 ACETAMINOPHEN 325 MG TABLET: Performed by: NEUROLOGICAL SURGERY

## 2022-03-28 PROCEDURE — 63047 LAM FACETEC & FORAMOT LUMBAR: CPT | Performed by: NEUROLOGICAL SURGERY

## 2022-03-28 PROCEDURE — 63710000001 OXYCODONE 10 MG TABLET EXTENDED-RELEASE 12 HOUR: Performed by: NEUROLOGICAL SURGERY

## 2022-03-28 PROCEDURE — 25010000002 DEXAMETHASONE PER 1 MG: Performed by: NURSE ANESTHETIST, CERTIFIED REGISTERED

## 2022-03-28 PROCEDURE — 25010000002 FENTANYL CITRATE (PF) 50 MCG/ML SOLUTION: Performed by: NURSE ANESTHETIST, CERTIFIED REGISTERED

## 2022-03-28 PROCEDURE — 63710000001 ATORVASTATIN 40 MG TABLET: Performed by: NEUROLOGICAL SURGERY

## 2022-03-28 PROCEDURE — 63048 LAM FACETEC &FORAMOT EA ADDL: CPT | Performed by: NEUROLOGICAL SURGERY

## 2022-03-28 PROCEDURE — 63710000001 POLYETHYLENE GLYCOL 17 G PACK: Performed by: NEUROLOGICAL SURGERY

## 2022-03-28 PROCEDURE — 63710000001 FAMOTIDINE 20 MG TABLET: Performed by: NEUROLOGICAL SURGERY

## 2022-03-28 PROCEDURE — 25010000002 NEOSTIGMINE 10 MG/10ML SOLUTION: Performed by: NURSE ANESTHETIST, CERTIFIED REGISTERED

## 2022-03-28 PROCEDURE — 25010000002 BUPRENORPHINE PER 0.1 MG: Performed by: NURSE ANESTHETIST, CERTIFIED REGISTERED

## 2022-03-28 RX ORDER — POLYETHYLENE GLYCOL 3350 17 G/17G
17 POWDER, FOR SOLUTION ORAL 2 TIMES DAILY
Status: DISCONTINUED | OUTPATIENT
Start: 2022-03-28 | End: 2022-03-30 | Stop reason: HOSPADM

## 2022-03-28 RX ORDER — DROPERIDOL 2.5 MG/ML
0.62 INJECTION, SOLUTION INTRAMUSCULAR; INTRAVENOUS AS NEEDED
Status: DISCONTINUED | OUTPATIENT
Start: 2022-03-28 | End: 2022-03-28 | Stop reason: HOSPADM

## 2022-03-28 RX ORDER — ACETAMINOPHEN 325 MG/1
650 TABLET ORAL ONCE
Status: COMPLETED | OUTPATIENT
Start: 2022-03-28 | End: 2022-03-28

## 2022-03-28 RX ORDER — SODIUM CHLORIDE 0.9 % (FLUSH) 0.9 %
3-10 SYRINGE (ML) INJECTION AS NEEDED
Status: DISCONTINUED | OUTPATIENT
Start: 2022-03-28 | End: 2022-03-28 | Stop reason: HOSPADM

## 2022-03-28 RX ORDER — DIPHENHYDRAMINE HCL 25 MG
25 CAPSULE ORAL NIGHTLY PRN
Status: DISCONTINUED | OUTPATIENT
Start: 2022-03-28 | End: 2022-03-30 | Stop reason: HOSPADM

## 2022-03-28 RX ORDER — DEXAMETHASONE SODIUM PHOSPHATE 4 MG/ML
INJECTION, SOLUTION INTRA-ARTICULAR; INTRALESIONAL; INTRAMUSCULAR; INTRAVENOUS; SOFT TISSUE AS NEEDED
Status: DISCONTINUED | OUTPATIENT
Start: 2022-03-28 | End: 2022-03-28 | Stop reason: SURG

## 2022-03-28 RX ORDER — FENTANYL CITRATE 50 UG/ML
INJECTION, SOLUTION INTRAMUSCULAR; INTRAVENOUS AS NEEDED
Status: DISCONTINUED | OUTPATIENT
Start: 2022-03-28 | End: 2022-03-28 | Stop reason: SURG

## 2022-03-28 RX ORDER — SODIUM CHLORIDE, SODIUM LACTATE, POTASSIUM CHLORIDE, CALCIUM CHLORIDE 600; 310; 30; 20 MG/100ML; MG/100ML; MG/100ML; MG/100ML
100 INJECTION, SOLUTION INTRAVENOUS CONTINUOUS
Status: DISCONTINUED | OUTPATIENT
Start: 2022-03-28 | End: 2022-03-29

## 2022-03-28 RX ORDER — SODIUM CHLORIDE 0.9 % (FLUSH) 0.9 %
10 SYRINGE (ML) INJECTION AS NEEDED
Status: DISCONTINUED | OUTPATIENT
Start: 2022-03-28 | End: 2022-03-30 | Stop reason: HOSPADM

## 2022-03-28 RX ORDER — BUPRENORPHINE HYDROCHLORIDE 0.32 MG/ML
INJECTION INTRAMUSCULAR; INTRAVENOUS AS NEEDED
Status: DISCONTINUED | OUTPATIENT
Start: 2022-03-28 | End: 2022-03-28 | Stop reason: SURG

## 2022-03-28 RX ORDER — PROMETHAZINE HYDROCHLORIDE 25 MG/1
25 TABLET ORAL ONCE AS NEEDED
Status: DISCONTINUED | OUTPATIENT
Start: 2022-03-28 | End: 2022-03-28 | Stop reason: HOSPADM

## 2022-03-28 RX ORDER — PROMETHAZINE HYDROCHLORIDE 12.5 MG/1
12.5 TABLET ORAL EVERY 6 HOURS PRN
Status: DISCONTINUED | OUTPATIENT
Start: 2022-03-28 | End: 2022-03-30 | Stop reason: HOSPADM

## 2022-03-28 RX ORDER — CETIRIZINE HYDROCHLORIDE 10 MG/1
10 TABLET ORAL DAILY
Status: DISCONTINUED | OUTPATIENT
Start: 2022-03-29 | End: 2022-03-30 | Stop reason: HOSPADM

## 2022-03-28 RX ORDER — ONDANSETRON 2 MG/ML
4 INJECTION INTRAMUSCULAR; INTRAVENOUS EVERY 6 HOURS PRN
Status: DISCONTINUED | OUTPATIENT
Start: 2022-03-28 | End: 2022-03-30 | Stop reason: HOSPADM

## 2022-03-28 RX ORDER — BISACODYL 10 MG
10 SUPPOSITORY, RECTAL RECTAL DAILY PRN
Status: DISCONTINUED | OUTPATIENT
Start: 2022-03-28 | End: 2022-03-30 | Stop reason: HOSPADM

## 2022-03-28 RX ORDER — LIDOCAINE HYDROCHLORIDE 10 MG/ML
INJECTION, SOLUTION EPIDURAL; INFILTRATION; INTRACAUDAL; PERINEURAL AS NEEDED
Status: DISCONTINUED | OUTPATIENT
Start: 2022-03-28 | End: 2022-03-28 | Stop reason: SURG

## 2022-03-28 RX ORDER — NALOXONE HCL 0.4 MG/ML
0.4 VIAL (ML) INJECTION
Status: DISCONTINUED | OUTPATIENT
Start: 2022-03-28 | End: 2022-03-28 | Stop reason: SDUPTHER

## 2022-03-28 RX ORDER — OXYCODONE HCL 10 MG/1
10 TABLET, FILM COATED, EXTENDED RELEASE ORAL ONCE
Status: COMPLETED | OUTPATIENT
Start: 2022-03-28 | End: 2022-03-28

## 2022-03-28 RX ORDER — SODIUM CHLORIDE, SODIUM LACTATE, POTASSIUM CHLORIDE, CALCIUM CHLORIDE 600; 310; 30; 20 MG/100ML; MG/100ML; MG/100ML; MG/100ML
9 INJECTION, SOLUTION INTRAVENOUS CONTINUOUS
Status: DISCONTINUED | OUTPATIENT
Start: 2022-03-28 | End: 2022-03-30 | Stop reason: HOSPADM

## 2022-03-28 RX ORDER — MIDAZOLAM HYDROCHLORIDE 1 MG/ML
0.5 INJECTION INTRAMUSCULAR; INTRAVENOUS
Status: DISCONTINUED | OUTPATIENT
Start: 2022-03-28 | End: 2022-03-28 | Stop reason: HOSPADM

## 2022-03-28 RX ORDER — HYDROMORPHONE HYDROCHLORIDE 1 MG/ML
0.5 INJECTION, SOLUTION INTRAMUSCULAR; INTRAVENOUS; SUBCUTANEOUS
Status: DISCONTINUED | OUTPATIENT
Start: 2022-03-28 | End: 2022-03-28 | Stop reason: HOSPADM

## 2022-03-28 RX ORDER — ONDANSETRON 2 MG/ML
4 INJECTION INTRAMUSCULAR; INTRAVENOUS ONCE AS NEEDED
Status: DISCONTINUED | OUTPATIENT
Start: 2022-03-28 | End: 2022-03-28 | Stop reason: HOSPADM

## 2022-03-28 RX ORDER — ONDANSETRON 4 MG/1
4 TABLET, FILM COATED ORAL EVERY 6 HOURS PRN
Status: DISCONTINUED | OUTPATIENT
Start: 2022-03-28 | End: 2022-03-30 | Stop reason: HOSPADM

## 2022-03-28 RX ORDER — NALOXONE HCL 0.4 MG/ML
0.4 VIAL (ML) INJECTION
Status: DISCONTINUED | OUTPATIENT
Start: 2022-03-28 | End: 2022-03-28

## 2022-03-28 RX ORDER — MEPERIDINE HYDROCHLORIDE 25 MG/ML
12.5 INJECTION INTRAMUSCULAR; INTRAVENOUS; SUBCUTANEOUS
Status: DISCONTINUED | OUTPATIENT
Start: 2022-03-28 | End: 2022-03-28 | Stop reason: HOSPADM

## 2022-03-28 RX ORDER — MORPHINE SULFATE 2 MG/ML
2 INJECTION, SOLUTION INTRAMUSCULAR; INTRAVENOUS EVERY 4 HOURS PRN
Status: DISCONTINUED | OUTPATIENT
Start: 2022-03-28 | End: 2022-03-30 | Stop reason: HOSPADM

## 2022-03-28 RX ORDER — FENTANYL CITRATE 50 UG/ML
50 INJECTION, SOLUTION INTRAMUSCULAR; INTRAVENOUS
Status: DISCONTINUED | OUTPATIENT
Start: 2022-03-28 | End: 2022-03-28 | Stop reason: HOSPADM

## 2022-03-28 RX ORDER — IBUPROFEN 800 MG/1
800 TABLET ORAL ONCE
Status: COMPLETED | OUTPATIENT
Start: 2022-03-28 | End: 2022-03-28

## 2022-03-28 RX ORDER — ATORVASTATIN CALCIUM 40 MG/1
40 TABLET, FILM COATED ORAL NIGHTLY
Status: DISCONTINUED | OUTPATIENT
Start: 2022-03-28 | End: 2022-03-30 | Stop reason: HOSPADM

## 2022-03-28 RX ORDER — HYDROCODONE BITARTRATE AND ACETAMINOPHEN 7.5; 325 MG/1; MG/1
1 TABLET ORAL ONCE
Status: COMPLETED | OUTPATIENT
Start: 2022-03-28 | End: 2022-03-28

## 2022-03-28 RX ORDER — GLYCOPYRROLATE 0.2 MG/ML
INJECTION INTRAMUSCULAR; INTRAVENOUS AS NEEDED
Status: DISCONTINUED | OUTPATIENT
Start: 2022-03-28 | End: 2022-03-28 | Stop reason: SURG

## 2022-03-28 RX ORDER — ASPIRIN 81 MG/1
81 TABLET ORAL DAILY
Status: DISCONTINUED | OUTPATIENT
Start: 2022-03-29 | End: 2022-03-30 | Stop reason: HOSPADM

## 2022-03-28 RX ORDER — OXYCODONE AND ACETAMINOPHEN 7.5; 325 MG/1; MG/1
1 TABLET ORAL EVERY 4 HOURS PRN
Status: DISCONTINUED | OUTPATIENT
Start: 2022-03-28 | End: 2022-03-30 | Stop reason: HOSPADM

## 2022-03-28 RX ORDER — NALOXONE HCL 0.4 MG/ML
0.4 VIAL (ML) INJECTION AS NEEDED
Status: DISCONTINUED | OUTPATIENT
Start: 2022-03-28 | End: 2022-03-28 | Stop reason: HOSPADM

## 2022-03-28 RX ORDER — SODIUM CHLORIDE 9 MG/ML
INJECTION, SOLUTION INTRAVENOUS AS NEEDED
Status: DISCONTINUED | OUTPATIENT
Start: 2022-03-28 | End: 2022-03-28 | Stop reason: HOSPADM

## 2022-03-28 RX ORDER — ACETAMINOPHEN 325 MG/1
650 TABLET ORAL EVERY 4 HOURS PRN
Status: DISCONTINUED | OUTPATIENT
Start: 2022-03-28 | End: 2022-03-30 | Stop reason: HOSPADM

## 2022-03-28 RX ORDER — IPRATROPIUM BROMIDE AND ALBUTEROL SULFATE 2.5; .5 MG/3ML; MG/3ML
3 SOLUTION RESPIRATORY (INHALATION) ONCE AS NEEDED
Status: DISCONTINUED | OUTPATIENT
Start: 2022-03-28 | End: 2022-03-28 | Stop reason: HOSPADM

## 2022-03-28 RX ORDER — ACETAMINOPHEN 500 MG
1000 TABLET ORAL ONCE
Status: DISCONTINUED | OUTPATIENT
Start: 2022-03-28 | End: 2022-03-28 | Stop reason: HOSPADM

## 2022-03-28 RX ORDER — MORPHINE SULFATE 1 MG/ML
2 INJECTION, SOLUTION EPIDURAL; INTRATHECAL; INTRAVENOUS EVERY 4 HOURS PRN
Status: DISCONTINUED | OUTPATIENT
Start: 2022-03-28 | End: 2022-03-28

## 2022-03-28 RX ORDER — METOPROLOL SUCCINATE 25 MG/1
25 TABLET, EXTENDED RELEASE ORAL DAILY
Status: DISCONTINUED | OUTPATIENT
Start: 2022-03-29 | End: 2022-03-30 | Stop reason: HOSPADM

## 2022-03-28 RX ORDER — NEOSTIGMINE METHYLSULFATE 1 MG/ML
INJECTION, SOLUTION INTRAVENOUS AS NEEDED
Status: DISCONTINUED | OUTPATIENT
Start: 2022-03-28 | End: 2022-03-28 | Stop reason: SURG

## 2022-03-28 RX ORDER — FAMOTIDINE 20 MG/1
20 TABLET, FILM COATED ORAL
Status: COMPLETED | OUTPATIENT
Start: 2022-03-28 | End: 2022-03-28

## 2022-03-28 RX ORDER — L.ACID,PARA/B.BIFIDUM/S.THERM 8B CELL
1 CAPSULE ORAL DAILY
Status: DISCONTINUED | OUTPATIENT
Start: 2022-03-29 | End: 2022-03-30 | Stop reason: HOSPADM

## 2022-03-28 RX ORDER — CEFAZOLIN SODIUM 2 G/100ML
2 INJECTION, SOLUTION INTRAVENOUS ONCE
Status: COMPLETED | OUTPATIENT
Start: 2022-03-28 | End: 2022-03-28

## 2022-03-28 RX ORDER — IBUPROFEN 800 MG/1
800 TABLET ORAL ONCE
Status: DISCONTINUED | OUTPATIENT
Start: 2022-03-28 | End: 2022-03-28 | Stop reason: HOSPADM

## 2022-03-28 RX ORDER — NALOXONE HCL 0.4 MG/ML
0.4 VIAL (ML) INJECTION
Status: DISCONTINUED | OUTPATIENT
Start: 2022-03-28 | End: 2022-03-30 | Stop reason: HOSPADM

## 2022-03-28 RX ORDER — SODIUM CHLORIDE 0.9 % (FLUSH) 0.9 %
3 SYRINGE (ML) INJECTION EVERY 12 HOURS SCHEDULED
Status: DISCONTINUED | OUTPATIENT
Start: 2022-03-28 | End: 2022-03-30 | Stop reason: HOSPADM

## 2022-03-28 RX ORDER — LEVOTHYROXINE SODIUM 0.03 MG/1
25 TABLET ORAL DAILY
Status: DISCONTINUED | OUTPATIENT
Start: 2022-03-29 | End: 2022-03-29

## 2022-03-28 RX ORDER — HYDRALAZINE HYDROCHLORIDE 20 MG/ML
5 INJECTION INTRAMUSCULAR; INTRAVENOUS
Status: DISCONTINUED | OUTPATIENT
Start: 2022-03-28 | End: 2022-03-28 | Stop reason: HOSPADM

## 2022-03-28 RX ORDER — SODIUM CHLORIDE 9 MG/ML
INJECTION, SOLUTION INTRAVENOUS CONTINUOUS PRN
Status: DISCONTINUED | OUTPATIENT
Start: 2022-03-28 | End: 2022-03-28 | Stop reason: SURG

## 2022-03-28 RX ORDER — MORPHINE SULFATE 1 MG/ML
2 INJECTION, SOLUTION EPIDURAL; INTRATHECAL; INTRAVENOUS EVERY 4 HOURS PRN
Status: DISCONTINUED | OUTPATIENT
Start: 2022-03-28 | End: 2022-03-28 | Stop reason: SDUPTHER

## 2022-03-28 RX ORDER — CEFAZOLIN SODIUM 2 G/100ML
2 INJECTION, SOLUTION INTRAVENOUS ONCE
Status: DISCONTINUED | OUTPATIENT
Start: 2022-03-28 | End: 2022-03-28 | Stop reason: SDUPTHER

## 2022-03-28 RX ORDER — PROMETHAZINE HYDROCHLORIDE 12.5 MG/1
12.5 SUPPOSITORY RECTAL EVERY 6 HOURS PRN
Status: DISCONTINUED | OUTPATIENT
Start: 2022-03-28 | End: 2022-03-30 | Stop reason: HOSPADM

## 2022-03-28 RX ORDER — HYDROCODONE BITARTRATE AND ACETAMINOPHEN 5; 325 MG/1; MG/1
1 TABLET ORAL ONCE AS NEEDED
Status: DISCONTINUED | OUTPATIENT
Start: 2022-03-28 | End: 2022-03-28 | Stop reason: HOSPADM

## 2022-03-28 RX ORDER — ROCURONIUM BROMIDE 10 MG/ML
INJECTION, SOLUTION INTRAVENOUS AS NEEDED
Status: DISCONTINUED | OUTPATIENT
Start: 2022-03-28 | End: 2022-03-28 | Stop reason: SURG

## 2022-03-28 RX ORDER — BUPIVACAINE HYDROCHLORIDE AND EPINEPHRINE 2.5; 5 MG/ML; UG/ML
INJECTION, SOLUTION EPIDURAL; INFILTRATION; INTRACAUDAL; PERINEURAL AS NEEDED
Status: DISCONTINUED | OUTPATIENT
Start: 2022-03-28 | End: 2022-03-28 | Stop reason: HOSPADM

## 2022-03-28 RX ORDER — SODIUM CHLORIDE 0.9 % (FLUSH) 0.9 %
3 SYRINGE (ML) INJECTION EVERY 12 HOURS SCHEDULED
Status: DISCONTINUED | OUTPATIENT
Start: 2022-03-28 | End: 2022-03-28 | Stop reason: HOSPADM

## 2022-03-28 RX ORDER — PROPOFOL 10 MG/ML
VIAL (ML) INTRAVENOUS AS NEEDED
Status: DISCONTINUED | OUTPATIENT
Start: 2022-03-28 | End: 2022-03-28 | Stop reason: SURG

## 2022-03-28 RX ORDER — NITROGLYCERIN 0.4 MG/1
0.4 TABLET SUBLINGUAL AS NEEDED
Status: DISCONTINUED | OUTPATIENT
Start: 2022-03-28 | End: 2022-03-30 | Stop reason: HOSPADM

## 2022-03-28 RX ORDER — AMOXICILLIN 250 MG
2 CAPSULE ORAL NIGHTLY PRN
Status: DISCONTINUED | OUTPATIENT
Start: 2022-03-28 | End: 2022-03-30 | Stop reason: HOSPADM

## 2022-03-28 RX ORDER — PROMETHAZINE HYDROCHLORIDE 25 MG/1
25 SUPPOSITORY RECTAL ONCE AS NEEDED
Status: DISCONTINUED | OUTPATIENT
Start: 2022-03-28 | End: 2022-03-28 | Stop reason: HOSPADM

## 2022-03-28 RX ORDER — SODIUM CHLORIDE, SODIUM LACTATE, POTASSIUM CHLORIDE, CALCIUM CHLORIDE 600; 310; 30; 20 MG/100ML; MG/100ML; MG/100ML; MG/100ML
90 INJECTION, SOLUTION INTRAVENOUS CONTINUOUS
Status: DISCONTINUED | OUTPATIENT
Start: 2022-03-28 | End: 2022-03-29

## 2022-03-28 RX ORDER — DOCUSATE SODIUM 100 MG/1
200 CAPSULE, LIQUID FILLED ORAL 2 TIMES DAILY
Status: DISCONTINUED | OUTPATIENT
Start: 2022-03-28 | End: 2022-03-30 | Stop reason: HOSPADM

## 2022-03-28 RX ORDER — CEFAZOLIN SODIUM 2 G/100ML
2 INJECTION, SOLUTION INTRAVENOUS EVERY 8 HOURS
Status: COMPLETED | OUTPATIENT
Start: 2022-03-29 | End: 2022-03-29

## 2022-03-28 RX ORDER — EPHEDRINE SULFATE 50 MG/ML
INJECTION, SOLUTION INTRAVENOUS AS NEEDED
Status: DISCONTINUED | OUTPATIENT
Start: 2022-03-28 | End: 2022-03-28 | Stop reason: SURG

## 2022-03-28 RX ORDER — LOSARTAN POTASSIUM 50 MG/1
50 TABLET ORAL DAILY
Status: DISCONTINUED | OUTPATIENT
Start: 2022-03-29 | End: 2022-03-30 | Stop reason: HOSPADM

## 2022-03-28 RX ORDER — DROPERIDOL 2.5 MG/ML
0.62 INJECTION, SOLUTION INTRAMUSCULAR; INTRAVENOUS ONCE AS NEEDED
Status: DISCONTINUED | OUTPATIENT
Start: 2022-03-28 | End: 2022-03-28 | Stop reason: HOSPADM

## 2022-03-28 RX ORDER — LABETALOL HYDROCHLORIDE 5 MG/ML
5 INJECTION, SOLUTION INTRAVENOUS
Status: DISCONTINUED | OUTPATIENT
Start: 2022-03-28 | End: 2022-03-28 | Stop reason: HOSPADM

## 2022-03-28 RX ORDER — SODIUM CHLORIDE 0.9 % (FLUSH) 0.9 %
10 SYRINGE (ML) INJECTION AS NEEDED
Status: DISCONTINUED | OUTPATIENT
Start: 2022-03-28 | End: 2022-03-28 | Stop reason: HOSPADM

## 2022-03-28 RX ORDER — LIDOCAINE HYDROCHLORIDE 10 MG/ML
0.5 INJECTION, SOLUTION EPIDURAL; INFILTRATION; INTRACAUDAL; PERINEURAL ONCE AS NEEDED
Status: COMPLETED | OUTPATIENT
Start: 2022-03-28 | End: 2022-03-28

## 2022-03-28 RX ADMIN — LIDOCAINE HYDROCHLORIDE 100 MG: 10 INJECTION, SOLUTION EPIDURAL; INFILTRATION; INTRACAUDAL; PERINEURAL at 15:47

## 2022-03-28 RX ADMIN — SODIUM CHLORIDE, POTASSIUM CHLORIDE, SODIUM LACTATE AND CALCIUM CHLORIDE 100 ML/HR: 600; 310; 30; 20 INJECTION, SOLUTION INTRAVENOUS at 12:58

## 2022-03-28 RX ADMIN — ROCURONIUM BROMIDE 50 MG: 10 INJECTION INTRAVENOUS at 15:47

## 2022-03-28 RX ADMIN — EPHEDRINE SULFATE 5 MG: 50 INJECTION, SOLUTION INTRAVENOUS at 16:46

## 2022-03-28 RX ADMIN — SENNOSIDES AND DOCUSATE SODIUM 2 TABLET: 50; 8.6 TABLET ORAL at 21:57

## 2022-03-28 RX ADMIN — FENTANYL CITRATE 100 MCG: 50 INJECTION, SOLUTION INTRAMUSCULAR; INTRAVENOUS at 16:57

## 2022-03-28 RX ADMIN — LIDOCAINE HYDROCHLORIDE 0.5 ML: 10 INJECTION, SOLUTION EPIDURAL; INFILTRATION; INTRACAUDAL; PERINEURAL at 12:58

## 2022-03-28 RX ADMIN — NEOSTIGMINE METHYLSULFATE 3 MG: 0.5 INJECTION INTRAVENOUS at 17:23

## 2022-03-28 RX ADMIN — CEFAZOLIN SODIUM 2 G: 2 INJECTION, SOLUTION INTRAVENOUS at 23:15

## 2022-03-28 RX ADMIN — ACETAMINOPHEN 650 MG: 325 TABLET ORAL at 12:59

## 2022-03-28 RX ADMIN — DOCUSATE SODIUM 200 MG: 100 CAPSULE, LIQUID FILLED ORAL at 20:04

## 2022-03-28 RX ADMIN — EPHEDRINE SULFATE 5 MG: 50 INJECTION, SOLUTION INTRAVENOUS at 16:29

## 2022-03-28 RX ADMIN — IBUPROFEN 800 MG: 800 TABLET, FILM COATED ORAL at 12:59

## 2022-03-28 RX ADMIN — HYDROCODONE BITARTRATE AND ACETAMINOPHEN 1 TABLET: 7.5; 325 TABLET ORAL at 12:59

## 2022-03-28 RX ADMIN — CEFAZOLIN SODIUM 2 G: 2 INJECTION, SOLUTION INTRAVENOUS at 15:43

## 2022-03-28 RX ADMIN — ATORVASTATIN CALCIUM 40 MG: 40 TABLET, FILM COATED ORAL at 20:04

## 2022-03-28 RX ADMIN — SODIUM CHLORIDE: 9 INJECTION, SOLUTION INTRAVENOUS at 17:14

## 2022-03-28 RX ADMIN — DEXAMETHASONE SODIUM PHOSPHATE 8 MG: 4 INJECTION INTRA-ARTICULAR; INTRALESIONAL; INTRAMUSCULAR; INTRAVENOUS; SOFT TISSUE at 15:55

## 2022-03-28 RX ADMIN — BUPRENORPHINE HYDROCHLORIDE 300 MCG: 0.32 INJECTION INTRAMUSCULAR; INTRAVENOUS at 17:15

## 2022-03-28 RX ADMIN — POLYETHYLENE GLYCOL 3350 17 G: 17 POWDER, FOR SOLUTION ORAL at 20:04

## 2022-03-28 RX ADMIN — SODIUM CHLORIDE, POTASSIUM CHLORIDE, SODIUM LACTATE AND CALCIUM CHLORIDE 100 ML/HR: 600; 310; 30; 20 INJECTION, SOLUTION INTRAVENOUS at 20:05

## 2022-03-28 RX ADMIN — PROPOFOL 180 MG: 10 INJECTION, EMULSION INTRAVENOUS at 15:47

## 2022-03-28 RX ADMIN — FAMOTIDINE 20 MG: 20 TABLET, FILM COATED ORAL at 12:59

## 2022-03-28 RX ADMIN — EPHEDRINE SULFATE 10 MG: 50 INJECTION, SOLUTION INTRAVENOUS at 16:32

## 2022-03-28 RX ADMIN — OXYCODONE HYDROCHLORIDE 10 MG: 10 TABLET, FILM COATED, EXTENDED RELEASE ORAL at 13:00

## 2022-03-28 RX ADMIN — EPHEDRINE SULFATE 5 MG: 50 INJECTION, SOLUTION INTRAVENOUS at 17:11

## 2022-03-28 RX ADMIN — GLYCOPYRROLATE 0.4 MG: 0.2 INJECTION INTRAMUSCULAR; INTRAVENOUS at 17:23

## 2022-03-28 NOTE — ANESTHESIA PREPROCEDURE EVALUATION
Anesthesia Evaluation                  Airway   Mallampati: II  Dental      Pulmonary    Cardiovascular     (+) hypertension, CAD,       Neuro/Psych  GI/Hepatic/Renal/Endo    (+) obesity,   liver disease, thyroid problem     Musculoskeletal     Abdominal    Substance History      OB/GYN          Other                        Anesthesia Plan    ASA 3     general     intravenous induction     Anesthetic plan, all risks, benefits, and alternatives have been provided, discussed and informed consent has been obtained with: patient.        CODE STATUS:

## 2022-03-28 NOTE — ANESTHESIA POSTPROCEDURE EVALUATION
Patient: Larry Dean Klinefelter    Procedure Summary       Date: 03/28/22 Room / Location:  [facility]  [facility]  [facility]    Anesthesia Start: 1543 Anesthesia Stop: 1748    Procedure: LUMBAR LAMINECTOMY DECOMPRESSION L3-5 (N/A Spine Lumbar) Diagnosis:       Lumbar stenosis with neurogenic claudication      (Lumbar stenosis with neurogenic claudication [M48.062])    Surgeons: Conrad Givens MD Provider: César Sibley MD    Anesthesia Type: general ASA Status: 3            Anesthesia Type: general    Vitals  Vitals Value Taken Time   /72 03/28/22 1830   Temp 97.6 °F (36.4 °C) 03/28/22 1830   Pulse 59 03/28/22 1830   Resp 16 03/28/22 1830   SpO2 97 % 03/28/22 1830           Post Anesthesia Care and Evaluation    Patient location during evaluation: PACU  Patient participation: complete - patient participated  Level of consciousness: awake and alert  Pain management: adequate  Airway patency: patent  Anesthetic complications: No anesthetic complications  PONV Status: none  Cardiovascular status: hemodynamically stable and acceptable  Respiratory status: nonlabored ventilation, acceptable and nasal cannula  Hydration status: acceptable

## 2022-03-28 NOTE — ANESTHESIA PROCEDURE NOTES
Airway  Urgency: elective    Date/Time: 3/28/2022 3:50 PM  Airway not difficult    General Information and Staff    Patient location during procedure: OR  CRNA: Will Patel CRNA    Indications and Patient Condition  Indications for airway management: airway protection    Preoxygenated: yes  MILS not maintained throughout  Mask difficulty assessment: 1 - vent by mask    Final Airway Details  Final airway type: endotracheal airway      Successful airway: ETT  Cuffed: yes   Successful intubation technique: video laryngoscopy  Facilitating devices/methods: Bougie  Endotracheal tube insertion site: oral  Blade: Li  Blade size: 4  ETT size (mm): 7.5  Cormack-Lehane Classification: grade III - view of epiglottis only  Placement verified by: chest auscultation and capnometry   Measured from: lips  ETT/EBT  to lips (cm): 23  Number of attempts at approach: 3 or more  Assessment: lips, teeth, and gum same as pre-op and atraumatic intubation    Additional Comments  Unsuccessful attempts with nunes 2, best direct view grade 3, unable to pass bougie, grade 1 indirect view with li, bougie advanced to glottic opening, unable to advance further, ett placed over bougie with some difficulty, no blood noted

## 2022-03-29 LAB — POTASSIUM SERPL-SCNC: 4 MMOL/L (ref 3.5–5.2)

## 2022-03-29 PROCEDURE — 97116 GAIT TRAINING THERAPY: CPT

## 2022-03-29 PROCEDURE — 97162 PT EVAL MOD COMPLEX 30 MIN: CPT

## 2022-03-29 PROCEDURE — 63710000001 LEVOTHYROXINE 25 MCG TABLET: Performed by: NEUROLOGICAL SURGERY

## 2022-03-29 PROCEDURE — 97110 THERAPEUTIC EXERCISES: CPT

## 2022-03-29 PROCEDURE — A9270 NON-COVERED ITEM OR SERVICE: HCPCS | Performed by: NEUROLOGICAL SURGERY

## 2022-03-29 PROCEDURE — 97166 OT EVAL MOD COMPLEX 45 MIN: CPT

## 2022-03-29 PROCEDURE — 63710000001 LOSARTAN 50 MG TABLET: Performed by: NEUROLOGICAL SURGERY

## 2022-03-29 PROCEDURE — 63710000001 LACTOBACILLUS ACIDOPHILUS CAPSULE: Performed by: NEUROLOGICAL SURGERY

## 2022-03-29 PROCEDURE — 63710000001 ACETAMINOPHEN 325 MG TABLET: Performed by: NEUROLOGICAL SURGERY

## 2022-03-29 PROCEDURE — 63710000001 SENNOSIDES-DOCUSATE 8.6-50 MG TABLET: Performed by: NEUROLOGICAL SURGERY

## 2022-03-29 PROCEDURE — 84132 ASSAY OF SERUM POTASSIUM: CPT | Performed by: ANESTHESIOLOGY

## 2022-03-29 PROCEDURE — 63710000001 ASPIRIN 81 MG TABLET DELAYED-RELEASE: Performed by: NEUROLOGICAL SURGERY

## 2022-03-29 PROCEDURE — 63710000001 CETIRIZINE 10 MG TABLET: Performed by: NEUROLOGICAL SURGERY

## 2022-03-29 PROCEDURE — 63710000001 ATORVASTATIN 40 MG TABLET: Performed by: NEUROLOGICAL SURGERY

## 2022-03-29 PROCEDURE — 99024 POSTOP FOLLOW-UP VISIT: CPT | Performed by: NEUROLOGICAL SURGERY

## 2022-03-29 PROCEDURE — 94799 UNLISTED PULMONARY SVC/PX: CPT

## 2022-03-29 PROCEDURE — 97535 SELF CARE MNGMENT TRAINING: CPT

## 2022-03-29 PROCEDURE — 94660 CPAP INITIATION&MGMT: CPT

## 2022-03-29 PROCEDURE — 63710000001 POLYETHYLENE GLYCOL 17 G PACK: Performed by: NEUROLOGICAL SURGERY

## 2022-03-29 PROCEDURE — 63710000001 DOCUSATE SODIUM 100 MG CAPSULE: Performed by: NEUROLOGICAL SURGERY

## 2022-03-29 PROCEDURE — 25010000002 CEFAZOLIN IN DEXTROSE 2-4 GM/100ML-% SOLUTION: Performed by: NEUROLOGICAL SURGERY

## 2022-03-29 PROCEDURE — 63710000001 METOPROLOL SUCCINATE XL 25 MG TABLET SUSTAINED-RELEASE 24 HOUR: Performed by: NEUROLOGICAL SURGERY

## 2022-03-29 RX ORDER — LEVOTHYROXINE SODIUM 0.03 MG/1
25 TABLET ORAL
Status: DISCONTINUED | OUTPATIENT
Start: 2022-03-30 | End: 2022-03-30 | Stop reason: HOSPADM

## 2022-03-29 RX ADMIN — SODIUM CHLORIDE, POTASSIUM CHLORIDE, SODIUM LACTATE AND CALCIUM CHLORIDE 100 ML/HR: 600; 310; 30; 20 INJECTION, SOLUTION INTRAVENOUS at 05:03

## 2022-03-29 RX ADMIN — ATORVASTATIN CALCIUM 40 MG: 40 TABLET, FILM COATED ORAL at 20:22

## 2022-03-29 RX ADMIN — CETIRIZINE HYDROCHLORIDE 10 MG: 10 TABLET, FILM COATED ORAL at 08:54

## 2022-03-29 RX ADMIN — METOPROLOL SUCCINATE 25 MG: 25 TABLET, EXTENDED RELEASE ORAL at 08:54

## 2022-03-29 RX ADMIN — SENNOSIDES AND DOCUSATE SODIUM 2 TABLET: 50; 8.6 TABLET ORAL at 20:21

## 2022-03-29 RX ADMIN — POLYETHYLENE GLYCOL 3350 17 G: 17 POWDER, FOR SOLUTION ORAL at 08:54

## 2022-03-29 RX ADMIN — DOCUSATE SODIUM 200 MG: 100 CAPSULE, LIQUID FILLED ORAL at 08:54

## 2022-03-29 RX ADMIN — Medication 1 CAPSULE: at 08:54

## 2022-03-29 RX ADMIN — Medication 3 ML: at 08:59

## 2022-03-29 RX ADMIN — LOSARTAN POTASSIUM 50 MG: 50 TABLET, FILM COATED ORAL at 08:54

## 2022-03-29 RX ADMIN — CEFAZOLIN SODIUM 2 G: 2 INJECTION, SOLUTION INTRAVENOUS at 08:55

## 2022-03-29 RX ADMIN — Medication 3 ML: at 20:24

## 2022-03-29 RX ADMIN — LEVOTHYROXINE SODIUM 25 MCG: 25 TABLET ORAL at 08:54

## 2022-03-29 RX ADMIN — ACETAMINOPHEN 650 MG: 325 TABLET ORAL at 14:47

## 2022-03-29 RX ADMIN — DOCUSATE SODIUM 200 MG: 100 CAPSULE, LIQUID FILLED ORAL at 20:22

## 2022-03-29 RX ADMIN — ASPIRIN 81 MG: 81 TABLET, COATED ORAL at 08:58

## 2022-03-29 RX ADMIN — POLYETHYLENE GLYCOL 3350 17 G: 17 POWDER, FOR SOLUTION ORAL at 20:22

## 2022-03-30 VITALS
HEART RATE: 72 BPM | RESPIRATION RATE: 16 BRPM | SYSTOLIC BLOOD PRESSURE: 133 MMHG | WEIGHT: 251.32 LBS | DIASTOLIC BLOOD PRESSURE: 66 MMHG | TEMPERATURE: 99.3 F | OXYGEN SATURATION: 95 % | HEIGHT: 70 IN | BODY MASS INDEX: 35.98 KG/M2

## 2022-03-30 PROCEDURE — 63710000001 CETIRIZINE 10 MG TABLET: Performed by: NEUROLOGICAL SURGERY

## 2022-03-30 PROCEDURE — A9270 NON-COVERED ITEM OR SERVICE: HCPCS | Performed by: NEUROLOGICAL SURGERY

## 2022-03-30 PROCEDURE — 63710000001 ACETAMINOPHEN 325 MG TABLET: Performed by: NEUROLOGICAL SURGERY

## 2022-03-30 PROCEDURE — 99024 POSTOP FOLLOW-UP VISIT: CPT | Performed by: NEUROLOGICAL SURGERY

## 2022-03-30 PROCEDURE — 63710000001 METOPROLOL SUCCINATE XL 25 MG TABLET SUSTAINED-RELEASE 24 HOUR: Performed by: NEUROLOGICAL SURGERY

## 2022-03-30 PROCEDURE — 63710000001 LEVOTHYROXINE 25 MCG TABLET: Performed by: NEUROLOGICAL SURGERY

## 2022-03-30 PROCEDURE — 63710000001 LOSARTAN 50 MG TABLET: Performed by: NEUROLOGICAL SURGERY

## 2022-03-30 PROCEDURE — 63710000001 ASPIRIN 81 MG TABLET DELAYED-RELEASE: Performed by: NEUROLOGICAL SURGERY

## 2022-03-30 PROCEDURE — 63710000001 POLYETHYLENE GLYCOL 17 G PACK: Performed by: NEUROLOGICAL SURGERY

## 2022-03-30 PROCEDURE — 63710000001 LACTOBACILLUS ACIDOPHILUS CAPSULE: Performed by: NEUROLOGICAL SURGERY

## 2022-03-30 RX ORDER — BISACODYL 10 MG
10 SUPPOSITORY, RECTAL RECTAL ONCE
Status: DISCONTINUED | OUTPATIENT
Start: 2022-03-30 | End: 2022-03-30 | Stop reason: HOSPADM

## 2022-03-30 RX ORDER — OXYCODONE HYDROCHLORIDE AND ACETAMINOPHEN 5; 325 MG/1; MG/1
1 TABLET ORAL 3 TIMES DAILY PRN
Qty: 15 TABLET | Refills: 0 | Status: SHIPPED | OUTPATIENT
Start: 2022-03-30 | End: 2022-05-25

## 2022-03-30 RX ADMIN — ACETAMINOPHEN 650 MG: 325 TABLET ORAL at 08:17

## 2022-03-30 RX ADMIN — LEVOTHYROXINE SODIUM 25 MCG: 25 TABLET ORAL at 05:48

## 2022-03-30 RX ADMIN — ASPIRIN 81 MG: 81 TABLET, COATED ORAL at 08:16

## 2022-03-30 RX ADMIN — LOSARTAN POTASSIUM 50 MG: 50 TABLET, FILM COATED ORAL at 08:16

## 2022-03-30 RX ADMIN — CETIRIZINE HYDROCHLORIDE 10 MG: 10 TABLET, FILM COATED ORAL at 08:16

## 2022-03-30 RX ADMIN — Medication 1 CAPSULE: at 08:17

## 2022-03-30 RX ADMIN — POLYETHYLENE GLYCOL 3350 17 G: 17 POWDER, FOR SOLUTION ORAL at 08:16

## 2022-03-30 RX ADMIN — METOPROLOL SUCCINATE 25 MG: 25 TABLET, EXTENDED RELEASE ORAL at 08:16

## 2022-03-30 RX ADMIN — Medication 3 ML: at 08:20

## 2022-04-11 ENCOUNTER — OFFICE VISIT (OUTPATIENT)
Dept: CARDIOLOGY | Facility: CLINIC | Age: 72
End: 2022-04-11

## 2022-04-11 VITALS
SYSTOLIC BLOOD PRESSURE: 116 MMHG | DIASTOLIC BLOOD PRESSURE: 62 MMHG | WEIGHT: 252 LBS | OXYGEN SATURATION: 96 % | HEART RATE: 63 BPM | BODY MASS INDEX: 36.08 KG/M2 | HEIGHT: 70 IN

## 2022-04-11 DIAGNOSIS — I10 PRIMARY HYPERTENSION: ICD-10-CM

## 2022-04-11 DIAGNOSIS — I25.10 CORONARY ARTERY DISEASE INVOLVING NATIVE CORONARY ARTERY OF NATIVE HEART WITHOUT ANGINA PECTORIS: Primary | ICD-10-CM

## 2022-04-11 DIAGNOSIS — E78.2 MIXED HYPERLIPIDEMIA: ICD-10-CM

## 2022-04-11 PROCEDURE — 99214 OFFICE O/P EST MOD 30 MIN: CPT | Performed by: INTERNAL MEDICINE

## 2022-04-11 NOTE — PROGRESS NOTES
[facility] at [facility]  Office visit  Larry Dean Klinefelter  1950  [phone]  There is no work phone number on file.    VISIT DATE:  4/11/2022    PCP: Kin Nunez MD  [address] [place], [place] [address]E [address] [place] [address]    CC:  Chief Complaint   Patient presents with   • Coronary artery disease involving native coronary artery of     Follow up       Previous cardiac studies and procedures:  January 2018 cardiac catheterization: 30 to 40% mid LAD.  60% ostial first diagonal, FFR 0.9.    November 2021 TTE  · Left ventricular ejection fraction appears to be 61 - 65%. Left ventricular systolic function is normal.  · Left ventricular diastolic function is consistent with (grade Ia w/high LAP) impaired relaxation.  · Left ventricular wall thickness is consistent with mild concentric hypertrophy.  · Estimated right ventricular systolic pressure from tricuspid regurgitation is normal (<35 mmHg). Calculated right ventricular systolic pressure from tricuspid regurgitation is 27 mmHg.      ASSESSMENT:   Diagnosis Plan   1. Coronary artery disease involving native coronary artery of native heart without angina pectoris     2. Mixed hyperlipidemia     3. Primary hypertension         PLAN:  Coronary artery disease: Currently stable and asymptomatic.  Nonobstructive coronary atherosclerosis based on most recent cardiac catheterization.  Continue aspirin, statin and afterload reduction.    Hyperlipidemia: Goal LDL less than 70.  Continue combination of atorvastatin 40 mg p.o. daily and Zetia 10 mg p.o. daily.    Hypertension: Goal less than 130/80 mmHg.  Labile.  Changing losartan from 100 mg p.o. every morning to 50 mg p.o. twice daily.  otherwise continue current medical therapy.  Continue keep home blood pressure log.  History of angioedema with lisinopril.        Subjective  Denies chest pain, palpitations or dyspnea on exertion.  Using a wheeled walker due to limitations from spinal stenosis.  2 weeks out from  "lumbar laminectomies, has begun to participate in cardiac rehab.  Blood pressures probably running less than 140/90 mmHg but still has blood pressure lability.    PHYSICAL EXAMINATION:  Vitals:    04/11/22 0912   BP: 116/62   BP Location: Left arm   Patient Position: Sitting   Pulse: 63   SpO2: 96%   Weight: 114 kg (252 lb)   Height: 177.8 cm (70\")     General Appearance:    Alert, cooperative, no distress, appears stated age   Head:    Normocephalic, without obvious abnormality, atraumatic   Eyes:    conjunctiva/corneas clear   Nose:   Nares normal, septum midline, mucosa normal, no drainage   Throat:   Lips, teeth and gums normal   Neck:   Supple, symmetrical, trachea midline, no carotid    bruit or JVD   Lungs:     Clear to auscultation bilaterally, respirations unlabored   Chest Wall:    No tenderness or deformity    Heart:    Regular rate and rhythm, S1 and S2 normal, no murmur, rub   or gallop, normal carotid impulse bilaterally without bruit.   Abdomen:     Soft, non-tender   Extremities:   Extremities normal, atraumatic, no cyanosis or edema   Pulses:   2+ and symmetric all extremities   Skin:   Skin color, texture, turgor normal, no rashes or lesions       Diagnostic Data:  Procedures  No results found for: CHLPL, TRIG, HDL, LDLDIRECT  Lab Results   Component Value Date    GLUCOSE 95 03/25/2022    BUN 16 03/25/2022    CREATININE 0.81 03/25/2022     03/25/2022    K 4.0 03/29/2022     03/25/2022    CO2 25.0 03/25/2022     Lab Results   Component Value Date    HGBA1C 5.60 03/25/2022     Lab Results   Component Value Date    WBC 6.90 03/25/2022    HGB 13.0 03/25/2022    HCT 37.4 (L) 03/25/2022     03/25/2022       Allergies  Allergies   Allergen Reactions   • Lisinopril Angioedema   • Naproxen Other (See Comments)     Pt doesn't recall        Current Medications    Current Outpatient Medications:   •  acetaminophen (TYLENOL) 500 MG tablet, Take 1,000 mg by mouth Every 6 (Six) Hours As Needed " for Mild Pain ., Disp: , Rfl:   •  aspirin 81 MG EC tablet, Take 81 mg by mouth Daily., Disp: , Rfl:   •  atorvastatin (LIPITOR) 40 MG tablet, Take 40 mg by mouth Every Night., Disp: , Rfl:   •  Calcium Carb-Cholecalciferol (CALCIUM 600 + D PO), Take 1 tablet by mouth 2 (Two) Times a Day., Disp: , Rfl:   •  cetirizine (zyrTEC) 10 MG tablet, Take 10 mg by mouth Daily., Disp: , Rfl:   •  Coenzyme Q10 (CO Q-10) 100 MG capsule, Take 100 mg by mouth 2 (Two) Times a Day., Disp: , Rfl:   •  docusate sodium (COLACE) 250 MG capsule, Take 250 mg by mouth Daily., Disp: , Rfl:   •  ezetimibe (ZETIA) 10 MG tablet, Take 10 mg by mouth Daily., Disp: , Rfl:   •  ferrous sulfate 324 (65 Fe) MG tablet delayed-release EC tablet, Take 324 mg by mouth Every Night., Disp: , Rfl:   •  Glucosamine-Chondroitin 1779-9127 MG/30ML liquid, Take 1 capsule by mouth 2 (Two) Times a Day., Disp: , Rfl:   •  losartan (Cozaar) 50 MG tablet, Take 1 tablet by mouth Daily. (Patient taking differently: Take 100 mg by mouth Daily. 2 tablets daily), Disp: 90 tablet, Rfl: 1  •  metoprolol succinate XL (TOPROL-XL) 25 MG 24 hr tablet, Take 50 mg by mouth Daily., Disp: , Rfl:   •  Misc Natural Products (Airborne Elderberry) chewable tablet, Chew 1 capsule Daily. 2 gummies, Disp: , Rfl:   •  Multiple Vitamins-Minerals (VITEYES AREDS FORMULA PO), Take 1 capsule by mouth 2 (Two) Times a Day., Disp: , Rfl:   •  nitroglycerin (NITROSTAT) 0.4 MG SL tablet, Place 0.4 mg under the tongue As Needed for Chest Pain. Take no more than 3 doses in 15 minutes., Disp: , Rfl:   •  NON FORMULARY, Take 1 tablet by mouth 2 (Two) Times a Day. Visual advantages  macular support, Disp: , Rfl:   •  Polyethylene Glycol 3350 (MIRALAX PO), Take 1 dose by mouth 2 (Two) Times a Day. 1 tsp, Disp: , Rfl:   •  SYNTHROID 25 MCG tablet, Take 25 mcg by mouth Daily., Disp: , Rfl:   •  oxyCODONE-acetaminophen (PERCOCET) 5-325 MG per tablet, Take 1 tablet by mouth 3 (Three) Times a Day As Needed  (Pain)., Disp: 15 tablet, Rfl: 0          ROS  ROS      SOCIAL HX  Social History     Socioeconomic History   • Marital status:    Tobacco Use   • Smoking status: Never Smoker   • Smokeless tobacco: Never Used   Vaping Use   • Vaping Use: Never used   Substance and Sexual Activity   • Alcohol use: Never   • Drug use: Never   • Sexual activity: Defer       FAMILY HX  Family History   Problem Relation Age of Onset   • Rheum arthritis Mother    • Dementia Mother    • Parkinsonism Mother    • Stroke Father    • Heart attack Father    • Hypertension Father    • Clotting disorder Father    • Rheum arthritis Father    • Hypertension Other              Jermain Alfaro III, MD, FACC

## 2022-04-19 ENCOUNTER — OFFICE VISIT (OUTPATIENT)
Dept: NEUROSURGERY | Facility: CLINIC | Age: 72
End: 2022-04-19

## 2022-04-19 VITALS
TEMPERATURE: 98.1 F | DIASTOLIC BLOOD PRESSURE: 72 MMHG | SYSTOLIC BLOOD PRESSURE: 126 MMHG | BODY MASS INDEX: 36.33 KG/M2 | WEIGHT: 253.8 LBS | HEIGHT: 70 IN

## 2022-04-19 DIAGNOSIS — Z98.890 S/P LUMBAR LAMINECTOMY: ICD-10-CM

## 2022-04-19 DIAGNOSIS — M48.062 LUMBAR STENOSIS WITH NEUROGENIC CLAUDICATION: Primary | ICD-10-CM

## 2022-04-19 PROCEDURE — 99024 POSTOP FOLLOW-UP VISIT: CPT | Performed by: PHYSICIAN ASSISTANT

## 2022-04-19 NOTE — PROGRESS NOTES
Subjective     Chief Complaint: back pain with walking and standing intolerance    Patient ID: Larry Dean Klinefelter is a 71 y.o. male is here today for follow-up.    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  He has back pain with bilateral leg pain right greater than left.  This is especially bad at night when lying flat or with walking and standing.  Symptoms were relieved somewhat by sitting down or flexing forward at the waist while walking   lumbar MRI study dated 12/30/2020 showed mild stenosis at L2-3 and high-grade stenosis at L3-4 and L4-5.  There may be significant disc protrusion at L4-5.  After failing conservative treatment he underwent L3-5 laminectomy on 3/28/2022.  Inspection of the disc at L4-5 showed a bony protuberance which was flattened, but no disc herniation.  The disc space was not entered.  Patient was discharged to PACU and then the surgical floor in very good condition.  He did well overnight and his YADIRA drain was removed on postoperative day 2 and he was discharged to home.  He has been doing outpatient physical therapy and reports that his preoperative leg claudication symptoms are completely relieved.  He has had minimal back pain and is no longer taking any kind of pain medication.  He has questions today about when he can return to swimming at the [employer] as well as driving and yard work  He complains that his incision has been itching, but denies any drainage, fevers, swelling or other concerns    The following portions of the patient's history were reviewed and updated as appropriate: allergies, current medications, past family history, past medical history, past social history, past surgical history and problem list.    Family history:   Family History   Problem Relation Age of Onset   • Rheum arthritis Mother    • Dementia Mother    • Parkinsonism  Mother    • Stroke Father    • Heart attack Father    • Hypertension Father    • Clotting disorder Father    • Rheum arthritis Father    • Hypertension Other        Social history:   Social History     Socioeconomic History   • Marital status:    Tobacco Use   • Smoking status: Never Smoker   • Smokeless tobacco: Never Used   Vaping Use   • Vaping Use: Never used   Substance and Sexual Activity   • Alcohol use: Never   • Drug use: Never   • Sexual activity: Defer       Review of Systems   Constitutional: Negative for activity change, appetite change, chills, diaphoresis, fatigue, fever and unexpected weight change.   HENT: Negative for congestion, dental problem, drooling, ear discharge, ear pain, facial swelling, hearing loss, mouth sores, nosebleeds, postnasal drip, rhinorrhea, sinus pressure, sinus pain, sneezing, sore throat, tinnitus, trouble swallowing and voice change.    Eyes: Negative for photophobia, pain, discharge, redness, itching and visual disturbance.   Respiratory: Negative for apnea, cough, choking, chest tightness, shortness of breath, wheezing and stridor.    Cardiovascular: Negative for chest pain, palpitations and leg swelling.   Gastrointestinal: Negative for abdominal distention, abdominal pain, anal bleeding, blood in stool, constipation, diarrhea, nausea, rectal pain and vomiting.   Endocrine: Negative for cold intolerance, heat intolerance, polydipsia, polyphagia and polyuria.   Genitourinary: Negative for decreased urine volume, difficulty urinating, dysuria, enuresis, flank pain, frequency, genital sores, hematuria, penile discharge, penile pain, penile swelling, scrotal swelling, testicular pain and urgency.   Musculoskeletal: Negative for arthralgias, back pain, gait problem, joint swelling, myalgias, neck pain and neck stiffness.   Skin: Negative for color change, pallor, rash and wound.   Allergic/Immunologic: Negative for environmental allergies, food allergies and  "immunocompromised state.   Neurological: Negative for dizziness, tremors, seizures, syncope, facial asymmetry, speech difficulty, weakness, light-headedness, numbness and headaches.   Hematological: Negative for adenopathy. Does not bruise/bleed easily.   Psychiatric/Behavioral: Negative for agitation, behavioral problems, confusion, decreased concentration, dysphoric mood, hallucinations, self-injury, sleep disturbance and suicidal ideas. The patient is not nervous/anxious and is not hyperactive.        Objective   Blood pressure 126/72, temperature 98.1 °F (36.7 °C), temperature source Infrared, height 177.8 cm (70\"), weight 115 kg (253 lb 12.8 oz).  Body mass index is 36.42 kg/m².    Physical Exam  Constitutional:       Appearance: Normal appearance. He is obese.   Eyes:      Pupils: Pupils are equal, round, and reactive to light.   Pulmonary:      Effort: Pulmonary effort is normal.   Musculoskeletal:      Comments: Gait steady and independent.  He does still use a walker as he has low stamina and sits frequently, however he feels that he is doing better and using it less often at home.  He lower extremity strength is 4-5 out of 5 and equal bilaterally.   Skin:     Comments: Lumbar incision is clean, dry and intact.  There is a small area of eschar just below the middle of the incision line   Neurological:      General: No focal deficit present.      Mental Status: He is alert and oriented to person, place, and time.         Assessment/Plan   Mr. Klinefelter is doing very well.  He may resume swimming in about a week and a half, around May 1.  His incision is healing quite nicely.  He had questions about yard work.  He is limited to lifting 15 pounds or less at this point.  He may use a propelled push mower if he is not lifting and emptying the grass clippings.  He will continue to work with physical therapy to slowly increase his activity.  He will follow-up with Dr. Givens in approximately 6 weeks for what " will hopefully be a final visit. He will contact our office if he has any questions or concerns in the interim.     Diagnoses and all orders for this visit:    1. Lumbar stenosis with neurogenic claudication (Primary)    2. S/P lumbar laminectomy        Return in about 6 weeks (around 5/31/2022).    Leda Ramirez PA-C

## 2022-05-25 ENCOUNTER — OFFICE VISIT (OUTPATIENT)
Dept: NEUROSURGERY | Facility: CLINIC | Age: 72
End: 2022-05-25

## 2022-05-25 VITALS — WEIGHT: 252.6 LBS | HEIGHT: 70 IN | TEMPERATURE: 97.5 F | BODY MASS INDEX: 36.16 KG/M2

## 2022-05-25 DIAGNOSIS — M48.062 SPINAL STENOSIS OF LUMBAR REGION WITH NEUROGENIC CLAUDICATION: ICD-10-CM

## 2022-05-25 DIAGNOSIS — Z98.890 S/P LUMBAR LAMINECTOMY: Primary | ICD-10-CM

## 2022-05-25 PROCEDURE — 99024 POSTOP FOLLOW-UP VISIT: CPT | Performed by: NEUROLOGICAL SURGERY

## 2022-05-25 NOTE — PROGRESS NOTES
Patient: Larry Dean Klinefelter  : 1950    Primary Care Provider: Kin Nunez MD    Requesting Provider: As above        History    Chief Complaint: Low back pain with walking and standing intolerance.    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  He had back and bilateral leg pain, right greater than left.  Ultimately on 3/20/2022 underwent decompressive laminectomies at L3-4 and L4-5.  He remains deconditioned but he has no pain at all.  He is trying to increase his walking.  He has done physical therapy.    Review of Systems   Constitutional: Positive for fatigue. Negative for activity change, appetite change, chills, diaphoresis, fever and unexpected weight change.   HENT: Negative for congestion, dental problem, drooling, ear discharge, ear pain, facial swelling, hearing loss, mouth sores, nosebleeds, postnasal drip, rhinorrhea, sinus pressure, sinus pain, sneezing, sore throat, tinnitus, trouble swallowing and voice change.    Eyes: Negative for photophobia, pain, discharge, redness, itching and visual disturbance.   Respiratory: Negative for apnea, cough, choking, chest tightness, shortness of breath, wheezing and stridor.    Cardiovascular: Negative for chest pain, palpitations and leg swelling.   Gastrointestinal: Negative for abdominal distention, abdominal pain, anal bleeding, blood in stool, constipation, diarrhea, nausea, rectal pain and vomiting.   Endocrine: Negative for cold intolerance, heat intolerance, polydipsia, polyphagia and polyuria.   Genitourinary: Negative for decreased urine volume, difficulty urinating, dysuria, enuresis, flank pain, frequency, genital sores, hematuria and urgency.   Musculoskeletal: Negative for arthralgias, back pain, gait problem, joint swelling, myalgias, neck pain and neck stiffness.   Skin: Negative for color change, pallor, rash and wound.   Allergic/Immunologic: Negative for  "environmental allergies, food allergies and immunocompromised state.   Neurological: Negative for dizziness, tremors, seizures, syncope, facial asymmetry, speech difficulty, weakness, light-headedness, numbness and headaches.   Hematological: Negative for adenopathy. Does not bruise/bleed easily.   Psychiatric/Behavioral: Negative for agitation, behavioral problems, confusion, decreased concentration, dysphoric mood, hallucinations, self-injury, sleep disturbance and suicidal ideas. The patient is not nervous/anxious and is not hyperactive.    All other systems reviewed and are negative.      The patient's past medical history, past surgical history, family history, and social history have been reviewed at length in the electronic medical record.      Physical Exam:   Temp 97.5 °F (36.4 °C)   Ht 177.8 cm (70\")   Wt 115 kg (252 lb 9.6 oz)   BMI 36.24 kg/m²   Lumbar incision looks great.  His gait remains somewhat stooped.    Medical Decision Making    Diagnosis:   Lumbar stenosis with neurogenic claudication status post decompression.    Treatment Options:   The patient is doing well.  He is going to increase his activity.  If he would like to pursue more therapy then he will contact my office.  Otherwise at this juncture we will see him on an as-needed basis.       Diagnosis Plan   1. S/P lumbar laminectomy     2. Spinal stenosis of lumbar region with neurogenic claudication         Scribed for Conrad Givens MD by LUCAS Valdez 5/25/2022 09:31 EDT      I, Dr. Givens, personally performed the services described in the documentation, as scribed in my presence, and it is both accurate and complete.  "

## 2022-08-02 NOTE — PAT
"Attempted several times to get a statement from Dr Newman office and unable to obtain a definite answer only the statement \"minimize time off aspirin.\"    Notified Sabrina Self at office that unable to acquire statement regarding aspirin.  Sabrina stated the patient had to be cancelled due to an URI and we can figure it out when he reschedules.    Chart sent to medical records for scanning.  " Conveyed results.  ----- Message from Tristan Wei MD sent at 8/2/2022  7:40 AM CDT -----  Inform patient that PSA has gone up further.  Refer to Urology.    Urology order prompted.  Patient is called to convey results per Tristan Wei MD  . Patient is unavailable and asked to return call to clinic.    Patient states an understanding with no further questions.  Referral sent.

## 2022-10-17 ENCOUNTER — OFFICE VISIT (OUTPATIENT)
Dept: CARDIOLOGY | Facility: CLINIC | Age: 72
End: 2022-10-17

## 2022-10-17 VITALS
WEIGHT: 252 LBS | HEIGHT: 70 IN | OXYGEN SATURATION: 96 % | BODY MASS INDEX: 36.08 KG/M2 | SYSTOLIC BLOOD PRESSURE: 102 MMHG | DIASTOLIC BLOOD PRESSURE: 64 MMHG | HEART RATE: 71 BPM

## 2022-10-17 DIAGNOSIS — E78.2 MIXED HYPERLIPIDEMIA: ICD-10-CM

## 2022-10-17 DIAGNOSIS — I25.10 CORONARY ARTERY DISEASE INVOLVING NATIVE CORONARY ARTERY OF NATIVE HEART WITHOUT ANGINA PECTORIS: Primary | ICD-10-CM

## 2022-10-17 DIAGNOSIS — I10 PRIMARY HYPERTENSION: ICD-10-CM

## 2022-10-17 DIAGNOSIS — R06.09 DYSPNEA ON EXERTION: ICD-10-CM

## 2022-10-17 PROCEDURE — 99214 OFFICE O/P EST MOD 30 MIN: CPT | Performed by: INTERNAL MEDICINE

## 2022-10-17 RX ORDER — RANOLAZINE 500 MG/1
500 TABLET, EXTENDED RELEASE ORAL DAILY
COMMUNITY

## 2022-10-17 NOTE — PROGRESS NOTES
[facility] at [facility]  Office visit  Larry Dean Klinefelter  1950  [phone]  There is no work phone number on file.    VISIT DATE:  10/17/2022    PCP: Kin Nunez MD  [address] [place], [place] [address]E [address] [place] [address]    CC:  Chief Complaint   Patient presents with   • Coronary Artery Disease       Previous cardiac studies and procedures:  January 2018 cardiac catheterization: 30 to 40% mid LAD.  60% ostial first diagonal, FFR 0.9.    November 2021 TTE  · Left ventricular ejection fraction appears to be 61 - 65%. Left ventricular systolic function is normal.  · Left ventricular diastolic function is consistent with (grade Ia w/high LAP) impaired relaxation.  · Left ventricular wall thickness is consistent with mild concentric hypertrophy.  · Estimated right ventricular systolic pressure from tricuspid regurgitation is normal (<35 mmHg). Calculated right ventricular systolic pressure from tricuspid regurgitation is 27 mmHg.      ASSESSMENT:   Diagnosis Plan   1. Coronary artery disease involving native coronary artery of native heart without angina pectoris        2. Mixed hyperlipidemia        3. Primary hypertension            PLAN:  Coronary artery disease: New limiting symptoms potentially concerning for anginal equivalent.  Myocardial perfusion imaging pending for ischemia evaluation.  Continue aspirin, statin and afterload reduction.    Hyperlipidemia: Goal LDL less than 70.  Continue combination of atorvastatin 40 mg p.o. daily and Zetia 10 mg p.o. daily.    Hypertension: Goal less than 130/80 mmHg.  Overall reasonable control.  History of blood pressure lability.  Continue current medical therapy.  Continue keep home blood pressure log.  History of angioedema with lisinopril.        Subjective  Denies chest pain, palpitations.  He is reporting new onset dyspnea on exertion after walking 300 feet over the previous 4 to 6 months.  Otherwise functional status has improved after laminectomies for  "underlying spinal stenosis.  Blood pressures probably running less than 140/90 mmHg but still has blood pressure lability.    PHYSICAL EXAMINATION:  Vitals:    10/17/22 1347   BP: 102/64   BP Location: Right arm   Patient Position: Sitting   Pulse: 71   SpO2: 96%   Weight: 114 kg (252 lb)   Height: 177.8 cm (70\")     General Appearance:    Alert, cooperative, no distress, appears stated age   Head:    Normocephalic, without obvious abnormality, atraumatic   Eyes:    conjunctiva/corneas clear   Nose:   Nares normal, septum midline, mucosa normal, no drainage   Throat:   Lips, teeth and gums normal   Neck:   Supple, symmetrical, trachea midline, no carotid    bruit or JVD   Lungs:     Clear to auscultation bilaterally, respirations unlabored   Chest Wall:    No tenderness or deformity    Heart:    Regular rate and rhythm, S1 and S2 normal, no murmur, rub   or gallop, normal carotid impulse bilaterally without bruit.   Abdomen:     Soft, non-tender   Extremities:   Extremities normal, atraumatic, no cyanosis or edema   Pulses:   2+ and symmetric all extremities   Skin:   Skin color, texture, turgor normal, no rashes or lesions       Diagnostic Data:  Procedures  No results found for: CHLPL, TRIG, HDL, LDLDIRECT  Lab Results   Component Value Date    GLUCOSE 95 03/25/2022    BUN 16 03/25/2022    CREATININE 0.81 03/25/2022     03/25/2022    K 4.0 03/29/2022     03/25/2022    CO2 25.0 03/25/2022     Lab Results   Component Value Date    HGBA1C 5.60 03/25/2022     Lab Results   Component Value Date    WBC 6.90 03/25/2022    HGB 13.0 03/25/2022    HCT 37.4 (L) 03/25/2022     03/25/2022       Allergies  Allergies   Allergen Reactions   • Lisinopril Angioedema   • Naproxen Other (See Comments)     Pt doesn't recall        Current Medications    Current Outpatient Medications:   •  acetaminophen (TYLENOL) 500 MG tablet, Take 1,000 mg by mouth Every 6 (Six) Hours As Needed for Mild Pain ., Disp: , Rfl:   •  " aspirin 81 MG EC tablet, Take 81 mg by mouth Daily., Disp: , Rfl:   •  atorvastatin (LIPITOR) 40 MG tablet, Take 40 mg by mouth Every Night., Disp: , Rfl:   •  Calcium Carb-Cholecalciferol (CALCIUM 600 + D PO), Take 1 tablet by mouth 2 (Two) Times a Day., Disp: , Rfl:   •  cetirizine (zyrTEC) 10 MG tablet, Take 10 mg by mouth Daily., Disp: , Rfl:   •  Coenzyme Q10 (CO Q-10) 100 MG capsule, Take 100 mg by mouth 2 (Two) Times a Day., Disp: , Rfl:   •  docusate sodium (COLACE) 250 MG capsule, Take 250 mg by mouth Daily., Disp: , Rfl:   •  ezetimibe (ZETIA) 10 MG tablet, Take 10 mg by mouth Daily., Disp: , Rfl:   •  ferrous sulfate 324 (65 Fe) MG tablet delayed-release EC tablet, Take 324 mg by mouth Every Night., Disp: , Rfl:   •  Glucosamine-Chondroitin 8340-4794 MG/30ML liquid, Take 1 capsule by mouth 2 (Two) Times a Day., Disp: , Rfl:   •  losartan (Cozaar) 50 MG tablet, Take 1 tablet by mouth Daily. (Patient taking differently: Take 1 tablet by mouth.), Disp: 90 tablet, Rfl: 1  •  metoprolol succinate XL (TOPROL-XL) 25 MG 24 hr tablet, Take 50 mg by mouth Daily., Disp: , Rfl:   •  Misc Natural Products (Airborne Elderberry) chewable tablet, Chew 1 capsule Daily. 2 gummies, Disp: , Rfl:   •  Multiple Vitamins-Minerals (VITEYES AREDS FORMULA PO), Take 1 capsule by mouth 2 (Two) Times a Day., Disp: , Rfl:   •  nitroglycerin (NITROSTAT) 0.4 MG SL tablet, Place 0.4 mg under the tongue As Needed for Chest Pain. Take no more than 3 doses in 15 minutes., Disp: , Rfl:   •  NON FORMULARY, Take 1 tablet by mouth 2 (Two) Times a Day. Visual advantages  macular support, Disp: , Rfl:   •  Polyethylene Glycol 3350 (MIRALAX PO), Take 1 dose by mouth 2 (Two) Times a Day. 1 tsp, Disp: , Rfl:   •  ranolazine (RANEXA) 500 MG 12 hr tablet, Take 1 tablet by mouth Daily., Disp: , Rfl:   •  SYNTHROID 25 MCG tablet, Take 25 mcg by mouth Daily., Disp: , Rfl:           ROS  ROS      SOCIAL HX  Social History     Socioeconomic History   •  Marital status:    Tobacco Use   • Smoking status: Never   • Smokeless tobacco: Never   Vaping Use   • Vaping Use: Never used   Substance and Sexual Activity   • Alcohol use: Never   • Drug use: Never   • Sexual activity: Defer       FAMILY HX  Family History   Problem Relation Age of Onset   • Rheum arthritis Mother    • Dementia Mother    • Parkinsonism Mother    • Stroke Father    • Heart attack Father    • Hypertension Father    • Clotting disorder Father    • Rheum arthritis Father    • Hypertension Other              Jermain Alfaro III, MD, FACC

## 2022-11-22 ENCOUNTER — APPOINTMENT (OUTPATIENT)
Dept: CARDIOLOGY | Facility: HOSPITAL | Age: 72
End: 2022-11-22

## 2022-12-08 ENCOUNTER — HOSPITAL ENCOUNTER (OUTPATIENT)
Dept: CARDIOLOGY | Facility: HOSPITAL | Age: 72
Discharge: HOME | End: 2022-12-08
Admitting: INTERNAL MEDICINE

## 2022-12-08 VITALS
DIASTOLIC BLOOD PRESSURE: 100 MMHG | WEIGHT: 252 LBS | BODY MASS INDEX: 36.08 KG/M2 | HEART RATE: 66 BPM | HEIGHT: 70 IN | SYSTOLIC BLOOD PRESSURE: 180 MMHG

## 2022-12-08 DIAGNOSIS — R06.09 DYSPNEA ON EXERTION: ICD-10-CM

## 2022-12-08 PROCEDURE — 0 TECHNETIUM SESTAMIBI: Performed by: INTERNAL MEDICINE

## 2022-12-08 PROCEDURE — 93017 CV STRESS TEST TRACING ONLY: CPT

## 2022-12-08 PROCEDURE — 78452 HT MUSCLE IMAGE SPECT MULT: CPT

## 2022-12-08 PROCEDURE — 78452 HT MUSCLE IMAGE SPECT MULT: CPT | Performed by: INTERNAL MEDICINE

## 2022-12-08 PROCEDURE — 93018 CV STRESS TEST I&R ONLY: CPT | Performed by: INTERNAL MEDICINE

## 2022-12-08 PROCEDURE — A9500 TC99M SESTAMIBI: HCPCS | Performed by: INTERNAL MEDICINE

## 2022-12-08 RX ADMIN — TECHNETIUM TC 99M SESTAMIBI 1 DOSE: 1 INJECTION INTRAVENOUS at 08:30

## 2022-12-08 RX ADMIN — TECHNETIUM TC 99M SESTAMIBI 1 DOSE: 1 INJECTION INTRAVENOUS at 10:25

## 2022-12-09 ENCOUNTER — TELEPHONE (OUTPATIENT)
Dept: CARDIOLOGY | Facility: CLINIC | Age: 72
End: 2022-12-09

## 2022-12-09 LAB
BH CV REST NUCLEAR ISOTOPE DOSE: 9.5 MCI
BH CV STRESS BP STAGE 1: NORMAL
BH CV STRESS DURATION MIN STAGE 1: 4
BH CV STRESS DURATION SEC STAGE 1: 0
BH CV STRESS GRADE STAGE 1: 10
BH CV STRESS HR STAGE 1: 133
BH CV STRESS METS STAGE 1: 5
BH CV STRESS NUCLEAR ISOTOPE DOSE: 30.1 MCI
BH CV STRESS O2 STAGE 1: 100
BH CV STRESS PROTOCOL 1: NORMAL
BH CV STRESS RECOVERY BP: NORMAL MMHG
BH CV STRESS RECOVERY HR: 75 BPM
BH CV STRESS RECOVERY O2: 99 %
BH CV STRESS SPEED STAGE 1: 1.7
BH CV STRESS STAGE 1: 1
LV EF NUC BP: 82 %
MAXIMAL PREDICTED HEART RATE: 146 BPM
PERCENT MAX PREDICTED HR: 91.1 %
STRESS BASELINE BP: NORMAL MMHG
STRESS BASELINE HR: 66 BPM
STRESS O2 SAT REST: 96 %
STRESS PERCENT HR: 107 %
STRESS POST ESTIMATED WORKLOAD: 4.6 METS
STRESS POST EXERCISE DUR MIN: 4 MIN
STRESS POST EXERCISE DUR SEC: 0 SEC
STRESS POST O2 SAT PEAK: 100 %
STRESS POST PEAK BP: NORMAL MMHG
STRESS POST PEAK HR: 133 BPM
STRESS TARGET HR: 124 BPM

## 2022-12-09 NOTE — TELEPHONE ENCOUNTER
----- Message from Jermain Alfaro III, MD sent at 12/9/2022  2:52 PM EST -----  Normal heart function and no significant abnormalities noted on your stress test.

## 2023-04-28 ENCOUNTER — OFFICE VISIT (OUTPATIENT)
Dept: CARDIOLOGY | Facility: CLINIC | Age: 73
End: 2023-04-28
Payer: MEDICARE

## 2023-04-28 VITALS
HEIGHT: 70 IN | SYSTOLIC BLOOD PRESSURE: 110 MMHG | DIASTOLIC BLOOD PRESSURE: 70 MMHG | OXYGEN SATURATION: 93 % | HEART RATE: 67 BPM | WEIGHT: 252 LBS | BODY MASS INDEX: 36.08 KG/M2

## 2023-04-28 DIAGNOSIS — G47.33 OSA ON CPAP: ICD-10-CM

## 2023-04-28 DIAGNOSIS — R60.0 BILATERAL LEG EDEMA: ICD-10-CM

## 2023-04-28 DIAGNOSIS — I10 ESSENTIAL HYPERTENSION: ICD-10-CM

## 2023-04-28 DIAGNOSIS — Z99.89 OSA ON CPAP: ICD-10-CM

## 2023-04-28 RX ORDER — PREDNISOLONE ACETATE 10 MG/ML
SUSPENSION/ DROPS OPHTHALMIC
COMMUNITY
Start: 2023-04-24

## 2023-04-28 RX ORDER — POTASSIUM CHLORIDE 750 MG/1
10 TABLET, EXTENDED RELEASE ORAL 2 TIMES DAILY
Qty: 90 TABLET | Refills: 1 | Status: SHIPPED | OUTPATIENT
Start: 2023-04-28 | End: 2023-05-08

## 2023-04-28 RX ORDER — METOPROLOL SUCCINATE 50 MG/1
1 TABLET, EXTENDED RELEASE ORAL DAILY
COMMUNITY
Start: 2023-04-10 | End: 2023-04-28 | Stop reason: DRUGHIGH

## 2023-04-28 RX ORDER — HYDROCHLOROTHIAZIDE 25 MG/1
25 TABLET ORAL DAILY
Qty: 90 TABLET | Refills: 1 | Status: SHIPPED | OUTPATIENT
Start: 2023-04-28 | End: 2023-05-08

## 2023-04-28 NOTE — PATIENT INSTRUCTIONS
Metoprolol 50mg, take HALF one daily   Start Hydrochlorthiazide 25mg daily  Potassium 10 MEQ daily  Get labs 3 months

## 2023-05-02 NOTE — PROGRESS NOTES
[facility] Provider Note     Date:   2023   Name: Larry Dean Klinefelter  :   1950  PCP: Kin Nunez MD    Chief Complaint   Patient presents with   • Sleep Apnea       Subjective     History of Present Illness  Larry Dean Klinefelter is a 72 y.o. male who presents today for TRACEY and chest pain.  He reports his mask, airflow, machine are working well.  He has not had any chest pain since starting Ranexa.  He does continue to have some shortness of air with exertion such as mowing his yard but this is not new.    He has 1-2+ pitting bilateral lower extremity edema today.  He is interested in starting a diuretic for this.  His blood pressure is only 110/70 today.  So we will half his metoprolol and start hydrochlorothiazide 25 mg and potassium 10 mEq daily.  Then check labs in 3 months.  I did not realize he was now being followed by Dr. Alfaro for cardiology.  I will forward my note in case Dr. Alfaro does not agree with my changes.    Cardiac and sleep related problem list  Now follows with Dr. Alfaro for cardiology, sleep only    Allergies   Allergen Reactions   • Lisinopril Angioedema   • Naproxen Other (See Comments)     Pt doesn't recall        Current Outpatient Medications:   •  acetaminophen (TYLENOL) 500 MG tablet, Take 2 tablets by mouth Every 6 (Six) Hours As Needed for Mild Pain., Disp: , Rfl:   •  aspirin 81 MG EC tablet, Take 1 tablet by mouth Daily., Disp: , Rfl:   •  atorvastatin (LIPITOR) 40 MG tablet, Take 1 tablet by mouth Every Night., Disp: , Rfl:   •  Calcium Carb-Cholecalciferol (CALCIUM 600 + D PO), Take 1 tablet by mouth 2 (Two) Times a Day., Disp: , Rfl:   •  cetirizine (zyrTEC) 10 MG tablet, Take 1 tablet by mouth Daily., Disp: , Rfl:   •  Coenzyme Q10 (CO Q-10) 100 MG capsule, Take 100 mg by mouth 2 (Two) Times a Day., Disp: , Rfl:   •  docusate sodium (COLACE) 250 MG capsule, Take 1 capsule by mouth Daily., Disp: , Rfl:   •  ezetimibe (ZETIA)  10 MG tablet, Take 1 tablet by mouth Daily., Disp: , Rfl:   •  ferrous sulfate 324 (65 Fe) MG tablet delayed-release EC tablet, Take 1 tablet by mouth Every Night., Disp: , Rfl:   •  Glucosamine-Chondroitin 2218-4358 MG/30ML liquid, Take 1 capsule by mouth 2 (Two) Times a Day., Disp: , Rfl:   •  losartan (Cozaar) 50 MG tablet, Take 1 tablet by mouth Daily. (Patient taking differently: Take 1 tablet by mouth 2 (Two) Times a Day.), Disp: 90 tablet, Rfl: 1  •  Misc Natural Products (Airborne Elderberry) chewable tablet, Chew 1 capsule Daily. 2 gummies, Disp: , Rfl:   •  Multiple Vitamins-Minerals (VITEYES AREDS FORMULA PO), Take 1 capsule by mouth 2 (Two) Times a Day., Disp: , Rfl:   •  nitroglycerin (NITROSTAT) 0.4 MG SL tablet, Place 1 tablet under the tongue As Needed for Chest Pain. Take no more than 3 doses in 15 minutes., Disp: , Rfl:   •  Polyethylene Glycol 3350 (MIRALAX PO), Take 1 dose by mouth 2 (Two) Times a Day. 1 tsp, Disp: , Rfl:   •  prednisoLONE acetate (PRED FORTE) 1 % ophthalmic suspension, INSTILL 1 DROP INTO LEFT EYE 4 TIMES DAILY, Disp: , Rfl:   •  ranolazine (RANEXA) 500 MG 12 hr tablet, Take 1 tablet by mouth Daily., Disp: , Rfl:   •  SYNTHROID 25 MCG tablet, Take 1 tablet by mouth Daily., Disp: , Rfl:   •  hydroCHLOROthiazide (HYDRODIURIL) 25 MG tablet, Take 1 tablet by mouth Daily., Disp: 90 tablet, Rfl: 1  •  Metoprolol Succinate 25 MG capsule extended-release 24 hour sprinkle, Take 25 mg by mouth Daily., Disp: 90 capsule, Rfl: 1  •  potassium chloride (K-DUR,KLOR-CON) 10 MEQ CR tablet, Take 1 tablet by mouth 2 (Two) Times a Day., Disp: 90 tablet, Rfl: 1    Past Medical History:   Diagnosis Date   • Anemia    • Bowel obstruction    • Cataract     mild- still forming   • Chest pain    • Constipation    • Coronary artery disease    • Disease of thyroid gland    • Gallstone    • GERD (gastroesophageal reflux disease)    • Hepatitis    • Hyperlipidemia    • Hypertension    • Low back pain    •  "Lumbar spinal stenosis    • Macular degeneration    • Medial meniscus tear    • Obesity    • Obstructive sleep apnea on CPAP     12-18   • Pneumonia    • Primary osteoarthritis of left knee     and right knee   • Sepsis due to pneumonia 11/01/2020   • SOBOE (shortness of breath on exertion)    • Wears glasses       Past Surgical History:   Procedure Laterality Date   • CARDIAC CATHETERIZATION  01/2019   • COLONOSCOPY  2013   • EXPLORATORY LAPAROTOMY N/A 10/21/2021    Procedure: LAPAROTOMY EXPLORATORY, EXPLANT OF ABDOMINAL MESH AND LYSIS OF ADHESIONS;  Surgeon: Quan Alberto MD;  Location:  [email or web address] OR;  Service: General;  Laterality: N/A;   • FRACTURE SURGERY      left heel - no hardware   • INGUINAL HERNIA REPAIR      x2    • KNEE ARTHROSCOPY Right    • LUMBAR LAMINECTOMY DISCECTOMY DECOMPRESSION N/A 3/28/2022    Procedure: LUMBAR LAMINECTOMY DECOMPRESSION L3-5;  Surgeon: Conrad Givens MD;  Location:  [email or web address];  Service: Neurosurgery;  Laterality: N/A;   • REPLACEMENT TOTAL KNEE Right    • TONSILLECTOMY     • TOTAL KNEE ARTHROPLASTY Left 05/16/2019    Procedure: TOTAL KNEE ARTHROPLASTY LEFT;  Surgeon: Willian Jc MD;  Location:  [email or web address];  Service: Orthopedics     Family History   Problem Relation Age of Onset   • Rheum arthritis Mother    • Dementia Mother    • Parkinsonism Mother    • Stroke Father    • Heart attack Father    • Hypertension Father    • Clotting disorder Father    • Rheum arthritis Father    • Hypertension Other      Social History     Socioeconomic History   • Marital status:    Tobacco Use   • Smoking status: Never   • Smokeless tobacco: Never   Vaping Use   • Vaping Use: Never used   Substance and Sexual Activity   • Alcohol use: Never   • Drug use: Never   • Sexual activity: Defer       Objective     Vital Signs:  /70 (BP Location: Left arm, Patient Position: Sitting)   Pulse 67   Ht 177.8 cm (70\")   Wt 114 kg (252 lb)   SpO2 93%   BMI 36.16 kg/m²   Estimated " "body mass index is 36.16 kg/m² as calculated from the following:    Height as of this encounter: 177.8 cm (70\").    Weight as of this encounter: 114 kg (252 lb).         Physical Exam  Vitals reviewed.   Constitutional:       Appearance: Normal appearance.   Eyes:      Pupils: Pupils are equal, round, and reactive to light.   Neck:      Vascular: No carotid bruit.   Cardiovascular:      Rate and Rhythm: Normal rate and regular rhythm.      Pulses: Normal pulses.      Heart sounds: Normal heart sounds.   Pulmonary:      Effort: Pulmonary effort is normal.      Breath sounds: Normal breath sounds.   Musculoskeletal:         General: Normal range of motion.      Right lower leg: No edema.      Left lower leg: No edema.   Skin:     General: Skin is warm and dry.   Neurological:      Mental Status: He is alert and oriented to person, place, and time.      Gait: Gait is intact.   Psychiatric:         Attention and Perception: Attention normal.         Mood and Affect: Mood normal.               Assessment and Plan     Diagnoses and all orders for this visit:    1. TRACEY on CPAP  Assessment & Plan:  Benefiting from PAP therapy.  Plan to continue.    Orders:  -     PAP Therapy    2. Bilateral leg edema  -     hydroCHLOROthiazide (HYDRODIURIL) 25 MG tablet; Take 1 tablet by mouth Daily.  Dispense: 90 tablet; Refill: 1  -     potassium chloride (K-DUR,KLOR-CON) 10 MEQ CR tablet; Take 1 tablet by mouth 2 (Two) Times a Day.  Dispense: 90 tablet; Refill: 1  -     Basic Metabolic Panel; Future    3. Essential hypertension  -     Metoprolol Succinate 25 MG capsule extended-release 24 hour sprinkle; Take 25 mg by mouth Daily.  Dispense: 90 capsule; Refill: 1      Recommendations: ER if symptoms increase, Limitations of stress testing for definitive diagnosis reviewed, Sleep hygiene discussed, Sleep risks reviewed (driving, medical, sleep death, sedating agents), Limit salt, Elevate legs, Compression hose, Stop cigarettes, Limit " caffeine, Exercise recommendations discussed and Report if any new/changing symptoms immediately      Follow Up  Return in about 3 months (around 7/28/2023) for TRACEY- NO DL today.  Patient was given instructions and counseling regarding his condition or for health maintenance advice. Please see specific information pulled into the AVS if appropriate.

## 2023-05-04 ENCOUNTER — TELEPHONE (OUTPATIENT)
Dept: CARDIOLOGY | Facility: CLINIC | Age: 73
End: 2023-05-04
Payer: MEDICARE

## 2023-05-04 NOTE — TELEPHONE ENCOUNTER
----- Message from JASSI Vasquez sent at 5/2/2023  7:07 PM EDT -----  Regarding: Cards  Please call and verify with patient that he sees Dr. Alfaro for cardiology now.  If that is the case please send a copy of my note to Dr. Alfaro.  Thank you.

## 2023-05-08 ENCOUNTER — OFFICE VISIT (OUTPATIENT)
Dept: CARDIOLOGY | Facility: CLINIC | Age: 73
End: 2023-05-08
Payer: MEDICARE

## 2023-05-08 VITALS
DIASTOLIC BLOOD PRESSURE: 64 MMHG | OXYGEN SATURATION: 95 % | SYSTOLIC BLOOD PRESSURE: 118 MMHG | HEIGHT: 70 IN | BODY MASS INDEX: 35.1 KG/M2 | WEIGHT: 245.2 LBS | HEART RATE: 64 BPM

## 2023-05-08 DIAGNOSIS — R60.0 BILATERAL LEG EDEMA: ICD-10-CM

## 2023-05-08 DIAGNOSIS — E78.2 MIXED HYPERLIPIDEMIA: ICD-10-CM

## 2023-05-08 DIAGNOSIS — I10 PRIMARY HYPERTENSION: ICD-10-CM

## 2023-05-08 DIAGNOSIS — I25.10 CORONARY ARTERY DISEASE INVOLVING NATIVE CORONARY ARTERY OF NATIVE HEART WITHOUT ANGINA PECTORIS: Primary | ICD-10-CM

## 2023-05-08 PROCEDURE — 3074F SYST BP LT 130 MM HG: CPT | Performed by: INTERNAL MEDICINE

## 2023-05-08 PROCEDURE — 99214 OFFICE O/P EST MOD 30 MIN: CPT | Performed by: INTERNAL MEDICINE

## 2023-05-08 PROCEDURE — 3078F DIAST BP <80 MM HG: CPT | Performed by: INTERNAL MEDICINE

## 2023-05-08 RX ORDER — HYDROCHLOROTHIAZIDE 25 MG/1
25 TABLET ORAL AS NEEDED
Qty: 90 TABLET | Refills: 1
Start: 2023-05-08

## 2023-05-08 RX ORDER — POTASSIUM CHLORIDE 750 MG/1
10 TABLET, EXTENDED RELEASE ORAL AS NEEDED
Qty: 90 TABLET | Refills: 1
Start: 2023-05-08

## 2023-05-08 NOTE — PROGRESS NOTES
[facility] at [facility]  Office visit  Larry Dean Klinefelter  1950  [phone]  There is no work phone number on file.    VISIT DATE:  5/8/2023    PCP: Kin Nunez MD  [address] [place], [place] [address] [place] [address]    CC:  Chief Complaint   Patient presents with   • Coronary Artery Disease     6 month follow up        Previous cardiac studies and procedures:  January 2018 cardiac catheterization: 30 to 40% mid LAD.  60% ostial first diagonal, FFR 0.9.    November 2021 TTE  · Left ventricular ejection fraction appears to be 61 - 65%. Left ventricular systolic function is normal.  · Left ventricular diastolic function is consistent with (grade Ia w/high LAP) impaired relaxation.  · Left ventricular wall thickness is consistent with mild concentric hypertrophy.  · Estimated right ventricular systolic pressure from tricuspid regurgitation is normal (<35 mmHg). Calculated right ventricular systolic pressure from tricuspid regurgitation is 27 mmHg.    December 2022 exercise myocardial perfusion imaging  •  Patient denied any chest discomfort/pain during exercise; he did report shortness of breath that limited his ablity to exercise.  •  Expected exercise duration 6:40, actual 4:00.  •  No ECG evidence of myocardial ischemia  •  Left ventricular ejection fraction is hyperdynamic (Calculated EF > 70%).  •  Technically limited due to soft tissue attenuation artifact with more prominent apical dropout although no definitive scar or ischemia visualized.  •  Impressions are consistent with a low risk study.    ASSESSMENT:   Diagnosis Plan   1. Coronary artery disease involving native coronary artery of native heart without angina pectoris        2. Mixed hyperlipidemia        3. Primary hypertension        4. Bilateral leg edema  hydroCHLOROthiazide (HYDRODIURIL) 25 MG tablet    potassium chloride (K-DUR,KLOR-CON) 10 MEQ CR tablet          PLAN:  Coronary artery disease: Currently asymptomatic and stable.   "Continue aspirin, statin and afterload reduction.    Hyperlipidemia: Goal LDL less than 70.  Continue combination of atorvastatin 40 mg p.o. daily and Zetia 10 mg p.o. daily.    Hypertension: Goal less than 130/80 mmHg.  Overall reasonable control.  History of blood pressure lability.  Agree with recent down titration of beta-blockade.  He is currently hesitant to add any further medications at this time I think it is reasonable to continue to keep a home blood pressure log and trend bilateral lower extremity edema before initiation of thiazide diuretic.      Subjective  Denies chest pain, palpitations.  Blood pressures running less than 130/80 mmHg.  Using a wheeled walker for ambulation due to low back issues.  No significant lower extreme edema.  Recent medication changes included decreasing Toprol to 25 mg p.o. daily and adding hydrochlorothiazide and potassium supplementation, patient has not started these medications at this time.  Reporting some mild generalized fatigue.    PHYSICAL EXAMINATION:  Vitals:    05/08/23 1522   BP: 118/64   BP Location: Right arm   Patient Position: Sitting   Cuff Size: Adult   Pulse: 64   SpO2: 95%   Weight: 111 kg (245 lb 3.2 oz)   Height: 177.8 cm (70\")     General Appearance:    Alert, cooperative, no distress, appears stated age   Head:    Normocephalic, without obvious abnormality, atraumatic   Eyes:    conjunctiva/corneas clear   Nose:   Nares normal, septum midline, mucosa normal, no drainage   Throat:   Lips, teeth and gums normal   Neck:   Supple, symmetrical, trachea midline, no carotid    bruit or JVD   Lungs:     Clear to auscultation bilaterally, respirations unlabored   Chest Wall:    No tenderness or deformity    Heart:    Regular rate and rhythm, S1 and S2 normal, no murmur, rub   or gallop, normal carotid impulse bilaterally without bruit.   Abdomen:     Soft, non-tender   Extremities:   Extremities normal, atraumatic, no cyanosis or edema   Pulses:   2+ and " symmetric all extremities   Skin:   Skin color, texture, turgor normal, no rashes or lesions       Diagnostic Data:  Procedures  No results found for: CHLPL, TRIG, HDL, LDLDIRECT  Lab Results   Component Value Date    GLUCOSE 95 03/25/2022    BUN 16 03/25/2022    CREATININE 0.81 03/25/2022     03/25/2022    K 4.0 03/29/2022     03/25/2022    CO2 25.0 03/25/2022     Lab Results   Component Value Date    HGBA1C 5.60 03/25/2022     Lab Results   Component Value Date    WBC 6.90 03/25/2022    HGB 13.0 03/25/2022    HCT 37.4 (L) 03/25/2022     03/25/2022       Allergies  Allergies   Allergen Reactions   • Lisinopril Angioedema   • Naproxen Other (See Comments)     Pt doesn't recall        Current Medications    Current Outpatient Medications:   •  acetaminophen (TYLENOL) 500 MG tablet, Take 2 tablets by mouth Every 6 (Six) Hours As Needed for Mild Pain., Disp: , Rfl:   •  aspirin 81 MG EC tablet, Take 1 tablet by mouth Daily., Disp: , Rfl:   •  atorvastatin (LIPITOR) 40 MG tablet, Take 1 tablet by mouth Every Night., Disp: , Rfl:   •  Calcium Carb-Cholecalciferol (CALCIUM 600 + D PO), Take 1 tablet by mouth 2 (Two) Times a Day., Disp: , Rfl:   •  cetirizine (zyrTEC) 10 MG tablet, Take 1 tablet by mouth Daily., Disp: , Rfl:   •  Coenzyme Q10 (CO Q-10) 100 MG capsule, Take 100 mg by mouth 2 (Two) Times a Day., Disp: , Rfl:   •  docusate sodium (COLACE) 250 MG capsule, Take 1 capsule by mouth Daily., Disp: , Rfl:   •  ezetimibe (ZETIA) 10 MG tablet, Take 1 tablet by mouth Daily., Disp: , Rfl:   •  ferrous sulfate 324 (65 Fe) MG tablet delayed-release EC tablet, Take 1 tablet by mouth Every Night., Disp: , Rfl:   •  Glucosamine-Chondroitin 3771-0338 MG/30ML liquid, Take 1 capsule by mouth 2 (Two) Times a Day., Disp: , Rfl:   •  hydroCHLOROthiazide (HYDRODIURIL) 25 MG tablet, Take 1 tablet by mouth As Needed (edema)., Disp: 90 tablet, Rfl: 1  •  losartan (Cozaar) 50 MG tablet, Take 1 tablet by mouth Daily.  (Patient taking differently: Take 1 tablet by mouth 2 (Two) Times a Day.), Disp: 90 tablet, Rfl: 1  •  Metoprolol Succinate 25 MG capsule extended-release 24 hour sprinkle, Take 25 mg by mouth Daily., Disp: 90 capsule, Rfl: 1  •  Misc Natural Products (Airborne Elderberry) chewable tablet, Chew 1 capsule Daily. 2 gummies, Disp: , Rfl:   •  Multiple Vitamins-Minerals (VITEYES AREDS FORMULA PO), Take 1 capsule by mouth 2 (Two) Times a Day., Disp: , Rfl:   •  Polyethylene Glycol 3350 (MIRALAX PO), Take 1 dose by mouth 2 (Two) Times a Day. 1 tsp, Disp: , Rfl:   •  potassium chloride (K-DUR,KLOR-CON) 10 MEQ CR tablet, Take 1 tablet by mouth As Needed (with hctz)., Disp: 90 tablet, Rfl: 1  •  prednisoLONE acetate (PRED FORTE) 1 % ophthalmic suspension, INSTILL 1 DROP INTO LEFT EYE 4 TIMES DAILY, Disp: , Rfl:   •  ranolazine (RANEXA) 500 MG 12 hr tablet, Take 1 tablet by mouth Daily., Disp: , Rfl:   •  SYNTHROID 25 MCG tablet, Take 1 tablet by mouth Daily., Disp: , Rfl:           ROS  ROS      SOCIAL HX  Social History     Socioeconomic History   • Marital status:    Tobacco Use   • Smoking status: Never   • Smokeless tobacco: Never   Vaping Use   • Vaping Use: Never used   Substance and Sexual Activity   • Alcohol use: Never   • Drug use: Never   • Sexual activity: Defer       FAMILY HX  Family History   Problem Relation Age of Onset   • Rheum arthritis Mother    • Dementia Mother    • Parkinsonism Mother    • Stroke Father    • Heart attack Father    • Hypertension Father    • Clotting disorder Father    • Rheum arthritis Father    • Hypertension Other              Jermain Alfaro III, MD, [facility]

## 2023-06-14 ENCOUNTER — TELEPHONE (OUTPATIENT)
Dept: CARDIOLOGY | Facility: CLINIC | Age: 73
End: 2023-06-14
Payer: MEDICARE

## 2023-06-14 NOTE — TELEPHONE ENCOUNTER
Eliana at Dr.Gregory Denny office is requesting a Cardiac risk assessment. He is scheduled for a Colonoscopy on 6/21/2023. Please advise.

## 2023-06-30 NOTE — PROGRESS NOTES
MARIELENA Rico    Nerve Cath Post Op Call    Patient Name: Larry Dean Klinefelter  :  1950  MRN:  [id number]  Date of Discharge: 2019    Nerve Cath Post Op Call:    Catheter Plan:Patient called/No answer/Message left to call [facility] pain service for any questions or complaints                               No restrictions

## 2023-07-28 ENCOUNTER — OFFICE VISIT (OUTPATIENT)
Dept: CARDIOLOGY | Facility: CLINIC | Age: 73
End: 2023-07-28
Payer: MEDICARE

## 2023-07-28 VITALS
HEIGHT: 70 IN | OXYGEN SATURATION: 97 % | WEIGHT: 254 LBS | BODY MASS INDEX: 36.36 KG/M2 | SYSTOLIC BLOOD PRESSURE: 122 MMHG | HEART RATE: 77 BPM | DIASTOLIC BLOOD PRESSURE: 80 MMHG

## 2023-07-28 DIAGNOSIS — I10 PRIMARY HYPERTENSION: ICD-10-CM

## 2023-07-28 DIAGNOSIS — Z99.89 OSA ON CPAP: ICD-10-CM

## 2023-07-28 DIAGNOSIS — R60.0 BILATERAL LEG EDEMA: ICD-10-CM

## 2023-07-28 DIAGNOSIS — I25.10 CORONARY ARTERY DISEASE INVOLVING NATIVE CORONARY ARTERY OF NATIVE HEART WITHOUT ANGINA PECTORIS: ICD-10-CM

## 2023-07-28 DIAGNOSIS — G47.33 OSA ON CPAP: ICD-10-CM

## 2023-07-28 PROCEDURE — 3074F SYST BP LT 130 MM HG: CPT | Performed by: NURSE PRACTITIONER

## 2023-07-28 PROCEDURE — 3079F DIAST BP 80-89 MM HG: CPT | Performed by: NURSE PRACTITIONER

## 2023-07-28 PROCEDURE — 99213 OFFICE O/P EST LOW 20 MIN: CPT | Performed by: NURSE PRACTITIONER

## 2023-07-28 RX ORDER — MOXIFLOXACIN 5 MG/ML
SOLUTION/ DROPS OPHTHALMIC
COMMUNITY
Start: 2023-07-07

## 2023-07-28 RX ORDER — METOPROLOL SUCCINATE 25 MG/1
TABLET, EXTENDED RELEASE ORAL
COMMUNITY
Start: 2023-05-02

## 2023-07-28 NOTE — PROGRESS NOTES
[facility] Provider Note     Date:   2023   Name: Larry Dean Klinefelter  :   1950  PCP: Kin Nunez MD    Chief Complaint   Patient presents with    Follow-up    Sleep Apnea       Subjective     History of Present Illness  Larry Dean Klinefelter is a 73 y.o. male who presents today for follow-up on TRACEY only.  He reports his PAP, airflow, and machine are comfortable and working well.  PAP compliance report dated 2023 to 2023 download interpreted in clinic today.  Shown patient uses his machine 100% of the time, over 4 hours 100% of the time, for an average use of 8 hours and 33 minutes.  His overall AHI is 1.9.  Showing excellent compliance and excellent control his AirSense 10 AutoSet is set 12-18 with an EPR of 1.  He is benefiting from PAP therapy.  We will plan to continue.    Last couple cardiac visits he has seen Dr. Alfaro with [facility] cardiology in [place].  Today he reports due to the distance of driving the [place] he would like to switch back here for cardiac care as well.  He is requesting to see Dr. Meyer.  I explained that we will certainly can schedule his follow-up with Dr. Meyer but that he will most likely see me or another one of her nurse practitioners with subsequent follow-ups.  He still would like to switch his care back here.  We are more than happy to do that and understands since he lives here in [place] that is easier for him.    Blood pressure at goal.  Bilateral lower extremity edema has resolved with hydrochlorothiazide and potassium.  CAD is currently asymptomatic and stable.  On medical therapy.  Hyperlipidemia goal less than 70.  He is on atorvastatin 40 mg and Zetia 10 mg daily.    Cardiac and sleep related problem list    2023 wants to switch cardiac care from Dr. Alfaro back to Dr. Meyer since he lives here in [place]    CAD  TRACEY  Hypertension  Hyperlipidemia  Edema    2018 cardiac catheterization: 30  to 40% mid LAD.  60% ostial first diagonal, FFR 0.9.     November 2021 TTE  · Left ventricular ejection fraction appears to be 61 - 65%. Left ventricular systolic function is normal.  · Left ventricular diastolic function is consistent with (grade Ia w/high LAP) impaired relaxation.  · Left ventricular wall thickness is consistent with mild concentric hypertrophy.  · Estimated right ventricular systolic pressure from tricuspid regurgitation is normal (<35 mmHg). Calculated right ventricular systolic pressure from tricuspid regurgitation is 27 mmHg.     December 2022 exercise myocardial perfusion imaging  ·  Patient denied any chest discomfort/pain during exercise; he did report shortness of breath that limited his ablity to exercise.  ·  Expected exercise duration 6:40, actual 4:00.  ·  No ECG evidence of myocardial ischemia  ·  Left ventricular ejection fraction is hyperdynamic (Calculated EF > 70%).  ·  Technically limited due to soft tissue attenuation artifact with more prominent apical dropout although no definitive scar or ischemia visualized.  ·  Impressions are consistent with a low risk study.      Allergies   Allergen Reactions    Lisinopril Angioedema    Naproxen Other (See Comments)     Pt doesn't recall        Current Outpatient Medications:     acetaminophen (TYLENOL) 500 MG tablet, Take 2 tablets by mouth Every 6 (Six) Hours As Needed for Mild Pain., Disp: , Rfl:     aspirin 81 MG EC tablet, Take 1 tablet by mouth Daily., Disp: , Rfl:     atorvastatin (LIPITOR) 40 MG tablet, Take 1 tablet by mouth Every Night., Disp: , Rfl:     Calcium Carb-Cholecalciferol (CALCIUM 600 + D PO), Take 1 tablet by mouth 2 (Two) Times a Day., Disp: , Rfl:     cetirizine (zyrTEC) 10 MG tablet, Take 1 tablet by mouth Daily., Disp: , Rfl:     Coenzyme Q10 (CO Q-10) 100 MG capsule, Take 100 mg by mouth 2 (Two) Times a Day., Disp: , Rfl:     Diclofenac Sodium (VOLTAREN) 1 % gel gel, , Disp: , Rfl:     docusate sodium (COLACE)  250 MG capsule, Take 1 capsule by mouth Daily., Disp: , Rfl:     ezetimibe (ZETIA) 10 MG tablet, Take 1 tablet by mouth Daily., Disp: , Rfl:     ferrous sulfate 324 (65 Fe) MG tablet delayed-release EC tablet, Take 1 tablet by mouth Every Night., Disp: , Rfl:     Glucosamine-Chondroitin 7912-8748 MG/30ML liquid, Take 1 capsule by mouth 2 (Two) Times a Day., Disp: , Rfl:     hydroCHLOROthiazide (HYDRODIURIL) 25 MG tablet, Take 1 tablet by mouth As Needed (edema)., Disp: 90 tablet, Rfl: 1    losartan (Cozaar) 50 MG tablet, Take 1 tablet by mouth Daily. (Patient taking differently: Take 1 tablet by mouth 2 (Two) Times a Day.), Disp: 90 tablet, Rfl: 1    metoprolol succinate XL (TOPROL-XL) 25 MG 24 hr tablet, , Disp: , Rfl:     Misc Natural Products (Airborne Elderberry) chewable tablet, Chew 1 capsule Daily. 2 gummies, Disp: , Rfl:     moxifloxacin (VIGAMOX) 0.5 % ophthalmic solution, INSTILL 1 DROP INTO RIGHT EYE 4 TIMES DAILY, Disp: , Rfl:     Multiple Vitamins-Minerals (VITEYES AREDS FORMULA PO), Take 1 capsule by mouth 2 (Two) Times a Day., Disp: , Rfl:     Polyethylene Glycol 3350 (MIRALAX PO), Take 1 dose by mouth 2 (Two) Times a Day. 1 tsp, Disp: , Rfl:     potassium chloride (K-DUR,KLOR-CON) 10 MEQ CR tablet, Take 1 tablet by mouth As Needed (with hctz)., Disp: 90 tablet, Rfl: 1    prednisoLONE acetate (PRED FORTE) 1 % ophthalmic suspension, INSTILL 1 DROP INTO LEFT EYE 4 TIMES DAILY, Disp: , Rfl:     ranolazine (RANEXA) 500 MG 12 hr tablet, Take 1 tablet by mouth Daily., Disp: , Rfl:     SYNTHROID 25 MCG tablet, Take 1 tablet by mouth Daily., Disp: , Rfl:     Past Medical History:   Diagnosis Date    Anemia     Bowel obstruction     Cataract     mild- still forming    Chest pain     Constipation     Coronary artery disease     Disease of thyroid gland     Gallstone     GERD (gastroesophageal reflux disease)     Hepatitis     Hyperlipidemia     Hypertension     Low back pain     Lumbar spinal stenosis      "Macular degeneration     Medial meniscus tear     Obesity     Obstructive sleep apnea on CPAP     12-18    Pneumonia     Primary osteoarthritis of left knee     and right knee    Sepsis due to pneumonia 11/01/2020    SOBOE (shortness of breath on exertion)     Wears glasses       Past Surgical History:   Procedure Laterality Date    CARDIAC CATHETERIZATION  01/2019    COLONOSCOPY  2013    EXPLORATORY LAPAROTOMY N/A 10/21/2021    Procedure: LAPAROTOMY EXPLORATORY, EXPLANT OF ABDOMINAL MESH AND LYSIS OF ADHESIONS;  Surgeon: Quan Alberto MD;  Location:  [facility];  Service: General;  Laterality: N/A;    FRACTURE SURGERY      left heel - no hardware    INGUINAL HERNIA REPAIR      x2     KNEE ARTHROSCOPY Right     LUMBAR LAMINECTOMY DISCECTOMY DECOMPRESSION N/A 3/28/2022    Procedure: LUMBAR LAMINECTOMY DECOMPRESSION L3-5;  Surgeon: Conrad Givens MD;  Location:  [facility];  Service: Neurosurgery;  Laterality: N/A;    REPLACEMENT TOTAL KNEE Right     TONSILLECTOMY      TOTAL KNEE ARTHROPLASTY Left 05/16/2019    Procedure: TOTAL KNEE ARTHROPLASTY LEFT;  Surgeon: Willian Jc MD;  Location:  [facility];  Service: Orthopedics     Family History   Problem Relation Age of Onset    Rheum arthritis Mother     Dementia Mother     Parkinsonism Mother     Stroke Father     Heart attack Father     Hypertension Father     Clotting disorder Father     Rheum arthritis Father     Hypertension Other      Social History     Socioeconomic History    Marital status:    Tobacco Use    Smoking status: Never     Passive exposure: Never    Smokeless tobacco: Never   Vaping Use    Vaping Use: Never used   Substance and Sexual Activity    Alcohol use: Never    Drug use: Never    Sexual activity: Defer       Objective     Vital Signs:  /80 (BP Location: Left arm)   Pulse 77   Ht 177.8 cm (70\")   Wt 115 kg (254 lb)   SpO2 97%   BMI 36.45 kg/m²   Estimated body mass index is 36.45 kg/m² as calculated from the " "following:    Height as of this encounter: 177.8 cm (70\").    Weight as of this encounter: 115 kg (254 lb).             Physical Exam  Vitals reviewed.   Constitutional:       Appearance: Normal appearance. He is well-developed.   HENT:      Head: Normocephalic and atraumatic.   Eyes:      General: No scleral icterus.     Pupils: Pupils are equal, round, and reactive to light.   Neck:      Vascular: No carotid bruit.   Cardiovascular:      Rate and Rhythm: Normal rate and regular rhythm.      Pulses: Normal pulses.           Radial pulses are 2+ on the right side and 2+ on the left side.        Dorsalis pedis pulses are 2+ on the right side and 2+ on the left side.        Posterior tibial pulses are 2+ on the right side and 2+ on the left side.      Heart sounds: Normal heart sounds. No murmur heard.  Pulmonary:      Breath sounds: Normal breath sounds. No wheezing or rhonchi.   Musculoskeletal:      Right lower leg: No edema.      Left lower leg: No edema.   Skin:     General: Skin is warm and dry.      Capillary Refill: Capillary refill takes less than 2 seconds.      Coloration: Skin is not cyanotic.      Nails: There is no clubbing.   Neurological:      Mental Status: He is alert and oriented to person, place, and time.      Motor: No weakness.      Gait: Gait normal.   Psychiatric:         Mood and Affect: Mood normal.         Behavior: Behavior is cooperative.         Thought Content: Thought content normal.         Cognition and Memory: Memory normal.                   Assessment and Plan     Diagnoses and all orders for this visit:    1. TRACEY on CPAP  Comments:  Benefiting from PAP therapy.  Plan to continue.  Orders:  -     PAP Therapy    2. Coronary artery disease involving native coronary artery of native heart without angina pectoris  Comments:  Stable.  Asymptomatic.  On medical therapy.    3. Primary hypertension  Comments:  At goal.    4. Bilateral leg edema  Comments:  Doing well on HCTZ and " potassium.        Recommendations: ER if symptoms increase, Report if any new/changing symptoms immediately, Limit salt, Elevate legs, Limit caffeine, Sleep risks reviewed (driving, medical, sleep death, sedating agents), and Sleep hygiene discussed          Follow Up  Return in about 6 months (around 1/28/2024) for TRACEY/CAD w/ [place].  Patient was given instructions and counseling regarding his condition or for health maintenance advice. Please see specific information pulled into the AVS if appropriate.

## 2023-10-13 DIAGNOSIS — R60.0 BILATERAL LEG EDEMA: ICD-10-CM

## 2023-10-16 RX ORDER — POTASSIUM CHLORIDE 750 MG/1
10 TABLET, EXTENDED RELEASE ORAL 2 TIMES DAILY
Qty: 90 TABLET | Refills: 7 | Status: SHIPPED | OUTPATIENT
Start: 2023-10-16

## 2023-10-25 RX ORDER — METOPROLOL SUCCINATE 25 MG/1
25 TABLET, EXTENDED RELEASE ORAL DAILY
Qty: 90 TABLET | Refills: 3 | Status: SHIPPED | OUTPATIENT
Start: 2023-10-25

## 2023-11-01 ENCOUNTER — OFFICE VISIT (OUTPATIENT)
Dept: CARDIOLOGY | Facility: CLINIC | Age: 73
End: 2023-11-01
Payer: MEDICARE

## 2023-11-01 VITALS
OXYGEN SATURATION: 95 % | HEIGHT: 70 IN | DIASTOLIC BLOOD PRESSURE: 70 MMHG | HEART RATE: 65 BPM | WEIGHT: 261 LBS | SYSTOLIC BLOOD PRESSURE: 120 MMHG | BODY MASS INDEX: 37.37 KG/M2

## 2023-11-01 DIAGNOSIS — R06.02 SOB (SHORTNESS OF BREATH): ICD-10-CM

## 2023-11-01 DIAGNOSIS — I10 PRIMARY HYPERTENSION: ICD-10-CM

## 2023-11-03 PROBLEM — R06.02 SOB (SHORTNESS OF BREATH): Status: ACTIVE | Noted: 2023-11-03

## 2023-11-03 RX ORDER — ALBUTEROL SULFATE 90 UG/1
2 AEROSOL, METERED RESPIRATORY (INHALATION) EVERY 6 HOURS PRN
Qty: 18 G | Refills: 1 | Status: SHIPPED | OUTPATIENT
Start: 2023-11-03

## 2023-11-03 NOTE — PROGRESS NOTES
[facility] Provider Note     Date:   11/3/2023   Name: Larry Dean Klinefelter  :   1950  PCP: Kin Nunez MD    Chief Complaint   Patient presents with   • Follow-up     SOA       Subjective     History of Present Illness  Larry Dean Klinefelter is a 73 y.o. male who presents today for a call in visit.  Last visit when he was following up for TRACEY he said he would like to switch from Dr. Alfaro with [facility] cardiology in [place] back to's for cardiac care as well as the TRACEY due to the distance of driving the [place].  He was requesting to see Dr. Meyer.  And we agreed to schedule his follow-up to reestablish cardiac care with Dr. Meyer and then he may would be seen by the nurse practitioners for subsequent visits.    However, since today was a call in visit,  patient had to be scheduled with an NP instead of Dr. Meyer.  He reports that according to the TV, if someone has increasing shortness of air they should see their doctor.  He reports he has had increasing shortness of air for a year but that is gotten worse over the last couple months.  He initially thought it was the weather where it was so hot out but now that is gotten cooler and shortness of air has not gotten any better.  He says that when he was off his beta-blocker in 2022 for a stress test with Dr. Alfaro that he felt much much better regarding his shortness of air.    I asked him if he has a history of asthma and he said as a child but not as an adult.  Since he had a low risk stress test in 2022 I would like to update EKG today, do a trial of rescue inhaler, and check PFTs.  Patient and his wife are agreeable to this.     Hyperlipidemia goal less than 70.  He is on atorvastatin 40 mg and Zetia 10 mg daily.    EKG sinus rhythm with moderate ST depression.  No chest pain.    Cardiac and sleep related problem list    2023 wants to switch cardiac care from Dr. Alfaro back to Dr. Meyer  since he lives here in [place]    CAD  TRACEY  Hypertension  Hyperlipidemia  Edema    January 2018 cardiac catheterization: 30 to 40% mid LAD.  60% ostial first diagonal, FFR 0.9.     November 2021 TTE  · Left ventricular ejection fraction appears to be 61 - 65%. Left ventricular systolic function is normal.  · Left ventricular diastolic function is consistent with (grade Ia w/high LAP) impaired relaxation.  · Left ventricular wall thickness is consistent with mild concentric hypertrophy.  · Estimated right ventricular systolic pressure from tricuspid regurgitation is normal (<35 mmHg). Calculated right ventricular systolic pressure from tricuspid regurgitation is 27 mmHg.     December 2022 exercise myocardial perfusion imaging  ·  Patient denied any chest discomfort/pain during exercise; he did report shortness of breath that limited his ablity to exercise.  ·  Expected exercise duration 6:40, actual 4:00.  ·  No ECG evidence of myocardial ischemia  ·  Left ventricular ejection fraction is hyperdynamic (Calculated EF > 70%).  ·  Technically limited due to soft tissue attenuation artifact with more prominent apical dropout although no definitive scar or ischemia visualized.  ·  Impressions are consistent with a low risk study.      Allergies   Allergen Reactions   • Lisinopril Angioedema   • Naproxen Other (See Comments)     Pt doesn't recall        Current Outpatient Medications:   •  acetaminophen (TYLENOL) 500 MG tablet, Take 2 tablets by mouth Every 6 (Six) Hours As Needed for Mild Pain., Disp: , Rfl:   •  aspirin 81 MG EC tablet, Take 1 tablet by mouth Daily., Disp: , Rfl:   •  atorvastatin (LIPITOR) 40 MG tablet, Take 1 tablet by mouth Every Night., Disp: , Rfl:   •  Calcium Carb-Cholecalciferol (CALCIUM 600 + D PO), Take 1 tablet by mouth 2 (Two) Times a Day., Disp: , Rfl:   •  cetirizine (zyrTEC) 10 MG tablet, Take 1 tablet by mouth Daily., Disp: , Rfl:   •  Coenzyme Q10 (CO Q-10) 100 MG capsule, Take 100 mg by  mouth 2 (Two) Times a Day., Disp: , Rfl:   •  Diclofenac Sodium (VOLTAREN) 1 % gel gel, , Disp: , Rfl:   •  docusate sodium (COLACE) 250 MG capsule, Take 1 capsule by mouth Daily., Disp: , Rfl:   •  ezetimibe (ZETIA) 10 MG tablet, Take 1 tablet by mouth Daily., Disp: , Rfl:   •  ferrous sulfate 324 (65 Fe) MG tablet delayed-release EC tablet, Take 1 tablet by mouth Every Night., Disp: , Rfl:   •  Glucosamine-Chondroitin 4910-0733 MG/30ML liquid, Take 1 capsule by mouth 2 (Two) Times a Day., Disp: , Rfl:   •  hydroCHLOROthiazide (HYDRODIURIL) 25 MG tablet, Take 1 tablet by mouth As Needed (edema)., Disp: 90 tablet, Rfl: 1  •  losartan (Cozaar) 50 MG tablet, Take 1 tablet by mouth Daily. (Patient taking differently: Take 1 tablet by mouth 2 (Two) Times a Day.), Disp: 90 tablet, Rfl: 1  •  metoprolol succinate XL (TOPROL-XL) 25 MG 24 hr tablet, TAKE 1 TABLET DAILY, Disp: 90 tablet, Rfl: 3  •  Misc Natural Products (Airborne Elderberry) chewable tablet, Chew 1 capsule Daily. 2 gummies, Disp: , Rfl:   •  moxifloxacin (VIGAMOX) 0.5 % ophthalmic solution, INSTILL 1 DROP INTO RIGHT EYE 4 TIMES DAILY, Disp: , Rfl:   •  Multiple Vitamins-Minerals (VITEYES AREDS FORMULA PO), Take 1 capsule by mouth 2 (Two) Times a Day., Disp: , Rfl:   •  Polyethylene Glycol 3350 (MIRALAX PO), Take 1 dose by mouth 2 (Two) Times a Day. 1 tsp, Disp: , Rfl:   •  potassium chloride (K-DUR,KLOR-CON) 10 MEQ CR tablet, TAKE 1 TABLET TWICE A DAY, Disp: 90 tablet, Rfl: 7  •  prednisoLONE acetate (PRED FORTE) 1 % ophthalmic suspension, INSTILL 1 DROP INTO LEFT EYE 4 TIMES DAILY, Disp: , Rfl:   •  ranolazine (RANEXA) 500 MG 12 hr tablet, Take 1 tablet by mouth Daily., Disp: , Rfl:   •  SYNTHROID 25 MCG tablet, Take 1 tablet by mouth Daily., Disp: , Rfl:   •  albuterol sulfate  (90 Base) MCG/ACT inhaler, Inhale 2 puffs Every 6 (Six) Hours As Needed for Shortness of Air., Disp: 18 g, Rfl: 1    Past Medical History:   Diagnosis Date   • Anemia    •  Bowel obstruction    • Cataract     mild- still forming   • Chest pain    • Constipation    • Coronary artery disease    • Disease of thyroid gland    • Gallstone    • GERD (gastroesophageal reflux disease)    • Hepatitis    • Hyperlipidemia    • Hypertension    • Low back pain    • Lumbar spinal stenosis    • Macular degeneration    • Medial meniscus tear    • Obesity    • Obstructive sleep apnea on CPAP     12-18   • Pneumonia    • Primary osteoarthritis of left knee     and right knee   • Sepsis due to pneumonia 11/01/2020   • SOBOE (shortness of breath on exertion)    • Wears glasses       Past Surgical History:   Procedure Laterality Date   • CARDIAC CATHETERIZATION  01/2019   • COLONOSCOPY  2013   • EXPLORATORY LAPAROTOMY N/A 10/21/2021    Procedure: LAPAROTOMY EXPLORATORY, EXPLANT OF ABDOMINAL MESH AND LYSIS OF ADHESIONS;  Surgeon: Quan Alberto MD;  Location:  [facility];  Service: General;  Laterality: N/A;   • FRACTURE SURGERY      left heel - no hardware   • INGUINAL HERNIA REPAIR      x2    • KNEE ARTHROSCOPY Right    • LUMBAR LAMINECTOMY DISCECTOMY DECOMPRESSION N/A 3/28/2022    Procedure: LUMBAR LAMINECTOMY DECOMPRESSION L3-5;  Surgeon: Conrad Givens MD;  Location:  [facility];  Service: Neurosurgery;  Laterality: N/A;   • REPLACEMENT TOTAL KNEE Right    • TONSILLECTOMY     • TOTAL KNEE ARTHROPLASTY Left 05/16/2019    Procedure: TOTAL KNEE ARTHROPLASTY LEFT;  Surgeon: Willian Jc MD;  Location:  [facility];  Service: Orthopedics     Family History   Problem Relation Age of Onset   • Rheum arthritis Mother    • Dementia Mother    • Parkinsonism Mother    • Stroke Father    • Heart attack Father    • Hypertension Father    • Clotting disorder Father    • Rheum arthritis Father    • Hypertension Other      Social History     Socioeconomic History   • Marital status:    Tobacco Use   • Smoking status: Never     Passive exposure: Never   • Smokeless tobacco: Never   Vaping Use   • Vaping  "Use: Never used   Substance and Sexual Activity   • Alcohol use: Never   • Drug use: Never   • Sexual activity: Defer       Objective     Vital Signs:  /70 (BP Location: Left arm)   Pulse 65   Ht 177.8 cm (70\")   Wt 118 kg (261 lb)   SpO2 95%   BMI 37.45 kg/m²   Estimated body mass index is 37.45 kg/m² as calculated from the following:    Height as of this encounter: 177.8 cm (70\").    Weight as of this encounter: 118 kg (261 lb).             Physical Exam  Vitals reviewed.   Constitutional:       Appearance: Normal appearance. He is well-developed.   HENT:      Head: Normocephalic and atraumatic.   Eyes:      General: No scleral icterus.     Pupils: Pupils are equal, round, and reactive to light.   Neck:      Vascular: No carotid bruit.   Cardiovascular:      Rate and Rhythm: Normal rate and regular rhythm.      Pulses: Normal pulses.           Radial pulses are 2+ on the right side and 2+ on the left side.        Dorsalis pedis pulses are 2+ on the right side and 2+ on the left side.        Posterior tibial pulses are 2+ on the right side and 2+ on the left side.      Heart sounds: Normal heart sounds. No murmur heard.  Pulmonary:      Breath sounds: Normal breath sounds. No wheezing or rhonchi.   Musculoskeletal:      Right lower leg: No edema.      Left lower leg: No edema.   Skin:     General: Skin is warm and dry.      Capillary Refill: Capillary refill takes less than 2 seconds.      Coloration: Skin is not cyanotic.      Nails: There is no clubbing.   Neurological:      Mental Status: He is alert and oriented to person, place, and time.      Motor: No weakness.      Gait: Gait normal.   Psychiatric:         Mood and Affect: Mood normal.         Behavior: Behavior is cooperative.         Thought Content: Thought content normal.         Cognition and Memory: Memory normal.                 ECG 12 Lead    Date/Time: 11/1/2023 5:20 PM  Performed by: Char Basilio APRN    Authorized by: " Char Basilio APRN  Comparison: compared with previous ECG from 10/25/2022  Similar to previous ECG  Rhythm: sinus rhythm  Other findings: non-specific ST-T wave changes    Clinical impression: abnormal EKG           Assessment and Plan     Diagnoses and all orders for this visit:    1. SOB (shortness of breath)  Comments:  EKG updated. Stress test 12/2022. Check PFT and do trial of albuterol.  Orders:  -     Complete PFT - Pre & Post Bronchodilator; Future    2. Primary hypertension  Comments:  at goal.    Other orders  -     albuterol sulfate  (90 Base) MCG/ACT inhaler; Inhale 2 puffs Every 6 (Six) Hours As Needed for Shortness of Air.  Dispense: 18 g; Refill: 1  -     ECG 12 Lead          Recommendations: ER if symptoms increase, Report if any new/changing symptoms immediately, Limit salt, Elevate legs, Limit caffeine, Sleep risks reviewed (driving, medical, sleep death, sedating agents), and Sleep hygiene discussed          Follow Up  Return in about 1 month (around 12/1/2023).  Patient was given instructions and counseling regarding his condition or for health maintenance advice. Please see specific information pulled into the AVS if appropriate.

## 2023-11-07 RX ORDER — RANOLAZINE 500 MG/1
500 TABLET, EXTENDED RELEASE ORAL DAILY
Qty: 90 TABLET | Refills: 0 | Status: SHIPPED | OUTPATIENT
Start: 2023-11-07

## 2023-11-20 DIAGNOSIS — R60.0 BILATERAL LEG EDEMA: ICD-10-CM

## 2023-11-20 RX ORDER — HYDROCHLOROTHIAZIDE 25 MG/1
25 TABLET ORAL DAILY
Qty: 90 TABLET | Refills: 3 | Status: SHIPPED | OUTPATIENT
Start: 2023-11-20

## 2023-11-21 PROCEDURE — 94726 PLETHYSMOGRAPHY LUNG VOLUMES: CPT | Performed by: INTERNAL MEDICINE

## 2023-11-21 PROCEDURE — 94729 DIFFUSING CAPACITY: CPT | Performed by: INTERNAL MEDICINE

## 2023-11-21 PROCEDURE — 94010 BREATHING CAPACITY TEST: CPT | Performed by: INTERNAL MEDICINE

## 2023-12-01 ENCOUNTER — OUTSIDE FACILITY SERVICE (OUTPATIENT)
Dept: CARDIOLOGY | Facility: CLINIC | Age: 73
End: 2023-12-01
Payer: MEDICARE

## 2023-12-04 ENCOUNTER — TELEPHONE (OUTPATIENT)
Dept: CARDIOLOGY | Facility: CLINIC | Age: 73
End: 2023-12-04

## 2023-12-04 NOTE — TELEPHONE ENCOUNTER
Caller: Klinefelter, Larry Shar    Relationship: Self    Best call back number: [phone]     What is the best time to reach you: ANYTIME, LEAVE VM    Who are you requesting to speak with (clinical staff, provider,  specific staff member): CLINICAL     What was the call regarding: PT IS WONDERING IF HE NEEDS TO BRING IN HIS CPAP CHIP OR machine TO THE APPT ON 12/5/23. PLEASE ADVISE.     Is it okay if the provider responds through MyChart: CALL

## 2023-12-05 ENCOUNTER — OFFICE VISIT (OUTPATIENT)
Dept: CARDIOLOGY | Facility: CLINIC | Age: 73
End: 2023-12-05
Payer: MEDICARE

## 2023-12-05 VITALS
OXYGEN SATURATION: 95 % | DIASTOLIC BLOOD PRESSURE: 76 MMHG | SYSTOLIC BLOOD PRESSURE: 126 MMHG | HEIGHT: 70 IN | BODY MASS INDEX: 37.65 KG/M2 | HEART RATE: 63 BPM | WEIGHT: 263 LBS

## 2023-12-05 DIAGNOSIS — G47.33 OSA ON CPAP: ICD-10-CM

## 2023-12-05 DIAGNOSIS — R06.02 SOB (SHORTNESS OF BREATH): ICD-10-CM

## 2023-12-05 PROCEDURE — 3078F DIAST BP <80 MM HG: CPT | Performed by: NURSE PRACTITIONER

## 2023-12-05 PROCEDURE — 99213 OFFICE O/P EST LOW 20 MIN: CPT | Performed by: NURSE PRACTITIONER

## 2023-12-05 PROCEDURE — 3074F SYST BP LT 130 MM HG: CPT | Performed by: NURSE PRACTITIONER

## 2023-12-05 NOTE — PROGRESS NOTES
[facility] Provider Note     Date:   2023   Name: Larry Dean Klinefelter  :   1950  PCP: Kin Nunez MD    Chief Complaint   Patient presents with    Follow-up    Sleep Apnea     PFT       Subjective     History of Present Illness  Larry Dean Klinefelter is a 73 y.o. male who presents today for follow-up on shortness of air.  Coexisting TRACEY addressed last visit.  He had his PFT shows mild airway obstruction.  He said he used his albuterol inhaler twice but did not notice much of a difference with that.  Advised we will send our note today to Dr. Fernandez to see if Dr. Nunez wants to start him on a daily inhaler or any other plan of care for his shortness of air since it does not appear cardiac in nature.    He also reports today that he continues to wear his braces at night for bilateral carpal tunnel but that he has had to be in some hand numbness at nighttime.  I advised I will once again send my note to his PCP.    He said his shortness of air is not terrible but present.  He said he would probably have to stop and rest if he were to walk from the [employer] station across the street to our office.  Advised that this is decent amount of walking and it may very well just be some deconditioning.  He has occasional chest discomfort that stops with rest.  For example he was carrying in wood for his woodstove and fell a little bit of left-sided chest discomfort that he said he would not call pain and that it did not require him to stop.  He was able to complete the task of bringing wood inside.    He still wants to keep his appointment next month to reestablish cardiac care with Dr. Meyer.  Return to clinic as scheduled.  Please see this note as well as note before.  Thanks    Cardiac and sleep related problem list    2023 wants to switch cardiac care from Dr. Alfaro back to Dr. Meyer since he lives here in  [place]    CAD  TRACEY  Hypertension  Hyperlipidemia  Edema    January 2018 cardiac catheterization: 30 to 40% mid LAD.  60% ostial first diagonal, FFR 0.9.     November 2021 TTE  · Left ventricular ejection fraction appears to be 61 - 65%. Left ventricular systolic function is normal.  · Left ventricular diastolic function is consistent with (grade Ia w/high LAP) impaired relaxation.  · Left ventricular wall thickness is consistent with mild concentric hypertrophy.  · Estimated right ventricular systolic pressure from tricuspid regurgitation is normal (<35 mmHg). Calculated right ventricular systolic pressure from tricuspid regurgitation is 27 mmHg.     December 2022 exercise myocardial perfusion imaging  ·  Patient denied any chest discomfort/pain during exercise; he did report shortness of breath that limited his ablity to exercise.  ·  Expected exercise duration 6:40, actual 4:00.  ·  No ECG evidence of myocardial ischemia  ·  Left ventricular ejection fraction is hyperdynamic (Calculated EF > 70%).  ·  Technically limited due to soft tissue attenuation artifact with more prominent apical dropout although no definitive scar or ischemia visualized.  ·  Impressions are consistent with a low risk study.      Allergies   Allergen Reactions    Lisinopril Angioedema    Naproxen Other (See Comments)     Pt doesn't recall        Current Outpatient Medications:     acetaminophen (TYLENOL) 500 MG tablet, Take 2 tablets by mouth Every 6 (Six) Hours As Needed for Mild Pain., Disp: , Rfl:     albuterol sulfate  (90 Base) MCG/ACT inhaler, Inhale 2 puffs Every 6 (Six) Hours As Needed for Shortness of Air., Disp: 18 g, Rfl: 1    aspirin 81 MG EC tablet, Take 1 tablet by mouth Daily., Disp: , Rfl:     atorvastatin (LIPITOR) 40 MG tablet, Take 1 tablet by mouth Every Night., Disp: , Rfl:     Calcium Carb-Cholecalciferol (CALCIUM 600 + D PO), Take 1 tablet by mouth 2 (Two) Times a Day., Disp: , Rfl:     cetirizine (zyrTEC) 10 MG  tablet, Take 1 tablet by mouth Daily., Disp: , Rfl:     Coenzyme Q10 (CO Q-10) 100 MG capsule, Take 100 mg by mouth 2 (Two) Times a Day., Disp: , Rfl:     Diclofenac Sodium (VOLTAREN) 1 % gel gel, , Disp: , Rfl:     docusate sodium (COLACE) 250 MG capsule, Take 1 capsule by mouth Daily., Disp: , Rfl:     ezetimibe (ZETIA) 10 MG tablet, Take 1 tablet by mouth Daily., Disp: , Rfl:     ferrous sulfate 324 (65 Fe) MG tablet delayed-release EC tablet, Take 1 tablet by mouth Every Night., Disp: , Rfl:     Glucosamine-Chondroitin 5841-4431 MG/30ML liquid, Take 1 capsule by mouth 2 (Two) Times a Day., Disp: , Rfl:     hydroCHLOROthiazide (HYDRODIURIL) 25 MG tablet, TAKE 1 TABLET DAILY, Disp: 90 tablet, Rfl: 3    losartan (Cozaar) 50 MG tablet, Take 1 tablet by mouth Daily. (Patient taking differently: Take 1 tablet by mouth 2 (Two) Times a Day.), Disp: 90 tablet, Rfl: 1    metoprolol succinate XL (TOPROL-XL) 25 MG 24 hr tablet, TAKE 1 TABLET DAILY, Disp: 90 tablet, Rfl: 3    Misc Natural Products (Airborne Elderberry) chewable tablet, Chew 1 capsule Daily. 2 gummies, Disp: , Rfl:     moxifloxacin (VIGAMOX) 0.5 % ophthalmic solution, INSTILL 1 DROP INTO RIGHT EYE 4 TIMES DAILY, Disp: , Rfl:     Multiple Vitamins-Minerals (VITEYES AREDS FORMULA PO), Take 1 capsule by mouth 2 (Two) Times a Day., Disp: , Rfl:     Polyethylene Glycol 3350 (MIRALAX PO), Take 1 dose by mouth 2 (Two) Times a Day. 1 tsp, Disp: , Rfl:     potassium chloride (K-DUR,KLOR-CON) 10 MEQ CR tablet, TAKE 1 TABLET TWICE A DAY, Disp: 90 tablet, Rfl: 7    prednisoLONE acetate (PRED FORTE) 1 % ophthalmic suspension, INSTILL 1 DROP INTO LEFT EYE 4 TIMES DAILY, Disp: , Rfl:     ranolazine (RANEXA) 500 MG 12 hr tablet, TAKE 1 TABLET DAILY, Disp: 90 tablet, Rfl: 0    SYNTHROID 25 MCG tablet, Take 1 tablet by mouth Daily., Disp: , Rfl:     Past Medical History:   Diagnosis Date    Anemia     Bowel obstruction     Cataract     mild- still forming    Chest pain      Constipation     Coronary artery disease     Disease of thyroid gland     Gallstone     GERD (gastroesophageal reflux disease)     Hepatitis     Hyperlipidemia     Hypertension     Low back pain     Lumbar spinal stenosis     Macular degeneration     Medial meniscus tear     Obesity     Obstructive sleep apnea on CPAP     12-18    Pneumonia     Primary osteoarthritis of left knee     and right knee    Sepsis due to pneumonia 11/01/2020    SOBOE (shortness of breath on exertion)     Wears glasses       Past Surgical History:   Procedure Laterality Date    CARDIAC CATHETERIZATION  01/2019    COLONOSCOPY  2013    EXPLORATORY LAPAROTOMY N/A 10/21/2021    Procedure: LAPAROTOMY EXPLORATORY, EXPLANT OF ABDOMINAL MESH AND LYSIS OF ADHESIONS;  Surgeon: Quan Alberto MD;  Location:  [facility];  Service: General;  Laterality: N/A;    FRACTURE SURGERY      left heel - no hardware    INGUINAL HERNIA REPAIR      x2     KNEE ARTHROSCOPY Right     LUMBAR LAMINECTOMY DISCECTOMY DECOMPRESSION N/A 3/28/2022    Procedure: LUMBAR LAMINECTOMY DECOMPRESSION L3-5;  Surgeon: Conrad Givens MD;  Location:  [facility];  Service: Neurosurgery;  Laterality: N/A;    REPLACEMENT TOTAL KNEE Right     TONSILLECTOMY      TOTAL KNEE ARTHROPLASTY Left 05/16/2019    Procedure: TOTAL KNEE ARTHROPLASTY LEFT;  Surgeon: Willian Jc MD;  Location:  [facility];  Service: Orthopedics     Family History   Problem Relation Age of Onset    Rheum arthritis Mother     Dementia Mother     Parkinsonism Mother     Stroke Father     Heart attack Father     Hypertension Father     Clotting disorder Father     Rheum arthritis Father     Hypertension Other      Social History     Socioeconomic History    Marital status:    Tobacco Use    Smoking status: Never     Passive exposure: Never    Smokeless tobacco: Never   Vaping Use    Vaping Use: Never used   Substance and Sexual Activity    Alcohol use: Never    Drug use: Never    Sexual activity: Defer  "      Objective     Vital Signs:  /76 (BP Location: Left arm)   Pulse 63   Ht 177.8 cm (70\")   Wt 119 kg (263 lb)   SpO2 95%   BMI 37.74 kg/m²   Estimated body mass index is 37.74 kg/m² as calculated from the following:    Height as of this encounter: 177.8 cm (70\").    Weight as of this encounter: 119 kg (263 lb).         Physical Exam  Vitals reviewed.   Constitutional:       Appearance: Normal appearance. He is well-developed.   HENT:      Head: Normocephalic and atraumatic.   Eyes:      General: No scleral icterus.     Pupils: Pupils are equal, round, and reactive to light.   Neck:      Vascular: No carotid bruit.   Cardiovascular:      Rate and Rhythm: Normal rate and regular rhythm.      Pulses: Normal pulses.           Radial pulses are 2+ on the right side and 2+ on the left side.        Dorsalis pedis pulses are 2+ on the right side and 2+ on the left side.        Posterior tibial pulses are 2+ on the right side and 2+ on the left side.      Heart sounds: Normal heart sounds. No murmur heard.  Pulmonary:      Breath sounds: Normal breath sounds. No wheezing or rhonchi.   Musculoskeletal:      Right lower leg: No edema.      Left lower leg: No edema.   Skin:     General: Skin is warm and dry.      Capillary Refill: Capillary refill takes less than 2 seconds.      Coloration: Skin is not cyanotic.      Nails: There is no clubbing.   Neurological:      Mental Status: He is alert and oriented to person, place, and time.      Motor: No weakness.      Gait: Gait normal.   Psychiatric:         Mood and Affect: Mood normal.         Behavior: Behavior is cooperative.         Thought Content: Thought content normal.         Cognition and Memory: Memory normal.                     Assessment and Plan     Diagnoses and all orders for this visit:    1. SOB (shortness of breath)  Comments:  Stable.  PFTs reviewed.  Will send note to PCP.    2. TRACEY on CPAP  Comments:  Continues to benefit from PAP therapy.  " Will plan to continue.        Recommendations: ER if symptoms increase, Report if any new/changing symptoms immediately, Sleep risks reviewed (driving, medical, sleep death, sedating agents), Sleep hygiene discussed, and Increase pap therapy usage      Follow Up  Return for TRACEY/CAD as scheduled.    Char Basilio, APRN   12/05/2023     Please note that this explicitly excludes time spent on other separate billable services such as performing procedures or test interpretation, when applicable.    This note was created using dictation software which occasionally transcribes nonsensical phrases. Please contact the provider if any clarification is needed.

## 2024-01-29 ENCOUNTER — OFFICE VISIT (OUTPATIENT)
Dept: CARDIOLOGY | Facility: CLINIC | Age: 74
End: 2024-01-29
Payer: MEDICARE

## 2024-01-29 VITALS
HEIGHT: 70 IN | WEIGHT: 260 LBS | OXYGEN SATURATION: 96 % | DIASTOLIC BLOOD PRESSURE: 74 MMHG | BODY MASS INDEX: 37.22 KG/M2 | HEART RATE: 75 BPM | SYSTOLIC BLOOD PRESSURE: 128 MMHG

## 2024-01-29 DIAGNOSIS — R06.02 SOB (SHORTNESS OF BREATH): Primary | ICD-10-CM

## 2024-01-29 DIAGNOSIS — G47.33 OSA ON CPAP: ICD-10-CM

## 2024-01-29 DIAGNOSIS — I10 PRIMARY HYPERTENSION: ICD-10-CM

## 2024-01-29 PROCEDURE — 3078F DIAST BP <80 MM HG: CPT | Performed by: INTERNAL MEDICINE

## 2024-01-29 PROCEDURE — 99214 OFFICE O/P EST MOD 30 MIN: CPT | Performed by: INTERNAL MEDICINE

## 2024-01-29 PROCEDURE — 3074F SYST BP LT 130 MM HG: CPT | Performed by: INTERNAL MEDICINE

## 2024-01-29 NOTE — PROGRESS NOTES
[facility] Provider Note     Date:   2024   Name: Larry Dean Klinefelter  :   1950  PCP: Kin Nunez MD    Chief Complaint   Patient presents with    Sleep Apnea    Hypertension       Subjective     History of Present Illness  Larry Dean Klinefelter is a 73 y.o. male who presents today for follow up on shortness of air and PFT results. He has not had worsening of his symptoms. No chest pain. Mild LE edema but stable. PFTs were essentially normal, mild restriction only, likely from weight. He does have bilateral carpal tunnel possibly, though it has not been formally diagnosed. We discussed possible amylodosis work up in the future. However his SOA symptoms have developed very gradually over a number of years. This sounds more like age related deconditioning. If he is not able to build some strength in the next several months we agreed we would do further testing and work up for amylodosis but it is rare and unlikely.     Cardiac and sleep related problem list    2023 wants to switch cardiac care from Dr. Alfaro back to Dr. Meyer since he lives here in [place]    CAD  TRACEY  Hypertension  Hyperlipidemia  Edema    2018 cardiac catheterization: 30 to 40% mid LAD.  60% ostial first diagonal, FFR 0.9.     2021 TTE  · Left ventricular ejection fraction appears to be 61 - 65%. Left ventricular systolic function is normal.  · Left ventricular diastolic function is consistent with (grade Ia w/high LAP) impaired relaxation.  · Left ventricular wall thickness is consistent with mild concentric hypertrophy.  · Estimated right ventricular systolic pressure from tricuspid regurgitation is normal (<35 mmHg). Calculated right ventricular systolic pressure from tricuspid regurgitation is 27 mmHg.     2022 exercise myocardial perfusion imaging  ·  Patient denied any chest discomfort/pain during exercise; he did report shortness of breath that limited his  ablity to exercise.  ·  Expected exercise duration 6:40, actual 4:00.  ·  No ECG evidence of myocardial ischemia  ·  Left ventricular ejection fraction is hyperdynamic (Calculated EF > 70%).  ·  Technically limited due to soft tissue attenuation artifact with more prominent apical dropout although no definitive scar or ischemia visualized.  ·  Impressions are consistent with a low risk study.      Allergies   Allergen Reactions    Lisinopril Angioedema    Naproxen Other (See Comments)     Pt doesn't recall        Current Outpatient Medications:     acetaminophen (TYLENOL) 500 MG tablet, Take 2 tablets by mouth Every 6 (Six) Hours As Needed for Mild Pain., Disp: , Rfl:     albuterol sulfate  (90 Base) MCG/ACT inhaler, Inhale 2 puffs Every 6 (Six) Hours As Needed for Shortness of Air., Disp: 18 g, Rfl: 1    aspirin 81 MG EC tablet, Take 1 tablet by mouth Daily., Disp: , Rfl:     atorvastatin (LIPITOR) 40 MG tablet, Take 1 tablet by mouth Every Night., Disp: , Rfl:     Calcium Carb-Cholecalciferol (CALCIUM 600 + D PO), Take 1 tablet by mouth 2 (Two) Times a Day., Disp: , Rfl:     cetirizine (zyrTEC) 10 MG tablet, Take 1 tablet by mouth Daily., Disp: , Rfl:     Coenzyme Q10 (CO Q-10) 100 MG capsule, Take 100 mg by mouth 2 (Two) Times a Day., Disp: , Rfl:     Diclofenac Sodium (VOLTAREN) 1 % gel gel, , Disp: , Rfl:     docusate sodium (COLACE) 250 MG capsule, Take 1 capsule by mouth Daily., Disp: , Rfl:     ezetimibe (ZETIA) 10 MG tablet, Take 1 tablet by mouth Daily., Disp: , Rfl:     ferrous sulfate 324 (65 Fe) MG tablet delayed-release EC tablet, Take 1 tablet by mouth Every Night., Disp: , Rfl:     Glucosamine-Chondroitin 0867-7454 MG/30ML liquid, Take 1 capsule by mouth 2 (Two) Times a Day., Disp: , Rfl:     hydroCHLOROthiazide (HYDRODIURIL) 25 MG tablet, TAKE 1 TABLET DAILY, Disp: 90 tablet, Rfl: 3    losartan (Cozaar) 50 MG tablet, Take 1 tablet by mouth Daily. (Patient taking differently: Take 1 tablet by  mouth 2 (Two) Times a Day.), Disp: 90 tablet, Rfl: 1    metoprolol succinate XL (TOPROL-XL) 25 MG 24 hr tablet, TAKE 1 TABLET DAILY, Disp: 90 tablet, Rfl: 3    Misc Natural Products (Airborne Elderberry) chewable tablet, Chew 1 capsule Daily. 2 gummies, Disp: , Rfl:     moxifloxacin (VIGAMOX) 0.5 % ophthalmic solution, INSTILL 1 DROP INTO RIGHT EYE 4 TIMES DAILY, Disp: , Rfl:     Multiple Vitamins-Minerals (VITEYES AREDS FORMULA PO), Take 1 capsule by mouth 2 (Two) Times a Day., Disp: , Rfl:     Polyethylene Glycol 3350 (MIRALAX PO), Take 1 dose by mouth 2 (Two) Times a Day. 1 tsp, Disp: , Rfl:     potassium chloride (K-DUR,KLOR-CON) 10 MEQ CR tablet, TAKE 1 TABLET TWICE A DAY, Disp: 90 tablet, Rfl: 7    prednisoLONE acetate (PRED FORTE) 1 % ophthalmic suspension, INSTILL 1 DROP INTO LEFT EYE 4 TIMES DAILY, Disp: , Rfl:     ranolazine (RANEXA) 500 MG 12 hr tablet, TAKE 1 TABLET DAILY, Disp: 90 tablet, Rfl: 0    SYNTHROID 25 MCG tablet, Take 1 tablet by mouth Daily., Disp: , Rfl:     Past Medical History:   Diagnosis Date    Anemia     Bowel obstruction     Cataract     mild- still forming    Chest pain     Constipation     Coronary artery disease     Disease of thyroid gland     Gallstone     GERD (gastroesophageal reflux disease)     Hepatitis     Hyperlipidemia     Hypertension     Low back pain     Lumbar spinal stenosis     Macular degeneration     Medial meniscus tear     Obesity     Obstructive sleep apnea on CPAP     12-18    Pneumonia     Primary osteoarthritis of left knee     and right knee    Sepsis due to pneumonia 11/01/2020    SOBOE (shortness of breath on exertion)     Wears glasses       Past Surgical History:   Procedure Laterality Date    CARDIAC CATHETERIZATION  01/2019    COLONOSCOPY  2013    EXPLORATORY LAPAROTOMY N/A 10/21/2021    Procedure: LAPAROTOMY EXPLORATORY, EXPLANT OF ABDOMINAL MESH AND LYSIS OF ADHESIONS;  Surgeon: Quan Alberto MD;  Location: [facility];  Service: General;   "Laterality: N/A;    FRACTURE SURGERY      left heel - no hardware    INGUINAL HERNIA REPAIR      x2     KNEE ARTHROSCOPY Right     LUMBAR LAMINECTOMY DISCECTOMY DECOMPRESSION N/A 3/28/2022    Procedure: LUMBAR LAMINECTOMY DECOMPRESSION L3-5;  Surgeon: Conrad Givens MD;  Location:  [facility];  Service: Neurosurgery;  Laterality: N/A;    REPLACEMENT TOTAL KNEE Right     TONSILLECTOMY      TOTAL KNEE ARTHROPLASTY Left 05/16/2019    Procedure: TOTAL KNEE ARTHROPLASTY LEFT;  Surgeon: Willian Jc MD;  Location:  [facility];  Service: Orthopedics     Family History   Problem Relation Age of Onset    Rheum arthritis Mother     Dementia Mother     Parkinsonism Mother     Stroke Father     Heart attack Father     Hypertension Father     Clotting disorder Father     Rheum arthritis Father     Hypertension Other      Social History     Socioeconomic History    Marital status:    Tobacco Use    Smoking status: Never     Passive exposure: Never    Smokeless tobacco: Never   Vaping Use    Vaping Use: Never used   Substance and Sexual Activity    Alcohol use: Never    Drug use: Never    Sexual activity: Defer       Objective     Vital Signs:  /74 (BP Location: Right arm, Patient Position: Sitting)   Pulse 75   Ht 177.8 cm (70\")   Wt 118 kg (260 lb)   SpO2 96%   BMI 37.31 kg/m²   Estimated body mass index is 37.31 kg/m² as calculated from the following:    Height as of this encounter: 177.8 cm (70\").    Weight as of this encounter: 118 kg (260 lb).         Physical Exam  Vitals reviewed.   Constitutional:       General: He is not in acute distress.     Appearance: Normal appearance.   HENT:      Head: Normocephalic and atraumatic.      Mouth/Throat:      Mouth: Mucous membranes are moist.   Eyes:      Conjunctiva/sclera: Conjunctivae normal.   Neck:      Vascular: No carotid bruit.   Cardiovascular:      Rate and Rhythm: Normal rate and regular rhythm.      Pulses: Normal pulses.      Heart sounds: Normal " heart sounds. No murmur heard.  Pulmonary:      Effort: Pulmonary effort is normal. No respiratory distress.      Breath sounds: Normal breath sounds. No wheezing or rhonchi.   Abdominal:      General: Abdomen is flat.      Palpations: Abdomen is soft.   Musculoskeletal:      Cervical back: Normal range of motion and neck supple.      Right lower leg: No edema.      Left lower leg: No edema.   Skin:     General: Skin is warm and dry.      Coloration: Skin is not jaundiced.   Neurological:      General: No focal deficit present.      Mental Status: He is alert and oriented to person, place, and time. Mental status is at baseline.      GCS: GCS eye subscore is 4. GCS verbal subscore is 5. GCS motor subscore is 6.      Cranial Nerves: No cranial nerve deficit.      Motor: No weakness.      Gait: Gait normal.   Psychiatric:         Mood and Affect: Mood and affect normal. Mood is not anxious.         Speech: Speech normal.         Behavior: Behavior normal.                     Assessment and Plan     Diagnoses and all orders for this visit:    1. SOB (shortness of breath) (Primary)  Comments:  I think this is conditioning related but we could also check for amyloidosis.  Other causes have been ruled out including cardiac and pulmonary.    2. TRACEY on CPAP  Comments:  Download reviewed.  Good compliance and control.    3. Primary hypertension  Comments:  Well-controlled.  Continue current medications.              Follow Up  Return in about 6 months (around 7/29/2024) for Next scheduled follow up, Recheck symptoms, Patient OK with NP visit.    Mariana Meyer MD   01/29/2024     Please note that this explicitly excludes time spent on other separate billable services such as performing procedures or test interpretation, when applicable.    This note was created using dictation software which occasionally transcribes nonsensical phrases. Please contact the provider if any clarification is needed.

## 2024-02-06 RX ORDER — RANOLAZINE 500 MG/1
500 TABLET, EXTENDED RELEASE ORAL DAILY
Qty: 90 TABLET | Refills: 3 | Status: SHIPPED | OUTPATIENT
Start: 2024-02-06

## 2024-07-29 ENCOUNTER — OFFICE VISIT (OUTPATIENT)
Dept: CARDIOLOGY | Facility: CLINIC | Age: 74
End: 2024-07-29
Payer: MEDICARE

## 2024-07-29 VITALS
WEIGHT: 258.2 LBS | HEART RATE: 74 BPM | BODY MASS INDEX: 36.97 KG/M2 | SYSTOLIC BLOOD PRESSURE: 126 MMHG | DIASTOLIC BLOOD PRESSURE: 68 MMHG | HEIGHT: 70 IN | OXYGEN SATURATION: 96 %

## 2024-07-29 DIAGNOSIS — R07.2 PRECORDIAL CHEST PAIN: Primary | ICD-10-CM

## 2024-07-29 DIAGNOSIS — G47.33 OSA ON CPAP: ICD-10-CM

## 2024-07-29 DIAGNOSIS — R06.02 SOB (SHORTNESS OF BREATH): ICD-10-CM

## 2024-07-29 DIAGNOSIS — I25.10 CORONARY ARTERY DISEASE INVOLVING NATIVE CORONARY ARTERY OF NATIVE HEART WITHOUT ANGINA PECTORIS: ICD-10-CM

## 2024-07-29 PROCEDURE — 1160F RVW MEDS BY RX/DR IN RCRD: CPT | Performed by: NURSE PRACTITIONER

## 2024-07-29 PROCEDURE — 1159F MED LIST DOCD IN RCRD: CPT | Performed by: NURSE PRACTITIONER

## 2024-07-29 PROCEDURE — 3074F SYST BP LT 130 MM HG: CPT | Performed by: NURSE PRACTITIONER

## 2024-07-29 PROCEDURE — 3078F DIAST BP <80 MM HG: CPT | Performed by: NURSE PRACTITIONER

## 2024-07-29 PROCEDURE — 99214 OFFICE O/P EST MOD 30 MIN: CPT | Performed by: NURSE PRACTITIONER

## 2024-07-29 PROCEDURE — 93000 ELECTROCARDIOGRAM COMPLETE: CPT | Performed by: NURSE PRACTITIONER

## 2024-07-29 NOTE — PROGRESS NOTES
[facility] Provider Note     Date:   2024   Name: Larry Dean Klinefelter  :   1950  PCP: Kin Nunez MD    Chief Complaint   Patient presents with    Follow-up     Shortness of breath/chest discomfort       Subjective     History of Present Illness  Larry Dean Klinefelter is a 74 y.o. male who presents today for follow-up visit on CAD and TRACEY. Patient is doing very well on PAP therapy with good control compliance.  Download reviewed and interpreted today.  Compliance is 100%, average use per night is 8 hours and 9 minutes.  AHI is 1.9.  He is using a fullface mask and doing well with that.  Airflow is comfortable.  He denies excessive daytime sleepiness and fatigue.  He underwent a left heart cath in  that revealed 30-40% mid LAD stenosis and 60% ostial first diagonal stenosis.  He reports that he has been having intermittent episodes of left-sided chest pain that sometimes occurs with exertion and relieved with rest.  He has chronic shortness of breath and feels like it is worsening.  He reports that he became very short of breath today while walking into our clinic.  He has not had any recent cardiac testing.  Lower extremity edema is currently stable.  He still reports occasional dizziness.    Cardiac history  1. CAD  2. TRACEY, Auto CPAP 12-18 cm  3. Hypertension  4. Hyperlipidemia  5. Edema    2018 cardiac catheterization: 30 to 40% mid LAD.  60% ostial first diagonal, FFR 0.9.       Reports Denies   Chest Pain [x] []   Shortness of Air [x] []   Palpitations [] [x]   Edema [x] []   Dizziness [x] []   Syncope [] [x]       Allergies   Allergen Reactions    Lisinopril Angioedema    Naproxen Other (See Comments)     Pt doesn't recall        Current Outpatient Medications:     acetaminophen (TYLENOL) 500 MG tablet, Take 2 tablets by mouth Every 6 (Six) Hours As Needed for Mild Pain., Disp: , Rfl:     aspirin 81 MG EC tablet, Take 1 tablet by mouth Daily.,  Disp: , Rfl:     Calcium Carb-Cholecalciferol (CALCIUM 600 + D PO), Take 1 tablet by mouth 2 (Two) Times a Day., Disp: , Rfl:     cetirizine (zyrTEC) 10 MG tablet, Take 1 tablet by mouth Daily., Disp: , Rfl:     Coenzyme Q10 (CO Q-10) 100 MG capsule, Take 100 mg by mouth 2 (Two) Times a Day., Disp: , Rfl:     Diclofenac Sodium (VOLTAREN) 1 % gel gel, , Disp: , Rfl:     docusate sodium (COLACE) 250 MG capsule, Take 1 capsule by mouth Daily., Disp: , Rfl:     ezetimibe (ZETIA) 10 MG tablet, Take 1 tablet by mouth Daily., Disp: , Rfl:     ferrous sulfate 324 (65 Fe) MG tablet delayed-release EC tablet, Take 1 tablet by mouth Every Night., Disp: , Rfl:     Glucosamine-Chondroitin 3225-6797 MG/30ML liquid, Take 1 capsule by mouth 2 (Two) Times a Day., Disp: , Rfl:     hydroCHLOROthiazide (HYDRODIURIL) 25 MG tablet, TAKE 1 TABLET DAILY, Disp: 90 tablet, Rfl: 3    losartan (Cozaar) 50 MG tablet, Take 1 tablet by mouth Daily. (Patient taking differently: Take 1 tablet by mouth 2 (Two) Times a Day.), Disp: 90 tablet, Rfl: 1    metoprolol succinate XL (TOPROL-XL) 25 MG 24 hr tablet, TAKE 1 TABLET DAILY, Disp: 90 tablet, Rfl: 3    Misc Natural Products (Airborne Elderberry) chewable tablet, Chew 1 capsule Daily. 2 gummies, Disp: , Rfl:     Multiple Vitamins-Minerals (VITEYES AREDS FORMULA PO), Take 1 capsule by mouth 2 (Two) Times a Day., Disp: , Rfl:     Polyethylene Glycol 3350 (MIRALAX PO), Take 1 dose by mouth 2 (Two) Times a Day. 1 tsp, Disp: , Rfl:     potassium chloride (K-DUR,KLOR-CON) 10 MEQ CR tablet, TAKE 1 TABLET TWICE A DAY, Disp: 90 tablet, Rfl: 7    ranolazine (RANEXA) 500 MG 12 hr tablet, TAKE 1 TABLET DAILY, Disp: 90 tablet, Rfl: 3    SYNTHROID 25 MCG tablet, Take 1 tablet by mouth Daily., Disp: , Rfl:     Past Medical History:   Diagnosis Date    Anemia     Bowel obstruction     Cataract     mild- still forming    Chest pain     Constipation     Coronary artery disease     Disease of thyroid gland      Gallstone     GERD (gastroesophageal reflux disease)     Hepatitis     Hyperlipidemia     Hypertension     Low back pain     Lumbar spinal stenosis     Macular degeneration     Medial meniscus tear     Obesity     Obstructive sleep apnea on CPAP     12-18    Pneumonia     Primary osteoarthritis of left knee     and right knee    Sepsis due to pneumonia 11/01/2020    SOBOE (shortness of breath on exertion)     Wears glasses       Past Surgical History:   Procedure Laterality Date    CARDIAC CATHETERIZATION  01/2019    COLONOSCOPY  2013    EXPLORATORY LAPAROTOMY N/A 10/21/2021    Procedure: LAPAROTOMY EXPLORATORY, EXPLANT OF ABDOMINAL MESH AND LYSIS OF ADHESIONS;  Surgeon: Quan Alberto MD;  Location:  [facility];  Service: General;  Laterality: N/A;    FRACTURE SURGERY      left heel - no hardware    INGUINAL HERNIA REPAIR      x2     KNEE ARTHROSCOPY Right     LUMBAR LAMINECTOMY DISCECTOMY DECOMPRESSION N/A 3/28/2022    Procedure: LUMBAR LAMINECTOMY DECOMPRESSION L3-5;  Surgeon: Conrad Givens MD;  Location:  [facility];  Service: Neurosurgery;  Laterality: N/A;    REPLACEMENT TOTAL KNEE Right     TONSILLECTOMY      TOTAL KNEE ARTHROPLASTY Left 05/16/2019    Procedure: TOTAL KNEE ARTHROPLASTY LEFT;  Surgeon: Willian Jc MD;  Location:  [facility];  Service: Orthopedics     Family History   Problem Relation Age of Onset    Rheum arthritis Mother     Dementia Mother     Parkinsonism Mother     Stroke Father     Heart attack Father     Hypertension Father     Clotting disorder Father     Rheum arthritis Father     Hypertension Other      Social History     Socioeconomic History    Marital status:    Tobacco Use    Smoking status: Never     Passive exposure: Never    Smokeless tobacco: Never   Vaping Use    Vaping status: Never Used   Substance and Sexual Activity    Alcohol use: Never    Drug use: Never    Sexual activity: Defer       Objective     Vital Signs:  /68 (BP Location: Left arm, Patient  "Position: Sitting, Cuff Size: Adult)   Pulse 74   Ht 177.8 cm (70\")   Wt 117 kg (258 lb 3.2 oz)   SpO2 96%   BMI 37.05 kg/m²   Estimated body mass index is 37.05 kg/m² as calculated from the following:    Height as of this encounter: 177.8 cm (70\").    Weight as of this encounter: 117 kg (258 lb 3.2 oz).       Class 2 Severe Obesity (BMI >=35 and <=39.9). Obesity-related health conditions include the following: obstructive sleep apnea and hypertension. Obesity is unchanged. BMI is is above average; BMI management plan is completed. We discussed portion control and increasing exercise.      Physical Exam  Vitals reviewed.   Constitutional:       Appearance: Normal appearance.   HENT:      Head: Normocephalic.   Cardiovascular:      Rate and Rhythm: Normal rate and regular rhythm.      Heart sounds: Normal heart sounds.   Pulmonary:      Effort: Pulmonary effort is normal.      Breath sounds: Normal breath sounds.   Musculoskeletal:      Right lower leg: No edema.      Left lower leg: No edema.   Skin:     General: Skin is warm and dry.      Capillary Refill: Capillary refill takes less than 2 seconds.   Neurological:      General: No focal deficit present.      Mental Status: He is alert and oriented to person, place, and time.   Psychiatric:         Mood and Affect: Mood normal.         Behavior: Behavior normal.           PAP download reviewed: Most recent PAP download reviewed      ECG 12 Lead    Date/Time: 7/29/2024 3:16 PM  Performed by: Emilie More APRN    Authorized by: Emilie More APRN  Comparison: not compared with previous ECG   Previous ECG: no previous ECG available  Rhythm: sinus bradycardia and sinus arrhythmia  Rate: bradycardic  BPM: 59    Clinical impression: abnormal EKG  Clinical impression comment: possible inferior myocardial infarction, probably old.           Assessment and Plan     Diagnoses and all orders for this visit:    1. Precordial chest pain (Primary)  Assessment " & Plan:  Reports intermittent episodes of left-sided chest pain that sometimes occurs with exertion and relieved with rest.  Also reports worsening shortness of breath.  - Proceed with stress test to rule out ischemia.    Orders:  -     Adult Transthoracic Echo Complete W/ Cont if Necessary Per Protocol; Future  -     Adult Stress Echo W/ Cont or Stress Agent if Necessary Per Protocol; Future  -     ECG 12 Lead    2. TRACEY on CPAP  Assessment & Plan:  Patient is doing very well on PAP therapy with good control compliance.  Download reviewed and interpreted today.  Compliance is 100%, average use per night is 8 hours and 9 minutes.  AHI is 1.9.  He denies excessive daytime sleepiness or fatigue.    - Continue PAP therapy at current settings.    Orders:  -     PAP Therapy    3. Coronary artery disease involving native coronary artery of native heart without angina pectoris  Assessment & Plan:  He underwent a left heart cath in 2018 that revealed 30-40% mid LAD stenosis and 60% ostial first diagonal stenosis.  - Continue aspirin, Ranexa, metoprolol current doses  -Unclear why he is not taking a statin medication.  We will address this at follow-up visit in 1 month.    Orders:  -     Adult Stress Echo W/ Cont or Stress Agent if Necessary Per Protocol; Future    4. SOB (shortness of breath)  Assessment & Plan:  Patient reports worsening shortness of breath on exertion.    - Proceed with cardiac workup (echocardiogram and stress echo)    Orders:  -     Adult Transthoracic Echo Complete W/ Cont if Necessary Per Protocol; Future  -     Adult Stress Echo W/ Cont or Stress Agent if Necessary Per Protocol; Future        Recommendations: ER if symptoms increase and Report if any new/changing symptoms immediately          Follow Up  Return in about 5 weeks (around 9/2/2024) for cardiac testing results.  Patient was given instructions and counseling regarding his condition or for health maintenance advice. Please see specific  information pulled into the AVS if appropriate.

## 2024-07-30 PROBLEM — R07.2 PRECORDIAL CHEST PAIN: Status: ACTIVE | Noted: 2024-07-30

## 2024-07-31 NOTE — ASSESSMENT & PLAN NOTE
Patient is doing very well on PAP therapy with good control compliance.  Download reviewed and interpreted today.  Compliance is 100%, average use per night is 8 hours and 9 minutes.  AHI is 1.9.  He denies excessive daytime sleepiness or fatigue.    - Continue PAP therapy at current settings.

## 2024-07-31 NOTE — ASSESSMENT & PLAN NOTE
Patient reports worsening shortness of breath on exertion.    - Proceed with cardiac workup (echocardiogram and stress echo)

## 2024-07-31 NOTE — ASSESSMENT & PLAN NOTE
He underwent a left heart cath in 2018 that revealed 30-40% mid LAD stenosis and 60% ostial first diagonal stenosis.  - Continue aspirin, Ranexa, metoprolol current doses  -Unclear why he is not taking a statin medication.  We will address this at follow-up visit in 1 month.

## 2024-07-31 NOTE — ASSESSMENT & PLAN NOTE
Reports intermittent episodes of left-sided chest pain that sometimes occurs with exertion and relieved with rest.  Also reports worsening shortness of breath.  - Proceed with stress test to rule out ischemia.

## 2024-09-04 ENCOUNTER — OFFICE VISIT (OUTPATIENT)
Dept: CARDIOLOGY | Facility: CLINIC | Age: 74
End: 2024-09-04
Payer: MEDICARE

## 2024-09-04 VITALS
BODY MASS INDEX: 37.22 KG/M2 | HEIGHT: 70 IN | OXYGEN SATURATION: 98 % | WEIGHT: 260 LBS | HEART RATE: 90 BPM | SYSTOLIC BLOOD PRESSURE: 124 MMHG | DIASTOLIC BLOOD PRESSURE: 68 MMHG

## 2024-09-04 DIAGNOSIS — E78.00 PURE HYPERCHOLESTEROLEMIA: ICD-10-CM

## 2024-09-04 DIAGNOSIS — I25.10 CORONARY ARTERY DISEASE INVOLVING NATIVE CORONARY ARTERY OF NATIVE HEART WITHOUT ANGINA PECTORIS: Primary | ICD-10-CM

## 2024-09-04 DIAGNOSIS — I10 PRIMARY HYPERTENSION: ICD-10-CM

## 2024-09-04 PROCEDURE — 99214 OFFICE O/P EST MOD 30 MIN: CPT | Performed by: INTERNAL MEDICINE

## 2024-09-04 PROCEDURE — 3078F DIAST BP <80 MM HG: CPT | Performed by: INTERNAL MEDICINE

## 2024-09-04 PROCEDURE — 3074F SYST BP LT 130 MM HG: CPT | Performed by: INTERNAL MEDICINE

## 2024-09-04 RX ORDER — ATORVASTATIN CALCIUM 40 MG/1
TABLET, FILM COATED ORAL
COMMUNITY
Start: 2024-08-28

## 2024-09-05 NOTE — PROGRESS NOTES
[facility] Provider Note     Date:   2024   Name: Larry Dean Klinefelter  :   1950  PCP: Kin Nunez MD    Chief Complaint   Patient presents with    Shortness of Breath       Subjective     History of Present Illness  Larry Dean Klinefelter is a 74 y.o. male who presents today for follow-up on chest pain.  He has been having exertional chest pain and worsening shortness of breath for the last couple months.  He has not had any resting chest pain.  Describes it as a pressure in the center of his chest.  He also has bilateral arm numbness but that happens only when he is asleep at night.  The arm numbness does not go along with his chest pain.  No lower extremity edema.  He had abnormal stress testing today and we discussed heart catheterization risks and benefits.    Cardiac history  1. CAD  2. TRACEY, Auto CPAP 12-18 cm  3. Hypertension  4. Hyperlipidemia  5. Edema    2018 cardiac catheterization: 30 to 40% mid LAD.  60% ostial first diagonal, FFR 0.9.    Allergies   Allergen Reactions    Lisinopril Angioedema    Naproxen Other (See Comments)     Pt doesn't recall        Current Outpatient Medications:     acetaminophen (TYLENOL) 500 MG tablet, Take 2 tablets by mouth Every 6 (Six) Hours As Needed for Mild Pain., Disp: , Rfl:     aspirin 81 MG EC tablet, Take 1 tablet by mouth Daily., Disp: , Rfl:     atorvastatin (LIPITOR) 40 MG tablet, , Disp: , Rfl:     Calcium Carb-Cholecalciferol (CALCIUM 600 + D PO), Take 1 tablet by mouth 2 (Two) Times a Day., Disp: , Rfl:     cetirizine (zyrTEC) 10 MG tablet, Take 1 tablet by mouth Daily., Disp: , Rfl:     Coenzyme Q10 (CO Q-10) 100 MG capsule, Take 100 mg by mouth 2 (Two) Times a Day., Disp: , Rfl:     Diclofenac Sodium (VOLTAREN) 1 % gel gel, , Disp: , Rfl:     docusate sodium (COLACE) 250 MG capsule, Take 1 capsule by mouth Daily., Disp: , Rfl:     ezetimibe (ZETIA) 10 MG tablet, Take 1 tablet by mouth Daily., Disp: ,  Rfl:     ferrous sulfate 324 (65 Fe) MG tablet delayed-release EC tablet, Take 1 tablet by mouth Every Night., Disp: , Rfl:     Glucosamine-Chondroitin 1884-0190 MG/30ML liquid, Take 1 capsule by mouth 2 (Two) Times a Day., Disp: , Rfl:     hydroCHLOROthiazide (HYDRODIURIL) 25 MG tablet, TAKE 1 TABLET DAILY, Disp: 90 tablet, Rfl: 3    losartan (Cozaar) 50 MG tablet, Take 1 tablet by mouth Daily. (Patient taking differently: Take 1 tablet by mouth 2 (Two) Times a Day.), Disp: 90 tablet, Rfl: 1    metoprolol succinate XL (TOPROL-XL) 25 MG 24 hr tablet, TAKE 1 TABLET DAILY, Disp: 90 tablet, Rfl: 3    Misc Natural Products (Airborne Elderberry) chewable tablet, Chew 1 capsule Daily. 2 gummies, Disp: , Rfl:     Multiple Vitamins-Minerals (VITEYES AREDS FORMULA PO), Take 1 capsule by mouth 2 (Two) Times a Day., Disp: , Rfl:     Polyethylene Glycol 3350 (MIRALAX PO), Take 1 dose by mouth 2 (Two) Times a Day. 1 tsp, Disp: , Rfl:     potassium chloride (K-DUR,KLOR-CON) 10 MEQ CR tablet, TAKE 1 TABLET TWICE A DAY, Disp: 90 tablet, Rfl: 7    ranolazine (RANEXA) 500 MG 12 hr tablet, TAKE 1 TABLET DAILY, Disp: 90 tablet, Rfl: 3    SYNTHROID 25 MCG tablet, Take 1 tablet by mouth Daily., Disp: , Rfl:     Past Medical History:   Diagnosis Date    Anemia     Bowel obstruction     Cataract     mild- still forming    Chest pain     Constipation     Coronary artery disease     Disease of thyroid gland     Gallstone     GERD (gastroesophageal reflux disease)     Hepatitis     Hyperlipidemia     Hypertension     Low back pain     Lumbar spinal stenosis     Macular degeneration     Medial meniscus tear     Obesity     Obstructive sleep apnea on CPAP     12-18    Pneumonia     Primary osteoarthritis of left knee     and right knee    Sepsis due to pneumonia 11/01/2020    SOBOE (shortness of breath on exertion)     Wears glasses       Past Surgical History:   Procedure Laterality Date    CARDIAC CATHETERIZATION  01/2019    COLONOSCOPY  2013  "   EXPLORATORY LAPAROTOMY N/A 10/21/2021    Procedure: LAPAROTOMY EXPLORATORY, EXPLANT OF ABDOMINAL MESH AND LYSIS OF ADHESIONS;  Surgeon: Quan Alberto MD;  Location:  [facility];  Service: General;  Laterality: N/A;    FRACTURE SURGERY      left heel - no hardware    INGUINAL HERNIA REPAIR      x2     KNEE ARTHROSCOPY Right     LUMBAR LAMINECTOMY DISCECTOMY DECOMPRESSION N/A 3/28/2022    Procedure: LUMBAR LAMINECTOMY DECOMPRESSION L3-5;  Surgeon: Conrad Givens MD;  Location:  [facility];  Service: Neurosurgery;  Laterality: N/A;    REPLACEMENT TOTAL KNEE Right     TONSILLECTOMY      TOTAL KNEE ARTHROPLASTY Left 05/16/2019    Procedure: TOTAL KNEE ARTHROPLASTY LEFT;  Surgeon: Willian Jc MD;  Location:  [facility];  Service: Orthopedics     Family History   Problem Relation Age of Onset    Rheum arthritis Mother     Dementia Mother     Parkinsonism Mother     Stroke Father     Heart attack Father     Hypertension Father     Clotting disorder Father     Rheum arthritis Father     Hypertension Other      Social History     Socioeconomic History    Marital status:    Tobacco Use    Smoking status: Never     Passive exposure: Never    Smokeless tobacco: Never   Vaping Use    Vaping status: Never Used   Substance and Sexual Activity    Alcohol use: Never    Drug use: Never    Sexual activity: Defer       Objective     Vital Signs:  /68   Pulse 90   Ht 177.8 cm (70\")   Wt 118 kg (260 lb)   SpO2 98%   BMI 37.31 kg/m²   Estimated body mass index is 37.31 kg/m² as calculated from the following:    Height as of this encounter: 177.8 cm (70\").    Weight as of this encounter: 118 kg (260 lb).         Physical Exam  Constitutional:       Appearance: Normal appearance. He is well-developed.   HENT:      Head: Normocephalic and atraumatic.   Eyes:      General: No scleral icterus.     Pupils: Pupils are equal, round, and reactive to light.   Cardiovascular:      Rate and Rhythm: Normal rate and regular " rhythm.      Heart sounds: Normal heart sounds. No murmur heard.  Pulmonary:      Breath sounds: Normal breath sounds. No wheezing or rhonchi.   Musculoskeletal:      Right lower leg: No edema.      Left lower leg: No edema.   Skin:     Capillary Refill: Capillary refill takes less than 2 seconds.      Coloration: Skin is not cyanotic.      Nails: There is no clubbing.   Neurological:      Mental Status: He is alert and oriented to person, place, and time.      Motor: No weakness.      Gait: Gait normal.   Psychiatric:         Mood and Affect: Mood normal.         Behavior: Behavior is cooperative.         Thought Content: Thought content normal.         Cognition and Memory: Memory normal.                     Assessment and Plan     Diagnoses and all orders for this visit:    1. Coronary artery disease involving native coronary artery of native heart without angina pectoris (Primary)  Comments:  Prior 60% LAD stenosis.  Worried that this is possibly advanced now especially given his symptoms.  Plan heart catheterization.  Orders:  -     Case Request Cath Lab: Left Heart Cath  -     CBC & Differential; Future  -     Comprehensive Metabolic Panel; Future  -     Lipid Panel; Future    2. Pure hypercholesterolemia  Comments:  Add lipid panel on precath labs to ensure he is well-controlled.  Orders:  -     Case Request Cath Lab: Left Heart Cath  -     CBC & Differential; Future  -     Comprehensive Metabolic Panel; Future  -     Lipid Panel; Future    3. Primary hypertension  Comments:  Well-controlled.  Continue current medications.              Follow Up  No follow-ups on file.    Mariana Meyer MD   09/04/2024     Please note that this explicitly excludes time spent on other separate billable services such as performing procedures or test interpretation, when applicable.    This note was created using dictation software which occasionally transcribes nonsensical phrases. Please contact the provider if any  clarification is needed.

## 2024-09-23 DIAGNOSIS — R60.0 BILATERAL LEG EDEMA: ICD-10-CM

## 2024-09-23 RX ORDER — POTASSIUM CHLORIDE 750 MG/1
10 TABLET, EXTENDED RELEASE ORAL 2 TIMES DAILY
Qty: 180 TABLET | Refills: 3 | Status: SHIPPED | OUTPATIENT
Start: 2024-09-23

## 2024-09-24 ENCOUNTER — APPOINTMENT (OUTPATIENT)
Dept: GENERAL RADIOLOGY | Facility: HOSPITAL | Age: 74
End: 2024-09-24
Payer: MEDICARE

## 2024-09-24 ENCOUNTER — HOSPITAL ENCOUNTER (OUTPATIENT)
Facility: HOSPITAL | Age: 74
Setting detail: HOSPITAL OUTPATIENT SURGERY
Discharge: HOME | End: 2024-09-24
Attending: INTERNAL MEDICINE | Admitting: INTERNAL MEDICINE
Payer: MEDICARE

## 2024-09-24 VITALS
WEIGHT: 261.8 LBS | DIASTOLIC BLOOD PRESSURE: 66 MMHG | HEART RATE: 56 BPM | RESPIRATION RATE: 18 BRPM | BODY MASS INDEX: 35.46 KG/M2 | OXYGEN SATURATION: 95 % | HEIGHT: 72 IN | TEMPERATURE: 98 F | SYSTOLIC BLOOD PRESSURE: 125 MMHG

## 2024-09-24 DIAGNOSIS — I25.10 CORONARY ARTERY DISEASE INVOLVING NATIVE CORONARY ARTERY OF NATIVE HEART WITHOUT ANGINA PECTORIS: ICD-10-CM

## 2024-09-24 DIAGNOSIS — E78.00 PURE HYPERCHOLESTEROLEMIA: ICD-10-CM

## 2024-09-24 LAB
ACT BLD: 372 SECONDS (ref 82–152)
ALBUMIN SERPL-MCNC: 4.3 G/DL (ref 3.5–5.2)
ALBUMIN/GLOB SERPL: 1.6 G/DL
ALP SERPL-CCNC: 43 U/L (ref 39–117)
ALT SERPL W P-5'-P-CCNC: 26 U/L (ref 1–41)
ANION GAP SERPL CALCULATED.3IONS-SCNC: 11 MMOL/L (ref 5–15)
AST SERPL-CCNC: 23 U/L (ref 1–40)
BILIRUB SERPL-MCNC: 0.6 MG/DL (ref 0–1.2)
BUN SERPL-MCNC: 18 MG/DL (ref 8–23)
BUN/CREAT SERPL: 17.3 (ref 7–25)
CALCIUM SPEC-SCNC: 9.1 MG/DL (ref 8.6–10.5)
CATH EF ESTIMATED: 70 %
CHLORIDE SERPL-SCNC: 101 MMOL/L (ref 98–107)
CHOLEST SERPL-MCNC: 108 MG/DL (ref 0–200)
CO2 SERPL-SCNC: 26 MMOL/L (ref 22–29)
CREAT SERPL-MCNC: 1.04 MG/DL (ref 0.76–1.27)
DEPRECATED RDW RBC AUTO: 46.5 FL (ref 37–54)
EGFRCR SERPLBLD CKD-EPI 2021: 74.4 ML/MIN/1.73
ERYTHROCYTE [DISTWIDTH] IN BLOOD BY AUTOMATED COUNT: 13 % (ref 12.3–15.4)
GLOBULIN UR ELPH-MCNC: 2.7 GM/DL
GLUCOSE SERPL-MCNC: 104 MG/DL (ref 65–99)
HBA1C MFR BLD: 5.5 % (ref 4.8–5.6)
HCT VFR BLD AUTO: 36.1 % (ref 37.5–51)
HDLC SERPL-MCNC: 39 MG/DL (ref 40–60)
HGB BLD-MCNC: 12.2 G/DL (ref 13–17.7)
LDLC SERPL CALC-MCNC: 51 MG/DL (ref 0–100)
LDLC/HDLC SERPL: 1.31 {RATIO}
MCH RBC QN AUTO: 33.3 PG (ref 26.6–33)
MCHC RBC AUTO-ENTMCNC: 33.8 G/DL (ref 31.5–35.7)
MCV RBC AUTO: 98.6 FL (ref 79–97)
PLATELET # BLD AUTO: 217 10*3/MM3 (ref 140–450)
PMV BLD AUTO: 9.7 FL (ref 6–12)
POTASSIUM SERPL-SCNC: 4.1 MMOL/L (ref 3.5–5.2)
PROT SERPL-MCNC: 7 G/DL (ref 6–8.5)
RBC # BLD AUTO: 3.66 10*6/MM3 (ref 4.14–5.8)
SODIUM SERPL-SCNC: 138 MMOL/L (ref 136–145)
TRIGL SERPL-MCNC: 90 MG/DL (ref 0–150)
VLDLC SERPL-MCNC: 18 MG/DL (ref 5–40)
WBC NRBC COR # BLD AUTO: 7.04 10*3/MM3 (ref 3.4–10.8)

## 2024-09-24 PROCEDURE — 85027 COMPLETE CBC AUTOMATED: CPT | Performed by: NURSE PRACTITIONER

## 2024-09-24 PROCEDURE — 25010000002 HEPARIN (PORCINE) PER 1000 UNITS: Performed by: INTERNAL MEDICINE

## 2024-09-24 PROCEDURE — 25510000001 IOPAMIDOL PER 1 ML: Performed by: INTERNAL MEDICINE

## 2024-09-24 PROCEDURE — C1769 GUIDE WIRE: HCPCS | Performed by: INTERNAL MEDICINE

## 2024-09-24 PROCEDURE — 36415 COLL VENOUS BLD VENIPUNCTURE: CPT

## 2024-09-24 PROCEDURE — 71045 X-RAY EXAM CHEST 1 VIEW: CPT

## 2024-09-24 PROCEDURE — 80061 LIPID PANEL: CPT | Performed by: NURSE PRACTITIONER

## 2024-09-24 PROCEDURE — 25010000002 MIDAZOLAM PER 1 MG: Performed by: INTERNAL MEDICINE

## 2024-09-24 PROCEDURE — 25810000003 SODIUM CHLORIDE 0.9 % SOLUTION: Performed by: NURSE PRACTITIONER

## 2024-09-24 PROCEDURE — 85347 COAGULATION TIME ACTIVATED: CPT

## 2024-09-24 PROCEDURE — 93799 UNLISTED CV SVC/PROCEDURE: CPT | Performed by: INTERNAL MEDICINE

## 2024-09-24 PROCEDURE — 80053 COMPREHEN METABOLIC PANEL: CPT | Performed by: NURSE PRACTITIONER

## 2024-09-24 PROCEDURE — C1894 INTRO/SHEATH, NON-LASER: HCPCS | Performed by: INTERNAL MEDICINE

## 2024-09-24 PROCEDURE — 83036 HEMOGLOBIN GLYCOSYLATED A1C: CPT | Performed by: NURSE PRACTITIONER

## 2024-09-24 PROCEDURE — 25010000002 FENTANYL CITRATE (PF) 50 MCG/ML SOLUTION: Performed by: INTERNAL MEDICINE

## 2024-09-24 PROCEDURE — C1887 CATHETER, GUIDING: HCPCS | Performed by: INTERNAL MEDICINE

## 2024-09-24 PROCEDURE — 93458 L HRT ARTERY/VENTRICLE ANGIO: CPT | Performed by: INTERNAL MEDICINE

## 2024-09-24 PROCEDURE — 25010000002 NICARDIPINE 2.5 MG/ML SOLUTION: Performed by: INTERNAL MEDICINE

## 2024-09-24 RX ORDER — FENTANYL CITRATE 50 UG/ML
INJECTION, SOLUTION INTRAMUSCULAR; INTRAVENOUS
Status: DISCONTINUED | OUTPATIENT
Start: 2024-09-24 | End: 2024-09-24 | Stop reason: HOSPADM

## 2024-09-24 RX ORDER — SODIUM CHLORIDE 0.9 % (FLUSH) 0.9 %
1-10 SYRINGE (ML) INJECTION AS NEEDED
Status: DISCONTINUED | OUTPATIENT
Start: 2024-09-24 | End: 2024-09-24 | Stop reason: HOSPADM

## 2024-09-24 RX ORDER — IOPAMIDOL 755 MG/ML
INJECTION, SOLUTION INTRAVASCULAR
Status: DISCONTINUED | OUTPATIENT
Start: 2024-09-24 | End: 2024-09-24 | Stop reason: HOSPADM

## 2024-09-24 RX ORDER — ACETAMINOPHEN 325 MG/1
650 TABLET ORAL EVERY 4 HOURS PRN
Status: DISCONTINUED | OUTPATIENT
Start: 2024-09-24 | End: 2024-09-24 | Stop reason: HOSPADM

## 2024-09-24 RX ORDER — NICARDIPINE HCL-0.9% SOD CHLOR 1 MG/10 ML
SYRINGE (ML) INTRAVENOUS
Status: DISCONTINUED | OUTPATIENT
Start: 2024-09-24 | End: 2024-09-24 | Stop reason: HOSPADM

## 2024-09-24 RX ORDER — MIDAZOLAM HYDROCHLORIDE 1 MG/ML
INJECTION INTRAMUSCULAR; INTRAVENOUS
Status: DISCONTINUED | OUTPATIENT
Start: 2024-09-24 | End: 2024-09-24 | Stop reason: HOSPADM

## 2024-09-24 RX ORDER — LIDOCAINE HYDROCHLORIDE 10 MG/ML
INJECTION, SOLUTION EPIDURAL; INFILTRATION; INTRACAUDAL; PERINEURAL
Status: DISCONTINUED | OUTPATIENT
Start: 2024-09-24 | End: 2024-09-24 | Stop reason: HOSPADM

## 2024-09-24 RX ORDER — ONDANSETRON 2 MG/ML
4 INJECTION INTRAMUSCULAR; INTRAVENOUS EVERY 6 HOURS PRN
Status: DISCONTINUED | OUTPATIENT
Start: 2024-09-24 | End: 2024-09-24 | Stop reason: HOSPADM

## 2024-09-24 RX ORDER — SODIUM CHLORIDE 9 MG/ML
40 INJECTION, SOLUTION INTRAVENOUS AS NEEDED
Status: DISCONTINUED | OUTPATIENT
Start: 2024-09-24 | End: 2024-09-24 | Stop reason: HOSPADM

## 2024-09-24 RX ORDER — ASPIRIN 81 MG/1
324 TABLET, CHEWABLE ORAL ONCE
Status: COMPLETED | OUTPATIENT
Start: 2024-09-24 | End: 2024-09-24

## 2024-09-24 RX ORDER — NITROGLYCERIN 0.4 MG/1
0.4 TABLET SUBLINGUAL
Status: DISCONTINUED | OUTPATIENT
Start: 2024-09-24 | End: 2024-09-24 | Stop reason: HOSPADM

## 2024-09-24 RX ORDER — HEPARIN SODIUM 1000 [USP'U]/ML
INJECTION, SOLUTION INTRAVENOUS; SUBCUTANEOUS
Status: DISCONTINUED | OUTPATIENT
Start: 2024-09-24 | End: 2024-09-24 | Stop reason: HOSPADM

## 2024-09-24 RX ORDER — SODIUM CHLORIDE 0.9 % (FLUSH) 0.9 %
10 SYRINGE (ML) INJECTION EVERY 12 HOURS SCHEDULED
Status: DISCONTINUED | OUTPATIENT
Start: 2024-09-24 | End: 2024-09-24 | Stop reason: HOSPADM

## 2024-09-24 RX ADMIN — SODIUM CHLORIDE 330 ML: 9 INJECTION, SOLUTION INTRAVENOUS at 08:57

## 2024-09-24 RX ADMIN — ASPIRIN 81 MG 324 MG: 81 TABLET ORAL at 09:20

## 2024-10-28 ENCOUNTER — OFFICE VISIT (OUTPATIENT)
Dept: CARDIOLOGY | Facility: CLINIC | Age: 74
End: 2024-10-28
Payer: MEDICARE

## 2024-10-28 VITALS
BODY MASS INDEX: 35.35 KG/M2 | HEART RATE: 76 BPM | SYSTOLIC BLOOD PRESSURE: 118 MMHG | WEIGHT: 261 LBS | HEIGHT: 72 IN | DIASTOLIC BLOOD PRESSURE: 62 MMHG | OXYGEN SATURATION: 99 %

## 2024-10-28 DIAGNOSIS — R06.02 SOB (SHORTNESS OF BREATH): ICD-10-CM

## 2024-10-28 DIAGNOSIS — I10 PRIMARY HYPERTENSION: ICD-10-CM

## 2024-10-28 DIAGNOSIS — E87.6 HYPOKALEMIA: ICD-10-CM

## 2024-10-28 DIAGNOSIS — I20.89 CHRONIC STABLE ANGINA: ICD-10-CM

## 2024-10-28 DIAGNOSIS — G47.33 OSA ON CPAP: ICD-10-CM

## 2024-10-28 DIAGNOSIS — I25.119 CORONARY ARTERY DISEASE INVOLVING NATIVE CORONARY ARTERY OF NATIVE HEART WITH ANGINA PECTORIS: Primary | ICD-10-CM

## 2024-10-28 PROCEDURE — 3074F SYST BP LT 130 MM HG: CPT | Performed by: INTERNAL MEDICINE

## 2024-10-28 PROCEDURE — 3078F DIAST BP <80 MM HG: CPT | Performed by: INTERNAL MEDICINE

## 2024-10-28 PROCEDURE — 99214 OFFICE O/P EST MOD 30 MIN: CPT | Performed by: INTERNAL MEDICINE

## 2024-10-28 NOTE — ASSESSMENT & PLAN NOTE
Increase exercise and activity. Will refer to cardiac rehab.  Either deconditioning or stable angina.  Encouraged exercise.  Orders:    Ambulatory Referral to Cardiac Rehab

## 2024-10-28 NOTE — ASSESSMENT & PLAN NOTE
Will try for cardiac rehab. LDL controlled. Continue medical therapy.  70-80% blockage in diagonal artery.  Symptoms may represent stable angina.    Orders:    Ambulatory Referral to Cardiac Rehab

## 2024-10-28 NOTE — PROGRESS NOTES
[facility] Provider Note     Date:   10/28/2024   Name: Larry Dean Klinefelter  :   1950  PCP: Kin Nunez MD    Chief Complaint   Patient presents with    Coronary Artery Disease    Sleep Apnea       Subjective     History of Present Illness  Larry Dean Klinefelter is a 74 y.o. male who presents today for  follow up from heart cath. No stents was placed. Wanting results of heart cath.  Spent some time explaining to him because he was not quite understanding what he was told after the cath itself.  Has been having aching feeling in right side of chest. No worse. No palpitations noted.  Hands numb which has been going on long time.  He wants to know if it is vascular related.  If walks 250 feet will get short of breath and need to rest.Reports that symptoms are not any better since before his heart cath.Reports that symptoms have not gotten any worse either.  Has slight swelling in lower extremities. Reports some swelling occasionally to right index finger.  Reports no dizziness. Reports no syncope  C-PAP No problems with pressure or mask. Sometimes tired in the day but not excessive.Uses full face mask. No problems with supplies.        Cardiac history  1. CAD  -24 LHC ·  40% mid LAD with normal RFR 0.81  ·  70-80% ostial first diagonal stenosis  ·  Otherwise normal coronary arteries  ·  LVEF 70%    2. TRACEY, Auto CPAP 12-18 cm  3. Hypertension  4. Hyperlipidemia  5. Edema    Allergies   Allergen Reactions    Lisinopril Angioedema    Naproxen Other (See Comments)     Pt doesn't recall        Current Outpatient Medications:     acetaminophen (TYLENOL) 500 MG tablet, Take 2 tablets by mouth Every 6 (Six) Hours As Needed for Mild Pain., Disp: , Rfl:     aspirin 81 MG EC tablet, Take 1 tablet by mouth Daily., Disp: , Rfl:     atorvastatin (LIPITOR) 40 MG tablet, Take 1 tablet by mouth Every Night., Disp: , Rfl:     Calcium Carb-Cholecalciferol (CALCIUM 600 + D PO), Take 1  tablet by mouth 2 (Two) Times a Day., Disp: , Rfl:     cetirizine (zyrTEC) 10 MG tablet, Take 1 tablet by mouth Daily., Disp: , Rfl:     Coenzyme Q10 (CO Q-10) 100 MG capsule, Take 100 mg by mouth 2 (Two) Times a Day., Disp: , Rfl:     Diclofenac Sodium (VOLTAREN) 1 % gel gel, , Disp: , Rfl:     docusate sodium (COLACE) 250 MG capsule, Take 1 capsule by mouth Daily., Disp: , Rfl:     ezetimibe (ZETIA) 10 MG tablet, Take 1 tablet by mouth Daily., Disp: , Rfl:     ferrous sulfate 324 (65 Fe) MG tablet delayed-release EC tablet, Take 1 tablet by mouth Every Night., Disp: , Rfl:     Glucosamine-Chondroitin 8779-2442 MG/30ML liquid, Take 1 capsule by mouth 2 (Two) Times a Day., Disp: , Rfl:     hydroCHLOROthiazide (HYDRODIURIL) 25 MG tablet, TAKE 1 TABLET DAILY, Disp: 90 tablet, Rfl: 3    losartan (Cozaar) 50 MG tablet, Take 1 tablet by mouth Daily. (Patient taking differently: Take 1 tablet by mouth 2 (Two) Times a Day.), Disp: 90 tablet, Rfl: 1    metoprolol succinate XL (TOPROL-XL) 25 MG 24 hr tablet, TAKE 1 TABLET DAILY, Disp: 90 tablet, Rfl: 3    Multiple Vitamins-Minerals (VITEYES AREDS FORMULA PO), Take 1 capsule by mouth 2 (Two) Times a Day., Disp: , Rfl:     multivitamin with minerals (MULTIVITAMIN ADULT PO), Take 1 tablet by mouth Daily., Disp: , Rfl:     Polyethylene Glycol 3350 (MIRALAX PO), Take 1 dose by mouth 2 (Two) Times a Day. 1 tsp, Disp: , Rfl:     ranolazine (RANEXA) 500 MG 12 hr tablet, TAKE 1 TABLET DAILY, Disp: 90 tablet, Rfl: 3    SYNTHROID 25 MCG tablet, Take 1 tablet by mouth Daily., Disp: , Rfl:     Past Medical History:   Diagnosis Date    Anemia     Bowel obstruction     Cataract     mild- still forming    Chest pain     Constipation     Coronary artery disease     Disease of thyroid gland     Gallstone     GERD (gastroesophageal reflux disease)     Hepatitis     Hyperlipidemia     Hypertension     Low back pain     Lumbar spinal stenosis     Macular degeneration     Medial meniscus tear      Obesity     Obstructive sleep apnea on CPAP     12-18    Pneumonia     Primary osteoarthritis of left knee     and right knee    Sepsis due to pneumonia 11/01/2020    SOBOE (shortness of breath on exertion)     Wears glasses       Past Surgical History:   Procedure Laterality Date    CARDIAC CATHETERIZATION  01/2019    CARDIAC CATHETERIZATION      09/24/2024 PER DR. DOOLEY    CARDIAC CATHETERIZATION N/A 9/24/2024    Procedure: Left Heart Cath;  Surgeon: Joshua Dooley MD;  Location: [facility];  Service: Cardiovascular;  Laterality: N/A;    CARDIAC CATHETERIZATION  9/24/2024    Procedure: Functional Flow [place];  Surgeon: Joshua Dooley MD;  Location: [facility];  Service: Cardiovascular;;    COLONOSCOPY  2013    EXPLORATORY LAPAROTOMY N/A 10/21/2021    Procedure: LAPAROTOMY EXPLORATORY, EXPLANT OF ABDOMINAL MESH AND LYSIS OF ADHESIONS;  Surgeon: Quan Alberto MD;  Location: [facility];  Service: General;  Laterality: N/A;    FRACTURE SURGERY      left heel - no hardware    INGUINAL HERNIA REPAIR      x2     KNEE ARTHROSCOPY Right     LUMBAR LAMINECTOMY DISCECTOMY DECOMPRESSION N/A 03/28/2022    Procedure: LUMBAR LAMINECTOMY DECOMPRESSION L3-5;  Surgeon: Conrad Givens MD;  Location: [facility];  Service: Neurosurgery;  Laterality: N/A;    REPLACEMENT TOTAL KNEE Right     TONSILLECTOMY      TOTAL KNEE ARTHROPLASTY Left 05/16/2019    Procedure: TOTAL KNEE ARTHROPLASTY LEFT;  Surgeon: Willian Jc MD;  Location: [facility];  Service: Orthopedics     Family History   Problem Relation Age of Onset    Rheum arthritis Mother     Dementia Mother     Parkinsonism Mother     Stroke Father     Heart attack Father     Hypertension Father     Clotting disorder Father     Rheum arthritis Father     Hypertension Other      Social History     Socioeconomic History    Marital status:    Tobacco Use    Smoking status: Never     Passive exposure: Never    Smokeless tobacco:  "Never   Vaping Use    Vaping status: Never Used   Substance and Sexual Activity    Alcohol use: Never    Drug use: Never    Sexual activity: Defer       Objective     Vital Signs:  /62   Pulse 76   Ht 182.9 cm (72\")   Wt 118 kg (261 lb)   SpO2 99%   BMI 35.40 kg/m²   Estimated body mass index is 35.4 kg/m² as calculated from the following:    Height as of this encounter: 182.9 cm (72\").    Weight as of this encounter: 118 kg (261 lb).         Physical Exam  Constitutional:       Appearance: Normal appearance. He is well-developed.   HENT:      Head: Normocephalic and atraumatic.   Eyes:      General: No scleral icterus.     Pupils: Pupils are equal, round, and reactive to light.   Cardiovascular:      Rate and Rhythm: Normal rate and regular rhythm.      Heart sounds: Normal heart sounds. No murmur heard.  Pulmonary:      Breath sounds: Normal breath sounds. No wheezing or rhonchi.   Musculoskeletal:      Right lower leg: Edema present.      Left lower leg: Edema present.   Skin:     Capillary Refill: Capillary refill takes less than 2 seconds.      Coloration: Skin is not cyanotic.      Nails: There is no clubbing.   Neurological:      Mental Status: He is alert and oriented to person, place, and time.      Motor: No weakness.      Gait: Gait normal.   Psychiatric:         Mood and Affect: Mood normal.         Behavior: Behavior is cooperative.         Thought Content: Thought content normal.         Cognition and Memory: Memory normal.                     Assessment and Plan     Assessment & Plan  Coronary artery disease involving native coronary artery of native heart with angina pectoris  Will try for cardiac rehab. LDL controlled. Continue medical therapy.  70-80% blockage in diagonal artery.  Symptoms may represent stable angina.    Orders:    Ambulatory Referral to Cardiac Rehab    Chronic stable angina  Orders:    Ambulatory Referral to Cardiac Rehab    SOB (shortness of breath)  Increase exercise " and activity. Will refer to cardiac rehab.  Either deconditioning or stable angina.  Encouraged exercise.  Orders:    Ambulatory Referral to Cardiac Rehab    Primary hypertension  Hypertension is stable and controlled  Continue current treatment regimen.  Blood pressure will be reassessed in 3 months.         TRACEY on CPAP  Doing well.  Download reviewed.  Good compliance and control.       Hypokalemia  Stop k dur and repeat potassium in 1 wk  Orders:    Basic Metabolic Panel; Future           Recommendations: ER if symptoms increase and Report if any new/changing symptoms immediately    Addendum 10/30/24 to reflect opinion that his SOA is chronic stable angina related to his CAD and correct diagnosis accordingly.       Follow Up  Return in about 3 months (around 1/28/2025) for Recheck symptoms.    Mariana Meyer MD   10/28/2024     Please note that this explicitly excludes time spent on other separate billable services such as performing procedures or test interpretation, when applicable.    This note was created using dictation software which occasionally transcribes nonsensical phrases. Please contact the provider if any clarification is needed.

## 2024-10-28 NOTE — ASSESSMENT & PLAN NOTE
Hypertension is stable and controlled  Continue current treatment regimen.  Blood pressure will be reassessed in 3 months.

## 2024-11-06 ENCOUNTER — TELEPHONE (OUTPATIENT)
Dept: CARDIOLOGY | Facility: CLINIC | Age: 74
End: 2024-11-06
Payer: MEDICARE

## 2024-11-06 DIAGNOSIS — E87.6 HYPOKALEMIA: ICD-10-CM

## 2024-11-06 NOTE — TELEPHONE ENCOUNTER
Patient called and stated that he was told to stop his potassium.  He did labs to see what the results was and was wondering if he needs to stay off of the potassium or restart it.  He would like for someone to call him at [phone].

## 2024-11-07 NOTE — TELEPHONE ENCOUNTER
Called pt with written note of JASSI Ojeda.  He will hold off on taking his potassium.  He will also follow up with his PCP for labs and see our staff back ib 1.29.25 @ 19:15 AM in [place].  He verbalizes understanding.

## 2024-11-14 DIAGNOSIS — R60.0 BILATERAL LEG EDEMA: ICD-10-CM

## 2024-11-14 RX ORDER — HYDROCHLOROTHIAZIDE 25 MG/1
25 TABLET ORAL DAILY
Qty: 90 TABLET | Refills: 3 | Status: SHIPPED | OUTPATIENT
Start: 2024-11-14

## 2025-01-31 RX ORDER — RANOLAZINE 500 MG/1
500 TABLET, EXTENDED RELEASE ORAL DAILY
Qty: 90 TABLET | Refills: 3 | Status: SHIPPED | OUTPATIENT
Start: 2025-01-31

## 2025-02-24 ENCOUNTER — TELEPHONE (OUTPATIENT)
Dept: CARDIOLOGY | Facility: CLINIC | Age: 75
End: 2025-02-24
Payer: MEDICARE

## 2025-02-24 NOTE — TELEPHONE ENCOUNTER
Caller: Klinefelter, Larry Shar    Relationship: Self    Best call back number: [phone]     What is the best time to reach you: ANY     Who are you requesting to speak with (clinical staff, provider,  specific staff member): CLINICAL     What was the call regarding:   PT THOUGHT HE WAS COMING BACK TO SEE US TO F/U ON MED CHANGES, WONDERING IF ANY BLOOD WORK IS NEEDED BEFORE APPT.     Is it okay if the provider responds through MyChart: CALL

## 2025-02-24 NOTE — TELEPHONE ENCOUNTER
Called pt back in regards to needing labs.  Didn't see any pending orders.  Last BMP was 11/5/24.  Reminded pt of follow up visit on 3/3/25 with Dr. Meyer who may order labs to be done at that time.  He verbalizes understanding.

## 2025-03-03 ENCOUNTER — OFFICE VISIT (OUTPATIENT)
Dept: CARDIOLOGY | Facility: CLINIC | Age: 75
End: 2025-03-03
Payer: MEDICARE

## 2025-03-03 VITALS
HEART RATE: 74 BPM | BODY MASS INDEX: 34.13 KG/M2 | HEIGHT: 72 IN | SYSTOLIC BLOOD PRESSURE: 128 MMHG | OXYGEN SATURATION: 97 % | DIASTOLIC BLOOD PRESSURE: 70 MMHG | WEIGHT: 252 LBS

## 2025-03-03 DIAGNOSIS — I10 PRIMARY HYPERTENSION: ICD-10-CM

## 2025-03-03 DIAGNOSIS — G47.33 OSA ON CPAP: ICD-10-CM

## 2025-03-03 DIAGNOSIS — I25.119 CORONARY ARTERY DISEASE INVOLVING NATIVE CORONARY ARTERY OF NATIVE HEART WITH ANGINA PECTORIS: Primary | ICD-10-CM

## 2025-03-03 PROCEDURE — 3078F DIAST BP <80 MM HG: CPT | Performed by: INTERNAL MEDICINE

## 2025-03-03 PROCEDURE — 99214 OFFICE O/P EST MOD 30 MIN: CPT | Performed by: INTERNAL MEDICINE

## 2025-03-03 PROCEDURE — 3074F SYST BP LT 130 MM HG: CPT | Performed by: INTERNAL MEDICINE

## 2025-03-03 NOTE — PROGRESS NOTES
[facility] Provider Note     Date:   2025   Name: Larry Dean Klinefelter  :   1950  PCP: Kin Nunez MD    Chief Complaint   Patient presents with    Coronary Artery Disease     Pt states he is here today for follow up CAD. Does have some slight pain in his chest from time to time and does get SOA if he walks very far.     Sleep Apnea     Pt states he is here today for follow up TRACEY. DL is in Epic.        Subjective     History of Present Illness  Larry Dean Klinefelter is a 74 y.o. male who presents today for follow up  Last time seen was taken off potassium. Wants to know if is doing what was needed.  Has slight lower extremity edema.  Has occasion little pain in upper chest not severe and goes away after a while.  Denies palpitations.  Reports that has occasional lightheaded if stands up to fast. Not usual.  Gets short winded if walks very far. Will sit down and then get up go . Reports that is the same in nature now.  Has not been walking much outside D/T cold.  Reports no falls.  Feels pretty good.    Cardiac history  1. CAD  -24 LHC ·  40% mid LAD with normal RFR 0.81  ·  70-80% ostial first diagonal stenosis  ·  Otherwise normal coronary arteries  ·  LVEF 70%    2. TRACEY, Auto CPAP 12-18 cm  3. Hypertension  4. Hyperlipidemia  5. Edema    Allergies   Allergen Reactions    Lisinopril Angioedema    Naproxen Other (See Comments)     Pt doesn't recall        Current Outpatient Medications:     acetaminophen (TYLENOL) 500 MG tablet, Take 2 tablets by mouth Every 6 (Six) Hours As Needed for Mild Pain., Disp: , Rfl:     aspirin 81 MG EC tablet, Take 1 tablet by mouth Daily., Disp: , Rfl:     atorvastatin (LIPITOR) 40 MG tablet, Take 1 tablet by mouth Every Night., Disp: , Rfl:     Calcium Carb-Cholecalciferol (CALCIUM 600 + D PO), Take 1 tablet by mouth 2 (Two) Times a Day., Disp: , Rfl:     cetirizine (zyrTEC) 10 MG tablet, Take 1 tablet by mouth Daily., Disp: ,  Rfl:     Coenzyme Q10 (CO Q-10) 100 MG capsule, Take 100 mg by mouth 2 (Two) Times a Day., Disp: , Rfl:     Diclofenac Sodium (VOLTAREN) 1 % gel gel, , Disp: , Rfl:     docusate sodium (COLACE) 250 MG capsule, Take 1 capsule by mouth Daily., Disp: , Rfl:     ezetimibe (ZETIA) 10 MG tablet, Take 1 tablet by mouth Daily., Disp: , Rfl:     ferrous sulfate 324 (65 Fe) MG tablet delayed-release EC tablet, Take 1 tablet by mouth Every Night., Disp: , Rfl:     Glucosamine-Chondroitin 1614-5782 MG/30ML liquid, Take 1 capsule by mouth 2 (Two) Times a Day., Disp: , Rfl:     hydroCHLOROthiazide 25 MG tablet, TAKE 1 TABLET DAILY, Disp: 90 tablet, Rfl: 3    losartan (Cozaar) 50 MG tablet, Take 1 tablet by mouth Daily. (Patient taking differently: Take 1 tablet by mouth 2 (Two) Times a Day.), Disp: 90 tablet, Rfl: 1    metoprolol succinate XL (TOPROL-XL) 25 MG 24 hr tablet, TAKE 1 TABLET DAILY, Disp: 90 tablet, Rfl: 3    Multiple Vitamins-Minerals (VITEYES AREDS FORMULA PO), Take 1 capsule by mouth 2 (Two) Times a Day., Disp: , Rfl:     multivitamin with minerals (MULTIVITAMIN ADULT PO), Take 1 tablet by mouth Daily., Disp: , Rfl:     Polyethylene Glycol 3350 (MIRALAX PO), Take 1 dose by mouth 2 (Two) Times a Day. 1 tsp, Disp: , Rfl:     SYNTHROID 25 MCG tablet, Take 1 tablet by mouth Daily., Disp: , Rfl:     Past Medical History:   Diagnosis Date    Anemia     Bowel obstruction     Cataract     mild- still forming    Chest pain     Constipation     Coronary artery disease     Disease of thyroid gland     Gallstone     GERD (gastroesophageal reflux disease)     Hepatitis     Hyperlipidemia     Hypertension     Low back pain     Lumbar spinal stenosis     Macular degeneration     Medial meniscus tear     Obesity     Obstructive sleep apnea on CPAP     12-18    Pneumonia     Primary osteoarthritis of left knee     and right knee    Sepsis due to pneumonia 11/01/2020    SOBOE (shortness of breath on exertion)     Wears glasses        Past Surgical History:   Procedure Laterality Date    CARDIAC CATHETERIZATION  01/2019    CARDIAC CATHETERIZATION      09/24/2024 PER DR. DOOLEY    CARDIAC CATHETERIZATION N/A 9/24/2024    Procedure: Left Heart Cath;  Surgeon: Joshua Dooley MD;  Location:  [facility];  Service: Cardiovascular;  Laterality: N/A;    CARDIAC CATHETERIZATION  9/24/2024    Procedure: Functional Flow [place];  Surgeon: Joshua Dooley MD;  Location:  [facility];  Service: Cardiovascular;;    COLONOSCOPY  2013    EXPLORATORY LAPAROTOMY N/A 10/21/2021    Procedure: LAPAROTOMY EXPLORATORY, EXPLANT OF ABDOMINAL MESH AND LYSIS OF ADHESIONS;  Surgeon: Quan Alberto MD;  Location:  [facility];  Service: General;  Laterality: N/A;    FRACTURE SURGERY      left heel - no hardware    INGUINAL HERNIA REPAIR      x2     KNEE ARTHROSCOPY Right     LUMBAR LAMINECTOMY DISCECTOMY DECOMPRESSION N/A 03/28/2022    Procedure: LUMBAR LAMINECTOMY DECOMPRESSION L3-5;  Surgeon: Conrad Givens MD;  Location:  [facility];  Service: Neurosurgery;  Laterality: N/A;    REPLACEMENT TOTAL KNEE Right     TONSILLECTOMY      TOTAL KNEE ARTHROPLASTY Left 05/16/2019    Procedure: TOTAL KNEE ARTHROPLASTY LEFT;  Surgeon: Willian Jc MD;  Location:  [facility];  Service: Orthopedics     Family History   Problem Relation Age of Onset    Rheum arthritis Mother     Dementia Mother     Parkinsonism Mother     Stroke Father     Heart attack Father     Hypertension Father     Clotting disorder Father     Rheum arthritis Father     Hypertension Other      Social History     Socioeconomic History    Marital status:    Tobacco Use    Smoking status: Never     Passive exposure: Never    Smokeless tobacco: Never   Vaping Use    Vaping status: Never Used   Substance and Sexual Activity    Alcohol use: Never    Drug use: Never    Sexual activity: Defer       Objective     Vital Signs:  /70 (BP Location: Right arm, Patient  "Position: Sitting, Cuff Size: Adult)   Pulse 74   Ht 182.9 cm (72\")   Wt 114 kg (252 lb)   SpO2 97%   BMI 34.18 kg/m²   Estimated body mass index is 34.18 kg/m² as calculated from the following:    Height as of this encounter: 182.9 cm (72\").    Weight as of this encounter: 114 kg (252 lb).         Physical Exam  Constitutional:       Appearance: Normal appearance. He is well-developed.   HENT:      Head: Normocephalic and atraumatic.   Eyes:      General: No scleral icterus.     Pupils: Pupils are equal, round, and reactive to light.   Cardiovascular:      Rate and Rhythm: Normal rate and regular rhythm.      Heart sounds: Normal heart sounds. No murmur heard.  Pulmonary:      Breath sounds: Normal breath sounds. No wheezing or rhonchi.   Musculoskeletal:      Right lower leg: Edema (trace) present.      Left lower leg: Edema (trace) present.   Skin:     Capillary Refill: Capillary refill takes less than 2 seconds.      Coloration: Skin is not cyanotic.      Nails: There is no clubbing.   Neurological:      Mental Status: He is alert and oriented to person, place, and time.      Motor: No weakness.      Gait: Gait normal.   Psychiatric:         Mood and Affect: Mood normal.         Behavior: Behavior is cooperative.         Thought Content: Thought content normal.         Cognition and Memory: Memory normal.                     Assessment and Plan     Assessment & Plan  Coronary artery disease involving native coronary artery of native heart with angina pectoris  Does not seem to have benefited from Ranexa.  Will go ahead and stop this.  Otherwise he is doing well.  He is in cardiac rehab program and benefiting from this.  Also doing well on medical therapy.  I think some of his shortness of breath is related to conditioning.         Primary hypertension  Well-controlled.  Continue current medications.         TRACEY on CPAP  PAP download reviewed.  Good compliance and control.                    Follow " Up  Return in about 6 months (around 9/3/2025) for Recheck symptoms.    FLORA Meyer MD  [facility] and [facility]  03/03/2025     Please note that this explicitly excludes time spent on other separate billable services such as performing procedures or test interpretation, when applicable.    This note was created using dictation software which occasionally transcribes nonsensical phrases. Please contact the provider if any clarification is needed.

## 2025-03-04 NOTE — ASSESSMENT & PLAN NOTE
Does not seem to have benefited from Ranexa.  Will go ahead and stop this.  Otherwise he is doing well.  He is in cardiac rehab program and benefiting from this.  Also doing well on medical therapy.  I think some of his shortness of breath is related to conditioning.

## 2025-05-16 ENCOUNTER — TELEPHONE (OUTPATIENT)
Dept: CARDIOLOGY | Facility: CLINIC | Age: 75
End: 2025-05-16
Payer: MEDICARE

## 2025-05-16 NOTE — TELEPHONE ENCOUNTER
Caller: Klinefelter, Larry Shar    Relationship: Self    Best call back number: [phone]     What is the best time to reach you: ANYTIME    Who are you requesting to speak with (clinical staff, provider,  specific staff member): PROVIDER    Do you know the name of the person who called: NA    What was the call regarding: PT WANTING TO KNOW IF THE HYDROCHLOROTHIAZIDE REQUIRES HIM TO BE OUT OF THE SUN ENTIRELY, AS HE DOES MOW THE GRASS. BOTTLE STATES TO AVOID SUNLIGHT. PLEASE ADVISE.     Is it okay if the provider responds through MyChart: NO

## 2025-05-19 NOTE — TELEPHONE ENCOUNTER
PATIENT IS WANTING TO KNOW IF HE NEEDS TO AVOID SUNLIGHT COMPLETELY SINCE HE IS TAKING HCTZ. PLEASE ADVISE

## (undated) DEVICE — APPL CHLORAPREP W/TINT 26ML BLU

## (undated) DEVICE — CLTH CLENS READYCLEANSE PERI CARE PK/5

## (undated) DEVICE — MODEL AT P65, P/N 701554-001KIT CONTENTS: HAND CONTROLLER, 3-WAY HIGH-PRESSURE STOPCOCK WITH ROTATING END AND PREMIUM HIGH-PRESSURE TUBING: Brand: ANGIOTOUCH® KIT

## (undated) DEVICE — DRSNG WND BORDR/ADHS NONADHR/GZ LF 4X4IN STRL

## (undated) DEVICE — HLDR CATH FOL STATLOCK SWVL TRICOT

## (undated) DEVICE — PK KN TOTL 10

## (undated) DEVICE — PATIENT RETURN ELECTRODE, SINGLE-USE, CONTACT QUALITY MONITORING, ADULT, WITH 9FT CORD, FOR PATIENTS WEIGING OVER 33LBS. (15KG): Brand: MEGADYNE

## (undated) DEVICE — PUMP PAIN AUTOFUSER AUTO SELCT NOBOLUS 1TO14ML/HR 550ML DISP

## (undated) DEVICE — STRYKER PERFORMANCE SERIES SAGITTAL BLADE: Brand: STRYKER PERFORMANCE SERIES

## (undated) DEVICE — RECIPROCATING BLADE, DOUBLE SIDED, OFFSET  (70.0 X 0.8 X 12.5MM)

## (undated) DEVICE — CATH DIAG EXPO .056 FL3.5 6F 100CM

## (undated) DEVICE — ANTIBACTERIAL UNDYED BRAIDED (POLYGLACTIN 910), SYNTHETIC ABSORBABLE SUTURE: Brand: COATED VICRYL

## (undated) DEVICE — HDRST INTUB GENTLETOUCH SLOT 7IN RT

## (undated) DEVICE — GLV SURG SENSICARE W/ALOE PF LF 9 STRL

## (undated) DEVICE — TOTAL TRAY, 16FR 10ML SIL FOLEY, URN: Brand: MEDLINE

## (undated) DEVICE — STRAP POSTN KN/BDY FM 5X72IN DISP

## (undated) DEVICE — Device

## (undated) DEVICE — SIGMA LCS HIGH PERFORMANCE STERILE THREADED HEADED PINS: Brand: SIGMA LCS HIGH PERFORMANCE

## (undated) DEVICE — SUT VIC PLS CTD ANTIB BR 3/0 8/18IN 45CM

## (undated) DEVICE — GW PRESSUREWIRE X WIRELESS FFR 175CM

## (undated) DEVICE — SUT MNCRYL PLS ANTIB UD 4/0 PS2 18IN

## (undated) DEVICE — TR BAND RADIAL ARTERY COMPRESSION DEVICE: Brand: TR BAND

## (undated) DEVICE — BLANKT WARM UPPR/BDY ARM/OUT 57X196CM

## (undated) DEVICE — SPNG GZ WOVN 4X4IN 12PLY 10/BX STRL

## (undated) DEVICE — PK NEURO DISC 10

## (undated) DEVICE — ADULT, W/LG. BACK PAD, RADIOTRANSPARENT ELEMENT AND LEAD WIRE COMPATIBLE W/: Brand: DEFIBRILLATION ELECTRODES

## (undated) DEVICE — TB SXN FRAZIER 12F STRL

## (undated) DEVICE — C-ARM DRAPE: Brand: DEROYAL

## (undated) DEVICE — CEMENT MIXING SYSTEM WITH MIS FEMORAL BREAKAWAY NOZZLE: Brand: REVOLUTION

## (undated) DEVICE — PK CATH CARD 10

## (undated) DEVICE — LEX GENERAL ABDOMINAL SPLIT: Brand: MEDLINE INDUSTRIES, INC.

## (undated) DEVICE — CVR HNDL LT SURG ACCSSRY BLU STRL

## (undated) DEVICE — SYS SKIN CLS DERMABOND PRINEO W/22CM MESH TP

## (undated) DEVICE — ENSEAL 20 CM SHAFT, LARGE JAW: Brand: ENSEAL X1

## (undated) DEVICE — ELECTRD BLD EZ CLN MOD 4IN

## (undated) DEVICE — GW PERIPH GUIDERIGHT STD/EXCHNG/J/TIP SS 0.035IN 5X260CM

## (undated) DEVICE — BNDG ELAS W/CLIP 6IN 10YD LF STRL

## (undated) DEVICE — NDL HYPO ECLPS SFTY 18G 1 1/2IN

## (undated) DEVICE — GLIDESHEATH BASIC HYDROPHILIC COATED INTRODUCER SHEATH: Brand: GLIDESHEATH

## (undated) DEVICE — UNDERGLV SURG BIOGEL INDICAT PF 61/2 GRN

## (undated) DEVICE — ELECTRD BLD EZ CLN STD 2.5IN

## (undated) DEVICE — MODEL BT2000 P/N 700287-012KIT CONTENTS: MANIFOLD WITH SALINE AND CONTRAST PORTS, SALINE TUBING WITH SPIKE AND HAND SYRINGE, TRANSDUCER: Brand: BT2000 AUTOMATED MANIFOLD KIT

## (undated) DEVICE — GUIDE CATHETER: Brand: MACH1™

## (undated) DEVICE — CVR HNDL LIGHT RIGID

## (undated) DEVICE — ENCORE® LATEX MICRO SIZE 8.5, STERILE LATEX POWDER-FREE SURGICAL GLOVE: Brand: ENCORE

## (undated) DEVICE — GLV SURG PREMIERPRO MIC LTX PF SZ7.5 BRN

## (undated) DEVICE — GLV SURG PREMIERPRO MIC LTX PF SZ6.5 BRN

## (undated) DEVICE — BNDR ABD PREMIUM/UNIV 10IN 27TO48IN

## (undated) DEVICE — GLV SURG BIOGEL LTX PF 7

## (undated) DEVICE — ACCY PA700 LUBRICANT DIFFUSER MR7 4 PACK: Brand: MIDAS REX

## (undated) DEVICE — PENCL ROCKRSWCH MEGADYNE W/HOLSTR 10FT SS

## (undated) DEVICE — TOOL MR8-14MH30 MR8 14CM MATCH 3MM: Brand: MIDAS REX MR8

## (undated) DEVICE — DRSNG PAD ABD 8X10IN STRL

## (undated) DEVICE — UNDERGLV SURG BIOGEL INDICATOR LF PF 7.5

## (undated) DEVICE — KT VLV HEMO MAP ACC PLS LG/BORE MTL/INTRO W/TORQ/DEV

## (undated) DEVICE — SIGMA LCS HIGH PERFORMANCE INSTRUMENTS STERILE THREADED PINS: Brand: SIGMA LCS HIGH PERFORMANCE

## (undated) DEVICE — NDL ANGIOGR ADV THN SMOTH SGLWALL 21G 1.5

## (undated) DEVICE — CATH DIAG EXPO M/ PK 6FR FL4/FR4 PIG 3PK

## (undated) DEVICE — UNDERCAST PADDING: Brand: DEROYAL